# Patient Record
Sex: MALE | Race: WHITE | NOT HISPANIC OR LATINO | Employment: FULL TIME | URBAN - METROPOLITAN AREA
[De-identification: names, ages, dates, MRNs, and addresses within clinical notes are randomized per-mention and may not be internally consistent; named-entity substitution may affect disease eponyms.]

---

## 2020-01-01 ENCOUNTER — HOSPITAL ENCOUNTER (OUTPATIENT)
Dept: INFUSION CENTER | Facility: HOSPITAL | Age: 55
Discharge: HOME | End: 2020-12-22
Attending: INTERNAL MEDICINE
Payer: COMMERCIAL

## 2020-01-01 ENCOUNTER — APPOINTMENT (OUTPATIENT)
Dept: RADIATION ONCOLOGY | Facility: CLINIC | Age: 55
End: 2020-01-01
Attending: RADIOLOGY
Payer: COMMERCIAL

## 2020-01-01 ENCOUNTER — TELEPHONE (OUTPATIENT)
Dept: HEMATOLOGY ONCOLOGY | Facility: CLINIC | Age: 55
End: 2020-01-01

## 2020-01-01 ENCOUNTER — APPOINTMENT (OUTPATIENT)
Dept: PHYSICAL THERAPY | Facility: CLINIC | Age: 55
End: 2020-01-01
Payer: COMMERCIAL

## 2020-01-01 ENCOUNTER — OFFICE VISIT (OUTPATIENT)
Dept: PHYSICAL THERAPY | Facility: CLINIC | Age: 55
End: 2020-01-01
Payer: COMMERCIAL

## 2020-01-01 ENCOUNTER — HOSPITAL ENCOUNTER (OUTPATIENT)
Dept: INFUSION CENTER | Facility: HOSPITAL | Age: 55
Discharge: HOME | End: 2020-12-01
Attending: INTERNAL MEDICINE
Payer: COMMERCIAL

## 2020-01-01 ENCOUNTER — APPOINTMENT (OUTPATIENT)
Dept: SPEECH THERAPY | Facility: CLINIC | Age: 55
End: 2020-01-01
Payer: COMMERCIAL

## 2020-01-01 ENCOUNTER — TELEPHONE (OUTPATIENT)
Dept: RADIATION ONCOLOGY | Facility: CLINIC | Age: 55
End: 2020-01-01

## 2020-01-01 ENCOUNTER — APPOINTMENT (OUTPATIENT)
Dept: OCCUPATIONAL THERAPY | Facility: CLINIC | Age: 55
End: 2020-01-01
Payer: COMMERCIAL

## 2020-01-01 ENCOUNTER — HOSPITAL ENCOUNTER (OUTPATIENT)
Dept: INFUSION CENTER | Facility: HOSPITAL | Age: 55
Discharge: HOME | End: 2020-12-03
Attending: INTERNAL MEDICINE
Payer: COMMERCIAL

## 2020-01-01 ENCOUNTER — HOSPITAL ENCOUNTER (OUTPATIENT)
Dept: INFUSION CENTER | Facility: HOSPITAL | Age: 55
End: 2020-01-01
Attending: INTERNAL MEDICINE

## 2020-01-01 ENCOUNTER — HOSPITAL ENCOUNTER (OUTPATIENT)
Dept: NON INVASIVE DIAGNOSTICS | Facility: HOSPITAL | Age: 55
Discharge: HOME | End: 2020-12-23
Attending: INTERNAL MEDICINE
Payer: COMMERCIAL

## 2020-01-01 ENCOUNTER — HOSPITAL ENCOUNTER (OUTPATIENT)
Dept: INFUSION CENTER | Facility: HOSPITAL | Age: 55
Discharge: HOME | End: 2020-12-26
Payer: COMMERCIAL

## 2020-01-01 ENCOUNTER — TELEMEDICINE (OUTPATIENT)
Dept: RADIATION ONCOLOGY | Facility: CLINIC | Age: 55
End: 2020-01-01
Attending: RADIOLOGY

## 2020-01-01 ENCOUNTER — HOSPITAL ENCOUNTER (OUTPATIENT)
Dept: INFUSION CENTER | Facility: HOSPITAL | Age: 55
Discharge: HOME | End: 2020-12-23
Attending: INTERNAL MEDICINE
Payer: COMMERCIAL

## 2020-01-01 ENCOUNTER — TELEPHONE (OUTPATIENT)
Dept: PALLIATIVE MEDICINE | Facility: CLINIC | Age: 55
End: 2020-01-01

## 2020-01-01 ENCOUNTER — HOSPITAL ENCOUNTER (OUTPATIENT)
Dept: MRI IMAGING | Facility: HOSPITAL | Age: 55
Discharge: HOME | End: 2020-10-30
Payer: COMMERCIAL

## 2020-01-01 ENCOUNTER — DOCUMENTATION (OUTPATIENT)
Dept: HEMATOLOGY ONCOLOGY | Facility: CLINIC | Age: 55
End: 2020-01-01

## 2020-01-01 ENCOUNTER — HOSPITAL ENCOUNTER (OUTPATIENT)
Dept: INFUSION CENTER | Facility: HOSPITAL | Age: 55
Discharge: HOME | End: 2020-12-04
Payer: COMMERCIAL

## 2020-01-01 ENCOUNTER — OFFICE VISIT (OUTPATIENT)
Dept: SPEECH THERAPY | Facility: CLINIC | Age: 55
End: 2020-01-01
Payer: COMMERCIAL

## 2020-01-01 ENCOUNTER — TELEPHONE (OUTPATIENT)
Dept: NEUROSURGERY | Facility: CLINIC | Age: 55
End: 2020-01-01

## 2020-01-01 ENCOUNTER — HOSPITAL ENCOUNTER (OUTPATIENT)
Dept: MRI IMAGING | Facility: HOSPITAL | Age: 55
Discharge: HOME | End: 2020-12-01
Payer: COMMERCIAL

## 2020-01-01 ENCOUNTER — HOSPITAL ENCOUNTER (OUTPATIENT)
Dept: INFUSION CENTER | Facility: HOSPITAL | Age: 55
Discharge: HOME | End: 2020-11-24
Attending: INTERNAL MEDICINE

## 2020-01-01 ENCOUNTER — OFFICE VISIT (OUTPATIENT)
Dept: HEMATOLOGY ONCOLOGY | Facility: CLINIC | Age: 55
End: 2020-01-01
Payer: COMMERCIAL

## 2020-01-01 ENCOUNTER — EVALUATION (OUTPATIENT)
Dept: PHYSICAL THERAPY | Facility: CLINIC | Age: 55
End: 2020-01-01
Payer: COMMERCIAL

## 2020-01-01 ENCOUNTER — LAB (OUTPATIENT)
Dept: LAB | Facility: CLINIC | Age: 55
End: 2020-01-01
Payer: COMMERCIAL

## 2020-01-01 ENCOUNTER — HOSPITAL ENCOUNTER (OUTPATIENT)
Dept: INFUSION CENTER | Facility: HOSPITAL | Age: 55
Discharge: HOME | End: 2020-10-28
Payer: COMMERCIAL

## 2020-01-01 ENCOUNTER — OFFICE VISIT (OUTPATIENT)
Dept: OCCUPATIONAL THERAPY | Facility: CLINIC | Age: 55
End: 2020-01-01
Payer: COMMERCIAL

## 2020-01-01 ENCOUNTER — EVALUATION (OUTPATIENT)
Dept: SPEECH THERAPY | Facility: CLINIC | Age: 55
End: 2020-01-01
Payer: COMMERCIAL

## 2020-01-01 ENCOUNTER — TRANSCRIBE ORDERS (OUTPATIENT)
Dept: PHYSICAL THERAPY | Facility: CLINIC | Age: 55
End: 2020-01-01

## 2020-01-01 ENCOUNTER — HOSPITAL ENCOUNTER (OUTPATIENT)
Dept: INFUSION CENTER | Facility: HOSPITAL | Age: 55
Discharge: HOME | End: 2020-11-23
Attending: INTERNAL MEDICINE

## 2020-01-01 ENCOUNTER — HOSPITAL ENCOUNTER (OUTPATIENT)
Dept: INFUSION CENTER | Facility: HOSPITAL | Age: 55
Discharge: HOME | End: 2020-12-24
Attending: INTERNAL MEDICINE
Payer: COMMERCIAL

## 2020-01-01 ENCOUNTER — HOSPITAL ENCOUNTER (OUTPATIENT)
Dept: INFUSION CENTER | Facility: HOSPITAL | Age: 55
Discharge: HOME | End: 2020-11-06
Attending: INTERNAL MEDICINE
Payer: COMMERCIAL

## 2020-01-01 ENCOUNTER — TRANSCRIBE ORDERS (OUTPATIENT)
Dept: SPEECH THERAPY | Facility: CLINIC | Age: 55
End: 2020-01-01

## 2020-01-01 ENCOUNTER — HOSPITAL ENCOUNTER (OUTPATIENT)
Dept: INFUSION CENTER | Facility: HOSPITAL | Age: 55
Discharge: HOME | End: 2020-12-02
Attending: INTERNAL MEDICINE

## 2020-01-01 ENCOUNTER — HOSPITAL ENCOUNTER (OUTPATIENT)
Dept: INFUSION CENTER | Facility: HOSPITAL | Age: 55
Discharge: HOME | End: 2020-11-05
Attending: INTERNAL MEDICINE
Payer: COMMERCIAL

## 2020-01-01 ENCOUNTER — CONSULT (OUTPATIENT)
Dept: PALLIATIVE MEDICINE | Facility: CLINIC | Age: 55
End: 2020-01-01
Payer: COMMERCIAL

## 2020-01-01 ENCOUNTER — HOSPITAL ENCOUNTER (OUTPATIENT)
Dept: CT IMAGING | Facility: HOSPITAL | Age: 55
Discharge: HOME | End: 2020-12-31
Attending: INTERNAL MEDICINE
Payer: COMMERCIAL

## 2020-01-01 ENCOUNTER — EVALUATION (OUTPATIENT)
Dept: OCCUPATIONAL THERAPY | Facility: CLINIC | Age: 55
End: 2020-01-01
Payer: COMMERCIAL

## 2020-01-01 ENCOUNTER — HOSPITAL ENCOUNTER (OUTPATIENT)
Dept: INFUSION CENTER | Facility: HOSPITAL | Age: 55
Discharge: HOME | End: 2020-11-04
Attending: INTERNAL MEDICINE
Payer: COMMERCIAL

## 2020-01-01 ENCOUNTER — HOSPITAL ENCOUNTER (OUTPATIENT)
Dept: MRI IMAGING | Facility: HOSPITAL | Age: 55
Discharge: HOME | End: 2020-12-09
Attending: INTERNAL MEDICINE
Payer: COMMERCIAL

## 2020-01-01 ENCOUNTER — HOSPITAL ENCOUNTER (OUTPATIENT)
Dept: INFUSION CENTER | Facility: HOSPITAL | Age: 55
Discharge: HOME | End: 2020-12-02
Attending: INTERNAL MEDICINE
Payer: COMMERCIAL

## 2020-01-01 VITALS
SYSTOLIC BLOOD PRESSURE: 162 MMHG | BODY MASS INDEX: 26.19 KG/M2 | DIASTOLIC BLOOD PRESSURE: 95 MMHG | HEART RATE: 80 BPM | HEIGHT: 67 IN | WEIGHT: 166.89 LBS | TEMPERATURE: 99.1 F

## 2020-01-01 VITALS
BODY MASS INDEX: 28.11 KG/M2 | HEART RATE: 85 BPM | SYSTOLIC BLOOD PRESSURE: 120 MMHG | DIASTOLIC BLOOD PRESSURE: 80 MMHG | OXYGEN SATURATION: 99 % | WEIGHT: 181.2 LBS | TEMPERATURE: 97.9 F | RESPIRATION RATE: 14 BRPM

## 2020-01-01 VITALS
RESPIRATION RATE: 18 BRPM | DIASTOLIC BLOOD PRESSURE: 75 MMHG | HEART RATE: 86 BPM | HEIGHT: 67 IN | TEMPERATURE: 98.2 F | SYSTOLIC BLOOD PRESSURE: 126 MMHG | BODY MASS INDEX: 27.96 KG/M2 | WEIGHT: 178.13 LBS

## 2020-01-01 VITALS
WEIGHT: 167.33 LBS | HEIGHT: 67 IN | DIASTOLIC BLOOD PRESSURE: 67 MMHG | BODY MASS INDEX: 26.26 KG/M2 | HEART RATE: 80 BPM | TEMPERATURE: 97.9 F | SYSTOLIC BLOOD PRESSURE: 130 MMHG | RESPIRATION RATE: 16 BRPM

## 2020-01-01 VITALS
BODY MASS INDEX: 26.19 KG/M2 | SYSTOLIC BLOOD PRESSURE: 120 MMHG | HEART RATE: 68 BPM | HEIGHT: 67 IN | TEMPERATURE: 98.1 F | DIASTOLIC BLOOD PRESSURE: 80 MMHG | WEIGHT: 166.89 LBS | RESPIRATION RATE: 18 BRPM

## 2020-01-01 VITALS
WEIGHT: 178.13 LBS | HEIGHT: 67 IN | BODY MASS INDEX: 27.96 KG/M2 | SYSTOLIC BLOOD PRESSURE: 136 MMHG | DIASTOLIC BLOOD PRESSURE: 91 MMHG | TEMPERATURE: 97.7 F | HEART RATE: 71 BPM | RESPIRATION RATE: 16 BRPM

## 2020-01-01 VITALS
TEMPERATURE: 98.1 F | HEART RATE: 66 BPM | DIASTOLIC BLOOD PRESSURE: 76 MMHG | BODY MASS INDEX: 26.26 KG/M2 | HEIGHT: 67 IN | SYSTOLIC BLOOD PRESSURE: 109 MMHG | RESPIRATION RATE: 18 BRPM | WEIGHT: 167.33 LBS

## 2020-01-01 VITALS
HEART RATE: 56 BPM | SYSTOLIC BLOOD PRESSURE: 149 MMHG | TEMPERATURE: 98.8 F | RESPIRATION RATE: 18 BRPM | BODY MASS INDEX: 26.26 KG/M2 | DIASTOLIC BLOOD PRESSURE: 91 MMHG | HEIGHT: 67 IN | WEIGHT: 167.33 LBS

## 2020-01-01 VITALS
OXYGEN SATURATION: 94 % | HEART RATE: 60 BPM | DIASTOLIC BLOOD PRESSURE: 69 MMHG | RESPIRATION RATE: 18 BRPM | SYSTOLIC BLOOD PRESSURE: 120 MMHG | WEIGHT: 166.89 LBS | BODY MASS INDEX: 26.19 KG/M2 | HEIGHT: 67 IN

## 2020-01-01 VITALS
DIASTOLIC BLOOD PRESSURE: 72 MMHG | BODY MASS INDEX: 26.62 KG/M2 | TEMPERATURE: 97.7 F | OXYGEN SATURATION: 96 % | WEIGHT: 169.6 LBS | HEIGHT: 67 IN | HEART RATE: 86 BPM | RESPIRATION RATE: 18 BRPM | SYSTOLIC BLOOD PRESSURE: 112 MMHG

## 2020-01-01 VITALS
SYSTOLIC BLOOD PRESSURE: 118 MMHG | WEIGHT: 166.1 LBS | HEIGHT: 67 IN | DIASTOLIC BLOOD PRESSURE: 70 MMHG | TEMPERATURE: 97.7 F | OXYGEN SATURATION: 97 % | BODY MASS INDEX: 26.07 KG/M2 | HEART RATE: 64 BPM | RESPIRATION RATE: 18 BRPM

## 2020-01-01 VITALS — TEMPERATURE: 97.2 F

## 2020-01-01 VITALS
DIASTOLIC BLOOD PRESSURE: 82 MMHG | HEIGHT: 67 IN | SYSTOLIC BLOOD PRESSURE: 134 MMHG | RESPIRATION RATE: 16 BRPM | WEIGHT: 178.13 LBS | TEMPERATURE: 98.8 F | HEART RATE: 68 BPM | BODY MASS INDEX: 27.96 KG/M2

## 2020-01-01 VITALS — TEMPERATURE: 97.4 F

## 2020-01-01 VITALS — TEMPERATURE: 98.3 F

## 2020-01-01 DIAGNOSIS — C79.31 BRAIN METASTASES (HCC): Primary | ICD-10-CM

## 2020-01-01 DIAGNOSIS — C34.32 SMALL CELL LUNG CANCER, LEFT LOWER LOBE (HCC): Primary | ICD-10-CM

## 2020-01-01 DIAGNOSIS — G93.89 BRAIN MASS: Primary | ICD-10-CM

## 2020-01-01 DIAGNOSIS — T45.1X5A CHEMOTHERAPY-INDUCED NEUTROPENIA (HCC): ICD-10-CM

## 2020-01-01 DIAGNOSIS — R26.9 NEUROLOGIC GAIT DYSFUNCTION: ICD-10-CM

## 2020-01-01 DIAGNOSIS — C34.32 SMALL CELL LUNG CANCER, LEFT LOWER LOBE (HCC): ICD-10-CM

## 2020-01-01 DIAGNOSIS — C79.31 BRAIN METASTASES (HCC): ICD-10-CM

## 2020-01-01 DIAGNOSIS — R60.9 EDEMA, UNSPECIFIED TYPE: ICD-10-CM

## 2020-01-01 DIAGNOSIS — R47.01 APHASIA: Primary | ICD-10-CM

## 2020-01-01 DIAGNOSIS — D70.1 CHEMOTHERAPY-INDUCED NEUTROPENIA (HCC): ICD-10-CM

## 2020-01-01 DIAGNOSIS — R47.01 APHASIA: ICD-10-CM

## 2020-01-01 DIAGNOSIS — H54.40 BLINDNESS OF LEFT EYE, UNSPECIFIED RIGHT EYE VISUAL IMPAIRMENT CATEGORY: ICD-10-CM

## 2020-01-01 DIAGNOSIS — Z03.818 ENCOUNTER FOR OBSERVATION FOR SUSPECTED EXPOSURE TO OTHER BIOLOGICAL AGENTS RULED OUT: Primary | ICD-10-CM

## 2020-01-01 DIAGNOSIS — I82.4Z1 ACUTE DEEP VEIN THROMBOSIS (DVT) OF DISTAL VEIN OF RIGHT LOWER EXTREMITY (HCC): ICD-10-CM

## 2020-01-01 DIAGNOSIS — C34.90 MALIGNANT NEOPLASM OF LUNG, UNSPECIFIED LATERALITY, UNSPECIFIED PART OF LUNG (HCC): Primary | ICD-10-CM

## 2020-01-01 DIAGNOSIS — Z71.89 COUNSELING REGARDING ADVANCED CARE PLANNING AND GOALS OF CARE: ICD-10-CM

## 2020-01-01 DIAGNOSIS — Z45.2 ENCOUNTER FOR CENTRAL LINE CARE: ICD-10-CM

## 2020-01-01 DIAGNOSIS — Z03.818 ENCOUNTER FOR OBSERVATION FOR SUSPECTED EXPOSURE TO OTHER BIOLOGICAL AGENTS RULED OUT: ICD-10-CM

## 2020-01-01 DIAGNOSIS — R60.9 EDEMA, UNSPECIFIED TYPE: Primary | ICD-10-CM

## 2020-01-01 DIAGNOSIS — G93.89 BRAIN MASS: ICD-10-CM

## 2020-01-01 DIAGNOSIS — I82.4Z1 ACUTE DEEP VEIN THROMBOSIS (DVT) OF DISTAL VEIN OF RIGHT LOWER EXTREMITY (HCC): Primary | ICD-10-CM

## 2020-01-01 LAB
ALBUMIN SERPL BCP-MCNC: 3.3 G/DL (ref 3.5–5)
ALBUMIN SERPL BCP-MCNC: 3.4 G/DL (ref 3.5–5.7)
ALBUMIN SERPL BCP-MCNC: 3.6 G/DL (ref 3.5–5.7)
ALBUMIN SERPL BCP-MCNC: 3.8 G/DL (ref 3.5–5)
ALP SERPL-CCNC: 114 U/L (ref 46–116)
ALP SERPL-CCNC: 116 U/L (ref 46–116)
ALP SERPL-CCNC: 83 U/L (ref 40–150)
ALP SERPL-CCNC: 91 U/L (ref 40–150)
ALT SERPL W P-5'-P-CCNC: 28 U/L (ref 7–52)
ALT SERPL W P-5'-P-CCNC: 37 U/L (ref 7–52)
ALT SERPL W P-5'-P-CCNC: 38 U/L (ref 12–78)
ALT SERPL W P-5'-P-CCNC: 45 U/L (ref 12–78)
ANION GAP SERPL CALCULATED.3IONS-SCNC: 5 MMOL/L (ref 4–13)
ANION GAP SERPL CALCULATED.3IONS-SCNC: 5 MMOL/L (ref 4–13)
ANION GAP SERPL CALCULATED.3IONS-SCNC: 6 MMOL/L (ref 4–13)
ANION GAP SERPL CALCULATED.3IONS-SCNC: 7 MMOL/L (ref 4–13)
ANISOCYTOSIS BLD QL SMEAR: PRESENT
ANISOCYTOSIS BLD QL SMEAR: PRESENT
AST SERPL W P-5'-P-CCNC: 15 U/L (ref 13–39)
AST SERPL W P-5'-P-CCNC: 17 U/L (ref 13–39)
AST SERPL W P-5'-P-CCNC: 23 U/L (ref 5–45)
AST SERPL W P-5'-P-CCNC: 24 U/L (ref 5–45)
BASOPHILS # BLD AUTO: 0.01 THOUSANDS/ΜL (ref 0–0.1)
BASOPHILS # BLD MANUAL: 0 THOUSAND/UL (ref 0–0.1)
BASOPHILS NFR BLD AUTO: 0 % (ref 0–1)
BASOPHILS NFR MAR MANUAL: 0 % (ref 0–1)
BILIRUB SERPL-MCNC: 0.5 MG/DL (ref 0.2–1)
BILIRUB SERPL-MCNC: 0.5 MG/DL (ref 0.2–1)
BILIRUB SERPL-MCNC: 0.55 MG/DL (ref 0.2–1)
BILIRUB SERPL-MCNC: 0.75 MG/DL (ref 0.2–1)
BUN SERPL-MCNC: 11 MG/DL (ref 7–25)
BUN SERPL-MCNC: 6 MG/DL (ref 5–25)
BUN SERPL-MCNC: 8 MG/DL (ref 5–25)
BUN SERPL-MCNC: 8 MG/DL (ref 7–25)
CALCIUM ALBUM COR SERPL-MCNC: 10.1 MG/DL (ref 8.3–10.1)
CALCIUM ALBUM COR SERPL-MCNC: 9.1 MG/DL (ref 8.3–10.1)
CALCIUM SERPL-MCNC: 8.6 MG/DL (ref 8.6–10.5)
CALCIUM SERPL-MCNC: 8.8 MG/DL (ref 8.6–10.5)
CALCIUM SERPL-MCNC: 9.4 MG/DL (ref 8.3–10.1)
CALCIUM SERPL-MCNC: 9.5 MG/DL (ref 8.3–10.1)
CHLORIDE SERPL-SCNC: 104 MMOL/L (ref 98–107)
CHLORIDE SERPL-SCNC: 106 MMOL/L (ref 100–108)
CHLORIDE SERPL-SCNC: 106 MMOL/L (ref 98–107)
CHLORIDE SERPL-SCNC: 108 MMOL/L (ref 100–108)
CO2 SERPL-SCNC: 26 MMOL/L (ref 21–32)
CO2 SERPL-SCNC: 28 MMOL/L (ref 21–31)
CO2 SERPL-SCNC: 28 MMOL/L (ref 21–32)
CO2 SERPL-SCNC: 29 MMOL/L (ref 21–31)
CREAT SERPL-MCNC: 0.59 MG/DL (ref 0.7–1.3)
CREAT SERPL-MCNC: 0.6 MG/DL (ref 0.7–1.3)
CREAT SERPL-MCNC: 0.73 MG/DL (ref 0.6–1.3)
CREAT SERPL-MCNC: 0.81 MG/DL (ref 0.6–1.3)
CRP SERPL QL: 34.5 MG/L
EOSINOPHIL # BLD AUTO: 0 THOUSAND/ΜL (ref 0–0.61)
EOSINOPHIL # BLD MANUAL: 0 THOUSAND/UL (ref 0–0.4)
EOSINOPHIL NFR BLD AUTO: 0 % (ref 0–6)
EOSINOPHIL NFR BLD MANUAL: 0 % (ref 0–6)
ERYTHROCYTE [DISTWIDTH] IN BLOOD BY AUTOMATED COUNT: 13.9 % (ref 11.5–14.5)
ERYTHROCYTE [DISTWIDTH] IN BLOOD BY AUTOMATED COUNT: 14.4 % (ref 11.5–14.5)
ERYTHROCYTE [DISTWIDTH] IN BLOOD BY AUTOMATED COUNT: 15.1 % (ref 11.6–15.1)
ERYTHROCYTE [DISTWIDTH] IN BLOOD BY AUTOMATED COUNT: 15.5 % (ref 11.6–15.1)
ERYTHROCYTE [DISTWIDTH] IN BLOOD BY AUTOMATED COUNT: 18.8 % (ref 11.5–14.5)
ERYTHROCYTE [SEDIMENTATION RATE] IN BLOOD: 17 MM/HOUR (ref 0–19)
GFR SERPL CREATININE-BSD FRML MDRD: 101 ML/MIN/1.73SQ M
GFR SERPL CREATININE-BSD FRML MDRD: 106 ML/MIN/1.73SQ M
GFR SERPL CREATININE-BSD FRML MDRD: 115 ML/MIN/1.73SQ M
GFR SERPL CREATININE-BSD FRML MDRD: 116 ML/MIN/1.73SQ M
GIANT PLATELETS BLD QL SMEAR: PRESENT
GLUCOSE SERPL-MCNC: 109 MG/DL (ref 65–99)
GLUCOSE SERPL-MCNC: 114 MG/DL (ref 65–99)
GLUCOSE SERPL-MCNC: 92 MG/DL (ref 65–140)
GLUCOSE SERPL-MCNC: 94 MG/DL (ref 65–140)
HCT VFR BLD AUTO: 27.8 % (ref 42–47)
HCT VFR BLD AUTO: 35.3 % (ref 42–47)
HCT VFR BLD AUTO: 35.4 % (ref 42–47)
HCT VFR BLD AUTO: 37.1 % (ref 36.5–49.3)
HCT VFR BLD AUTO: 38.1 % (ref 36.5–49.3)
HGB BLD-MCNC: 12.1 G/DL (ref 12–17)
HGB BLD-MCNC: 12.4 G/DL (ref 12–17)
HGB BLD-MCNC: 12.4 G/DL (ref 14–18)
HGB BLD-MCNC: 12.6 G/DL (ref 14–18)
HGB BLD-MCNC: 9.3 G/DL (ref 14–18)
IMM GRANULOCYTES # BLD AUTO: 0.01 THOUSAND/UL (ref 0–0.2)
IMM GRANULOCYTES NFR BLD AUTO: 0 % (ref 0–2)
LDH SERPL-CCNC: 247 U/L (ref 84–246)
LYMPHOCYTES # BLD AUTO: 1.17 THOUSAND/UL (ref 0.6–4.47)
LYMPHOCYTES # BLD AUTO: 1.28 THOUSAND/UL (ref 0.6–4.47)
LYMPHOCYTES # BLD AUTO: 1.35 THOUSANDS/ΜL (ref 0.6–4.47)
LYMPHOCYTES # BLD AUTO: 1.56 THOUSAND/UL (ref 0.6–4.47)
LYMPHOCYTES # BLD AUTO: 1.99 THOUSAND/UL (ref 0.6–4.47)
LYMPHOCYTES # BLD AUTO: 15 % (ref 20–51)
LYMPHOCYTES # BLD AUTO: 19 % (ref 20–51)
LYMPHOCYTES # BLD AUTO: 20 % (ref 14–44)
LYMPHOCYTES # BLD AUTO: 40 % (ref 20–51)
LYMPHOCYTES NFR BLD AUTO: 59 % (ref 14–44)
MCH RBC QN AUTO: 30.7 PG (ref 26.8–34.3)
MCH RBC QN AUTO: 31 PG (ref 26.8–34.3)
MCH RBC QN AUTO: 31 PG (ref 26–34)
MCH RBC QN AUTO: 32 PG (ref 26–34)
MCH RBC QN AUTO: 32.2 PG (ref 26–34)
MCHC RBC AUTO-ENTMCNC: 32.5 G/DL (ref 31.4–37.4)
MCHC RBC AUTO-ENTMCNC: 32.6 G/DL (ref 31.4–37.4)
MCHC RBC AUTO-ENTMCNC: 33.5 G/DL (ref 31–37)
MCHC RBC AUTO-ENTMCNC: 35.2 G/DL (ref 31–37)
MCHC RBC AUTO-ENTMCNC: 35.7 G/DL (ref 31–37)
MCV RBC AUTO: 90 FL (ref 81–99)
MCV RBC AUTO: 91 FL (ref 81–99)
MCV RBC AUTO: 93 FL (ref 81–99)
MCV RBC AUTO: 94 FL (ref 82–98)
MCV RBC AUTO: 95 FL (ref 82–98)
METAMYELOCYTES NFR BLD MANUAL: 2 % (ref 0–1)
METAMYELOCYTES NFR BLD MANUAL: 5 % (ref 0–1)
MONOCYTES # BLD AUTO: 0.39 THOUSAND/ΜL (ref 0.17–1.22)
MONOCYTES # BLD AUTO: 0.51 THOUSAND/UL (ref 0–1.22)
MONOCYTES # BLD AUTO: 0.7 THOUSAND/UL (ref 0–1.22)
MONOCYTES # BLD AUTO: 0.74 THOUSAND/UL (ref 0–1.22)
MONOCYTES # BLD AUTO: 1.01 THOUSAND/UL (ref 0–1.22)
MONOCYTES NFR BLD AUTO: 13 % (ref 4–12)
MONOCYTES NFR BLD AUTO: 16 % (ref 4–12)
MONOCYTES NFR BLD AUTO: 17 % (ref 4–12)
MONOCYTES NFR BLD AUTO: 9 % (ref 4–12)
MONOCYTES NFR BLD: 7 % (ref 4–12)
MYELOCYTES NFR BLD MANUAL: 5 % (ref 0–1)
NEUTROPHILS # BLD AUTO: 0.54 THOUSANDS/ΜL (ref 1.85–7.62)
NEUTROPHILS # BLD MANUAL: 7.05 THOUSAND/UL (ref 1.85–7.62)
NEUTS BAND NFR BLD MANUAL: 1 % (ref 0–8)
NEUTS BAND NFR BLD MANUAL: 10 % (ref 0–8)
NEUTS SEG # BLD: 1.09 THOUSAND/UL (ref 1.81–6.82)
NEUTS SEG # BLD: 5.62 THOUSAND/UL (ref 1.81–6.82)
NEUTS SEG # BLD: 5.9 THOUSAND/UL (ref 1.81–6.82)
NEUTS SEG NFR BLD AUTO: 24 % (ref 43–75)
NEUTS SEG NFR BLD AUTO: 34 % (ref 42–75)
NEUTS SEG NFR BLD AUTO: 62 % (ref 42–75)
NEUTS SEG NFR BLD AUTO: 70 % (ref 43–75)
NEUTS SEG NFR BLD AUTO: 72 % (ref 42–75)
NRBC BLD AUTO-RTO: 0 /100 WBCS
NRBC BLD AUTO-RTO: 0 /100 WBCS
PLATELET # BLD AUTO: 164 THOUSANDS/UL (ref 149–390)
PLATELET # BLD AUTO: 237 THOUSANDS/UL (ref 149–390)
PLATELET # BLD AUTO: 246 THOUSANDS/UL (ref 149–390)
PLATELET # BLD AUTO: 253 THOUSANDS/UL (ref 149–390)
PLATELET # BLD AUTO: 319 THOUSANDS/UL (ref 149–390)
PLATELET BLD QL SMEAR: ADEQUATE
PLATELET CLUMP BLD QL SMEAR: PRESENT
PMV BLD AUTO: 10.2 FL (ref 8.9–12.7)
PMV BLD AUTO: 10.8 FL (ref 8.9–12.7)
PMV BLD AUTO: 7.3 FL (ref 8.6–11.7)
PMV BLD AUTO: 7.7 FL (ref 8.6–11.7)
PMV BLD AUTO: 7.8 FL (ref 8.6–11.7)
POLYCHROMASIA BLD QL SMEAR: PRESENT
POTASSIUM SERPL-SCNC: 3.7 MMOL/L (ref 3.5–5.5)
POTASSIUM SERPL-SCNC: 4 MMOL/L (ref 3.5–5.5)
POTASSIUM SERPL-SCNC: 4.1 MMOL/L (ref 3.5–5.3)
POTASSIUM SERPL-SCNC: 4.3 MMOL/L (ref 3.5–5.3)
PROT SERPL-MCNC: 6.3 G/DL (ref 6.4–8.9)
PROT SERPL-MCNC: 6.3 G/DL (ref 6.4–8.9)
PROT SERPL-MCNC: 7.1 G/DL (ref 6.4–8.2)
PROT SERPL-MCNC: 7.3 G/DL (ref 6.4–8.2)
RBC # BLD AUTO: 3 MILLION/UL (ref 4.3–5.9)
RBC # BLD AUTO: 3.88 MILLION/UL (ref 4.3–5.9)
RBC # BLD AUTO: 3.9 MILLION/UL (ref 3.88–5.62)
RBC # BLD AUTO: 3.92 MILLION/UL (ref 4.3–5.9)
RBC # BLD AUTO: 4.04 MILLION/UL (ref 3.88–5.62)
RBC MORPH BLD: ABNORMAL
RBC MORPH BLD: NORMAL
RBC MORPH BLD: NORMAL
SARS-COV-2 RNA SPEC QL NAA+PROBE: NOT DETECTED
SODIUM SERPL-SCNC: 139 MMOL/L (ref 134–143)
SODIUM SERPL-SCNC: 139 MMOL/L (ref 136–145)
SODIUM SERPL-SCNC: 139 MMOL/L (ref 136–145)
SODIUM SERPL-SCNC: 141 MMOL/L (ref 134–143)
TOTAL CELLS COUNTED SPEC: 100
TSH SERPL DL<=0.05 MIU/L-ACNC: 1.09 UIU/ML (ref 0.36–3.74)
WBC # BLD AUTO: 2.3 THOUSAND/UL (ref 4.31–10.16)
WBC # BLD AUTO: 3.2 THOUSAND/UL (ref 4.8–10.8)
WBC # BLD AUTO: 7.8 THOUSAND/UL (ref 4.8–10.8)
WBC # BLD AUTO: 8.2 THOUSAND/UL (ref 4.8–10.8)
WBC # BLD AUTO: 9.93 THOUSAND/UL (ref 4.31–10.16)

## 2020-01-01 PROCEDURE — 74177 CT ABD & PELVIS W/CONTRAST: CPT

## 2020-01-01 PROCEDURE — 96413 CHEMO IV INFUSION 1 HR: CPT

## 2020-01-01 PROCEDURE — 99215 OFFICE O/P EST HI 40 MIN: CPT | Performed by: NURSE PRACTITIONER

## 2020-01-01 PROCEDURE — G1004 CDSM NDSC: HCPCS

## 2020-01-01 PROCEDURE — A9577 INJ MULTIHANCE: HCPCS | Performed by: RADIOLOGY

## 2020-01-01 PROCEDURE — 80053 COMPREHEN METABOLIC PANEL: CPT

## 2020-01-01 PROCEDURE — 97112 NEUROMUSCULAR REEDUCATION: CPT

## 2020-01-01 PROCEDURE — 96367 TX/PROPH/DG ADDL SEQ IV INF: CPT

## 2020-01-01 PROCEDURE — 36415 COLL VENOUS BLD VENIPUNCTURE: CPT

## 2020-01-01 PROCEDURE — 96417 CHEMO IV INFUS EACH ADDL SEQ: CPT

## 2020-01-01 PROCEDURE — 97530 THERAPEUTIC ACTIVITIES: CPT

## 2020-01-01 PROCEDURE — 96415 CHEMO IV INFUSION ADDL HR: CPT

## 2020-01-01 PROCEDURE — 85025 COMPLETE CBC W/AUTO DIFF WBC: CPT

## 2020-01-01 PROCEDURE — 72158 MRI LUMBAR SPINE W/O & W/DYE: CPT

## 2020-01-01 PROCEDURE — 77334 RADIATION TREATMENT AID(S): CPT | Performed by: RADIOLOGY

## 2020-01-01 PROCEDURE — 97110 THERAPEUTIC EXERCISES: CPT | Performed by: PHYSICAL THERAPIST

## 2020-01-01 PROCEDURE — 77370 RADIATION PHYSICS CONSULT: CPT | Performed by: RADIOLOGY

## 2020-01-01 PROCEDURE — 92507 TX SP LANG VOICE COMM INDIV: CPT | Performed by: NURSE PRACTITIONER

## 2020-01-01 PROCEDURE — 85007 BL SMEAR W/DIFF WBC COUNT: CPT

## 2020-01-01 PROCEDURE — 77412 RADIATION TX DELIVERY LVL 3: CPT | Performed by: RADIOLOGY

## 2020-01-01 PROCEDURE — 85027 COMPLETE CBC AUTOMATED: CPT

## 2020-01-01 PROCEDURE — 96372 THER/PROPH/DIAG INJ SC/IM: CPT

## 2020-01-01 PROCEDURE — 77295 3-D RADIOTHERAPY PLAN: CPT | Performed by: RADIOLOGY

## 2020-01-01 PROCEDURE — 99214 OFFICE O/P EST MOD 30 MIN: CPT | Performed by: INTERNAL MEDICINE

## 2020-01-01 PROCEDURE — 85027 COMPLETE CBC AUTOMATED: CPT | Performed by: INTERNAL MEDICINE

## 2020-01-01 PROCEDURE — 86140 C-REACTIVE PROTEIN: CPT

## 2020-01-01 PROCEDURE — 85007 BL SMEAR W/DIFF WBC COUNT: CPT | Performed by: INTERNAL MEDICINE

## 2020-01-01 PROCEDURE — 99211 OFF/OP EST MAY X REQ PHY/QHP: CPT | Performed by: RADIOLOGY

## 2020-01-01 PROCEDURE — 96105 ASSESSMENT OF APHASIA: CPT | Performed by: NURSE PRACTITIONER

## 2020-01-01 PROCEDURE — 97112 NEUROMUSCULAR REEDUCATION: CPT | Performed by: PHYSICAL THERAPIST

## 2020-01-01 PROCEDURE — 97110 THERAPEUTIC EXERCISES: CPT

## 2020-01-01 PROCEDURE — 77331 SPECIAL RADIATION DOSIMETRY: CPT | Performed by: RADIOLOGY

## 2020-01-01 PROCEDURE — U0003 INFECTIOUS AGENT DETECTION BY NUCLEIC ACID (DNA OR RNA); SEVERE ACUTE RESPIRATORY SYNDROME CORONAVIRUS 2 (SARS-COV-2) (CORONAVIRUS DISEASE [COVID-19]), AMPLIFIED PROBE TECHNIQUE, MAKING USE OF HIGH THROUGHPUT TECHNOLOGIES AS DESCRIBED BY CMS-2020-01-R: HCPCS | Performed by: NURSE PRACTITIONER

## 2020-01-01 PROCEDURE — 85652 RBC SED RATE AUTOMATED: CPT

## 2020-01-01 PROCEDURE — 71260 CT THORAX DX C+: CPT

## 2020-01-01 PROCEDURE — 93970 EXTREMITY STUDY: CPT | Performed by: SURGERY

## 2020-01-01 PROCEDURE — A9577 INJ MULTIHANCE: HCPCS | Performed by: INTERNAL MEDICINE

## 2020-01-01 PROCEDURE — 72157 MRI CHEST SPINE W/O & W/DYE: CPT

## 2020-01-01 PROCEDURE — 97167 OT EVAL HIGH COMPLEX 60 MIN: CPT

## 2020-01-01 PROCEDURE — 97116 GAIT TRAINING THERAPY: CPT | Performed by: PHYSICAL THERAPIST

## 2020-01-01 PROCEDURE — 83615 LACTATE (LD) (LDH) ENZYME: CPT

## 2020-01-01 PROCEDURE — 84443 ASSAY THYROID STIM HORMONE: CPT

## 2020-01-01 PROCEDURE — A9577 INJ MULTIHANCE: HCPCS | Performed by: NURSE PRACTITIONER

## 2020-01-01 PROCEDURE — 77300 RADIATION THERAPY DOSE PLAN: CPT | Performed by: RADIOLOGY

## 2020-01-01 PROCEDURE — 97162 PT EVAL MOD COMPLEX 30 MIN: CPT | Performed by: PHYSICAL THERAPIST

## 2020-01-01 PROCEDURE — 70553 MRI BRAIN STEM W/O & W/DYE: CPT

## 2020-01-01 PROCEDURE — 80053 COMPREHEN METABOLIC PANEL: CPT | Performed by: INTERNAL MEDICINE

## 2020-01-01 PROCEDURE — 77290 THER RAD SIMULAJ FIELD CPLX: CPT | Performed by: RADIOLOGY

## 2020-01-01 PROCEDURE — 96411 CHEMO IV PUSH ADDL DRUG: CPT

## 2020-01-01 PROCEDURE — 72156 MRI NECK SPINE W/O & W/DYE: CPT

## 2020-01-01 PROCEDURE — 92507 TX SP LANG VOICE COMM INDIV: CPT

## 2020-01-01 PROCEDURE — 77280 THER RAD SIMULAJ FIELD SMPL: CPT | Performed by: RADIOLOGY

## 2020-01-01 PROCEDURE — 77387 GUIDANCE FOR RADJ TX DLVR: CPT | Performed by: RADIOLOGY

## 2020-01-01 PROCEDURE — 93970 EXTREMITY STUDY: CPT

## 2020-01-01 RX ORDER — SODIUM CHLORIDE 9 MG/ML
20 INJECTION, SOLUTION INTRAVENOUS ONCE
Status: CANCELLED | OUTPATIENT
Start: 2020-01-01

## 2020-01-01 RX ORDER — SODIUM CHLORIDE 9 MG/ML
20 INJECTION, SOLUTION INTRAVENOUS ONCE
Status: COMPLETED | OUTPATIENT
Start: 2020-01-01 | End: 2020-01-01

## 2020-01-01 RX ORDER — ASPIRIN 81 MG/1
162 TABLET ORAL DAILY
COMMUNITY

## 2020-01-01 RX ORDER — THIAMINE MONONITRATE (VIT B1) 100 MG
100 TABLET ORAL DAILY
COMMUNITY

## 2020-01-01 RX ORDER — DEXAMETHASONE 2 MG/1
2 TABLET ORAL 2 TIMES DAILY WITH MEALS
Qty: 28 TABLET | Refills: 0 | Status: SHIPPED | OUTPATIENT
Start: 2020-01-01

## 2020-01-01 RX ADMIN — FOSAPREPITANT 150 MG: 150 INJECTION, POWDER, LYOPHILIZED, FOR SOLUTION INTRAVENOUS at 12:42

## 2020-01-01 RX ADMIN — DEXAMETHASONE SODIUM PHOSPHATE: 10 INJECTION, SOLUTION INTRAMUSCULAR; INTRAVENOUS at 10:30

## 2020-01-01 RX ADMIN — CARBOPLATIN 750 MG: 10 INJECTION, SOLUTION INTRAVENOUS at 15:29

## 2020-01-01 RX ADMIN — ETOPOSIDE 200 MG: 20 INJECTION INTRAVENOUS at 12:41

## 2020-01-01 RX ADMIN — DEXAMETHASONE SODIUM PHOSPHATE: 10 INJECTION, SOLUTION INTRAMUSCULAR; INTRAVENOUS at 10:48

## 2020-01-01 RX ADMIN — CARBOPLATIN 736.5 MG: 10 INJECTION, SOLUTION INTRAVENOUS at 13:46

## 2020-01-01 RX ADMIN — DEXAMETHASONE SODIUM PHOSPHATE: 10 INJECTION, SOLUTION INTRAMUSCULAR; INTRAVENOUS at 12:23

## 2020-01-01 RX ADMIN — DEXAMETHASONE SODIUM PHOSPHATE: 10 INJECTION, SOLUTION INTRAMUSCULAR; INTRAVENOUS at 10:26

## 2020-01-01 RX ADMIN — DURVALUMAB 1500 MG: 500 INJECTION, SOLUTION INTRAVENOUS at 11:48

## 2020-01-01 RX ADMIN — DEXAMETHASONE SODIUM PHOSPHATE: 10 INJECTION, SOLUTION INTRAMUSCULAR; INTRAVENOUS at 11:55

## 2020-01-01 RX ADMIN — FOSAPREPITANT 150 MG: 150 INJECTION, POWDER, LYOPHILIZED, FOR SOLUTION INTRAVENOUS at 10:53

## 2020-01-01 RX ADMIN — GADOBENATE DIMEGLUMINE 8 ML: 529 INJECTION, SOLUTION INTRAVENOUS at 14:32

## 2020-01-01 RX ADMIN — GADOBENATE DIMEGLUMINE 8 ML: 529 INJECTION, SOLUTION INTRAVENOUS at 11:06

## 2020-01-01 RX ADMIN — SODIUM CHLORIDE 20 ML/HR: 0.9 INJECTION, SOLUTION INTRAVENOUS at 12:54

## 2020-01-01 RX ADMIN — DEXAMETHASONE SODIUM PHOSPHATE: 10 INJECTION, SOLUTION INTRAMUSCULAR; INTRAVENOUS at 12:57

## 2020-01-01 RX ADMIN — CARBOPLATIN 750 MG: 10 INJECTION, SOLUTION INTRAVENOUS at 13:04

## 2020-01-01 RX ADMIN — ETOPOSIDE 200 MG: 20 INJECTION INTRAVENOUS at 10:52

## 2020-01-01 RX ADMIN — SODIUM CHLORIDE 20 ML/HR: 0.9 INJECTION, SOLUTION INTRAVENOUS at 11:55

## 2020-01-01 RX ADMIN — SODIUM CHLORIDE 20 ML/HR: 0.9 INJECTION, SOLUTION INTRAVENOUS at 12:24

## 2020-01-01 RX ADMIN — ETOPOSIDE 200 MG: 20 INJECTION INTRAVENOUS at 12:23

## 2020-01-01 RX ADMIN — DEXAMETHASONE SODIUM PHOSPHATE: 10 INJECTION, SOLUTION INTRAMUSCULAR; INTRAVENOUS at 12:14

## 2020-01-01 RX ADMIN — DURVALUMAB 1500 MG: 500 INJECTION, SOLUTION INTRAVENOUS at 11:32

## 2020-01-01 RX ADMIN — ETOPOSIDE 200 MG: 20 INJECTION INTRAVENOUS at 09:09

## 2020-01-01 RX ADMIN — SODIUM CHLORIDE 20 ML/HR: 0.9 INJECTION, SOLUTION INTRAVENOUS at 12:08

## 2020-01-01 RX ADMIN — ETOPOSIDE 200 MG: 20 INJECTION INTRAVENOUS at 13:02

## 2020-01-01 RX ADMIN — IOHEXOL 100 ML: 350 INJECTION, SOLUTION INTRAVENOUS at 11:45

## 2020-01-01 RX ADMIN — ETOPOSIDE 200 MG: 20 INJECTION INTRAVENOUS at 13:35

## 2020-01-01 RX ADMIN — GADOBENATE DIMEGLUMINE 9 ML: 529 INJECTION, SOLUTION INTRAVENOUS at 14:06

## 2020-01-01 RX ADMIN — SODIUM CHLORIDE 20 ML/HR: 0.9 INJECTION, SOLUTION INTRAVENOUS at 08:28

## 2020-01-01 RX ADMIN — SODIUM CHLORIDE 20 ML/HR: 0.9 INJECTION, SOLUTION INTRAVENOUS at 10:26

## 2020-01-01 RX ADMIN — SODIUM CHLORIDE 20 ML/HR: 0.9 INJECTION, SOLUTION INTRAVENOUS at 10:21

## 2020-01-01 RX ADMIN — DURVALUMAB 1500 MG: 500 INJECTION, SOLUTION INTRAVENOUS at 13:18

## 2020-01-01 RX ADMIN — ETOPOSIDE 200 MG: 20 INJECTION INTRAVENOUS at 14:10

## 2020-01-01 RX ADMIN — DEXAMETHASONE SODIUM PHOSPHATE: 10 INJECTION, SOLUTION INTRAMUSCULAR; INTRAVENOUS at 12:08

## 2020-01-01 RX ADMIN — SODIUM CHLORIDE 20 ML/HR: 0.9 INJECTION, SOLUTION INTRAVENOUS at 10:46

## 2020-01-01 RX ADMIN — DEXAMETHASONE SODIUM PHOSPHATE: 10 INJECTION, SOLUTION INTRAMUSCULAR; INTRAVENOUS at 08:29

## 2020-01-01 RX ADMIN — FOSAPREPITANT 150 MG: 150 INJECTION, POWDER, LYOPHILIZED, FOR SOLUTION INTRAVENOUS at 11:12

## 2020-01-01 RX ADMIN — ETOPOSIDE 200 MG: 20 INJECTION INTRAVENOUS at 12:53

## 2020-01-01 RX ADMIN — PEGFILGRASTIM-CBQV 6 MG: 6 INJECTION, SOLUTION SUBCUTANEOUS at 13:39

## 2020-01-01 RX ADMIN — PEGFILGRASTIM-CBQV 6 MG: 6 INJECTION, SOLUTION SUBCUTANEOUS at 08:01

## 2020-01-01 RX ADMIN — ETOPOSIDE 200 MG: 20 INJECTION INTRAVENOUS at 14:23

## 2020-01-01 RX ADMIN — SODIUM CHLORIDE 20 ML/HR: 0.9 INJECTION, SOLUTION INTRAVENOUS at 12:13

## 2020-08-30 ENCOUNTER — APPOINTMENT (EMERGENCY)
Dept: CT IMAGING | Facility: HOSPITAL | Age: 55
End: 2020-08-30
Payer: COMMERCIAL

## 2020-08-30 ENCOUNTER — HOSPITAL ENCOUNTER (EMERGENCY)
Facility: HOSPITAL | Age: 55
End: 2020-08-30
Attending: EMERGENCY MEDICINE | Admitting: EMERGENCY MEDICINE
Payer: COMMERCIAL

## 2020-08-30 ENCOUNTER — OFFICE VISIT (OUTPATIENT)
Dept: URGENT CARE | Facility: CLINIC | Age: 55
End: 2020-08-30
Payer: COMMERCIAL

## 2020-08-30 ENCOUNTER — APPOINTMENT (INPATIENT)
Dept: RADIOLOGY | Facility: HOSPITAL | Age: 55
DRG: 021 | End: 2020-08-30
Payer: COMMERCIAL

## 2020-08-30 ENCOUNTER — HOSPITAL ENCOUNTER (INPATIENT)
Facility: HOSPITAL | Age: 55
LOS: 5 days | Discharge: HOME | DRG: 021 | End: 2020-09-04
Attending: INTERNAL MEDICINE | Admitting: INTERNAL MEDICINE
Payer: COMMERCIAL

## 2020-08-30 VITALS
HEIGHT: 70 IN | OXYGEN SATURATION: 96 % | BODY MASS INDEX: 27.2 KG/M2 | SYSTOLIC BLOOD PRESSURE: 177 MMHG | TEMPERATURE: 97.7 F | HEART RATE: 62 BPM | RESPIRATION RATE: 20 BRPM | WEIGHT: 190 LBS | DIASTOLIC BLOOD PRESSURE: 94 MMHG

## 2020-08-30 VITALS
DIASTOLIC BLOOD PRESSURE: 99 MMHG | HEIGHT: 70 IN | BODY MASS INDEX: 27.23 KG/M2 | HEART RATE: 65 BPM | WEIGHT: 190.2 LBS | TEMPERATURE: 97.8 F | OXYGEN SATURATION: 98 % | SYSTOLIC BLOOD PRESSURE: 177 MMHG | RESPIRATION RATE: 18 BRPM

## 2020-08-30 DIAGNOSIS — R91.8 LUNG MASS: ICD-10-CM

## 2020-08-30 DIAGNOSIS — F10.20 UNCOMPLICATED ALCOHOL DEPENDENCE (HCC): ICD-10-CM

## 2020-08-30 DIAGNOSIS — G93.89 BRAIN MASS: Primary | ICD-10-CM

## 2020-08-30 DIAGNOSIS — R29.810 FACIAL DROOP: Primary | ICD-10-CM

## 2020-08-30 DIAGNOSIS — E78.5 HYPERLIPIDEMIA: ICD-10-CM

## 2020-08-30 DIAGNOSIS — I10 HYPERTENSION: ICD-10-CM

## 2020-08-30 DIAGNOSIS — H54.7 DECREASED VISUAL ACUITY: ICD-10-CM

## 2020-08-30 DIAGNOSIS — H53.9 CHANGES IN VISION: ICD-10-CM

## 2020-08-30 DIAGNOSIS — R91.8 MASS OF LEFT LUNG: ICD-10-CM

## 2020-08-30 DIAGNOSIS — G93.89 BRAIN MASS: ICD-10-CM

## 2020-08-30 PROBLEM — Z72.0 TOBACCO ABUSE: Status: ACTIVE | Noted: 2020-08-30

## 2020-08-30 LAB
ALBUMIN SERPL BCP-MCNC: 4.7 G/DL (ref 3.5–5.7)
ALP SERPL-CCNC: 69 U/L (ref 40–150)
ALT SERPL W P-5'-P-CCNC: 22 U/L (ref 7–52)
ANION GAP SERPL CALCULATED.3IONS-SCNC: 7 MMOL/L (ref 4–13)
APTT PPP: 24 SECONDS (ref 23–37)
AST SERPL W P-5'-P-CCNC: 16 U/L (ref 13–39)
ATRIAL RATE: 57 BPM
BASOPHILS # BLD AUTO: 0 THOUSANDS/ΜL (ref 0–0.1)
BASOPHILS NFR BLD AUTO: 1 % (ref 0–2)
BILIRUB SERPL-MCNC: 0.5 MG/DL (ref 0.2–1)
BUN SERPL-MCNC: 14 MG/DL (ref 7–25)
CALCIUM SERPL-MCNC: 9.4 MG/DL (ref 8.6–10.5)
CHLORIDE SERPL-SCNC: 103 MMOL/L (ref 98–107)
CHOLEST SERPL-MCNC: 213 MG/DL (ref 0–200)
CO2 SERPL-SCNC: 30 MMOL/L (ref 21–31)
CREAT SERPL-MCNC: 0.8 MG/DL (ref 0.7–1.3)
EOSINOPHIL # BLD AUTO: 0.2 THOUSAND/ΜL (ref 0–0.61)
EOSINOPHIL NFR BLD AUTO: 3 % (ref 0–5)
ERYTHROCYTE [DISTWIDTH] IN BLOOD BY AUTOMATED COUNT: 13.2 % (ref 11.5–14.5)
GFR SERPL CREATININE-BSD FRML MDRD: 102 ML/MIN/1.73SQ M
GLUCOSE SERPL-MCNC: 107 MG/DL (ref 65–140)
GLUCOSE SERPL-MCNC: 99 MG/DL (ref 65–99)
HCT VFR BLD AUTO: 48.7 % (ref 42–47)
HDLC SERPL-MCNC: 45 MG/DL
HGB BLD-MCNC: 16.6 G/DL (ref 14–18)
INR PPP: 1.02 (ref 0.84–1.19)
LACTATE SERPL-SCNC: 1.2 MMOL/L (ref 0.5–2)
LDLC SERPL CALC-MCNC: 131 MG/DL (ref 0–100)
LYMPHOCYTES # BLD AUTO: 1.8 THOUSANDS/ΜL (ref 0.6–4.47)
LYMPHOCYTES NFR BLD AUTO: 24 % (ref 21–51)
MCH RBC QN AUTO: 31.2 PG (ref 26–34)
MCHC RBC AUTO-ENTMCNC: 34 G/DL (ref 31–37)
MCV RBC AUTO: 92 FL (ref 81–99)
MONOCYTES # BLD AUTO: 0.6 THOUSAND/ΜL (ref 0.17–1.22)
MONOCYTES NFR BLD AUTO: 9 % (ref 2–12)
NEUTROPHILS # BLD AUTO: 4.7 THOUSANDS/ΜL (ref 1.4–6.5)
NEUTS SEG NFR BLD AUTO: 64 % (ref 42–75)
P AXIS: 50 DEGREES
PLATELET # BLD AUTO: 191 THOUSANDS/UL (ref 149–390)
PLATELET # BLD AUTO: 193 THOUSANDS/UL (ref 149–390)
PMV BLD AUTO: 11.3 FL (ref 8.9–12.7)
PMV BLD AUTO: 9.8 FL (ref 8.6–11.7)
POTASSIUM SERPL-SCNC: 4.1 MMOL/L (ref 3.5–5.5)
PR INTERVAL: 180 MS
PROT SERPL-MCNC: 7.3 G/DL (ref 6.4–8.9)
PROTHROMBIN TIME: 13.3 SECONDS (ref 11.6–14.5)
QRS AXIS: -46 DEGREES
QRSD INTERVAL: 90 MS
QT INTERVAL: 420 MS
QTC INTERVAL: 408 MS
RBC # BLD AUTO: 5.3 MILLION/UL (ref 4.3–5.9)
SODIUM SERPL-SCNC: 140 MMOL/L (ref 134–143)
T WAVE AXIS: 66 DEGREES
TRIGL SERPL-MCNC: 183 MG/DL (ref 44–166)
TROPONIN I SERPL-MCNC: <0.03 NG/ML
VENTRICULAR RATE: 57 BPM
WBC # BLD AUTO: 7.4 THOUSAND/UL (ref 4.8–10.8)

## 2020-08-30 PROCEDURE — 96374 THER/PROPH/DIAG INJ IV PUSH: CPT

## 2020-08-30 PROCEDURE — NC001 PR NO CHARGE: Performed by: NEUROLOGICAL SURGERY

## 2020-08-30 PROCEDURE — G1004 CDSM NDSC: HCPCS

## 2020-08-30 PROCEDURE — 85025 COMPLETE CBC W/AUTO DIFF WBC: CPT | Performed by: EMERGENCY MEDICINE

## 2020-08-30 PROCEDURE — 80061 LIPID PANEL: CPT | Performed by: STUDENT IN AN ORGANIZED HEALTH CARE EDUCATION/TRAINING PROGRAM

## 2020-08-30 PROCEDURE — 93005 ELECTROCARDIOGRAM TRACING: CPT

## 2020-08-30 PROCEDURE — 85610 PROTHROMBIN TIME: CPT | Performed by: EMERGENCY MEDICINE

## 2020-08-30 PROCEDURE — 82948 REAGENT STRIP/BLOOD GLUCOSE: CPT | Performed by: NURSE PRACTITIONER

## 2020-08-30 PROCEDURE — 99285 EMERGENCY DEPT VISIT HI MDM: CPT

## 2020-08-30 PROCEDURE — 80053 COMPREHEN METABOLIC PANEL: CPT | Performed by: EMERGENCY MEDICINE

## 2020-08-30 PROCEDURE — NC001 PR NO CHARGE: Performed by: INTERNAL MEDICINE

## 2020-08-30 PROCEDURE — 93010 ELECTROCARDIOGRAM REPORT: CPT | Performed by: INTERNAL MEDICINE

## 2020-08-30 PROCEDURE — 99285 EMERGENCY DEPT VISIT HI MDM: CPT | Performed by: EMERGENCY MEDICINE

## 2020-08-30 PROCEDURE — 85049 AUTOMATED PLATELET COUNT: CPT | Performed by: STUDENT IN AN ORGANIZED HEALTH CARE EDUCATION/TRAINING PROGRAM

## 2020-08-30 PROCEDURE — G0382 LEV 3 HOSP TYPE B ED VISIT: HCPCS | Performed by: NURSE PRACTITIONER

## 2020-08-30 PROCEDURE — 85730 THROMBOPLASTIN TIME PARTIAL: CPT | Performed by: EMERGENCY MEDICINE

## 2020-08-30 PROCEDURE — 36415 COLL VENOUS BLD VENIPUNCTURE: CPT | Performed by: EMERGENCY MEDICINE

## 2020-08-30 PROCEDURE — 83605 ASSAY OF LACTIC ACID: CPT | Performed by: EMERGENCY MEDICINE

## 2020-08-30 PROCEDURE — 71270 CT THORAX DX C-/C+: CPT

## 2020-08-30 PROCEDURE — 70553 MRI BRAIN STEM W/O & W/DYE: CPT

## 2020-08-30 PROCEDURE — 84484 ASSAY OF TROPONIN QUANT: CPT | Performed by: EMERGENCY MEDICINE

## 2020-08-30 PROCEDURE — 74178 CT ABD&PLV WO CNTR FLWD CNTR: CPT

## 2020-08-30 PROCEDURE — 70450 CT HEAD/BRAIN W/O DYE: CPT

## 2020-08-30 RX ORDER — HEPARIN SODIUM 5000 [USP'U]/ML
5000 INJECTION, SOLUTION INTRAVENOUS; SUBCUTANEOUS EVERY 8 HOURS SCHEDULED
Status: DISCONTINUED | OUTPATIENT
Start: 2020-08-30 | End: 2020-08-31

## 2020-08-30 RX ORDER — NICOTINE 21 MG/24HR
21 PATCH, TRANSDERMAL 24 HOURS TRANSDERMAL ONCE
Status: DISCONTINUED | OUTPATIENT
Start: 2020-08-30 | End: 2020-08-30 | Stop reason: HOSPADM

## 2020-08-30 RX ORDER — LABETALOL 20 MG/4 ML (5 MG/ML) INTRAVENOUS SYRINGE
10 EVERY 4 HOURS PRN
Status: DISCONTINUED | OUTPATIENT
Start: 2020-08-30 | End: 2020-09-04 | Stop reason: HOSPADM

## 2020-08-30 RX ORDER — THIAMINE MONONITRATE (VIT B1) 100 MG
100 TABLET ORAL DAILY
Status: DISCONTINUED | OUTPATIENT
Start: 2020-08-31 | End: 2020-09-04 | Stop reason: HOSPADM

## 2020-08-30 RX ORDER — LISINOPRIL 10 MG/1
10 TABLET ORAL DAILY
Status: DISCONTINUED | OUTPATIENT
Start: 2020-08-31 | End: 2020-09-02

## 2020-08-30 RX ORDER — FOLIC ACID 1 MG/1
1 TABLET ORAL DAILY
Status: DISCONTINUED | OUTPATIENT
Start: 2020-08-31 | End: 2020-09-04 | Stop reason: HOSPADM

## 2020-08-30 RX ORDER — LORAZEPAM 0.5 MG/1
0.5 TABLET ORAL ONCE
Status: COMPLETED | OUTPATIENT
Start: 2020-08-30 | End: 2020-08-30

## 2020-08-30 RX ORDER — DEXAMETHASONE SODIUM PHOSPHATE 10 MG/ML
10 INJECTION, SOLUTION INTRAMUSCULAR; INTRAVENOUS ONCE
Status: COMPLETED | OUTPATIENT
Start: 2020-08-30 | End: 2020-08-30

## 2020-08-30 RX ORDER — NICOTINE 21 MG/24HR
1 PATCH, TRANSDERMAL 24 HOURS TRANSDERMAL DAILY
Status: DISCONTINUED | OUTPATIENT
Start: 2020-08-31 | End: 2020-09-04 | Stop reason: HOSPADM

## 2020-08-30 RX ADMIN — IOHEXOL 100 ML: 350 INJECTION, SOLUTION INTRAVENOUS at 23:15

## 2020-08-30 RX ADMIN — LABETALOL 20 MG/4 ML (5 MG/ML) INTRAVENOUS SYRINGE 10 MG: at 20:40

## 2020-08-30 RX ADMIN — NICOTINE 21 MG: 21 PATCH, EXTENDED RELEASE TRANSDERMAL at 16:52

## 2020-08-30 RX ADMIN — LORAZEPAM 0.5 MG: 0.5 TABLET ORAL at 21:28

## 2020-08-30 RX ADMIN — DEXAMETHASONE SODIUM PHOSPHATE 10 MG: 10 INJECTION, SOLUTION INTRAMUSCULAR; INTRAVENOUS at 16:52

## 2020-08-30 NOTE — ED PROVIDER NOTES
History  Chief Complaint   Patient presents with    Facial Droop     According to the patient, he has had a left sided facial droop starting about 1 5 weeks     Facial droop and blurred vision and facial numbness for about 10-14 days  As per family they noted him with facial droop last night at party, they also noted he was squinting oddly with the left eye  Pt states left eyey vision is decreased  Also reported periorbital numbness to the left ocular region  reported right facial droop  No LE/UE weakness is reported  No further sensation changes are reported  No trauma, recent illness,, uri or uti sx are reported  No cp, palps, sob  No leg swelling  States he does not see a doctor and thus has no pmhx  Denies trouble speaking but family states his voice sounds different  None       History reviewed  No pertinent past medical history  Past Surgical History:   Procedure Laterality Date    HAND SURGERY Left        History reviewed  No pertinent family history  I have reviewed and agree with the history as documented  E-Cigarette/Vaping    E-Cigarette Use Never User      E-Cigarette/Vaping Substances     Social History     Tobacco Use    Smoking status: Current Every Day Smoker     Packs/day: 1 50    Smokeless tobacco: Never Used   Substance Use Topics    Alcohol use: Yes     Frequency: 4 or more times a week     Binge frequency: Daily or almost daily    Drug use: Never       Review of Systems   All other systems reviewed and are negative  Physical Exam  Physical Exam  Constitutional:       General: He is not in acute distress  Appearance: He is not ill-appearing, toxic-appearing or diaphoretic  HENT:      Head: Normocephalic and atraumatic  Right Ear: External ear normal       Left Ear: External ear normal       Nose: Nose normal       Mouth/Throat:      Mouth: Mucous membranes are moist       Pharynx: No oropharyngeal exudate     Eyes:      General:         Right eye: No discharge  Left eye: No discharge  Conjunctiva/sclera: Conjunctivae normal       Pupils: Pupils are equal, round, and reactive to light  Neck:      Musculoskeletal: Normal range of motion and neck supple  No neck rigidity or muscular tenderness  Vascular: No JVD  Trachea: No tracheal deviation  Cardiovascular:      Rate and Rhythm: Normal rate and regular rhythm  Pulses: Normal pulses  Heart sounds: Normal heart sounds  No murmur  No friction rub  No gallop  Pulmonary:      Effort: No respiratory distress  Breath sounds: No stridor  No wheezing, rhonchi or rales  Chest:      Chest wall: No tenderness  Abdominal:      General: Bowel sounds are normal  There is no distension  Palpations: Abdomen is soft  There is no mass  Tenderness: There is no abdominal tenderness  There is no guarding or rebound  Hernia: No hernia is present  Musculoskeletal: Normal range of motion  General: No swelling, tenderness, deformity or signs of injury  Right lower leg: No edema  Left lower leg: No edema  Skin:     General: Skin is warm and dry  Capillary Refill: Capillary refill takes less than 2 seconds  Coloration: Skin is not pale  Findings: No rash  Neurological:      Mental Status: He is alert and oriented to person, place, and time  Sensory: No sensory deficit  Motor: No abnormal muscle tone  Deep Tendon Reflexes: Reflexes normal       Comments: There is a right sided facial droop that does not involve the forearm  The left eye is with decreased vision on my exam, when he closes the eye on the right can clearly see two fingers but when the other eye is closed he can not see two fingers and reported one blurry finger  He can lift both arms off table for 10 sec and both le off for 5 sec has equal motor strength to the LE and UE  normal H test  Eye is not injected  PERLLA   His speach appears high pitched and he does occasionally miss a word  NIHSS 3 for facial palsy and mild dysarthria            Vital Signs  ED Triage Vitals [08/30/20 1349]   Temperature Pulse Respirations Blood Pressure SpO2   97 7 °F (36 5 °C) 65 18 (!) 197/100 98 %      Temp Source Heart Rate Source Patient Position - Orthostatic VS BP Location FiO2 (%)   Temporal Monitor Lying Right arm --      Pain Score       --           Vitals:    08/30/20 1545 08/30/20 1600 08/30/20 1630 08/30/20 1731   BP: 165/89 165/85 169/94 (!) 177/94   Pulse: 65 62 74 62   Patient Position - Orthostatic VS: Lying Lying  Lying         Visual Acuity      ED Medications  Medications   dexamethasone (PF) (DECADRON) injection 10 mg (10 mg Intravenous Given 8/30/20 1652)       Diagnostic Studies  Results Reviewed     Procedure Component Value Units Date/Time    Troponin I [[id number]]  (Normal) Collected:  08/30/20 1422    Lab Status:  Final result Specimen:  Blood from Arm, Right Updated:  08/30/20 1447     Troponin I <0 03 ng/mL     Comprehensive metabolic panel [[id number]] Collected:  08/30/20 1422    Lab Status:  Final result Specimen:  Blood from Arm, Right Updated:  08/30/20 1445     Sodium 140 mmol/L      Potassium 4 1 mmol/L      Chloride 103 mmol/L      CO2 30 mmol/L      ANION GAP 7 mmol/L      BUN 14 mg/dL      Creatinine 0 80 mg/dL      Glucose 99 mg/dL      Calcium 9 4 mg/dL      AST 16 U/L      ALT 22 U/L      Alkaline Phosphatase 69 U/L      Total Protein 7 3 g/dL      Albumin 4 7 g/dL      Total Bilirubin 0 50 mg/dL      eGFR 102 ml/min/1 73sq m     Narrative:       Mindy guidelines for Chronic Kidney Disease (CKD):     Stage 1 with normal or high GFR (GFR > 90 mL/min/1 73 square meters)    Stage 2 Mild CKD (GFR = 60-89 mL/min/1 73 square meters)    Stage 3A Moderate CKD (GFR = 45-59 mL/min/1 73 square meters)    Stage 3B Moderate CKD (GFR = 30-44 mL/min/1 73 square meters)    Stage 4 Severe CKD (GFR = 15-29 mL/min/1 73 square meters)   Stage 5 End Stage CKD (GFR <15 mL/min/1 73 square meters)  Note: GFR calculation is accurate only with a steady state creatinine    Lactic acid [[id number]]  (Normal) Collected:  08/30/20 1422    Lab Status:  Final result Specimen:  Blood from Arm, Right Updated:  08/30/20 1445     LACTIC ACID 1 2 mmol/L     Narrative:       Result may be elevated if tourniquet was used during collection  Protime-INR [[id number]]  (Normal) Collected:  08/30/20 1422    Lab Status:  Final result Specimen:  Blood from Arm, Right Updated:  08/30/20 1440     Protime 13 3 seconds      INR 1 02    APTT [[id number]]  (Normal) Collected:  08/30/20 1422    Lab Status:  Final result Specimen:  Blood from Arm, Right Updated:  08/30/20 1440     PTT 24 seconds     CBC and differential [[id number]]  (Abnormal) Collected:  08/30/20 1422    Lab Status:  Final result Specimen:  Blood from Arm, Right Updated:  08/30/20 1431     WBC 7 40 Thousand/uL      RBC 5 30 Million/uL      Hemoglobin 16 6 g/dL      Hematocrit 48 7 %      MCV 92 fL      MCH 31 2 pg      MCHC 34 0 g/dL      RDW 13 2 %      MPV 9 8 fL      Platelets 740 Thousands/uL      Neutrophils Relative 64 %      Lymphocytes Relative 24 %      Monocytes Relative 9 %      Eosinophils Relative 3 %      Basophils Relative 1 %      Neutrophils Absolute 4 70 Thousands/µL      Lymphocytes Absolute 1 80 Thousands/µL      Monocytes Absolute 0 60 Thousand/µL      Eosinophils Absolute 0 20 Thousand/µL      Basophils Absolute 0 00 Thousands/µL                  CT head without contrast   Final Result by Saranya Rdz MD (08/30 1545)      Large predominantly cystic mass within the left cerebral hemisphere with relatively mild amount of surrounding edema in the brain and minimal calcification along one margin of the lesion  This may be a cystic neoplasm (primary or metastatic) or less    likely infection  Contrast-enhanced brain MRI is recommended for further workup        Mass effect on the left lateral ventricle with 4 mm rightward midline shift and some effacement of left-sided cerebral sulci  There is surgical consultation is advised  I personally discussed this study with Kenneyharrison Josette on 8/30/2020 at 3:41 PM                      Workstation performed: [id number]                    Procedures  Procedures         ED Course       US AUDIT      Most Recent Value   Initial Alcohol Screen: US AUDIT-C    1  How often do you have a drink containing alcohol? 5 Filed at: 08/30/2020 1350   2  How many drinks containing alcohol do you have on a typical day you are drinking? 2 Filed at: 08/30/2020 1350   3a  Male UNDER 65: How often do you have five or more drinks on one occasion? 0 Filed at: 08/30/2020 1350   Audit-C Score  7 Filed at: 08/30/2020 1350                  LILIANA/DAST-10      Most Recent Value   How many times in the past year have you    Used an illegal drug or used a prescription medication for non-medical reasons? Never Filed at: 08/30/2020 1350                                MDM  Number of Diagnoses or Management Options  Brain mass:   Changes in vision:   Facial droop:   Diagnosis management comments: With neuro sx and large brain mass requiring further eval  I have discussed case with neurosgy who rec [place] transfer, dr parker of neurosgy  I have discussed case with dr Basilio Guerrero who has accepted for dr Idalia Lopez           Disposition  Final diagnoses:   Facial droop   Changes in vision   Brain mass     Time reflects when diagnosis was documented in both MDM as applicable and the Disposition within this note     Time User Action Codes Description Comment    8/30/2020  5:06 PM Shanthi Pineda Add [G95 156] Facial droop     8/30/2020  5:06 PM Shanthi Pineda Add [H53 9] Changes in vision     8/30/2020  5:07 PM Shanthi Pineda Add [G93 89] Brain mass       ED Disposition     ED Disposition Condition Date/Time Comment    Transfer to Another Facility-In Network  Sun Aug 30, 2020  5:06 PM Joanna Young should be transferred out to [facility] Documentation      Most Recent Value   Accepting Physician  Dr Marisol Lara Name, Jose Lopez   Sending MD Dr Ruiz Banuelos      RN Documentation      [facility] Name, Jose Lopez   Bed Assignment  G022      Follow-up Information    None         There are no discharge medications for this patient  No discharge procedures on file      PDMP Review     None          ED Provider  Electronically Signed by           iTng Albarran MD  08/31/20 7706

## 2020-08-30 NOTE — TELEMEDICINE
Progress Note - Neurosurgery   Delbert Santiago 49 y o  male MRN: [id number]  Unit/Bed#: ED 01 Encounter: [id number]    Assessment/Plan:    · Large cystic left frontal mass with some calcifications  · Discussed with ER at [facility]  Recommend patient txr to [facility]  · Will need MRI brain with and without gado as well as CT CAP to evaluate for primary source  Objective:     Vitals: Blood pressure 165/85, pulse 62, temperature 97 7 °F (36 5 °C), temperature source Temporal, resp  rate 20, height 5' 10" (1 778 m), weight 86 2 kg (190 lb), SpO2 96 %  ,Body mass index is 27 26 kg/m²

## 2020-08-30 NOTE — EMTALA/ACUTE CARE TRANSFER
[address] [facility]  [address] [facility] [address]  [phone]  Dept: [phone]      EMTALA TRANSFER CONSENT    NAME Kendall Madsen                                         1965                              MRN [id number]    I have been informed of my rights regarding examination, treatment, and transfer   by Dr Kaia Spain MD    Benefits:      Risks:        Transfer Request   I acknowledge that my medical condition has been evaluated and explained to me by the emergency department physician or other qualified medical person and/or my attending physician who has recommended and offered to me further medical examination and treatment  I understand the Hospital's obligation with respect to the treatment and stabilization of my emergency medical condition  I nevertheless request to be transferred  I release the Hospital, the doctor, and any other persons caring for me from all responsibility or liability for any injury or ill effects that may result from my transfer and agree to accept all responsibility for the consequences of my choice to transfer, rather than receive stabilizing treatment at the Hospital  I understand that because the transfer is my request, my insurance may not provide reimbursement for the services  The Hospital will assist and direct me and my family in how to make arrangements for transfer, but the hospital is not liable for any fees charged by the transport service  In spite of this understanding, I refuse to consent to further medical examination and treatment which has been offered to me, and request transfer to  Jaycee Savage Name, [address] : [facility]  I authorize the performance of emergency medical procedures and treatments upon me in both transit and upon arrival at the receiving facility    Additionally, I authorize the release of any and all medical records to the receiving facility and request they be transported with me, if possible  I authorize the performance of emergency medical procedures and treatments upon me in both transit and upon arrival at the receiving facility  Additionally, I authorize the release of any and all medical records to the receiving facility and request they be transported with me, if possible  I understand that the safest mode of transportation during a medical emergency is an ambulance and that the Hospital advocates the use of this mode of transport  Risks of traveling to the receiving facility by car, including absence of medical control, life sustaining equipment, such as oxygen, and medical personnel has been explained to me and I fully understand them  (TK CORRECT BOX BELOW)  [  ]  I consent to the stated transfer and to be transported by ambulance/helicopter  [  ]  I consent to the stated transfer, but refuse transportation by ambulance and accept full responsibility for my transportation by car  I understand the risks of non-ambulance transfers and I exonerate the Hospital and its staff from any deterioration in my condition that results from this refusal     X___________________________________________    DATE  20  TIME________  Signature of patient or legally responsible individual signing on patient behalf           RELATIONSHIP TO PATIENT_________________________          Provider Certification    NAME Tk Walsh                                        [facility] 1965                              MRN [id number]    A medical screening exam was performed on the above named patient  Based on the examination:    Condition Necessitating Transfer The primary encounter diagnosis was Facial droop  Diagnoses of Changes in vision and Brain mass were also pertinent to this visit      Patient Condition:      Reason for Transfer:      Transfer Requirements: [address] [place] [address]   · Space available and qualified personnel available for treatment as acknowledged by    · Agreed to accept transfer and to provide appropriate medical treatment as acknowledged by       Dr Josey Mujica  · Appropriate medical records of the examination and treatment of the patient are provided at the time of transfer   [address] [facility], [address] _______  · Transfer will be performed by qualified personnel from    and appropriate transfer equipment as required, including the use of necessary and appropriate life support measures  Provider Certification: I have examined the patient and explained the following risks and benefits of being transferred/refusing transfer to the patient/family:         Based on these reasonable risks and benefits to the patient and/or the unborn child(francisco), and based upon the information available at the time of the patients examination, I certify that the medical benefits reasonably to be expected from the provision of appropriate medical treatments at another medical facility outweigh the increasing risks, if any, to the individuals medical condition, and in the case of labor to the unborn child, from effecting the transfer      X____________________________________________ DATE 08/30/20        TIME_______      ORIGINAL - SEND TO MEDICAL RECORDS   COPY - SEND WITH PATIENT DURING TRANSFER

## 2020-08-30 NOTE — PROGRESS NOTES
[facility]        NAME: Swati Bay is a 49 y o  male  : 1965    MRN: [id number]  DATE: 2020  TIME: 1:36 PM    Assessment and Plan   Facial droop [R29 810]  1  Facial droop  Transfer to other facility   2  Decreased visual acuity  Transfer to other facility     Patient refused EMS  Form signed and on chart  Patient's brother to drive patient to ER  Patient Instructions     Proceed to  ER for further evaluation  Chief Complaint     Chief Complaint   Patient presents with    Decreased Visual Acuity     pt states that he cannot see out of his left eye, it's just black  History of Present Illness       Patient is a 55-year-old male who presents with a one-week history of decreased vision in his left eye  Patient notes that he can see occasional shapes but that he  mostly sees black out of his left eye  Denies any visual changes in the right eye  Patient also complaining of numbness around his left eye  Patient denies any headache, dizziness or feeling lightheaded  Patient denies any confusion or change in mental status  Denies any head injury or trauma  Patient denies any numbness, tingling, decreased sensation, or weakness of extremities  Denies any difficulty with ambulation  States that he did go to work all week  Patient's family at bedside  States they saw patient for the 1st time in 1 week last night and noticed his smile with asymmetrical   Patient unsure when this began  Denies any past medical history  Review of Systems   Review of Systems   Constitutional: Negative for chills, diaphoresis, fatigue and fever  HENT: Negative  Eyes: Positive for visual disturbance  Negative for photophobia, pain, discharge, redness and itching  Respiratory: Negative for cough, chest tightness, shortness of breath, wheezing and stridor  Cardiovascular: Negative for chest pain and palpitations  Gastrointestinal: Negative  Musculoskeletal: Negative  Negative for gait problem  Skin: Negative  Neurological: Positive for facial asymmetry and numbness  Negative for dizziness, tremors, seizures, syncope, speech difficulty, weakness, light-headedness and headaches  Current Medications     No current outpatient medications on file  Current Allergies     Allergies as of 08/30/2020    (No Known Allergies)            The following portions of the patient's history were reviewed and updated as appropriate: allergies, current medications, past family history, past medical history, past social history, past surgical history and problem list      History reviewed  No pertinent past medical history  History reviewed  No pertinent surgical history  History reviewed  No pertinent family history  Medications have been verified  Objective   BP (!) 177/99   Pulse 65   Temp 97 8 °F (36 6 °C) (Temporal)   Resp 18   Ht 5' 10" (1 778 m)   Wt 86 3 kg (190 lb 3 2 oz)   SpO2 98%   BMI 27 29 kg/m²        Physical Exam     Physical Exam  Constitutional:       General: He is not in acute distress  Appearance: Normal appearance  He is not diaphoretic  HENT:      Head: Normocephalic and atraumatic  Eyes:      General: Lids are normal       Extraocular Movements: Extraocular movements intact  Conjunctiva/sclera: Conjunctivae normal       Pupils: Pupils are unequal (left pupil 4, right pupil 3  reactive to light bilaterally)  Cardiovascular:      Rate and Rhythm: Normal rate and regular rhythm  Pulses: Normal pulses  Heart sounds: Normal heart sounds, S1 normal and S2 normal    Pulmonary:      Effort: Pulmonary effort is normal       Breath sounds: Normal breath sounds and air entry  Skin:     General: Skin is warm and dry  Capillary Refill: Capillary refill takes less than 2 seconds  Neurological:      Mental Status: He is alert and oriented to person, place, and time  GCS: GCS eye subscore is 4   GCS verbal subscore is 5  GCS motor subscore is 6  Cranial Nerves: Facial asymmetry (smile asymmetrical, mild right sided droop) present  No cranial nerve deficit or dysarthria  Sensory: Sensory deficit (decreased sensation to left side of face) present  Motor: Motor function is intact  Coordination: Coordination is intact  Gait: Gait is intact

## 2020-08-31 ENCOUNTER — APPOINTMENT (INPATIENT)
Dept: RADIOLOGY | Facility: HOSPITAL | Age: 55
DRG: 021 | End: 2020-08-31
Payer: COMMERCIAL

## 2020-08-31 ENCOUNTER — ANESTHESIA EVENT (INPATIENT)
Dept: PERIOP | Facility: HOSPITAL | Age: 55
DRG: 021 | End: 2020-08-31
Payer: COMMERCIAL

## 2020-08-31 PROBLEM — G93.6 CEREBRAL EDEMA (HCC): Status: ACTIVE | Noted: 2020-08-31

## 2020-08-31 PROBLEM — G93.5 BRAIN COMPRESSION (HCC): Status: ACTIVE | Noted: 2020-08-31

## 2020-08-31 PROBLEM — J43.9 EMPHYSEMA LUNG (HCC): Status: ACTIVE | Noted: 2020-08-31

## 2020-08-31 LAB
ALBUMIN SERPL BCP-MCNC: 3.6 G/DL (ref 3.5–5)
ALP SERPL-CCNC: 84 U/L (ref 46–116)
ALT SERPL W P-5'-P-CCNC: 30 U/L (ref 12–78)
ANION GAP SERPL CALCULATED.3IONS-SCNC: 4 MMOL/L (ref 4–13)
AST SERPL W P-5'-P-CCNC: 16 U/L (ref 5–45)
BACTERIA UR QL AUTO: ABNORMAL /HPF
BASOPHILS # BLD AUTO: 0.02 THOUSANDS/ΜL (ref 0–0.1)
BASOPHILS NFR BLD AUTO: 0 % (ref 0–1)
BILIRUB SERPL-MCNC: 0.36 MG/DL (ref 0.2–1)
BILIRUB UR QL STRIP: NEGATIVE
BUN SERPL-MCNC: 16 MG/DL (ref 5–25)
CALCIUM SERPL-MCNC: 8.8 MG/DL (ref 8.3–10.1)
CHLORIDE SERPL-SCNC: 107 MMOL/L (ref 100–108)
CLARITY UR: CLEAR
CO2 SERPL-SCNC: 28 MMOL/L (ref 21–32)
COLOR UR: YELLOW
CREAT SERPL-MCNC: 0.84 MG/DL (ref 0.6–1.3)
EOSINOPHIL # BLD AUTO: 0.01 THOUSAND/ΜL (ref 0–0.61)
EOSINOPHIL NFR BLD AUTO: 0 % (ref 0–6)
ERYTHROCYTE [DISTWIDTH] IN BLOOD BY AUTOMATED COUNT: 12 % (ref 11.6–15.1)
GFR SERPL CREATININE-BSD FRML MDRD: 100 ML/MIN/1.73SQ M
GLUCOSE SERPL-MCNC: 119 MG/DL (ref 65–140)
GLUCOSE UR STRIP-MCNC: NEGATIVE MG/DL
HCT VFR BLD AUTO: 46.4 % (ref 36.5–49.3)
HGB BLD-MCNC: 15.9 G/DL (ref 12–17)
HGB UR QL STRIP.AUTO: NEGATIVE
HYALINE CASTS #/AREA URNS LPF: ABNORMAL /LPF
IMM GRANULOCYTES # BLD AUTO: 0.04 THOUSAND/UL (ref 0–0.2)
IMM GRANULOCYTES NFR BLD AUTO: 1 % (ref 0–2)
KETONES UR STRIP-MCNC: NEGATIVE MG/DL
LEUKOCYTE ESTERASE UR QL STRIP: NEGATIVE
LYMPHOCYTES # BLD AUTO: 0.92 THOUSANDS/ΜL (ref 0.6–4.47)
LYMPHOCYTES NFR BLD AUTO: 11 % (ref 14–44)
MCH RBC QN AUTO: 31.2 PG (ref 26.8–34.3)
MCHC RBC AUTO-ENTMCNC: 34.3 G/DL (ref 31.4–37.4)
MCV RBC AUTO: 91 FL (ref 82–98)
MONOCYTES # BLD AUTO: 0.37 THOUSAND/ΜL (ref 0.17–1.22)
MONOCYTES NFR BLD AUTO: 4 % (ref 4–12)
NEUTROPHILS # BLD AUTO: 7.23 THOUSANDS/ΜL (ref 1.85–7.62)
NEUTS SEG NFR BLD AUTO: 84 % (ref 43–75)
NITRITE UR QL STRIP: NEGATIVE
NON-SQ EPI CELLS URNS QL MICRO: ABNORMAL /HPF
NRBC BLD AUTO-RTO: 0 /100 WBCS
PH UR STRIP.AUTO: 6 [PH]
PLATELET # BLD AUTO: 206 THOUSANDS/UL (ref 149–390)
PMV BLD AUTO: 12.3 FL (ref 8.9–12.7)
POTASSIUM SERPL-SCNC: 4 MMOL/L (ref 3.5–5.3)
PROT SERPL-MCNC: 7.4 G/DL (ref 6.4–8.2)
PROT UR STRIP-MCNC: NEGATIVE MG/DL
PSA SERPL-MCNC: 0.4 NG/ML (ref 0–4)
RBC # BLD AUTO: 5.09 MILLION/UL (ref 3.88–5.62)
RBC #/AREA URNS AUTO: ABNORMAL /HPF
SODIUM SERPL-SCNC: 139 MMOL/L (ref 136–145)
SP GR UR STRIP.AUTO: >1.045 (ref 1–1.03)
UROBILINOGEN UR QL STRIP.AUTO: 1 E.U./DL
WBC # BLD AUTO: 8.59 THOUSAND/UL (ref 4.31–10.16)
WBC #/AREA URNS AUTO: ABNORMAL /HPF

## 2020-08-31 PROCEDURE — 85025 COMPLETE CBC W/AUTO DIFF WBC: CPT | Performed by: STUDENT IN AN ORGANIZED HEALTH CARE EDUCATION/TRAINING PROGRAM

## 2020-08-31 PROCEDURE — 80053 COMPREHEN METABOLIC PANEL: CPT | Performed by: STUDENT IN AN ORGANIZED HEALTH CARE EDUCATION/TRAINING PROGRAM

## 2020-08-31 PROCEDURE — NC001 PR NO CHARGE: Performed by: INTERNAL MEDICINE

## 2020-08-31 PROCEDURE — 81001 URINALYSIS AUTO W/SCOPE: CPT | Performed by: STUDENT IN AN ORGANIZED HEALTH CARE EDUCATION/TRAINING PROGRAM

## 2020-08-31 PROCEDURE — A9585 GADOBUTROL INJECTION: HCPCS | Performed by: STUDENT IN AN ORGANIZED HEALTH CARE EDUCATION/TRAINING PROGRAM

## 2020-08-31 PROCEDURE — G1004 CDSM NDSC: HCPCS

## 2020-08-31 PROCEDURE — 99223 1ST HOSP IP/OBS HIGH 75: CPT | Performed by: PHYSICIAN ASSISTANT

## 2020-08-31 PROCEDURE — 84153 ASSAY OF PSA TOTAL: CPT | Performed by: STUDENT IN AN ORGANIZED HEALTH CARE EDUCATION/TRAINING PROGRAM

## 2020-08-31 PROCEDURE — 99222 1ST HOSP IP/OBS MODERATE 55: CPT | Performed by: INTERNAL MEDICINE

## 2020-08-31 PROCEDURE — 74183 MRI ABD W/O CNTR FLWD CNTR: CPT

## 2020-08-31 RX ORDER — ALBUTEROL SULFATE 90 UG/1
2 AEROSOL, METERED RESPIRATORY (INHALATION) EVERY 6 HOURS
Status: DISCONTINUED | OUTPATIENT
Start: 2020-08-31 | End: 2020-09-02

## 2020-08-31 RX ORDER — ATORVASTATIN CALCIUM 40 MG/1
40 TABLET, FILM COATED ORAL
Status: DISCONTINUED | OUTPATIENT
Start: 2020-08-31 | End: 2020-09-04 | Stop reason: HOSPADM

## 2020-08-31 RX ORDER — HEPARIN SODIUM 5000 [USP'U]/ML
5000 INJECTION, SOLUTION INTRAVENOUS; SUBCUTANEOUS EVERY 8 HOURS SCHEDULED
Status: COMPLETED | OUTPATIENT
Start: 2020-08-31 | End: 2020-08-31

## 2020-08-31 RX ORDER — GUAIFENESIN 600 MG
600 TABLET, EXTENDED RELEASE 12 HR ORAL EVERY 12 HOURS SCHEDULED
Status: DISCONTINUED | OUTPATIENT
Start: 2020-08-31 | End: 2020-09-04 | Stop reason: HOSPADM

## 2020-08-31 RX ORDER — ATORVASTATIN CALCIUM 80 MG/1
80 TABLET, FILM COATED ORAL
Status: DISCONTINUED | OUTPATIENT
Start: 2020-08-31 | End: 2020-08-31

## 2020-08-31 RX ORDER — SODIUM CHLORIDE 9 MG/ML
75 INJECTION, SOLUTION INTRAVENOUS CONTINUOUS
Status: DISCONTINUED | OUTPATIENT
Start: 2020-08-31 | End: 2020-09-03

## 2020-08-31 RX ORDER — ALBUTEROL SULFATE 90 UG/1
2 AEROSOL, METERED RESPIRATORY (INHALATION) EVERY 6 HOURS
Status: DISCONTINUED | OUTPATIENT
Start: 2020-08-31 | End: 2020-08-31

## 2020-08-31 RX ADMIN — ALBUTEROL SULFATE 2 PUFF: 90 AEROSOL, METERED RESPIRATORY (INHALATION) at 10:47

## 2020-08-31 RX ADMIN — THIAMINE HCL TAB 100 MG 100 MG: 100 TAB at 09:16

## 2020-08-31 RX ADMIN — GUAIFENESIN 600 MG: 600 TABLET, EXTENDED RELEASE ORAL at 21:47

## 2020-08-31 RX ADMIN — Medication 1 TABLET: at 09:16

## 2020-08-31 RX ADMIN — FOLIC ACID 1 MG: 1 TABLET ORAL at 09:16

## 2020-08-31 RX ADMIN — LISINOPRIL 10 MG: 10 TABLET ORAL at 09:16

## 2020-08-31 RX ADMIN — GADOBUTROL 9 ML: 604.72 INJECTION INTRAVENOUS at 17:38

## 2020-08-31 RX ADMIN — ATORVASTATIN CALCIUM 40 MG: 40 TABLET, FILM COATED ORAL at 18:34

## 2020-08-31 RX ADMIN — GUAIFENESIN 600 MG: 600 TABLET, EXTENDED RELEASE ORAL at 10:31

## 2020-08-31 RX ADMIN — HEPARIN SODIUM 5000 UNITS: 5000 INJECTION INTRAVENOUS; SUBCUTANEOUS at 05:47

## 2020-08-31 RX ADMIN — GADOBUTROL 8 ML: 604.72 INJECTION INTRAVENOUS at 00:08

## 2020-08-31 RX ADMIN — SODIUM CHLORIDE 125 ML/HR: 0.9 INJECTION, SOLUTION INTRAVENOUS at 18:34

## 2020-08-31 RX ADMIN — HEPARIN SODIUM 5000 UNITS: 5000 INJECTION INTRAVENOUS; SUBCUTANEOUS at 13:39

## 2020-08-31 RX ADMIN — HEPARIN SODIUM 5000 UNITS: 5000 INJECTION INTRAVENOUS; SUBCUTANEOUS at 21:48

## 2020-08-31 RX ADMIN — NICOTINE 1 PATCH: 14 PATCH TRANSDERMAL at 09:14

## 2020-08-31 NOTE — CONSULTS
Consult Note - Pulmonary and Critical Care Medicine  Meri Santiago 49 y o  male MRN: [id number]  Unit/Bed#: [facility] 110-15 Encounter: [id number]    Consult requested location: Unit/Bed#: [address] [place] [address] 613-01  Physician Requesting Consult: yRan Graff MD   Reason for Consult:  Left lung mass    Assessment:  1  Left hilar mass  2  Concern for pulmonary embolism  3  Brain mass    Plan:   Await a PE scan   Depending on results of PE scan, will consider EBUS/TBNA biopsy for diagnosis of left hilar mass with brain cystic lesion   Plan discussed with primary team    HPI:    52-year-old male with past medical history of hypertension, hyperlipidemia, tobacco abuse presents for 1 week of progressive decrease in vision and a facial droop  Initial CT head shows cystic lesion in brain  CT scan of the chest revealed a left hilar mass  He has no new complaints but continues to complain of facial weakness and decreased vision  ROS:  Twelve point review of systems was negative except for what is otherwise mentioned        Past Medical Hx  Hypertension  Hyperlipidemia  Tobacco abuse        Past Surgical Hx  Hand surgery      Family Hx  Possible paternal hypertension      Occupational History:     No known previous inhalation injuries       Medications    Current Facility-Administered Medications:     albuterol (PROVENTIL HFA,VENTOLIN HFA) inhaler 2 puff, 2 puff, Inhalation, Q6H, Alejo Guzman DO, 2 puff at 08/31/20 1047    atorvastatin (LIPITOR) tablet 40 mg, 40 mg, Oral, Daily With Dinner, Valentina Willis DO    folic acid (FOLVITE) tablet 1 mg, 1 mg, Oral, Daily, Alejo Jackson DO, 1 mg at 08/31/20 0916    guaiFENesin (MUCINEX) 12 hr tablet 600 mg, 600 mg, Oral, Q12H [facility], Alejo Guzman DO, 600 mg at 08/31/20 1031    heparin (porcine) subcutaneous injection 5,000 Units, 5,000 Units, Subcutaneous, Q8H [facility], 5,000 Units at 08/31/20 1339 **AND** [COMPLETED] Platelet count, , , Once, Eldridge Bowels, DO    Labetalol HCl (NORMODYNE) injection 10 mg, 10 mg, Intravenous, Q4H PRN, Gabe Denver, DO, 10 mg at 08/30/20 2040    lisinopril (ZESTRIL) tablet 10 mg, 10 mg, Oral, Daily, [place], DO, 10 mg at 08/31/20 0916    multivitamin-minerals (CENTRUM) tablet 1 tablet, 1 tablet, Oral, Daily, Gabe Denver, DO, 1 tablet at 08/31/20 0916    nicotine (NICODERM CQ) 14 mg/24hr TD 24 hr patch 1 patch, 1 patch, Transdermal, Daily, Cuca Hampton, DO, 1 patch at 08/31/20 0914    thiamine (VITAMIN B1) tablet 100 mg, 100 mg, Oral, Daily, Gabe [place], DO, 100 mg at 08/31/20 4889       Social History:   Smoked 1 pack per day for greater than 30 years      Vitals: Blood pressure 152/91, pulse 64, temperature 97 9 °F (36 6 °C), resp  rate 19, SpO2 94 %  , There is no height or weight on file to calculate BMI  Physical Exam:  GEN  NAD  HEENT  ncat, non icteric, MM moist  NECK  supple, no JVD, no LAD  CV  +s1s2, no mrg, RRR  PULM  CTA BL, no wrr  ABD  soft, ntnd, + BS  EXT  no edema, no cyanosis, no clubbing  NEURO  Aox3, right facial droop,    Labs: I personally viewed and interpreted but not limited to the following laboratory studies:      Imaging:  I personally viewed and interpreted the following imaging studies:  CT chest 08/30/2020 shows left hilar mass      FABIOLA Calero [facility]

## 2020-08-31 NOTE — CASE MANAGEMENT
Initial interview:     Not a Bundle   Not a ReAdmit - Transfer from [address]    CM met with the patient and his girlfriend Joanne Faulkner" at bedside to review the CM role and discuss possible dc needs  Pt lives with Keyla Faulkner in a 3 story home in [facility]  No JASIEL  2nd floor bedroom and bathroom  Pt is independent, drives and works full-time  No DME  No hx of VNA or IP rehab  Pt denied drug, etoh or mental illness history  Son Gideon Rudd and, per Keyla Faulkner, Girlfriend Keyla Faulkner is POA and pt has a LW  Girlfriend Keyla Faulkner stated that she will bring in a copy of the POA document  Prescriptions are filled at [facility] in [facility]  Main contact:   Girlfrienkathe Faulkner" Lanre Ordoñez [phone]  Son Harry Fritz [phone]  Daughter Dav Daniel [phone]    WW Plan - pend medical progression  CM reviewed d/c planning process including the following: identifying help at home, patient preference for d/c planning needs, Discharge Lounge, [facility] Meds to Bed program, availability of treatment team to discuss questions or concerns patient and/or family may have regarding understanding medications and recognizing signs and symptoms once discharged  CM also encouraged patient to follow up with all recommended appointments after discharge  Patient advised of importance for patient and family to participate in managing patients medical well being

## 2020-08-31 NOTE — ASSESSMENT & PLAN NOTE
One week history of left blurry vision and right side facial droop  Physical exam initially showed right side facial droop and right sided weakness of lower and upper extremity  This morning 7/02, patient continues to have right facial drop but bilateral weakness of 5/5  Head CT 8/30 = large predominantly cystic mass within the left cerebral hemisphere with relatively mild amount of surrounding edema in the brain and minimal calcification along one margin of the lesion  Likely metastasis from lung mass, infection unlikely  Plan:  § Follow up CT head stable, DVT prophylaxis restarted  § Wound care, ALMA drain removed  § Monitor blood pressure, maintain under  (currently 138/60)  § Continue neuro checks Q2hr   § STAT head CT if change in neuro exam or drop of GCS more than 2  § Continue Keppra for seizure ppx   § Continue Dexamethasone IV started 9/1  Will transition to PO    § Pain control with tylenol, oxycodone and dilaudid    § Neurosurgery is following   § Hematology and oncology following, follow up biopsy results

## 2020-08-31 NOTE — ASSESSMENT & PLAN NOTE
30+ pack-year history of smoking  Mild emphysema on CT chest   Coarse rhonchi in all lobes bilaterally    Patient reports chronic cough, unchanged or worsening  - Continue Albuterol PRN  - Follow up as outpatient

## 2020-08-31 NOTE — H&P
INTERNAL MEDICINE RESIDENCY ADMISSION H&P     Name: Robbie Sandhu   Age & Sex: 49 y o  male   MRN: [id number]  Unit/Bed#: [facility] 613-01   Encounter: [id number]  Primary Care Provider: No primary care provider on file  Code Status: Level 1 - Full Code  Admission Status: INPATIENT   Disposition: Patient requires Med/Surg    Admit to team: SOD Team A    ASSESSMENT/PLAN     Principal Problem:    Brain mass  Active Problems:    Hypertension    Tobacco abuse      * Brain mass  Assessment & Plan  Patient with a one-week history of progressive decrease of vision in his left eye, right-sided facial droop, and decreased sensation of L face  CT of the head on 08/30 revealed a large predominantly cystic mass within the left cerebral hemisphere with relatively mild amount of surrounding edema in the brain and minimal calcification along one margin of the lesion  This may be a cystic neoplasm (primary or metastatic) or less likely infection  Mass effect on the left lateral ventricle with 4 mm rightward midline shift and some effacement of left-sided cerebral sulci  Plan:  -Neurosurgery following  -Further investigation with an MRI with and without gadolinium  -CT CAP with and without contrast to evaluate possible primary source    Hypertension  Assessment & Plan  Patient stated that he has a history of hypertension; however, he has never been treated for it  The patient states the last time he saw a primary care physician was about 5 years ago  Last documents from Dr Bina Do of [facility]  Was supposed to be taking Benicar 40-25 mg  He denies taking his blood pressure at home  Most recent /103  Possibly a component of anxiety secondary to his recent imaging findings  Plan:  -Start lisinopril 10 mg in AM  -IV labetalol 5 mg Q4 PRN for SBP >160 mmHg     Tobacco abuse  Assessment & Plan  Patient states he has about a 15 pack year history      Plan:  -Nicotine patch 21 mg  -Smoking cessation counseling upon discharge      Uncomplicated Alcohol Dependence  Patient states he drinks about 2-3 beers every day  He denies any history of tremors or withdrawal seizures  Plan:  -CIWA protocol, low suspicion that patient will withdraw heavily  -Thiamine and folic acid    Hyperlipidemia  Patient with a history based on previous documentation  Treatment only with diet  No record of lipid panel recently    Plan:  -Lipid panel ordered  -Assess need for therapy when panel results    VTE Pharmacologic Prophylaxis: Heparin  VTE Mechanical Prophylaxis: sequential compression device    CHIEF COMPLAINT   L eye blindness, R facial droop 2/2 brain mass  HISTORY OF PRESENT ILLNESS   Patient is a 26-year-old male with a past medical history of untreated hypertension, hyperlipidemia, and tobacco abuse that presented to an urgent care earlier today for 1 week of progressive decreased vision in his left eye and right-sided facial droop  Patient denied any headaches, dizziness, lightheadedness, confusion, altered mental status, trauma, and weakness  He was instructed to go to the emergency department where a CT of the head showed a large cystic mass in the left hemisphere with mild edema and calcification  At that time he was transferred to this [facility] for neurosurgery evaluation  Labwork was unremarkable  He was found to be hypertensive 171/103  Further imaging with MRI and CT CAP require further investigation and determine a primary source  Patient stated that the last time he saw a physician was about 5 years ago, and he is not current with his health maintenance (i e  colonoscopy)  Patient does have a significant family history with cancer in his mother, but the patient is unsure the type  REVIEW OF SYSTEMS     Review of Systems   Constitutional: Negative for activity change, appetite change, chills and fever     HENT: Negative for congestion, ear pain, hearing loss, postnasal drip, rhinorrhea, sneezing, sore throat and trouble swallowing  Eyes:        L eye progressive decreased vision for 1 week   Respiratory: Positive for cough  Negative for choking and chest tightness  Cardiovascular: Negative for chest pain, palpitations and leg swelling  Gastrointestinal: Negative for abdominal distention, abdominal pain, blood in stool, constipation, diarrhea, nausea and vomiting  Genitourinary: Negative for difficulty urinating and dysuria  Musculoskeletal: Negative for arthralgias and myalgias  Skin: Negative for rash  Allergic/Immunologic: Negative for environmental allergies and food allergies  Neurological: Positive for facial asymmetry and numbness  Negative for dizziness, weakness, light-headedness and headaches  Hematological: Negative for adenopathy  Psychiatric/Behavioral: Negative for agitation, behavioral problems and confusion  OBJECTIVE     Vitals:    20 1935 20 1953   BP: 147/85 (!) 171/103   BP Location: Right arm Right arm   Pulse: 68 68   Resp: 20 20   Temp: 98 8 °F (37 1 °C) 98 °F (36 7 °C)   TempSrc: Tympanic Oral   SpO2: 93%       Temperature:   Temp (24hrs), Av 1 °F (36 7 °C), Min:97 7 °F (36 5 °C), Max:98 8 °F (37 1 °C)    Temperature: 98 °F (36 7 °C)  Intake & Output:  I/O     None        Weights: There is no height or weight on file to calculate BMI  Weight (last 2 days)     None        Physical Exam  Vitals signs and nursing note reviewed  Constitutional:       General: He is not in acute distress  Appearance: Normal appearance  He is not ill-appearing  HENT:      Head: Normocephalic and atraumatic  Right Ear: External ear normal       Left Ear: External ear normal       Nose: Nose normal       Mouth/Throat:      Mouth: Mucous membranes are moist       Pharynx: Oropharynx is clear  Eyes:      General: No scleral icterus  Extraocular Movements: Extraocular movements intact        Comments: Anisocoria, L pupil is fixed with nearly complete blindness   Neck:      Musculoskeletal: Normal range of motion  Cardiovascular:      Rate and Rhythm: Normal rate and regular rhythm  Pulses: Normal pulses  Heart sounds: Normal heart sounds  No murmur  Pulmonary:      Effort: Pulmonary effort is normal       Breath sounds: Rhonchi and rales present  Abdominal:      General: Abdomen is flat  There is no distension  Palpations: Abdomen is soft  Tenderness: There is no abdominal tenderness  Musculoskeletal: Normal range of motion  General: No swelling  Skin:     General: Skin is warm and dry  Capillary Refill: Capillary refill takes less than 2 seconds  Neurological:      Mental Status: He is alert  Cranial Nerves: Cranial nerve deficit present  Sensory: Sensory deficit present  Motor: No weakness  Gait: Gait normal       Comments: 5/5 strength in all extremities  +2 reflexes in patella tendon, achilles tendon, and biceps tendon  Blindness in left eye  R facial drooping  Decreased sensation on L side, especially around L eye   Psychiatric:         Mood and Affect: Mood normal          Behavior: Behavior normal        PAST MEDICAL HISTORY   No past medical history on file  PAST SURGICAL HISTORY     Past Surgical History:   Procedure Laterality Date    HAND SURGERY Left      SOCIAL & FAMILY HISTORY     Social History     Substance and Sexual Activity   Alcohol Use Yes    Frequency: 4 or more times a week    Binge frequency: Daily or almost daily     Substance and Sexual Activity   Alcohol Use Yes    Frequency: 4 or more times a week    Binge frequency: Daily or almost daily        Substance and Sexual Activity   Drug Use Never     Social History     Tobacco Use   Smoking Status Current Every Day Smoker    Packs/day: 1 50   Smokeless Tobacco Never Used     No family history on file  LABORATORY DATA     Labs: I have personally reviewed pertinent reports      Results from last 7 days   Lab Units 08/30/20  1422   WBC Thousand/uL 7 40   HEMOGLOBIN g/dL 16 6   HEMATOCRIT % 48 7*   PLATELETS Thousands/uL 193   NEUTROS PCT % 64   MONOS PCT % 9      Results from last 7 days   Lab Units 08/30/20  1422   POTASSIUM mmol/L 4 1   CHLORIDE mmol/L 103   CO2 mmol/L 30   BUN mg/dL 14   CREATININE mg/dL 0 80   CALCIUM mg/dL 9 4   ALK PHOS U/L 69   ALT U/L 22   AST U/L 16              Results from last 7 days   Lab Units 08/30/20  1422   INR  1 02   PTT seconds 24     Results from last 7 days   Lab Units 08/30/20  1422   LACTIC ACID mmol/L 1 2     Results from last 7 days   Lab Units 08/30/20  1422   TROPONIN I ng/mL <0 03     Micro:  No results found for: Meacham Midget, WOUNDCULT, SPUTUMCULTUR  IMAGING & DIAGNOSTIC TESTS     Imaging: I have personally reviewed pertinent reports  Ct Head Without Contrast    Result Date: 8/30/2020  Impression: Large predominantly cystic mass within the left cerebral hemisphere with relatively mild amount of surrounding edema in the brain and minimal calcification along one margin of the lesion  This may be a cystic neoplasm (primary or metastatic) or less likely infection  Contrast-enhanced brain MRI is recommended for further workup  Mass effect on the left lateral ventricle with 4 mm rightward midline shift and some effacement of left-sided cerebral sulci  There is surgical consultation is advised  I personally discussed this study with Karime Stapleton on 8/30/2020 at 3:41 PM  Workstation performed: [id number]     EKG, Pathology, and Other Studies: I have personally reviewed pertinent reports       ALLERGIES   No Known Allergies  MEDICATIONS PRIOR TO ARRIVAL     Prior to Admission medications    Not on File     MEDICATIONS ADMINISTERED IN LAST 24 HOURS     Medication Administration - last 24 hours from 08/29/2020 2115 to 08/30/2020 2115       Date/Time Order Dose Route Action Action by     08/30/2020 2040 Labetalol HCl (NORMODYNE) injection 10 mg 10 mg Intravenous Given [place] Tangela Jha RN        CURRENT MEDICATIONS     Current Facility-Administered Medications   Medication Dose Route Frequency Provider Last Rate    heparin (porcine)  5,000 Units Subcutaneous [facility] Ngozi Retana DO      iohexol  50 mL Oral 90 min pre-procedure Sudhakar Soto DO      Labetalol HCl  10 mg Intravenous Q4H PRN Sudhakar Soto DO      [START ON 8/31/2020] lisinopril  10 mg Oral Daily Ngozi Retana DO      LORazepam  0 5 mg Oral Once Sudhakar Soto DO      [START ON 8/31/2020] nicotine  1 patch Transdermal Daily Ngozi Retana DO          Labetalol HCl, 10 mg, Q4H PRN        Admission Time  I spent 45 minutes admitting the patient  This involved direct patient contact where I performed a full history and physical, reviewing previous records, and reviewing laboratory and other diagnostic studies  Portions of the record may have been created with voice recognition software  Occasional wrong word or "sound a like" substitutions may have occurred due to the inherent limitations of voice recognition software    Read the chart carefully and recognize, using context, where substitutions have occurred     ==  Sudhakar Soto, [facility]  Internal Medicine Residency PGY-1

## 2020-08-31 NOTE — ASSESSMENT & PLAN NOTE
Patient states he drinks about 2-6 beers every day  He denies any history of tremors or withdrawal seizures  Plan:  - Continue [facility] protocol  -Thiamine 100 mg p o  Daily and folic acid 1 mg p o

## 2020-08-31 NOTE — UTILIZATION REVIEW
Initial Clinical Review    Admission: Date/Time/Statement:   Admission Orders (From admission, onward)     Ordered        08/30/20 1956  Inpatient Admission  Once                   Orders Placed This Encounter   Procedures    Inpatient Admission     Standing Status:   Standing     Number of Occurrences:   1     Order Specific Question:   Admitting Physician     Answer:   ISIAH MITCHELL [[id number]]     Order Specific Question:   Level of Care     Answer:   Med Surg [[id number]]     Order Specific Question:   Estimated length of stay     Answer:   More than 2 Midnights     Order Specific Question:   Certification     Answer:   I certify that inpatient services are medically necessary for this patient for a duration of greater than two midnights  See H&P and MD Progress Notes for additional information about the patient's course of treatment  Assessment/Plan:   49y Male, transfer from [facility] ED to [place] med surg unit presents with Left eye blindness, right facial droop secondary to brain mass  Initially presented to Urgent care earlier today for progressive decreased vision to his left eye and right-sided facial droop x1 week, referred to ED  PMH for untreated hypertension, hyperlipidemia and tobacco abuse  Transferred to [place] for Neurosurgery evaluation  At [address] [facility] ED CT Head showed a large cystic mass in the hemisphere with mild edema and calcification  Found to be hypertensive 171/103  PMH for Tobacco abuse, Uncomplicated Alcohol Dependence, HLD and HTN  Drinks approx  2-3 beers every day  Admit Inpatient level of care for Brain mass and Hypertension  Neurosurgery consult  MRI with and without gadolinium  Last seen by PCP about 5 yrs ago  Should be to be taking Benicar 40-25 mg  Start lisinopril daily  IV labetalol 5 mg Q4 PRN for SBP >160 mmHg   CIWA assess, low suspicious that he will withdrawn  Thiamine and folic acid  Lipid panel  On exam;  anisocoria, L pupil is fixed with nearly complete blindness   Right facial drooping  Decreased sensation on Left side, especially around L eye    8/31 Oncology-Medical cons; Lung cancer, brain mass  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  Will defer to Neurosurgery for management of brain mass inpatient  2/79 Systolics [phone] today  Continue lisinopril 10 mg in AM  8/31 Pulmonology cons; Left hilar mass, Concern for pulmonary embolism, Brain mass  Await PE scan  Depending on results of PE scan, will consider EBUS/TBNA biopsy for diagnosis of left hilar mass with brain cystic lesion    8/31 CIWA score = 0    Vitals   Temperature Pulse Respirations Blood Pressure SpO2   08/30/20 1935 08/30/20 1935 08/30/20 1935 08/30/20 1935 08/30/20 1935   98 8 °F (37 1 °C) 68 20 147/85 93 %      Temp Source Heart Rate Source Patient Position - Orthostatic VS BP Location FiO2 (%)   08/30/20 1935 08/30/20 1953 08/30/20 1935 08/30/20 1935 --   Tympanic Monitor Sitting Right arm       Pain Score       08/30/20 1951       No Pain          Wt Readings from Last 1 Encounters:   08/30/20 86 2 kg (190 lb)     Additional Vital Signs:   08/31/20 0740   97 9 °F (36 6 °C)   82   19   141/84      94 %          08/31/20 04:28:53   97 8 °F (36 6 °C)         139/86   104             08/31/20 0011   98 1 °F (36 7 °C)   77   20   142/82            Sitting    08/30/20 2119            147/87                08/30/20 19:53:31   98 °F (36 7 °C)   68   20   171/103Abnormal              Pertinent Labs/Diagnostic Test Results:   8/30 CT Head - Large predominantly cystic mass within the left cerebral hemisphere with relatively mild amount of surrounding edema in the brain and minimal calcification along one margin of the lesion  This may be a cystic neoplasm (primary or metastatic) or less likely infection  Contrast-enhanced brain MRI is recommended for further workup    Mass effect on the left lateral ventricle with 4 mm rightward midline shift and some effacement of left-sided cerebral sulci  There is surgical consultation is advised      8/30 CT Chest/Abd/Pelvis - There is an approximately 4 x 4 x 3 cm mass in the mid to lower left hilum which extends into the left lower lobe of the lung and the left lingula  Nearby satellite masses and nodules are present  The appearance is highly suggestive of malignant neoplasm  Pulmonology consultation and Oncology consultation is recommended  There appears to be some hypodensity within some of the central left pulmonary artery branches  Pulmonary embolism and/or tumor thrombus should be excluded  Dedicated CT angiogram/PE protocol CTA of the chest is recommended for further evaluation  Bilateral adrenal masses, each measuring approximately 2 cm  MRI is recommended for further characterization, if there are no contraindications  The urinary bladder wall appears thickened  This is most pronounced anteriorly  Clinical correlation, laboratory correlation and follow-up is recommended  The prostate is mildly prominent  Clinical and laboratory correlation is recommended  Mild emphysema  8/31 MRI Brain - Multiple intracranial enhancing lesions with a dominant cystic lesion noted in the deep left inferior frontal lobe  Multiplicity of lesions with a suspicious primary pulmonary neoplasm is most suggestive of metastatic disease  Other neoplastic etiologies thought to be less likely  Note is made of lesions along the ependymal surface of the lateral ventricles and septum pellucidum suspicious for the presence of leptomeningeal/CSF spread of disease  Imaging of the remainder of the neuroaxis should be considered for further evaluation  Of note lesions are seen along the course of the left trigeminal nerve tracking into Meckel's cave as well as at the level of the hypothalamus and abutting the optic tracts  Stable 5 mm rightward midline shift      8/31 MRI Abd - pending    8/31 CTA chest PE study - pending         Results from last 7 days Lab Units 08/31/20  0511 08/30/20  2119 08/30/20  1422   WBC Thousand/uL 8 59  --  7 40   HEMOGLOBIN g/dL 15 9  --  16 6   HEMATOCRIT % 46 4  --  48 7*   PLATELETS Thousands/uL 206 191 193   NEUTROS ABS Thousands/µL 7 23  --  4 70         Results from last 7 days   Lab Units 08/31/20  0510 08/30/20  1422   SODIUM mmol/L 139 140   POTASSIUM mmol/L 4 0 4 1   CHLORIDE mmol/L 107 103   CO2 mmol/L 28 30   ANION GAP mmol/L 4 7   BUN mg/dL 16 14   CREATININE mg/dL 0 84 0 80   EGFR ml/min/1 73sq m 100 102   CALCIUM mg/dL 8 8 9 4     Results from last 7 days   Lab Units 08/31/20  0510 08/30/20  1422   AST U/L 16 16   ALT U/L 30 22   ALK PHOS U/L 84 69   TOTAL PROTEIN g/dL 7 4 7 3   ALBUMIN g/dL 3 6 4 7   TOTAL BILIRUBIN mg/dL 0 36 0 50     Results from last 7 days   Lab Units 08/30/20  1305   POC GLUCOSE mg/dl 107     Results from last 7 days   Lab Units 08/31/20  0510 08/30/20  1422   GLUCOSE RANDOM mg/dL 119 99       Results from last 7 days   Lab Units 08/30/20  1422   TROPONIN I ng/mL <0 03         Results from last 7 days   Lab Units 08/30/20  1422   PROTIME seconds 13 3   INR  1 02   PTT seconds 24             Results from last 7 days   Lab Units 08/30/20  1422   LACTIC ACID mmol/L 1 2     Results from last 7 days   Lab Units 08/31/20  0622   CLARITY UA  Clear   COLOR UA  Yellow   SPEC GRAV UA  >1 045*   PH UA  6 0   GLUCOSE UA mg/dl Negative   KETONES UA mg/dl Negative   BLOOD UA  Negative   PROTEIN UA mg/dl Negative   NITRITE UA  Negative   BILIRUBIN UA  Negative   UROBILINOGEN UA E U /dl 1 0   LEUKOCYTES UA  Negative   WBC UA /hpf None Seen   RBC UA /hpf None Seen   BACTERIA UA /hpf None Seen   EPITHELIAL CELLS WET PREP /hpf None Seen       No past medical history on file    Present on Admission:   Brain mass   Hypertension   Tobacco abuse   Uncomplicated alcohol dependence ([facility] [address] )      Admitting Diagnosis: Brain mass [G93 89]  Age/Sex: 49 y o  male     Admission Orders:  Scheduled Medications:  albuterol, 2 puff, Inhalation, Q6H  atorvastatin, 40 mg, Oral, Daily With Dinner  folic acid, 1 mg, Oral, Daily  guaiFENesin, 600 mg, Oral, Q12H RAVEN  heparin (porcine), 5,000 Units, Subcutaneous, Q8H RAVEN  lisinopril, 10 mg, Oral, Daily  multivitamin-minerals, 1 tablet, Oral, Daily  nicotine, 1 patch, Transdermal, Daily  thiamine, 100 mg, Oral, Daily      Continuous IV Infusions: None     PRN Meds:  Labetalol HCl, 10 mg, Intravenous, Q4H PRN 8/30 x1      IP CONSULT TO NEUROSURGERY  IP CONSULT TO PULMONOLOGY  IP CONSULT TO ONCOLOGY    Network Utilization Review Department  [email or web address]  ATTENTION: Please call with any questions or concerns to [phone] and carefully listen to the prompts so that you are directed to the right person  All voicemails are confidential   Tejal Orozco all requests for admission clinical reviews, approved or denied determinations and any other requests to dedicated fax number below belonging to the campus where the patient is receiving treatment   List of dedicated fax numbers for the Facilities:  [address] DENIALS (Administrative/Medical Necessity) [phone]   [address] (Maternity/NICU/Pediatrics) [phone]   Conda Sas [phone]   Jamas Piggs [phone]   Dimple Dress [phone]   [address]  [phone]   [address] [phone]   [facility]  [phone]   [address] [facility] [address]  [address] [address] [address] [facility] [phone]

## 2020-08-31 NOTE — CONSULTS
Consultation - Medical Oncology   Tarsha Santiago 49 y o  male MRN: [id number]  Unit/Bed#: [facility] 700-48 Encounter: [id number]    Assessment/Plan   Lung cancer, brain mass  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  Neurosurgery following, plan pending  Would defer to neurosurgery for management of brain mass inpatient  Recommend followup with medical oncology as an outpatient for biopsy of lung mass vs adrenal glands and to discuss further options  Discharge information left for [facility]  History of Present Illness   Physician Requesting Consult: Kimberly Gleason MD  Reason for Consult / Principal Problem: brain mass and lung mass  HPI: Burke Walsh is a 49y o  year old male with PMhx HTN, HLD, tobacco abuse who presented with 1 week of decreased vision in L eye, R sided facial droop  Pt was directed to ED, where CT head showed large cystic mass within L cerebral hemisphere with surrounding edema and calcifications  Case was discussed with Neurosurgery who recommended transfer to B, recommended MRI brain with and without gadolinium contrast and CT chest abdomen and pelvis  Pt was transferred to B  MRI obtained with large predominantly cystic mass within L inferior frontal lobe, multiple intracranial enhancing lesions, suggestive of metastatic disease  CT CAP w/wo contrast showed pulmonary mass in mid to lower L hilum extending to LLL and lingula with satellite masses, nodules, possible pulmonary artery embolism in central left pulmonary artery branches, bilateral adrenal masses each 2cm, thickened urinary bladder, mildly prominent prostate  Oncology consulted for brain and lung masses  Pt was seen and evaluated at bedside  He denies any prior cancer history, reports cancer in his mother, unclear what type  He reports he is currently working  He reports current smoking up until admission  He has no acute complaints   Regarding social support, he reports that he has a girlfriend in the area  Pt expressed understanding of current situation  Inpatient consult to Oncology     Performed by  Janet Campos DO     Authorized by Francisca Solorzano DO            Review of Systems   Constitutional: Negative for chills, fatigue and fever  HENT: Negative for rhinorrhea and sore throat  Eyes: Positive for visual disturbance  Respiratory: Negative for choking, chest tightness, shortness of breath, wheezing and stridor  Cardiovascular: Negative for chest pain, palpitations and leg swelling  Gastrointestinal: Negative for abdominal pain, blood in stool, constipation, diarrhea, nausea and vomiting  Genitourinary: Negative for hematuria  Neurological: Positive for facial asymmetry  Negative for dizziness and light-headedness  Historical Information   No past medical history on file  Past Surgical History:   Procedure Laterality Date    HAND SURGERY Left      Social History   Social History     Substance and Sexual Activity   Alcohol Use Yes    Frequency: 4 or more times a week    Binge frequency: Daily or almost daily     Social History     Substance and Sexual Activity   Drug Use Never     E-Cigarette/Vaping    E-Cigarette Use Never User      E-Cigarette/Vaping Substances     Social History     Tobacco Use   Smoking Status Current Every Day Smoker    Packs/day: 1 50   Smokeless Tobacco Never Used     Family History: non-contributory    Meds/Allergies   all current active meds have been reviewed  No Known Allergies    Objective   Vitals: Blood pressure 152/91, pulse 64, temperature 97 9 °F (36 6 °C), resp  rate 19, SpO2 94 %      Intake/Output Summary (Last 24 hours) at 8/31/2020 1246  Last data filed at 8/31/2020 0800  Gross per 24 hour   Intake 0 ml   Output    Net 0 ml     Invasive Devices     Peripheral Intravenous Line            Peripheral IV 08/30/20 Right Antecubital less than 1 day                Physical Exam  Constitutional:       Appearance: He is well-developed  HENT:      Head: Normocephalic and atraumatic  Comments: R facial droop     Nose: Nose normal    Eyes:      General:         Right eye: No discharge  Left eye: No discharge  Cardiovascular:      Rate and Rhythm: Normal rate and regular rhythm  Heart sounds: Normal heart sounds  No murmur  No friction rub  No gallop  Pulmonary:      Effort: Pulmonary effort is normal       Breath sounds: Normal breath sounds  Abdominal:      General: Bowel sounds are normal  There is no distension  Palpations: Abdomen is soft  Tenderness: There is no abdominal tenderness  There is no guarding  Skin:     General: Skin is warm and dry  Neurological:      Mental Status: He is alert and oriented to person, place, and time  Psychiatric:         Speech: Speech normal        Lab Results: I have reviewed all pertinent labs  Imaging Studies: I have personally reviewed pertinent reports  EKG, Pathology, and Other Studies: I have personally reviewed pertinent reports  VTE Prophylaxis: Heparin    Code Status: Level 1 - Full Code  Advance Directive and Living Will:      Power of :    POLST:      Counseling / Coordination of Care  Total floor / unit time spent today 30 minutes  Greater than 50% of total time was spent with the patient and / or family counseling and / or coordination of care

## 2020-08-31 NOTE — PLAN OF CARE
Problem: PAIN - ADULT  Goal: Verbalizes/displays adequate comfort level or baseline comfort level  Description: Interventions:  - Encourage patient to monitor pain and request assistance  - Assess pain using appropriate pain scale  - Administer analgesics based on type and severity of pain and evaluate response  - Implement non-pharmacological measures as appropriate and evaluate response  - Consider cultural and social influences on pain and pain management  - Notify physician/advanced practitioner if interventions unsuccessful or patient reports new pain  Outcome: Progressing     Problem: INFECTION - ADULT  Goal: Absence or prevention of progression during hospitalization  Description: INTERVENTIONS:  - Assess and monitor for signs and symptoms of infection  - Monitor lab/diagnostic results  - Monitor all insertion sites, i e  indwelling lines, tubes, and drains  - Monitor endotracheal if appropriate and nasal secretions for changes in amount and color  - [place] appropriate cooling/warming therapies per order  - Administer medications as ordered  - Instruct and encourage patient and family to use good hand hygiene technique  - Identify and instruct in appropriate isolation precautions for identified infection/condition  Outcome: Progressing     Problem: SAFETY ADULT  Goal: Patient will remain free of falls  Description: INTERVENTIONS:  - Assess patient frequently for physical needs  -  Identify cognitive and physical deficits and behaviors that affect risk of falls    -  [place] fall precautions as indicated by assessment   - Educate patient/family on patient safety including physical limitations  - Instruct patient to call for assistance with activity based on assessment  - Modify environment to reduce risk of injury  - Consider OT/PT consult to assist with strengthening/mobility  Outcome: Progressing     Problem: DISCHARGE PLANNING  Goal: Discharge to home or other facility with appropriate resources  Description: INTERVENTIONS:  - Identify barriers to discharge w/patient and caregiver  - Arrange for needed discharge resources and transportation as appropriate  - Identify discharge learning needs (meds, wound care, etc )  - Arrange for interpretive services to assist at discharge as needed  - Refer to Case Management Department for coordinating discharge planning if the patient needs post-hospital services based on physician/advanced practitioner order or complex needs related to functional status, cognitive ability, or social support system  Outcome: Progressing     Problem: Knowledge Deficit  Goal: Patient/family/caregiver demonstrates understanding of disease process, treatment plan, medications, and discharge instructions  Description: Complete learning assessment and assess knowledge base    Interventions:  - Provide teaching at level of understanding  - Provide teaching via preferred learning methods  Outcome: Progressing     Problem: NEUROSENSORY - ADULT  Goal: Achieves stable or improved neurological status  Description: INTERVENTIONS  - Monitor and report changes in neurological status  - Monitor vital signs such as temperature, blood pressure, glucose, and any other labs ordered   - Initiate measures to prevent increased intracranial pressure  - Monitor for seizure activity and implement precautions if appropriate      Outcome: Progressing     Problem: SKIN/TISSUE INTEGRITY - ADULT  Goal: Skin integrity remains intact  Description: INTERVENTIONS  - Identify patients at risk for skin breakdown  - Assess and monitor skin integrity  - Assess and monitor nutrition and hydration status  - Monitor labs (i e  albumin)  - Assess for incontinence   - Turn and reposition patient  - Assist with mobility/ambulation  - Relieve pressure over bony prominences  - Avoid friction and shearing  - Provide appropriate hygiene as needed including keeping skin clean and dry  - Evaluate need for skin moisturizer/barrier cream  - Collaborate with interdisciplinary team (i e  Nutrition, Rehabilitation, etc )   - Patient/family teaching  Outcome: Progressing

## 2020-08-31 NOTE — PROGRESS NOTES
INTERNAL MEDICINE RESIDENCY PROGRESS NOTE     Name: Anabell Galan   Age & Sex: 49 y o  male   MRN: [id number]  Unit/Bed#: [facility] 613-01   Encounter: [id number]  Team: SOD Team A    PATIENT INFORMATION     Name: Anabell Galan   Age & Sex: 49 y o  male   MRN: [id number]  Hospital Stay Days: 1    ASSESSMENT/PLAN     Principal Problem:    Brain mass  Active Problems:    Hypertension    Tobacco abuse    Uncomplicated alcohol dependence ([facility] )    Hyperlipidemia    Emphysema lung (HCC)      Emphysema lung ([facility] )  Assessment & Plan  30+ pack-year history of smoking  Mild emphysema on CT chest   Coarse rhonchi in all lobes bilaterally  Patient reports chronic cough, unchanged or worsening as of late  Denies shortness of breath  Plan:  -patient asymptomatic (no shortness of breath, no increased respiratory rate or effort, stable oxygen saturation), but does complain of chronic cough  -mucolytic to help thin secretions  Start Mucinex b i d  Today  -incentive spirometry  -albuterol 2 puffs q 6 hours using spacer  -ensured adequate p o  hydration to help keep secretions thin  -CO2 28, continue to monitor next morning BMP    Hyperlipidemia  Assessment & Plan  Patient with a history based on previous documentation  Treatment only with diet  No record of lipid panel recently    Cholesterol  213High    mg/dL    Triglycerides  183High    mg/dL    HDL  45  mg/dL    LDL Calculated  131High    mg/dL          Plan:  -patient has a 14 2% risk of cardiovascular event in the next 10 years per ASCVD risk of algorithm  As such he should be placed on a moderate to high intensity statin  -start atorvastatin 40 mg q d today; plan to titrate up to 80 mg q d  Uncomplicated alcohol dependence ([facility] )  Assessment & Plan  Patient states he drinks about 2-6 beers every day  He denies any history of tremors or withdrawal seizures      Plan:  -CIWA protocol, low suspicion that patient will withdraw heavily  -patient is not exhibiting signs or symptoms of withdrawal this morning  -Thiamine 100 mg p o  Daily and folic acid 1 mg p o  Daily repletion    Tobacco abuse  Assessment & Plan  Patient states he has about a 30 pack year history  Plan:  -Nicotine patch 21 mg  -Smoking cessation counseling upon discharge    Hypertension  Assessment & Plan  Patient stated that he has a history of hypertension; however, he has never been treated for it  The patient states the last time he saw a primary care physician was about 5 years ago  Last documents from Dr Darcie Crawford of [facility]  Was supposed to be taking Benicar 40-25 mg  He denies taking his blood pressure at home  Most recent /103  Possibly a component of anxiety secondary to his recent imaging findings  Plan:  -systolics 510-012 today  -continue lisinopril 10 mg in AM  -IV labetalol 5 mg Q4 PRN for SBP >160 mmHg     * Brain mass  Assessment & Plan  Patient with a one-week history of progressive decrease of vision in his left eye, right-sided facial droop, and decreased sensation of L face  CT of the head on 08/30 revealed a large predominantly cystic mass within the left cerebral hemisphere with relatively mild amount of surrounding edema in the brain and minimal calcification along one margin of the lesion  This may be a cystic neoplasm (primary or metastatic) or less likely infection  Mass effect on the left lateral ventricle with 4 mm rightward midline shift and some effacement of left-sided cerebral sulci  MRI brain w/ and w/o contrast:  Multiple intracranial enhancing lesions with a dominant cystic lesion noted in the deep left inferior frontal lobe, as described above   Multiplicity of lesions with a suspicious primary pulmonary neoplasm is most suggestive of metastatic disease   Other  neoplastic etiologies thought to be less likely       Note is made of lesions along the ependymal surface of the lateral ventricles and septum pellucidum suspicious for the presence of leptomeningeal/CSF spread of disease   Imaging of the remainder of the neuroaxis should be considered for further   evaluation  Of note lesions are seen along the course of the left trigeminal nerve tracking into Meckel's cave as well as at the level of the hypothalamus and abutting the optic tracts  Stable 5 mm rightward midline shift  CT Chest/abdomen/pelvis: There is an approximately 4 x 4 x 3 cm mass in the mid to lower left hilum which extends into the left lower lobe of the lung and the left lingula   Nearby satellite masses and nodules are present  The appearance is highly suggestive of malignant neoplasm   Pulmonology consultation and Oncology consultation is recommended  There appears to be some hypodensity within some of the central left pulmonary artery branches   Pulmonary embolism and/or tumor thrombus should be excluded   Dedicated CT angiogram/PE protocol CTA of the chest is recommended for further evaluation  Bilateral adrenal masses, each measuring approximately 2 cm   MRI is recommended for further characterization, if there are no contraindications  The urinary bladder wall appears thickened   This is most pronounced anteriorly   Clinical correlation, laboratory correlation and follow-up is recommended  The prostate is mildly prominent   Clinical and laboratory correlation is recommended  Mild emphysema  Plan:  -per radiology report, imaging consistent with likely lung primary with atypical brain metastasis and possible bilateral adrenal gland involvement  Per radiology CT CAP report, further workup with MRI abdomen recommended  -Neurosurgery following  Updated advanced practitioner on call that MRI was back and oncology and pulmonology were also aware of patient's case  -MRI w/ and w/o contrast results listed above  -CT CAP results listed above  -pulmonology consulted  Spoke with fellow, likely will attempt EBUS of lung lesion  -oncology consulted  Spoke with medical resident on service  At this point will defer to Neurosurgery and pulmonology for further treatment plan  -continue heparin in case of likely intervention by pulmonology      Disposition: Pulmonology and oncology consulted  Neurosurgery following  MRI completed and shows multiple brain mets, probable lung primary, B/L adrenal nodules  Per Dr Tre Salguero who is the PGY2 on oncology rotation, oncology will likely defer further recommendations of treatment plan to pulmonology and neurosurgery at this point  Per pulmonology, likely will plan for EBUS to biopsy lung mass  Neurosurgery advanced practitioner contacted and updated on above  Patient stable at this point and made aware of image findings  SUBJECTIVE     Patient seen and examined  No acute events overnight  He is in good spirits considering grim findings  He is aware of multiple lesions found on imaging  Patient told me he does not drink alcohol, however per other provider notes and medical student exam following my own he admitted he drinks 2-6 beers/day  He also clarified his smoking history, and says he has smoked 1-1 5 packs of cigarettes since age 14 (thus 30+ pack year history minimum)  Denies: headache, dizziness, SOB, changed cough, CP, n/v/c/d, LE edema, weakness in extremities, changing/worsening visual deficits, unsteady gait, appetite changes  OBJECTIVE     Vitals:    20 0011 20 0428 20 0740 20 1216   BP: 142/82 139/86 141/84 152/91   BP Location: Right arm      Pulse: 77  82 64   Resp: 20  19    Temp: 98 1 °F (36 7 °C) 97 8 °F (36 6 °C) 97 9 °F (36 6 °C)    TempSrc:       SpO2:   94%       Temperature:   Temp (24hrs), Av °F (36 7 °C), Min:97 7 °F (36 5 °C), Max:98 8 °F (37 1 °C)    Temperature: 97 9 °F (36 6 °C)  Intake & Output:  I/O       701 -  -  -  07    P  O    0    Total Intake   0    Net   0           Unmeasured Urine Occurrence   1 x        Weights: There is no height or weight on file to calculate BMI  Weight (last 2 days)     None        Physical Exam  Constitutional:       General: He is not in acute distress  Appearance: Normal appearance  He is not ill-appearing, toxic-appearing or diaphoretic  HENT:      Head: Normocephalic and atraumatic  Mouth/Throat:      Mouth: Mucous membranes are moist    Eyes:      General:         Right eye: No discharge  Left eye: No discharge  Extraocular Movements: Extraocular movements intact  Comments: Anisocoria- L (4 mm) > R (2-3 mm)   Both pupils reactive to light (direct and consensual)  Left ptosis   Cardiovascular:      Rate and Rhythm: Normal rate and regular rhythm  Pulses: Normal pulses  Heart sounds: Normal heart sounds  No murmur  Pulmonary:      Effort: Pulmonary effort is normal  No respiratory distress  Breath sounds: Rhonchi (coarse rhonci B/L all lobes) present  No wheezing  Chest:      Chest wall: No tenderness  Abdominal:      General: Bowel sounds are normal  There is no distension  Palpations: Abdomen is soft  Tenderness: There is no abdominal tenderness  There is no guarding  Musculoskeletal:      Right lower leg: No edema  Left lower leg: No edema  Skin:     General: Skin is warm and dry  Capillary Refill: Capillary refill takes less than 2 seconds  Neurological:      Mental Status: He is alert and oriented to person, place, and time  Sensory: No sensory deficit  Comments: Left ptosis  Left CN VII deficit (difficulty keeping shut tightly), although equal eyebrow raising and puffed cheeks    At time of my examination, patient endorsed equal sensation in all distributions of CN V B/L       LABORATORY DATA     Labs: I have personally reviewed pertinent reports    Results from last 7 days   Lab Units 08/31/20  0511 08/30/20  2119 08/30/20  1422   WBC Thousand/uL 8 59  --  7 40 HEMOGLOBIN g/dL 15 9  --  16 6   HEMATOCRIT % 46 4  --  48 7*   PLATELETS Thousands/uL 206 191 193   NEUTROS PCT % 84*  --  64   MONOS PCT % 4  --  9      Results from last 7 days   Lab Units 08/31/20  0510 08/30/20  1422   POTASSIUM mmol/L 4 0 4 1   CHLORIDE mmol/L 107 103   CO2 mmol/L 28 30   BUN mg/dL 16 14   CREATININE mg/dL 0 84 0 80   CALCIUM mg/dL 8 8 9 4   ALK PHOS U/L 84 69   ALT U/L 30 22   AST U/L 16 16              Results from last 7 days   Lab Units 08/30/20  1422   INR  1 02   PTT seconds 24     Results from last 7 days   Lab Units 08/30/20  1422   LACTIC ACID mmol/L 1 2     Results from last 7 days   Lab Units 08/30/20  1422   TROPONIN I ng/mL <0 03       IMAGING & DIAGNOSTIC TESTING     Radiology Results: I have personally reviewed pertinent reports  Ct Chest Abdomen Pelvis W Wo Contrast    Result Date: 8/31/2020  Impression: There is an approximately 4 x 4 x 3 cm mass in the mid to lower left hilum which extends into the left lower lobe of the lung and the left lingula  Nearby satellite masses and nodules are present  The appearance is highly suggestive of malignant neoplasm  Pulmonology consultation and Oncology consultation is recommended  There appears to be some hypodensity within some of the central left pulmonary artery branches  Pulmonary embolism and/or tumor thrombus should be excluded  Dedicated CT angiogram/PE protocol CTA of the chest is recommended for further evaluation  Bilateral adrenal masses, each measuring approximately 2 cm  MRI is recommended for further characterization, if there are no contraindications  The urinary bladder wall appears thickened  This is most pronounced anteriorly  Clinical correlation, laboratory correlation and follow-up is recommended  The prostate is mildly prominent  Clinical and laboratory correlation is recommended  Mild emphysema  Other findings as described above, please see discussion    I personally discussed this study with JULIANNA RIDER AZAEL on 8/31/2020 at 5:05 AM  This examination demonstrates findings requiring imaging follow-up and was logged as such in [facility]  Workstation performed: [id number]     Ct Head Without Contrast    Result Date: 8/30/2020  Impression: Large predominantly cystic mass within the left cerebral hemisphere with relatively mild amount of surrounding edema in the brain and minimal calcification along one margin of the lesion  This may be a cystic neoplasm (primary or metastatic) or less likely infection  Contrast-enhanced brain MRI is recommended for further workup  Mass effect on the left lateral ventricle with 4 mm rightward midline shift and some effacement of left-sided cerebral sulci  There is surgical consultation is advised  I personally discussed this study with Winston Zuleima on 8/30/2020 at 3:41 PM  Workstation performed: [id number]     Mri Brain W Wo Contrast    Result Date: 8/31/2020  Impression: Multiple intracranial enhancing lesions with a dominant cystic lesion noted in the deep left inferior frontal lobe, as described above  Multiplicity of lesions with a suspicious primary pulmonary neoplasm is most suggestive of metastatic disease  Other  neoplastic etiologies thought to be less likely  Note is made of lesions along the ependymal surface of the lateral ventricles and septum pellucidum suspicious for the presence of leptomeningeal/CSF spread of disease  Imaging of the remainder of the neuroaxis should be considered for further evaluation  Of note lesions are seen along the course of the left trigeminal nerve tracking into Meckel's cave as well as at the level of the hypothalamus and abutting the optic tracts  Stable 5 mm rightward midline shift  Workstation performed: [id number]     Other Diagnostic Testing: I have personally reviewed pertinent reports      ACTIVE MEDICATIONS     Current Facility-Administered Medications   Medication Dose Route Frequency    albuterol (PROVENTIL HFA,VENTOLIN HFA) inhaler 2 puff  2 puff Inhalation Q6H    atorvastatin (LIPITOR) tablet 40 mg  40 mg Oral Daily With Dinner    folic acid (FOLVITE) tablet 1 mg  1 mg Oral Daily    guaiFENesin (MUCINEX) 12 hr tablet 600 mg  600 mg Oral Q12H RAVEN    heparin (porcine) subcutaneous injection 5,000 Units  5,000 Units Subcutaneous Q8H [address]    Labetalol HCl (NORMODYNE) injection 10 mg  10 mg Intravenous Q4H PRN    lisinopril (ZESTRIL) tablet 10 mg  10 mg Oral Daily    multivitamin-minerals (CENTRUM) tablet 1 tablet  1 tablet Oral Daily    nicotine (NICODERM CQ) 14 mg/24hr TD 24 hr patch 1 patch  1 patch Transdermal Daily    thiamine (VITAMIN B1) tablet 100 mg  100 mg Oral Daily       VTE Pharmacologic Prophylaxis: Heparin  VTE Mechanical Prophylaxis: sequential compression device    Portions of the record may have been created with voice recognition software  Occasional wrong word or "sound a like" substitutions may have occurred due to the inherent limitations of voice recognition software    Read the chart carefully and recognize, using context, where substitutions have occurred   ==  Chary Das, [address] [facility]  Internal Medicine Residency PGY-1

## 2020-08-31 NOTE — CONSULTS
Consult- Emma Combs 1965, 49 y o  male MRN: [id number]    Unit/Bed#: [facility] 558-71 Encounter: [id number]    Primary Care Provider: No primary care provider on file  Date and time admitted to hospital: 8/30/2020  7:18 PM      Inpatient consult to Neurosurgery  Consult performed by: Forrest Croft PA-C  Consult ordered by: Cody Guillen DO          * Brain mass  Assessment & Plan  Large left frontal temporal cystic mass with vasogenic edema and midline shift  Concern for leptomeningeal spread  · Presents with left eye visual deficit and left facial numbness    Imaging:  · CT head without 8/30/20:  Large predominantly cystic mass within the left cerebellar hemisphere with relatively mild amount of surrounding edema in the brain and minimal calcification along one marginal region  This may be cystic neoplasm, primary metastatic less likely infection  Mass effect on the lateral left ventricle with 4 mm of rightward shift and effacement of the left sulci  · CT chest abd pelvis with without 8/30/20:  Approximately 4 x 4 x 3 cm mass mid to lower left hilum extending left lower lobe to the lingual a  Nearby satellite masses  Highly suggestive of malignant neoplasm  Hypodensity concerning for possible PE  Bilateral adrenal masses  Bladder wall thickening  Enlarged prostate  · MRI brain with without 8/30/20:  Multiple intracranial enhancing lesions with the dominant cystic lesion noted a deep less inferior frontal lobe  The lesions along the ependymal surface of the lateral ventricles and septum blue cm suspicious for presence of leptomeningeal CSF spread  Lesion along the course of left trigeminal track into Meckel's cave as well as level of hypothalamus abutting optic tract  Stable 5 mm midline shift  Plan:  · Continue monitor neurological exam  · Imaging results reviewed with patient and his significant other    · Continue Decadron 4 mg every 6 hours for cerebral edema  · Mobilize physical therapy and occupational therapy as indicated  · DVT prophylaxis:  Bilateral SCDs  Heparin  · Ongoing medical management per primary team  · Discuss option for surgical resection of large cystic mass to allow for diagnosis and decrease pressure  The patient aware surgery will not cure symptoms but rather provide diagnosis and improve mass effect  · Patient is agreeable to surgical intervention  Will plan for craniotomy for mass resection tomorrow September 1st with Dr Juan Alejandro  · Labs reviewed  Preoperative orders placed  Brain compression [facility])  Assessment & Plan  Plan for surgical resection and continue decadron    Cerebral edema [facility])  Assessment & Plan  Decadron as above      History of Present Illness     HPI: Isabela Lee is a 49 y o  male with PMH including HTN off medication x8 years who presents with complaint of left visual loss  Patient is without significant past medical history as he does not see doctors Routinely  He states approximately 1 5 weeks ago he head decrease loss of vision from his left eye along with left forehead numbness  He denies any significant headaches of his significant other states he has been using more over-the-counter medications for headaches  He denies any significant recent weight loss  He denies any weakness or sensory deficits of his extremities  Significant other states he has complained of hand numbness dropping things in the past but patient denies at this time  Denies coughing although coughs intermittently in room  Denies chest pain or shortness of breath  Denies any speech deficits  Denies any mentation changes or seizures  Review of Systems   Constitutional: Negative for activity change, fatigue, fever and unexpected weight change  HENT: Negative for hearing loss, trouble swallowing and voice change  Eyes: Positive for visual disturbance  Negative for photophobia  Respiratory: Negative for cough and shortness of breath  Cardiovascular: Negative for chest pain and leg swelling  Gastrointestinal: Negative for abdominal pain, nausea and vomiting  Genitourinary: Negative for decreased urine volume and difficulty urinating  Musculoskeletal: Negative for back pain, gait problem and neck pain  Skin: Negative for pallor, rash and wound  Neurological: Positive for numbness  Negative for dizziness, tremors, seizures, speech difficulty, weakness, light-headedness and headaches  Psychiatric/Behavioral: Negative for agitation and confusion  Historical Information   No past medical history on file  Past Surgical History:   Procedure Laterality Date    HAND SURGERY Left      Social History     Substance and Sexual Activity   Alcohol Use Yes    Frequency: 4 or more times a week    Binge frequency: Daily or almost daily     Social History     Substance and Sexual Activity   Drug Use Never     Social History     Tobacco Use   Smoking Status Current Every Day Smoker    Packs/day: 1 50   Smokeless Tobacco Never Used     No family history on file      Meds/Allergies   all current active meds have been reviewed, current meds:   Current Facility-Administered Medications   Medication Dose Route Frequency    albuterol (PROVENTIL HFA,VENTOLIN HFA) inhaler 2 puff  2 puff Inhalation Q6H    atorvastatin (LIPITOR) tablet 40 mg  40 mg Oral Daily With Dinner    folic acid (FOLVITE) tablet 1 mg  1 mg Oral Daily    guaiFENesin (MUCINEX) 12 hr tablet 600 mg  600 mg Oral Q12H RAVEN    heparin (porcine) subcutaneous injection 5,000 Units  5,000 Units Subcutaneous Q8H [address]    Labetalol HCl (NORMODYNE) injection 10 mg  10 mg Intravenous Q4H PRN    lisinopril (ZESTRIL) tablet 10 mg  10 mg Oral Daily    multivitamin-minerals (CENTRUM) tablet 1 tablet  1 tablet Oral Daily    nicotine (NICODERM CQ) 14 mg/24hr TD 24 hr patch 1 patch  1 patch Transdermal Daily    thiamine (VITAMIN B1) tablet 100 mg  100 mg Oral Daily    and PTA meds:   None No Known Allergies    Objective   I/O       08/29 0701 - 08/30 0700 08/30 0701 - 08/31 0700 08/31 0701 - 09/01 0700    P  O    480    Total Intake   480    Net   +480           Unmeasured Urine Occurrence   1 x          Physical Exam  Vitals signs reviewed  Constitutional:       General: He is not in acute distress  Appearance: Normal appearance  He is well-developed and normal weight  He is not diaphoretic  HENT:      Head: Normocephalic and atraumatic  Right Ear: External ear normal       Left Ear: External ear normal       Nose: Nose normal       Mouth/Throat:      Mouth: Mucous membranes are moist    Eyes:      General: No scleral icterus  Extraocular Movements: Extraocular movements intact and EOM normal       Conjunctiva/sclera: Conjunctivae normal       Pupils: Pupils are equal, round, and reactive to light  Neck:      Musculoskeletal: Normal range of motion and neck supple  Cardiovascular:      Rate and Rhythm: Normal rate  Pulmonary:      Effort: Pulmonary effort is normal  No respiratory distress  Comments: Intermittent cough  Abdominal:      General: There is no distension  Palpations: Abdomen is soft  Tenderness: There is no abdominal tenderness  Musculoskeletal:         General: No tenderness  Skin:     General: Skin is warm and dry  Neurological:      Mental Status: He is alert and oriented to person, place, and time  Coordination: Finger-Nose-Finger Test normal       Deep Tendon Reflexes: Strength normal       Reflex Scores:       Bicep reflexes are 2+ on the right side and 2+ on the left side  Brachioradialis reflexes are 2+ on the right side and 2+ on the left side  Patellar reflexes are 2+ on the right side and 2+ on the left side  Psychiatric:         Mood and Affect: Affect is blunt  Speech: Speech normal          Behavior: Behavior normal          Thought Content:  Thought content normal          Judgment: Judgment normal  Neurologic Exam     Mental Status   Oriented to person, place, and time  Follows 3 step commands  Attention: normal  Concentration: normal    Speech: speech is normal   Level of consciousness: alert  Knowledge: good  Able to perform simple calculations  Able to name object  Normal comprehension  Cranial Nerves     CN II   Visual acuity: decreased (left eye able to see light and count fingers with some difficulty)    CN III, IV, VI   Pupils are equal, round, and reactive to light  Extraocular motions are normal    Right pupil: Size: 3 mm  Shape: regular  Left pupil: Size: 3 mm  Shape: regular  Nystagmus: none   Upgaze: normal  Conjugate gaze: present    CN V   Right facial sensation deficit: none  Left facial sensation deficit: forehead    CN VII   Right facial weakness: central    CN VIII   Hearing: intact    CN XI   Right trapezius strength: normal  Left trapezius strength: normal    CN XII   Tongue: not atrophic  Fasciculations: absent  Tongue deviation: left    Motor Exam   Muscle bulk: normal  Overall muscle tone: normal  Right arm pronator drift: absent  Left arm pronator drift: absent    Strength   Strength 5/5 throughout  Sensory Exam   Light touch normal      Gait, Coordination, and Reflexes     Coordination   Finger to nose coordination: normal    Tremor   Resting tremor: absent  Intention tremor: absent  Action tremor: absent    Reflexes   Right brachioradialis: 2+  Left brachioradialis: 2+  Right biceps: 2+  Left biceps: 2+  Right patellar: 2+  Left patellar: 2+  Right Salvador: absent  Left Salvador: absent  Right ankle clonus: absent  Left ankle clonus: absent        Vitals:Blood pressure 140/86, pulse 71, temperature 97 9 °F (36 6 °C), resp  rate 19, SpO2 94 %  ,There is no height or weight on file to calculate BMI       Lab Results:   Results from last 7 days   Lab Units 08/31/20  0511 08/30/20  2119 08/30/20  1422   WBC Thousand/uL 8 59  --  7 40   HEMOGLOBIN g/dL 15 9  --  16 6 HEMATOCRIT % 46 4  --  48 7*   PLATELETS Thousands/uL 206 191 193   NEUTROS PCT % 84*  --  64   MONOS PCT % 4  --  9     Results from last 7 days   Lab Units 08/31/20  0510 08/30/20  1422   POTASSIUM mmol/L 4 0 4 1   CHLORIDE mmol/L 107 103   CO2 mmol/L 28 30   BUN mg/dL 16 14   CREATININE mg/dL 0 84 0 80   CALCIUM mg/dL 8 8 9 4   ALK PHOS U/L 84 69   ALT U/L 30 22   AST U/L 16 16             Results from last 7 days   Lab Units 08/30/20  1422   INR  1 02   PTT seconds 24     No results found for: TROPONINT  ABG:No results found for: PHART, ZLG1MYP, PO2ART, SKM7NHS, B6OQCZYY, BEART, SOURCE    Imaging Studies: I have personally reviewed pertinent reports  and I have personally reviewed pertinent films in PACS    Ct Chest Abdomen Pelvis W Wo Contrast    Result Date: 8/31/2020  Impression: There is an approximately 4 x 4 x 3 cm mass in the mid to lower left hilum which extends into the left lower lobe of the lung and the left lingula  Nearby satellite masses and nodules are present  The appearance is highly suggestive of malignant neoplasm  Pulmonology consultation and Oncology consultation is recommended  There appears to be some hypodensity within some of the central left pulmonary artery branches  Pulmonary embolism and/or tumor thrombus should be excluded  Dedicated CT angiogram/PE protocol CTA of the chest is recommended for further evaluation  Bilateral adrenal masses, each measuring approximately 2 cm  MRI is recommended for further characterization, if there are no contraindications  The urinary bladder wall appears thickened  This is most pronounced anteriorly  Clinical correlation, laboratory correlation and follow-up is recommended  The prostate is mildly prominent  Clinical and laboratory correlation is recommended  Mild emphysema  Other findings as described above, please see discussion    I personally discussed this study with [facility] on 8/31/2020 at 5:05 AM  This examination demonstrates findings requiring imaging follow-up and was logged as such in EPIC  Workstation performed: [id number]     Ct Head Without Contrast    Result Date: 8/30/2020  Impression: Large predominantly cystic mass within the left cerebral hemisphere with relatively mild amount of surrounding edema in the brain and minimal calcification along one margin of the lesion  This may be a cystic neoplasm (primary or metastatic) or less likely infection  Contrast-enhanced brain MRI is recommended for further workup  Mass effect on the left lateral ventricle with 4 mm rightward midline shift and some effacement of left-sided cerebral sulci  There is surgical consultation is advised  I personally discussed this study with Tammi Iondarwin on 8/30/2020 at 3:41 PM  Workstation performed: [id number]     Mri Brain W Wo Contrast    Result Date: 8/31/2020  Impression: Multiple intracranial enhancing lesions with a dominant cystic lesion noted in the deep left inferior frontal lobe, as described above  Multiplicity of lesions with a suspicious primary pulmonary neoplasm is most suggestive of metastatic disease  Other  neoplastic etiologies thought to be less likely  Note is made of lesions along the ependymal surface of the lateral ventricles and septum pellucidum suspicious for the presence of leptomeningeal/CSF spread of disease  Imaging of the remainder of the neuroaxis should be considered for further evaluation  Of note lesions are seen along the course of the left trigeminal nerve tracking into Meckel's cave as well as at the level of the hypothalamus and abutting the optic tracts  Stable 5 mm rightward midline shift  Workstation performed: [id number]       EKG, Pathology, and Other Studies: I have personally reviewed pertinent reports     and I have personally reviewed pertinent films in PACS    VTE Prophylaxis: Sequential compression device (Venodyne)  and Heparin    Code Status: Level 1 - Full Code  Advance Directive and Living Will:      Power of :    POLST:      Counseling / Coordination of Care  I spent 45 minutes with the patient

## 2020-08-31 NOTE — ANESTHESIA PREPROCEDURE EVALUATION
Procedure:  Left frontal CRANIOTOMY IMAGE-GUIDED FOR TUMOR (Left Head)    ECG: SB with LAD  CT chest Left hilar mass suspcious for malignancy   Cystic lesion of L lower frontal lobe    Per neurosirgery team noted to have vision deficits, partial 3rd and 6th CN palsy and Left pupil enlarged by 1 mm  Denies the following: SOB/CP with exertion, asthma, JEANETTE, active GERD, stroke/TIA, seizure    Relevant Problems   CARDIO   (+) Hyperlipidemia   (+) Hypertension      PULMONARY   (+) Emphysema lung (HCC)      Other   (+) Brain mass   (+) Cerebral edema (HCC)   (+) Tobacco abuse        Physical Exam    Airway    Mallampati score: II  TM Distance: >3 FB  Neck ROM: full     Dental   upper dentures,     Cardiovascular  Rhythm: regular, Rate: normal,     Pulmonary  Comment: Productive cough, Breath sounds clear to auscultation,     Other Findings  Left eyelid droop       Anesthesia Plan  ASA Score- 3     Anesthesia Type- general with ASA Monitors  Additional Monitors: arterial line  Airway Plan: ETT  Plan Factors-Exercise tolerance (METS): >4 METS  Chart reviewed  EKG reviewed  Imaging results reviewed  Existing labs reviewed  Patient summary reviewed  Patient is a current smoker  Patient instructed to abstain from smoking on day of procedure  Patient did not smoke on day of surgery  Obstructive sleep apnea risk education given perioperatively  Induction- intravenous  Postoperative Plan- Plan for postoperative opioid use  Planned trial extubation    Informed Consent- Anesthetic plan and risks discussed with patient  I personally reviewed this patient with the CRNA  Discussed and agreed on the Anesthesia Plan with the CRNA  Umesh Flores

## 2020-08-31 NOTE — ASSESSMENT & PLAN NOTE
Patient states he has about a 30 pack year history      Plan:  -Nicotine patch 21 mg  -Smoking cessation counseling upon discharge

## 2020-08-31 NOTE — ASSESSMENT & PLAN NOTE
Patient with a history based on previous documentation  Treatment only with diet   No record of lipid panel recently    Cholesterol  213High    mg/dL    Triglycerides  183High    mg/dL    HDL  45  mg/dL    LDL Calculated  131High    mg/dL       - Continue Lipitor 40mg

## 2020-08-31 NOTE — PROGRESS NOTES
INTERNAL MEDICINE RESIDENCY SENIOR ADMISSION NOTE     Name: Ren Sandoval   Age & Sex: 49 y o  male   MRN: [id number]  Unit/Bed#: [facility] 613-01   Encounter: [id number]  Primary Care Provider: No primary care provider on file  Admit to team: SOD Team A    Patient seen and examined  Reviewed H&P per Dr Jessica Larios   Agree with the assessment and plan with any exception/addition as noted below:    Principal Problem:    Brain mass  Active Problems:    Hypertension    Tobacco abuse    Uncomplicated alcohol dependence [facility]    58-year-old male with past medical history significant for alcohol abuse, tobacco use, and hypertension who presents for evaluation decreased vision left side and right-sided facial droop  Patient presented to [facility] ED earlier today  CT head revealed large predominantly cystic mass within the left cerebral hemisphere, concern for malignancy vs less likely infection  Neurosurgery consulted by ED physician recommend patient be transferred to [facility] for further evaluation  -CT chest abdomen pelvis  -MRI with and without gado  -CIWA protocol  -folate, thiamine, multivitamin  -nicotine patch  -regular diet  -will start lisinopril 10 mg p o   Daily, labetalol p r n     -neurosurgery consulted, AP on call made aware of patient's arrival   -heparin for DVT prophylaxis      Code Status: Level 1 - Full Code  Admission Status: INPATIENT   Disposition: Patient requires Med/Surg  Expected Length of Stay:  2 nights or more

## 2020-08-31 NOTE — ASSESSMENT & PLAN NOTE
Stable  - Cardene gtt discontinued   - continue lisinopril 10 mg in AM  -IV labetalol 5 mg Q4 PRN for SBP >160 mmHg

## 2020-08-31 NOTE — ASSESSMENT & PLAN NOTE
Large left frontal temporal cystic mass with vasogenic edema and midline shift  Concern for leptomeningeal spread  · Presents with left eye visual deficit and left facial numbness    Imaging:  · CT head without 8/30/20:  Large predominantly cystic mass within the left cerebellar hemisphere with relatively mild amount of surrounding edema in the brain and minimal calcification along one marginal region  This may be cystic neoplasm, primary metastatic less likely infection  Mass effect on the lateral left ventricle with 4 mm of rightward shift and effacement of the left sulci  · CT chest abd pelvis with without 8/30/20:  Approximately 4 x 4 x 3 cm mass mid to lower left hilum extending left lower lobe to the lingual a  Nearby satellite masses  Highly suggestive of malignant neoplasm  Hypodensity concerning for possible PE  Bilateral adrenal masses  Bladder wall thickening  Enlarged prostate  · MRI brain with without 8/30/20:  Multiple intracranial enhancing lesions with the dominant cystic lesion noted a deep less inferior frontal lobe  The lesions along the ependymal surface of the lateral ventricles and septum blue cm suspicious for presence of leptomeningeal CSF spread  Lesion along the course of left trigeminal track into Meckel's cave as well as level of hypothalamus abutting optic tract  Stable 5 mm midline shift  Plan:  · Continue monitor neurological exam  · Imaging results reviewed with patient and his significant other  · Continue Decadron 4 mg every 6 hours for cerebral edema  · Mobilize physical therapy and occupational therapy as indicated  · DVT prophylaxis:  Bilateral SCDs  Heparin  · Ongoing medical management per primary team  · Discuss option for surgical resection of large cystic mass to allow for diagnosis and decrease pressure  The patient aware surgery will not cure symptoms but rather provide diagnosis and improve mass effect  · Patient is agreeable to surgical intervention  Will plan for craniotomy for mass resection tomorrow September 1st with Dr Shavonne Ma  · Labs reviewed  Preoperative orders placed

## 2020-09-01 ENCOUNTER — ANESTHESIA (INPATIENT)
Dept: PERIOP | Facility: HOSPITAL | Age: 55
DRG: 021 | End: 2020-09-01
Payer: COMMERCIAL

## 2020-09-01 LAB
ABO GROUP BLD: NORMAL
ABO GROUP BLD: NORMAL
ANION GAP SERPL CALCULATED.3IONS-SCNC: 6 MMOL/L (ref 4–13)
ANION GAP SERPL CALCULATED.3IONS-SCNC: 6 MMOL/L (ref 4–13)
ANISOCYTOSIS BLD QL SMEAR: PRESENT
APTT PPP: 25 SECONDS (ref 23–37)
BASOPHILS # BLD AUTO: 0.04 THOUSANDS/ΜL (ref 0–0.1)
BASOPHILS # BLD MANUAL: 0 THOUSAND/UL (ref 0–0.1)
BASOPHILS NFR BLD AUTO: 0 % (ref 0–1)
BASOPHILS NFR MAR MANUAL: 0 % (ref 0–1)
BLD GP AB SCN SERPL QL: NEGATIVE
BUN SERPL-MCNC: 13 MG/DL (ref 5–25)
BUN SERPL-MCNC: 16 MG/DL (ref 5–25)
CALCIUM SERPL-MCNC: 8.1 MG/DL (ref 8.3–10.1)
CALCIUM SERPL-MCNC: 8.8 MG/DL (ref 8.3–10.1)
CHLORIDE SERPL-SCNC: 110 MMOL/L (ref 100–108)
CHLORIDE SERPL-SCNC: 113 MMOL/L (ref 100–108)
CO2 SERPL-SCNC: 26 MMOL/L (ref 21–32)
CO2 SERPL-SCNC: 27 MMOL/L (ref 21–32)
CREAT SERPL-MCNC: 0.71 MG/DL (ref 0.6–1.3)
CREAT SERPL-MCNC: 0.85 MG/DL (ref 0.6–1.3)
EOSINOPHIL # BLD AUTO: 0.14 THOUSAND/ΜL (ref 0–0.61)
EOSINOPHIL # BLD MANUAL: 0 THOUSAND/UL (ref 0–0.4)
EOSINOPHIL NFR BLD AUTO: 1 % (ref 0–6)
EOSINOPHIL NFR BLD MANUAL: 0 % (ref 0–6)
ERYTHROCYTE [DISTWIDTH] IN BLOOD BY AUTOMATED COUNT: 12.3 % (ref 11.6–15.1)
ERYTHROCYTE [DISTWIDTH] IN BLOOD BY AUTOMATED COUNT: 12.4 % (ref 11.6–15.1)
GFR SERPL CREATININE-BSD FRML MDRD: 107 ML/MIN/1.73SQ M
GFR SERPL CREATININE-BSD FRML MDRD: 99 ML/MIN/1.73SQ M
GLUCOSE SERPL-MCNC: 108 MG/DL (ref 65–140)
GLUCOSE SERPL-MCNC: 124 MG/DL (ref 65–140)
HCT VFR BLD AUTO: 43.8 % (ref 36.5–49.3)
HCT VFR BLD AUTO: 46.8 % (ref 36.5–49.3)
HGB BLD-MCNC: 14.9 G/DL (ref 12–17)
HGB BLD-MCNC: 16.1 G/DL (ref 12–17)
IMM GRANULOCYTES # BLD AUTO: 0.04 THOUSAND/UL (ref 0–0.2)
IMM GRANULOCYTES NFR BLD AUTO: 0 % (ref 0–2)
INR PPP: 1.01 (ref 0.84–1.19)
LYMPHOCYTES # BLD AUTO: 0.29 THOUSAND/UL (ref 0.6–4.47)
LYMPHOCYTES # BLD AUTO: 2 % (ref 14–44)
LYMPHOCYTES # BLD AUTO: 2 THOUSANDS/ΜL (ref 0.6–4.47)
LYMPHOCYTES NFR BLD AUTO: 19 % (ref 14–44)
MAGNESIUM SERPL-MCNC: 2.1 MG/DL (ref 1.6–2.6)
MCH RBC QN AUTO: 31.6 PG (ref 26.8–34.3)
MCH RBC QN AUTO: 31.8 PG (ref 26.8–34.3)
MCHC RBC AUTO-ENTMCNC: 34 G/DL (ref 31.4–37.4)
MCHC RBC AUTO-ENTMCNC: 34.4 G/DL (ref 31.4–37.4)
MCV RBC AUTO: 92 FL (ref 82–98)
MCV RBC AUTO: 94 FL (ref 82–98)
MONOCYTES # BLD AUTO: 0.58 THOUSAND/UL (ref 0–1.22)
MONOCYTES # BLD AUTO: 0.75 THOUSAND/ΜL (ref 0.17–1.22)
MONOCYTES NFR BLD AUTO: 7 % (ref 4–12)
MONOCYTES NFR BLD: 4 % (ref 4–12)
NEUTROPHILS # BLD AUTO: 7.72 THOUSANDS/ΜL (ref 1.85–7.62)
NEUTROPHILS # BLD MANUAL: 13.12 THOUSAND/UL (ref 1.85–7.62)
NEUTS BAND NFR BLD MANUAL: 2 % (ref 0–8)
NEUTS SEG NFR BLD AUTO: 73 % (ref 43–75)
NEUTS SEG NFR BLD AUTO: 89 % (ref 43–75)
NRBC BLD AUTO-RTO: 0 /100 WBCS
NRBC BLD AUTO-RTO: 0 /100 WBCS
PHOSPHATE SERPL-MCNC: 3.8 MG/DL (ref 2.7–4.5)
PLATELET # BLD AUTO: 180 THOUSANDS/UL (ref 149–390)
PLATELET # BLD AUTO: 183 THOUSANDS/UL (ref 149–390)
PLATELET BLD QL SMEAR: ADEQUATE
PMV BLD AUTO: 11.8 FL (ref 8.9–12.7)
PMV BLD AUTO: 11.8 FL (ref 8.9–12.7)
POTASSIUM SERPL-SCNC: 3.7 MMOL/L (ref 3.5–5.3)
POTASSIUM SERPL-SCNC: 3.9 MMOL/L (ref 3.5–5.3)
PROTHROMBIN TIME: 13.3 SECONDS (ref 11.6–14.5)
RBC # BLD AUTO: 4.68 MILLION/UL (ref 3.88–5.62)
RBC # BLD AUTO: 5.1 MILLION/UL (ref 3.88–5.62)
RBC MORPH BLD: PRESENT
RH BLD: NEGATIVE
RH BLD: NEGATIVE
SODIUM SERPL-SCNC: 143 MMOL/L (ref 136–145)
SODIUM SERPL-SCNC: 145 MMOL/L (ref 136–145)
SPECIMEN EXPIRATION DATE: NORMAL
VARIANT LYMPHS # BLD AUTO: 3 %
WBC # BLD AUTO: 10.69 THOUSAND/UL (ref 4.31–10.16)
WBC # BLD AUTO: 14.42 THOUSAND/UL (ref 4.31–10.16)

## 2020-09-01 PROCEDURE — 88333 PATH CONSLTJ SURG CYTO XM 1: CPT | Performed by: PATHOLOGY

## 2020-09-01 PROCEDURE — 86901 BLOOD TYPING SEROLOGIC RH(D): CPT | Performed by: PHYSICIAN ASSISTANT

## 2020-09-01 PROCEDURE — 85007 BL SMEAR W/DIFF WBC COUNT: CPT | Performed by: REGISTERED NURSE

## 2020-09-01 PROCEDURE — 88341 IMHCHEM/IMCYTCHM EA ADD ANTB: CPT | Performed by: PATHOLOGY

## 2020-09-01 PROCEDURE — NC001 PR NO CHARGE: Performed by: INTERNAL MEDICINE

## 2020-09-01 PROCEDURE — 82948 REAGENT STRIP/BLOOD GLUCOSE: CPT

## 2020-09-01 PROCEDURE — 85730 THROMBOPLASTIN TIME PARTIAL: CPT | Performed by: STUDENT IN AN ORGANIZED HEALTH CARE EDUCATION/TRAINING PROGRAM

## 2020-09-01 PROCEDURE — 85025 COMPLETE CBC W/AUTO DIFF WBC: CPT | Performed by: STUDENT IN AN ORGANIZED HEALTH CARE EDUCATION/TRAINING PROGRAM

## 2020-09-01 PROCEDURE — 61510 CRNEC TREPH EXC BRN TUM STTL: CPT | Performed by: NEUROLOGICAL SURGERY

## 2020-09-01 PROCEDURE — 88307 TISSUE EXAM BY PATHOLOGIST: CPT | Performed by: PATHOLOGY

## 2020-09-01 PROCEDURE — 88342 IMHCHEM/IMCYTCHM 1ST ANTB: CPT | Performed by: PATHOLOGY

## 2020-09-01 PROCEDURE — 80048 BASIC METABOLIC PNL TOTAL CA: CPT | Performed by: STUDENT IN AN ORGANIZED HEALTH CARE EDUCATION/TRAINING PROGRAM

## 2020-09-01 PROCEDURE — 85610 PROTHROMBIN TIME: CPT | Performed by: STUDENT IN AN ORGANIZED HEALTH CARE EDUCATION/TRAINING PROGRAM

## 2020-09-01 PROCEDURE — 86850 RBC ANTIBODY SCREEN: CPT | Performed by: PHYSICIAN ASSISTANT

## 2020-09-01 PROCEDURE — C1713 ANCHOR/SCREW BN/BN,TIS/BN: HCPCS | Performed by: NEUROLOGICAL SURGERY

## 2020-09-01 PROCEDURE — 00B00ZZ EXCISION OF BRAIN, OPEN APPROACH: ICD-10-PCS | Performed by: INTERNAL MEDICINE

## 2020-09-01 PROCEDURE — 86920 COMPATIBILITY TEST SPIN: CPT

## 2020-09-01 PROCEDURE — 80048 BASIC METABOLIC PNL TOTAL CA: CPT | Performed by: REGISTERED NURSE

## 2020-09-01 PROCEDURE — 83735 ASSAY OF MAGNESIUM: CPT | Performed by: REGISTERED NURSE

## 2020-09-01 PROCEDURE — 99291 CRITICAL CARE FIRST HOUR: CPT | Performed by: ANESTHESIOLOGY

## 2020-09-01 PROCEDURE — 86900 BLOOD TYPING SEROLOGIC ABO: CPT | Performed by: PHYSICIAN ASSISTANT

## 2020-09-01 PROCEDURE — 61781 SCAN PROC CRANIAL INTRA: CPT | Performed by: NEUROLOGICAL SURGERY

## 2020-09-01 PROCEDURE — 85027 COMPLETE CBC AUTOMATED: CPT | Performed by: REGISTERED NURSE

## 2020-09-01 PROCEDURE — 99254 IP/OBS CNSLTJ NEW/EST MOD 60: CPT | Performed by: INTERNAL MEDICINE

## 2020-09-01 PROCEDURE — 88331 PATH CONSLTJ SURG 1 BLK 1SPC: CPT | Performed by: PATHOLOGY

## 2020-09-01 PROCEDURE — 84100 ASSAY OF PHOSPHORUS: CPT | Performed by: REGISTERED NURSE

## 2020-09-01 PROCEDURE — 88363 XM ARCHIVE TISSUE MOLEC ANAL: CPT | Performed by: PATHOLOGY

## 2020-09-01 DEVICE — IMPLANTABLE DEVICE
Type: IMPLANTABLE DEVICE | Site: CRANIAL | Status: FUNCTIONAL
Brand: THINFLAP SYSTEM

## 2020-09-01 RX ORDER — AMOXICILLIN 250 MG
2 CAPSULE ORAL DAILY
Status: DISCONTINUED | OUTPATIENT
Start: 2020-09-02 | End: 2020-09-04 | Stop reason: HOSPADM

## 2020-09-01 RX ORDER — HYDROMORPHONE HCL/PF 1 MG/ML
0.5 SYRINGE (ML) INJECTION
Status: DISCONTINUED | OUTPATIENT
Start: 2020-09-01 | End: 2020-09-04 | Stop reason: HOSPADM

## 2020-09-01 RX ORDER — LEVETIRACETAM 500 MG/1
500 TABLET ORAL EVERY 12 HOURS SCHEDULED
Status: DISCONTINUED | OUTPATIENT
Start: 2020-09-01 | End: 2020-09-01

## 2020-09-01 RX ORDER — LIDOCAINE HYDROCHLORIDE AND EPINEPHRINE 10; 10 MG/ML; UG/ML
INJECTION, SOLUTION INFILTRATION; PERINEURAL AS NEEDED
Status: DISCONTINUED | OUTPATIENT
Start: 2020-09-01 | End: 2020-09-01 | Stop reason: HOSPADM

## 2020-09-01 RX ORDER — DEXAMETHASONE 2 MG/1
2 TABLET ORAL EVERY 8 HOURS SCHEDULED
Status: DISCONTINUED | OUTPATIENT
Start: 2020-09-08 | End: 2020-09-01

## 2020-09-01 RX ORDER — EPHEDRINE SULFATE 50 MG/ML
INJECTION INTRAVENOUS AS NEEDED
Status: DISCONTINUED | OUTPATIENT
Start: 2020-09-01 | End: 2020-09-01

## 2020-09-01 RX ORDER — CEFAZOLIN SODIUM 2 G/50ML
2000 SOLUTION INTRAVENOUS ONCE
Status: COMPLETED | OUTPATIENT
Start: 2020-09-01 | End: 2020-09-01

## 2020-09-01 RX ORDER — OXYCODONE HYDROCHLORIDE 10 MG/1
10 TABLET ORAL EVERY 4 HOURS PRN
Status: DISCONTINUED | OUTPATIENT
Start: 2020-09-01 | End: 2020-09-04 | Stop reason: HOSPADM

## 2020-09-01 RX ORDER — HYDROMORPHONE HCL 110MG/55ML
PATIENT CONTROLLED ANALGESIA SYRINGE INTRAVENOUS AS NEEDED
Status: DISCONTINUED | OUTPATIENT
Start: 2020-09-01 | End: 2020-09-01

## 2020-09-01 RX ORDER — ONDANSETRON 2 MG/ML
4 INJECTION INTRAMUSCULAR; INTRAVENOUS EVERY 4 HOURS PRN
Status: DISCONTINUED | OUTPATIENT
Start: 2020-09-01 | End: 2020-09-04 | Stop reason: HOSPADM

## 2020-09-01 RX ORDER — LIDOCAINE HYDROCHLORIDE 10 MG/ML
INJECTION, SOLUTION EPIDURAL; INFILTRATION; INTRACAUDAL; PERINEURAL AS NEEDED
Status: DISCONTINUED | OUTPATIENT
Start: 2020-09-01 | End: 2020-09-01

## 2020-09-01 RX ORDER — ONDANSETRON 2 MG/ML
4 INJECTION INTRAMUSCULAR; INTRAVENOUS ONCE AS NEEDED
Status: DISCONTINUED | OUTPATIENT
Start: 2020-09-01 | End: 2020-09-01

## 2020-09-01 RX ORDER — DEXAMETHASONE SODIUM PHOSPHATE 4 MG/ML
4 INJECTION, SOLUTION INTRA-ARTICULAR; INTRALESIONAL; INTRAMUSCULAR; INTRAVENOUS; SOFT TISSUE EVERY 6 HOURS SCHEDULED
Status: COMPLETED | OUTPATIENT
Start: 2020-09-02 | End: 2020-09-03

## 2020-09-01 RX ORDER — PROPOFOL 10 MG/ML
INJECTION, EMULSION INTRAVENOUS CONTINUOUS PRN
Status: DISCONTINUED | OUTPATIENT
Start: 2020-09-01 | End: 2020-09-01

## 2020-09-01 RX ORDER — LABETALOL 20 MG/4 ML (5 MG/ML) INTRAVENOUS SYRINGE
10
Status: DISCONTINUED | OUTPATIENT
Start: 2020-09-01 | End: 2020-09-01

## 2020-09-01 RX ORDER — OXYCODONE HYDROCHLORIDE 5 MG/1
5 TABLET ORAL EVERY 4 HOURS PRN
Status: DISCONTINUED | OUTPATIENT
Start: 2020-09-01 | End: 2020-09-04 | Stop reason: HOSPADM

## 2020-09-01 RX ORDER — DEXAMETHASONE 2 MG/1
2 TABLET ORAL EVERY 12 HOURS SCHEDULED
Status: DISCONTINUED | OUTPATIENT
Start: 2020-09-09 | End: 2020-09-03

## 2020-09-01 RX ORDER — METHOCARBAMOL 500 MG/1
500 TABLET, FILM COATED ORAL EVERY 6 HOURS SCHEDULED
Status: DISCONTINUED | OUTPATIENT
Start: 2020-09-01 | End: 2020-09-04 | Stop reason: HOSPADM

## 2020-09-01 RX ORDER — DEXAMETHASONE SODIUM PHOSPHATE 10 MG/ML
INJECTION, SOLUTION INTRAMUSCULAR; INTRAVENOUS AS NEEDED
Status: DISCONTINUED | OUTPATIENT
Start: 2020-09-01 | End: 2020-09-01

## 2020-09-01 RX ORDER — DEXAMETHASONE 4 MG/1
4 TABLET ORAL EVERY 8 HOURS SCHEDULED
Status: DISCONTINUED | OUTPATIENT
Start: 2020-09-03 | End: 2020-09-01

## 2020-09-01 RX ORDER — DOCUSATE SODIUM 100 MG/1
100 CAPSULE, LIQUID FILLED ORAL 2 TIMES DAILY
Status: DISCONTINUED | OUTPATIENT
Start: 2020-09-01 | End: 2020-09-04 | Stop reason: HOSPADM

## 2020-09-01 RX ORDER — DEXAMETHASONE 2 MG/1
2 TABLET ORAL EVERY 6 HOURS SCHEDULED
Status: DISCONTINUED | OUTPATIENT
Start: 2020-09-05 | End: 2020-09-01

## 2020-09-01 RX ORDER — DEXAMETHASONE 2 MG/1
2 TABLET ORAL
Status: DISCONTINUED | OUTPATIENT
Start: 2020-09-12 | End: 2020-09-01

## 2020-09-01 RX ORDER — GINSENG 100 MG
CAPSULE ORAL AS NEEDED
Status: DISCONTINUED | OUTPATIENT
Start: 2020-09-01 | End: 2020-09-01 | Stop reason: HOSPADM

## 2020-09-01 RX ORDER — CEFAZOLIN SODIUM 1 G/3ML
INJECTION, POWDER, FOR SOLUTION INTRAMUSCULAR; INTRAVENOUS AS NEEDED
Status: DISCONTINUED | OUTPATIENT
Start: 2020-09-01 | End: 2020-09-01

## 2020-09-01 RX ORDER — CEFAZOLIN SODIUM 1 G/50ML
1000 SOLUTION INTRAVENOUS EVERY 8 HOURS
Status: COMPLETED | OUTPATIENT
Start: 2020-09-01 | End: 2020-09-02

## 2020-09-01 RX ORDER — DEXAMETHASONE 2 MG/1
2 TABLET ORAL EVERY 8 HOURS SCHEDULED
Status: DISCONTINUED | OUTPATIENT
Start: 2020-09-08 | End: 2020-09-03

## 2020-09-01 RX ORDER — DEXAMETHASONE 4 MG/1
4 TABLET ORAL EVERY 8 HOURS SCHEDULED
Status: DISCONTINUED | OUTPATIENT
Start: 2020-09-04 | End: 2020-09-03

## 2020-09-01 RX ORDER — DEXAMETHASONE 2 MG/1
2 TABLET ORAL
Status: DISCONTINUED | OUTPATIENT
Start: 2020-09-12 | End: 2020-09-03

## 2020-09-01 RX ORDER — ROCURONIUM BROMIDE 10 MG/ML
INJECTION, SOLUTION INTRAVENOUS AS NEEDED
Status: DISCONTINUED | OUTPATIENT
Start: 2020-09-01 | End: 2020-09-01

## 2020-09-01 RX ORDER — ACETAMINOPHEN 325 MG/1
975 TABLET ORAL EVERY 8 HOURS SCHEDULED
Status: DISCONTINUED | OUTPATIENT
Start: 2020-09-01 | End: 2020-09-04 | Stop reason: HOSPADM

## 2020-09-01 RX ORDER — CHLORHEXIDINE GLUCONATE 0.12 MG/ML
15 RINSE ORAL ONCE
Status: COMPLETED | OUTPATIENT
Start: 2020-09-01 | End: 2020-09-01

## 2020-09-01 RX ORDER — DEXAMETHASONE 4 MG/1
4 TABLET ORAL EVERY 6 HOURS SCHEDULED
Status: DISCONTINUED | OUTPATIENT
Start: 2020-09-01 | End: 2020-09-01

## 2020-09-01 RX ORDER — ONDANSETRON 2 MG/ML
INJECTION INTRAMUSCULAR; INTRAVENOUS AS NEEDED
Status: DISCONTINUED | OUTPATIENT
Start: 2020-09-01 | End: 2020-09-01

## 2020-09-01 RX ORDER — DEXAMETHASONE 2 MG/1
2 TABLET ORAL EVERY 12 HOURS SCHEDULED
Status: DISCONTINUED | OUTPATIENT
Start: 2020-09-09 | End: 2020-09-01

## 2020-09-01 RX ORDER — MANNITOL 250 MG/ML
INJECTION, SOLUTION INTRAVENOUS AS NEEDED
Status: DISCONTINUED | OUTPATIENT
Start: 2020-09-01 | End: 2020-09-01

## 2020-09-01 RX ORDER — ACETAMINOPHEN 160 MG
TABLET,DISINTEGRATING ORAL AS NEEDED
Status: DISCONTINUED | OUTPATIENT
Start: 2020-09-01 | End: 2020-09-01 | Stop reason: HOSPADM

## 2020-09-01 RX ORDER — DEXAMETHASONE 2 MG/1
2 TABLET ORAL EVERY 6 HOURS SCHEDULED
Status: DISCONTINUED | OUTPATIENT
Start: 2020-09-05 | End: 2020-09-03

## 2020-09-01 RX ORDER — HYDROMORPHONE HCL/PF 1 MG/ML
0.5 SYRINGE (ML) INJECTION
Status: DISCONTINUED | OUTPATIENT
Start: 2020-09-01 | End: 2020-09-01

## 2020-09-01 RX ORDER — FENTANYL CITRATE 50 UG/ML
INJECTION, SOLUTION INTRAMUSCULAR; INTRAVENOUS AS NEEDED
Status: DISCONTINUED | OUTPATIENT
Start: 2020-09-01 | End: 2020-09-01

## 2020-09-01 RX ORDER — SODIUM CHLORIDE, SODIUM LACTATE, POTASSIUM CHLORIDE, CALCIUM CHLORIDE 600; 310; 30; 20 MG/100ML; MG/100ML; MG/100ML; MG/100ML
INJECTION, SOLUTION INTRAVENOUS CONTINUOUS PRN
Status: DISCONTINUED | OUTPATIENT
Start: 2020-09-01 | End: 2020-09-01

## 2020-09-01 RX ORDER — CALCIUM CARBONATE 200(500)MG
1000 TABLET,CHEWABLE ORAL DAILY PRN
Status: DISCONTINUED | OUTPATIENT
Start: 2020-09-01 | End: 2020-09-04 | Stop reason: HOSPADM

## 2020-09-01 RX ORDER — PROPOFOL 10 MG/ML
INJECTION, EMULSION INTRAVENOUS AS NEEDED
Status: DISCONTINUED | OUTPATIENT
Start: 2020-09-01 | End: 2020-09-01

## 2020-09-01 RX ORDER — FUROSEMIDE 10 MG/ML
INJECTION INTRAMUSCULAR; INTRAVENOUS AS NEEDED
Status: DISCONTINUED | OUTPATIENT
Start: 2020-09-01 | End: 2020-09-01

## 2020-09-01 RX ADMIN — LEVETIRACETAM 500 MG: 100 INJECTION, SOLUTION INTRAVENOUS at 22:28

## 2020-09-01 RX ADMIN — SODIUM CHLORIDE 2.5 MG/HR: 0.9 INJECTION, SOLUTION INTRAVENOUS at 20:52

## 2020-09-01 RX ADMIN — LABETALOL 20 MG/4 ML (5 MG/ML) INTRAVENOUS SYRINGE 20 MG: at 15:42

## 2020-09-01 RX ADMIN — SODIUM CHLORIDE 125 ML/HR: 0.9 INJECTION, SOLUTION INTRAVENOUS at 20:52

## 2020-09-01 RX ADMIN — FENTANYL CITRATE 100 MCG: 50 INJECTION, SOLUTION INTRAMUSCULAR; INTRAVENOUS at 11:31

## 2020-09-01 RX ADMIN — SODIUM CHLORIDE 15 MG/HR: 0.9 INJECTION, SOLUTION INTRAVENOUS at 15:45

## 2020-09-01 RX ADMIN — ROCURONIUM BROMIDE 20 MG: 10 INJECTION, SOLUTION INTRAVENOUS at 13:18

## 2020-09-01 RX ADMIN — ROCURONIUM BROMIDE 50 MG: 10 INJECTION, SOLUTION INTRAVENOUS at 11:07

## 2020-09-01 RX ADMIN — PROPOFOL 20 MG: 10 INJECTION, EMULSION INTRAVENOUS at 11:28

## 2020-09-01 RX ADMIN — EPHEDRINE SULFATE 5 MG: 50 INJECTION, SOLUTION INTRAVENOUS at 11:51

## 2020-09-01 RX ADMIN — ROCURONIUM BROMIDE 30 MG: 10 INJECTION, SOLUTION INTRAVENOUS at 12:47

## 2020-09-01 RX ADMIN — LIDOCAINE HYDROCHLORIDE 50 MG: 10 INJECTION, SOLUTION EPIDURAL; INFILTRATION; INTRACAUDAL; PERINEURAL at 11:07

## 2020-09-01 RX ADMIN — DEXAMETHASONE SODIUM PHOSPHATE 10 MG: 10 INJECTION, SOLUTION INTRAMUSCULAR; INTRAVENOUS at 11:08

## 2020-09-01 RX ADMIN — ROCURONIUM BROMIDE 20 MG: 10 INJECTION, SOLUTION INTRAVENOUS at 12:16

## 2020-09-01 RX ADMIN — SODIUM CHLORIDE: 0.9 INJECTION, SOLUTION INTRAVENOUS at 11:02

## 2020-09-01 RX ADMIN — CHLORHEXIDINE GLUCONATE 0.12% ORAL RINSE 15 ML: 1.2 LIQUID ORAL at 09:38

## 2020-09-01 RX ADMIN — REMIFENTANIL HYDROCHLORIDE 0.2 MCG/KG/MIN: 1 INJECTION, POWDER, LYOPHILIZED, FOR SOLUTION INTRAVENOUS at 11:22

## 2020-09-01 RX ADMIN — ROCURONIUM BROMIDE 20 MG: 10 INJECTION, SOLUTION INTRAVENOUS at 13:50

## 2020-09-01 RX ADMIN — ONDANSETRON 4 MG: 2 INJECTION INTRAMUSCULAR; INTRAVENOUS at 14:36

## 2020-09-01 RX ADMIN — CEFAZOLIN SODIUM 2000 MG: 2 SOLUTION INTRAVENOUS at 11:07

## 2020-09-01 RX ADMIN — PHENYLEPHRINE HYDROCHLORIDE 25 MCG/MIN: 10 INJECTION INTRAVENOUS at 11:40

## 2020-09-01 RX ADMIN — FENTANYL CITRATE 100 MCG: 50 INJECTION, SOLUTION INTRAMUSCULAR; INTRAVENOUS at 11:07

## 2020-09-01 RX ADMIN — LEVETIRACETAM 1000 MG: 100 INJECTION, SOLUTION INTRAVENOUS at 11:24

## 2020-09-01 RX ADMIN — ALBUTEROL SULFATE 2 PUFF: 90 AEROSOL, METERED RESPIRATORY (INHALATION) at 22:28

## 2020-09-01 RX ADMIN — FUROSEMIDE 10 MG: 10 INJECTION, SOLUTION INTRAMUSCULAR; INTRAVENOUS at 11:30

## 2020-09-01 RX ADMIN — SODIUM CHLORIDE, SODIUM LACTATE, POTASSIUM CHLORIDE, AND CALCIUM CHLORIDE: .6; .31; .03; .02 INJECTION, SOLUTION INTRAVENOUS at 11:21

## 2020-09-01 RX ADMIN — LABETALOL 20 MG/4 ML (5 MG/ML) INTRAVENOUS SYRINGE 10 MG: at 15:25

## 2020-09-01 RX ADMIN — LABETALOL 20 MG/4 ML (5 MG/ML) INTRAVENOUS SYRINGE 20 MG: at 15:48

## 2020-09-01 RX ADMIN — HYDROMORPHONE HYDROCHLORIDE 1.5 MG: 2 INJECTION, SOLUTION INTRAMUSCULAR; INTRAVENOUS; SUBCUTANEOUS at 14:37

## 2020-09-01 RX ADMIN — LABETALOL 20 MG/4 ML (5 MG/ML) INTRAVENOUS SYRINGE 10 MG: at 15:28

## 2020-09-01 RX ADMIN — ROCURONIUM BROMIDE 30 MG: 10 INJECTION, SOLUTION INTRAVENOUS at 11:49

## 2020-09-01 RX ADMIN — SUGAMMADEX 200 MG: 100 INJECTION, SOLUTION INTRAVENOUS at 15:21

## 2020-09-01 RX ADMIN — PROPOFOL 180 MG: 10 INJECTION, EMULSION INTRAVENOUS at 11:07

## 2020-09-01 RX ADMIN — CEFAZOLIN 2000 MG: 1 INJECTION, POWDER, FOR SOLUTION INTRAVENOUS at 12:22

## 2020-09-01 RX ADMIN — CEFAZOLIN SODIUM 1000 MG: 1 SOLUTION INTRAVENOUS at 21:09

## 2020-09-01 RX ADMIN — SODIUM CHLORIDE 125 ML/HR: 0.9 INJECTION, SOLUTION INTRAVENOUS at 17:54

## 2020-09-01 RX ADMIN — SODIUM CHLORIDE 125 ML/HR: 0.9 INJECTION, SOLUTION INTRAVENOUS at 02:49

## 2020-09-01 RX ADMIN — ALBUTEROL SULFATE 2 PUFF: 90 AEROSOL, METERED RESPIRATORY (INHALATION) at 09:42

## 2020-09-01 RX ADMIN — PROPOFOL 100 MCG/KG/MIN: 10 INJECTION, EMULSION INTRAVENOUS at 11:22

## 2020-09-01 RX ADMIN — LABETALOL 20 MG/4 ML (5 MG/ML) INTRAVENOUS SYRINGE 10 MG: at 03:27

## 2020-09-01 RX ADMIN — MANNITOL 45 G: 12.5 INJECTION, SOLUTION INTRAVENOUS at 11:47

## 2020-09-01 NOTE — UTILIZATION REVIEW
Continued Stay Review    Date: 9/1                       Current Patient Class: Inpatient Current Level of Care: Med Surg    HPI:54 y o  male initially admitted on 8/30    Assessment/Plan:   8/31 Neurosurgery cons; Brain mass, Large left frontal temporal cystic mass with vasogenic edema and midline shift  Concern for leptomeningeal spread  Continue neuro exam  Discuss option for surgical resection of large cystic mass to allow for diagnosis and decrease pressure  The patient aware surgery will not cure symptoms but rather provide diagnosis and improve mass effect   Plan for craniotomy for mass resection tomorrow September 1st 9/1 OR Today - Left frontal CRANIOTOMY IMAGE-GUIDED FOR TUMOR (Left Head)     Pertinent Labs/Diagnostic Results:   8/31 MRI Abd - Bilateral adrenal nodules with imaging appearance most consistent with adenomas          Results from last 7 days   Lab Units 09/01/20  0503 08/31/20  0511 08/30/20  2119 08/30/20  1422   WBC Thousand/uL 10 69* 8 59  --  7 40   HEMOGLOBIN g/dL 16 1 15 9  --  16 6   HEMATOCRIT % 46 8 46 4  --  48 7*   PLATELETS Thousands/uL 180 206 191 193   NEUTROS ABS Thousands/µL 7 72* 7 23  --  4 70         Results from last 7 days   Lab Units 09/01/20  0503 08/31/20  0510 08/30/20  1422   SODIUM mmol/L 143 139 140   POTASSIUM mmol/L 3 7 4 0 4 1   CHLORIDE mmol/L 110* 107 103   CO2 mmol/L 27 28 30   ANION GAP mmol/L 6 4 7   BUN mg/dL 16 16 14   CREATININE mg/dL 0 71 0 84 0 80   EGFR ml/min/1 73sq m 107 100 102   CALCIUM mg/dL 8 8 8 8 9 4     Results from last 7 days   Lab Units 08/31/20  0510 08/30/20  1422   AST U/L 16 16   ALT U/L 30 22   ALK PHOS U/L 84 69   TOTAL PROTEIN g/dL 7 4 7 3   ALBUMIN g/dL 3 6 4 7   TOTAL BILIRUBIN mg/dL 0 36 0 50     Results from last 7 days   Lab Units 08/30/20  1305   POC GLUCOSE mg/dl 107     Results from last 7 days   Lab Units 09/01/20  0503 08/31/20  0510 08/30/20  1422   GLUCOSE RANDOM mg/dL 108 119 99       Results from last 7 days   Lab Units 08/30/20  1422   TROPONIN I ng/mL <0 03         Results from last 7 days   Lab Units 09/01/20  0503 08/30/20  1422   PROTIME seconds 13 3 13 3   INR  1 01 1 02   PTT seconds 25 24     Results from last 7 days   Lab Units 08/30/20  1422   LACTIC ACID mmol/L 1 2       Results from last 7 days   Lab Units 08/31/20  0622   CLARITY UA  Clear   COLOR UA  Yellow   SPEC GRAV UA  >1 045*   PH UA  6 0   GLUCOSE UA mg/dl Negative   KETONES UA mg/dl Negative   BLOOD UA  Negative   PROTEIN UA mg/dl Negative   NITRITE UA  Negative   BILIRUBIN UA  Negative   UROBILINOGEN UA E U /dl 1 0   LEUKOCYTES UA  Negative   WBC UA /hpf None Seen   RBC UA /hpf None Seen   BACTERIA UA /hpf None Seen   EPITHELIAL CELLS WET PREP /hpf None Seen       Vital Signs:   09/01/20 1010   97 2 °F (36 2 °C)Abnormal                           09/01/20 0800            158/91                09/01/20 07:48:49            158/91   113             09/01/20 06:36:37   98 4 °F (36 9 °C)      17   163/100   121             09/01/20 04:06:20      63      139/79   99            Medications:   Scheduled Medications:   albuterol, 2 puff, Inhalation, Q6H   atorvastatin, 40 mg, Oral, Daily With Dinner   folic acid, 1 mg, Oral, Daily   guaiFENesin, 600 mg, Oral, Q12H RAVEN   lisinopril, 10 mg, Oral, Daily  multivitamin-minerals, 1 tablet, Oral, Daily  nicotine, 1 patch, Transdermal, Daily   thiamine, 100 mg, Oral, Daily      Continuous IV Infusions:  sodium chloride, 125 mL/hr, Intravenous, Continuous      PRN Meds:  bacitracin topical ointment, , , PRN  hydrogen peroxide, , , PRN  HYDROmorphone, 0 5 mg, Intravenous, Q5 Min PRN  Labetalol HCl, 10 mg, Intravenous, Q4H PRN 9/1 x2  lidocaine-epinephrine, , , PRN  ondansetron, 4 mg, Intravenous, Once PRN  thrombin, , , PRN      Discharge Plan:     Network Utilization Review Department  [email or web address]  ATTENTION: Please call with any questions or concerns to [phone] and carefully listen to the prompts so that you are directed to the right person  All voicemails are confidential   Govind Petersen all requests for admission clinical reviews, approved or denied determinations and any other requests to dedicated fax number below belonging to the campus where the patient is receiving treatment   List of dedicated fax numbers for the Facilities:  [address] DENIALS (Administrative/Medical Necessity) [phone]   [address] (Maternity/NICU/Pediatrics) [phone]   [facility] [phone]   [facility] [phone]   [facility] [phone]   Apurva Jackson [phone]   [address] [facility] [address] [facility] [phone]   [facility]  [phone]   [address] [facility]  2401 [facility] 1000 [facility] [phone]

## 2020-09-01 NOTE — OP NOTE
OPERATIVE REPORT  PATIENT NAME: Sofi Mantilla    :  1965  MRN: [id number]  Pt Location: BE OR ROOM 09    SURGERY DATE: 2020    Surgeon(s) and Role:     * Raji Merchant MD - Primary     * Rina Zamudio PA-C - Assisting    Preop Diagnosis:  Brain mass [G93 89]    Post-Op Diagnosis Codes:     * Brain mass [G93 89]    Procedure(s) (LRB):  Left frontal CRANIOTOMY IMAGE-GUIDED FOR TUMOR (Left)    Specimen(s):  ID Type Source Tests Collected by Time Destination   1 : left frontal mass Tissue Brain TISSUE EXAM Raji Merchant MD 2020 1340    2 : Left frontal mass Tissue Brain TISSUE EXAM Raji Merchant MD 2020 1425        Estimated Blood Loss:   150 mL    Drains:  Closed/Suction Drain Left Head Bulb 10 Fr  (Active)   Number of days: 0       Urethral Catheter Latex 16 Fr  (Active)   Number of days: 0       Anesthesia Type:   General    Operative Indications:  Brain mass [G93 89]  This is an unfortunate 50-year-old gentleman who presented with headache, vision difficulty, as well as flat affect was found to have a large left frontal mass  Further investigation revealed a likely primary lesion in the lung as well as significant metastatic burden within the brain including ventricular spread  After discussing the risks and benefits associated with resection including bleeding, infection, stroke, paralysis, seizure, speech difficulties, and death he elected to proceed  Operative Findings:  Metastatic carcinoma on frozen section    Complications:   None    Procedure and Technique:  After obtaining written informed consent patient was brought to the operating room  General endotracheal anesthesia was induced  Arterial line Julian catheter replaced  He was then placed in Jalloh pins with his head turned to the right and a shoulder bump  Neuro navigation was then registered and confirmed to be accurate  Image guidance from the [employer] was used throughout the duration of the case   Images were loaded into the system and used for preoperative planning as well as intraoperative guidance  It was critically important through the duration of the case for navigation and avoidance of critical structures  After registering navigation his hair was clipped  Head was prepped in the usual sterile fashion  A surgical time-out was performed  10 cc of 1% lidocaine with 100,000 epinephrine was injected into the surgical site  A 10 blade was then used to open the skin taking care to protect the underlying temporalis fascia  This was then opened with monopolar cautery  The myocutaneous flap was then reflected anteriorly using silk stitches and rubber bands attached a Asad   Next an M8 drill bit was used to make a bur hole at the keyhole and posterior frontal fossa  A craniotome was used to connect these and the bone flap was elevated  This was saved on the back table in bacitracin soaked sponge  Several peripheral tack-up stitches were then placed  The dura was then opened with a 15 blade in a curvilinear fashion and reflected anteriorly  These were held in place with stay stitches  I then turned my attention to the sylvian fissure  An 11 blade was used to open the arachnoid sharply  This was then dissected from medial to lateral fashion  I immediately encountered tumor wall  Small corticectomy was made also in the frontal lobe  This allowed to dissect the cyst wall as well  A portion of cyst wall in tumor was then sent as frozen specimen  This returned consistent with metastatic carcinoma  I then began to work circumferentially around the tumor  Care was taken to protect the underlying vessels of the sylvian fissure  Along the base of the tumor there was a hypervascular area  Care was taken to protect of what appeared to be perforating arteries  I then continued my resection along the inferior pole taking care to remove some of the tumor capsule    Tumor capsule deep and medial was carefully resected to preclude damage to underlying white matter tracts  Once satisfied with the degree of resection and decompression of the cyst with resection of portions of the cyst wall, I turned my attention to hemostasis  This was done with bipolar cautery and Surgicel  The wound was then copiously irrigated antibiotic irrigation  The dura was then closed using running 4 0 Nurolon  Central tack-ups were then placed  Gelfoam was placed over the dura  Bone flap was then replaced using titanium plating system  A subgaleal drain was then placed under the temporalis muscle  The temporalis fascia was then reapproximated using 2 Vicryl  The galea was closed using 2 0 Vicryl  Skin was closed staples  Sterile dressing was then applied  He was then removed from his Jalloh pins and awoken from his general anesthetic at the end stable neurologic condition  There are 2 uninterrupted and correct surgical counts  Surgical sign-out was performed  There was no qualified resident aid in this case  As such, due to its complex nature Aline Martin was present and assisted for the duration of the case including exposure, resection and closure        I was present for the entire procedure    Patient Disposition:  PACU     SIGNATURE: Nubia Camacho MD  DATE: September 1, 2020  TIME: 3:47 PM

## 2020-09-01 NOTE — PROGRESS NOTES
HEMATOLOGY ONCOLOGY PROGRESS NOTE     Name: Swati Bay   Age & Sex: 49 y o  male   MRN: [id number]  Unit/Bed#: [address] 613-01   Encounter: [id number]      PATIENT INFORMATION     Name: Swati Bya   Age & Sex: 49 y o  male   MRN: [id number]  Hospital Stay Days: 2    ASSESSMENT/PLAN   Lung cancer, brain mass   - Imaging finding highly suspicious for primary lung malignancy with metastasis to brain   -  CT head without contrast  - large cystic mass within L cerebral hemisphere with surrounding edema and calcification   - MRI brain with and without gadolinium   - large predominantly cystic mass within L inferior frontal lobe, multiple intracranial enhancing lesions, suggestive of metastatic disease    - CT chest abdomen pelvis w/wo contrast - 4 x 4 x 3 cm pulmonary mass in mid to lower L hilum extending to LLL and lingula with satelite masses  Bilateral adrenal masses  - MRI abdomen pelvis  - Bilateral adrenal nodules with imaging appearance most consistent with adenomas  - Neurosurgery plans for craniotomy for mass resection today with Dr Sabine Hawkins, will wait for final pathology diagnosis with surgery   - Would defer to neurosurgery for management of brain mass inpatient   - Recommend outpatient followup with medical oncology for anticancer treatment       SUBJECTIVE     Patient seen and examined  No acute events overnight  Patient is scheduled for the OR with neurosurgery today  OBJECTIVE     Vitals:    20 0314 20 0406 20 0636 20 0748   BP: (!) 171/97 139/79 163/100 158/91   Pulse:  63     Resp:   17    Temp: 97 8 °F (36 6 °C)  98 4 °F (36 9 °C)    TempSrc:       SpO2:          Temperature:   Temp (24hrs), Av 1 °F (36 7 °C), Min:97 8 °F (36 6 °C), Max:98 4 °F (36 9 °C)    Temperature: 98 4 °F (36 9 °C)  Intake & Output:  I/O       701 -  07 -  07 -  07    P  O   960     I V   1027 1     Total Intake  1987     Net +1987 1            Unmeasured Urine Occurrence  3 x         Weights: There is no height or weight on file to calculate BMI  Weight (last 2 days)     None        Physical Exam  Vitals signs reviewed  HENT:      Head: Normocephalic  Comments: R facial droop     Nose: Nose normal    Eyes:      Extraocular Movements: Extraocular movements intact  Pupils: Pupils are equal, round, and reactive to light  Neck:      Musculoskeletal: Normal range of motion  Cardiovascular:      Rate and Rhythm: Normal rate and regular rhythm  Heart sounds: No murmur  No gallop  Pulmonary:      Effort: Pulmonary effort is normal  No respiratory distress  Abdominal:      General: Abdomen is flat  Palpations: Abdomen is soft  There is no mass  Tenderness: There is no abdominal tenderness  Skin:     General: Skin is warm  Neurological:      Mental Status: He is alert and oriented to person, place, and time  LABORATORY DATA     Labs: I have personally reviewed pertinent reports    Results from last 7 days   Lab Units 09/01/20  0503 08/31/20  0511 08/30/20  2119 08/30/20  1422   WBC Thousand/uL 10 69* 8 59  --  7 40   HEMOGLOBIN g/dL 16 1 15 9  --  16 6   HEMATOCRIT % 46 8 46 4  --  48 7*   PLATELETS Thousands/uL 180 206 191 193   NEUTROS PCT % 73 84*  --  64   MONOS PCT % 7 4  --  9      Results from last 7 days   Lab Units 09/01/20  0503 08/31/20  0510 08/30/20  1422   POTASSIUM mmol/L 3 7 4 0 4 1   CHLORIDE mmol/L 110* 107 103   CO2 mmol/L 27 28 30   BUN mg/dL 16 16 14   CREATININE mg/dL 0 71 0 84 0 80   CALCIUM mg/dL 8 8 8 8 9 4   ALK PHOS U/L  --  84 69   ALT U/L  --  30 22   AST U/L  --  16 16              Results from last 7 days   Lab Units 09/01/20  0503 08/30/20  1422   INR  1 01 1 02   PTT seconds 25 24     Results from last 7 days   Lab Units 08/30/20  1422   LACTIC ACID mmol/L 1 2     Results from last 7 days   Lab Units 08/30/20  1422   TROPONIN I ng/mL <0 03       IMAGING & DIAGNOSTIC TESTING     Radiology Results: I have personally reviewed pertinent reports  Ct Chest Abdomen Pelvis W Wo Contrast    Result Date: 8/31/2020  Impression: There is an approximately 4 x 4 x 3 cm mass in the mid to lower left hilum which extends into the left lower lobe of the lung and the left lingula  Nearby satellite masses and nodules are present  The appearance is highly suggestive of malignant neoplasm  Pulmonology consultation and Oncology consultation is recommended  There appears to be some hypodensity within some of the central left pulmonary artery branches  Pulmonary embolism and/or tumor thrombus should be excluded  Dedicated CT angiogram/PE protocol CTA of the chest is recommended for further evaluation  Bilateral adrenal masses, each measuring approximately 2 cm  MRI is recommended for further characterization, if there are no contraindications  The urinary bladder wall appears thickened  This is most pronounced anteriorly  Clinical correlation, laboratory correlation and follow-up is recommended  The prostate is mildly prominent  Clinical and laboratory correlation is recommended  Mild emphysema  Other findings as described above, please see discussion  I [facility] this [facility] on 8/31/2020 at 5:05 AM  This examination demonstrates findings requiring imaging follow-up and was logged as such in [facility]  Workstation performed: [id number]     Ct Head Without Contrast    Result Date: 8/30/2020  Impression: Large predominantly cystic mass within the left cerebral hemisphere with relatively mild amount of surrounding edema in the brain and minimal calcification along one margin of the lesion  This may be a cystic neoplasm (primary or metastatic) or less likely infection  Contrast-enhanced brain MRI is recommended for further workup  Mass effect on the left lateral ventricle with 4 mm rightward midline shift and some effacement of left-sided cerebral sulci  There is surgical consultation is advised  I personally discussed this study with Karime Stapleton on 8/30/2020 at 3:41 PM  Workstation performed: [id number]     Mri Brain W Wo Contrast    Result Date: 8/31/2020  Impression: Multiple intracranial enhancing lesions with a dominant cystic lesion noted in the deep left inferior frontal lobe, as described above  Multiplicity of lesions with a suspicious primary pulmonary neoplasm is most suggestive of metastatic disease  Other  neoplastic etiologies thought to be less likely  Note is made of lesions along the ependymal surface of the lateral ventricles and septum pellucidum suspicious for the presence of leptomeningeal/CSF spread of disease  Imaging of the remainder of the neuroaxis should be considered for further evaluation  Of note lesions are seen along the course of the left trigeminal nerve tracking into Meckel's cave as well as at the level of the hypothalamus and abutting the optic tracts  Stable 5 mm rightward midline shift  Workstation performed: [id number]     Mri Abdomen W Wo Contrast    Result Date: 8/31/2020  Impression: Bilateral adrenal nodules with imaging appearance most consistent with adenomas  Workstation performed: [id number]     Other Diagnostic Testing: I have personally reviewed pertinent reports      ACTIVE MEDICATIONS     Current Facility-Administered Medications   Medication Dose Route Frequency    albuterol (PROVENTIL HFA,VENTOLIN HFA) inhaler 2 puff  2 puff Inhalation Q6H    atorvastatin (LIPITOR) tablet 40 mg  40 mg Oral Daily With Dinner    bacitracin 100,000 Units in sodium chloride 0 9 % 1,000 mL irrigation bottle   Irrigation Once    ceFAZolin (ANCEF) IVPB (premix) 2,000 mg 50 mL  2,000 mg Intravenous Once    chlorhexidine (PERIDEX) 0 12 % oral rinse 15 mL  15 mL Swish & Spit Once    folic acid (FOLVITE) tablet 1 mg  1 mg Oral Daily    guaiFENesin (MUCINEX) 12 hr tablet 600 mg  600 mg Oral Q12H RAVEN    Labetalol HCl (NORMODYNE) injection 10 mg  10 mg Intravenous Q4H PRN    lisinopril (ZESTRIL) tablet 10 mg  10 mg Oral Daily    multivitamin-minerals (CENTRUM) tablet 1 tablet  1 tablet Oral Daily    nicotine (NICODERM CQ) 14 mg/24hr TD 24 hr patch 1 patch  1 patch Transdermal Daily    sodium chloride 0 9 % infusion  125 mL/hr Intravenous Continuous    thiamine (VITAMIN B1) tablet 100 mg  100 mg Oral Daily       VTE Pharmacologic Prophylaxis: Sequential compression device (Venodyne)   VTE Mechanical Prophylaxis: sequential compression device    Portions of the record may have been created with voice recognition software  Occasional wrong word or "sound a like" substitutions may have occurred due to the inherent limitations of voice recognition software    Read the chart carefully and recognize, using context, where substitutions have occurred   ==  Dena Jordan E Debby [address]7  MS4

## 2020-09-01 NOTE — ANESTHESIA PROCEDURE NOTES
Arterial Line Insertion  Performed by: Luisito Hamm MD  Authorized by: Luisito Hamm MD   Consent: Verbal consent obtained  Written consent obtained  Consent given by: patient  Patient understanding: patient states understanding of the procedure being performed  Patient consent: the patient's understanding of the procedure matches consent given  Procedure consent: procedure consent matches procedure scheduled  Relevant documents: relevant documents present and verified  Test results: test results available and properly labeled  Site marked: the operative site was marked  Required items: required blood products, implants, devices, and special equipment available  Patient identity confirmed: arm band and hospital-assigned identification number  Time out: Immediately prior to procedure a "time out" was called to verify the correct patient, procedure, equipment, support staff and site/side marked as required    Indications: hemodynamic monitoring  Orientation:  Right  Location: radial artery  Sedation:  Patient sedated: yes    Procedure Details:  Bhargav's test normal: yes  Needle gauge: 20  Seldinger technique: Seldinger technique used  Number of attempts: 2    Post-procedure:  Post-procedure: dressing applied  Waveform: good waveform  Post-procedure CNS: normal  Patient tolerance: Patient tolerated the procedure well with no immediate complications

## 2020-09-01 NOTE — PROGRESS NOTES
ICU Acceptance Note - [facility] 49 [facility]  [facility] MRN: [id number]  Unit/Bed#: RENETTA SALGUERO Encounter: [id number]      -------------------------------------------------------------------------------------------------------------  Chief Complaint: right facial droop and left eye blurry vision 2/2 brain mass     History of Present Illness   HX and PE limited by:  Surgical anesthesia     Jose Martinez is a 49 y o  male with a PMH of untreated hypertension, hyperlipidemia, tobacco use and alcohol abuse disorder who presented to an urgent care with 10 days of right facial droop and left blurry vision  Denied any headache, dizziness, emesis, SOB, or chest pain  Last time he saw a physician was 5 years ago and is not currently up to date with health maintenance screening  He was instructed to go to the ED where a CT found a large cystic mass in the left hemisphere with mild edema and calcification  He was transferred to [facility] for further neurosurgery evaluation  While here, he was hypertensive (171/103) and further imaging studies noted bilateral adrenal masses, urinary bladder thickening, prominent prostate, mild emphysema, and large left hilar mass with probable peripheral metastatic disease  Per radiology report, likely lung is primary source with atypical brain metastasis and possible bilateral adrenal gland involvement  Pulmonary and oncology was consulted and is following  Neurosurgery is following and performed a left frontal craniotomy today  Patient currently recovering in PACU and has no new complaints other than blurry vision      -------------------------------------------------------------------------------------------------------------  Assessment and Plan:    Neuro:    Diagnosis: Brain Mass   o One week history of left blurry vision and right side facial droop  Physical exam shows right side facial droop and right sided weakness of lower and upper extremity   CT of the head on 08/30 revealed a large predominantly cystic mass within the left cerebral hemisphere with relatively mild amount of surrounding edema in the brain and minimal calcification along one margin of the lesion  Likely metastasis from lung mass, infection unlikely  o Plan:   - per radiology report, imaging consistent with likely lung primary with atypical brain metastasis and possible bilateral adrenal gland involvement  Per radiology CT CAP report, further workup with MRI abdomen recommended  - Follow up with neurosurgery for final pathology from frontal craniotomy   - Monitor blood pressure, maintain under   - Continue on Nicardipine 25 mg drip   - Continue neuro checks Q1hr   - STAT head CT if change in neuro exam or drop of GCS more than 2  - Continue Keppra for seizure ppx   - Dexamethasone starting today 9/1  - Pain control with tylenol, oxycodone and dilaudid  - Neurosurgery is following   - Hematology and oncology following  - Biopsy are pending and will follow up   - Repeat CT in the morning without contrast      Uncomplicated Alcohol Dependence:  - Patient history reports daily consumption of 2-3 beers  Denies any history of withdrawal symptoms    - Maintain on CIWA protocal   - Thiamine and folic acid supplementation   - Alcohol cessation counseling upon discharge    Tobacco Abuse:  - Nicotine patch 21 mg   - Smoking cessation counseling upon discharge     CV:    Diagnosis: Hypertension  o Currently normotensive (108/63)   o Plan:   - Hold home lisinopril   - Continue Nicardipine 25 mg drip   - Keep SBP < 140       Diagnosis: Hyperlipidemia   o Cholesterol 213, Triglycerides 183,   o Plan:   - Atorvastatin 40 mg daily, consider titrating up if tolerating well     Pulm:   Diagnosis: Lung Mass   o Chest CT: mass in lower left hilum, 4x4x3 cm that extends into left lower lobe of lung   Nearby satellite masses and nodules  o Plan:   - Oncology following, will follow up results of biopsy  Diagnosis: Emphysema   o Currently asymptomatic with no SOB, desaturation or productive coughing  Physical exam findings of poor lung aeration, no wheezing or ronchi  Chest CT showed mild to moderate emphysema  o Plan:   - Incentive Spirometry   - Albuterol 2 puffs Q6H, consider switching to PRN since patient is asymptomatic    - Continue mucolytic, Mucinex BID    GI:    Diagnosis: Adrenal Mass   o MRI abdomen pelvis 8/31 - Bilateral adrenal nodules with imaging appearance most consistent with adenomas   o Oncology following, follow up with biopsy   o Monitor for bowel movements     : No findings    F/E/N: No findings  - Advanced diet   - Replete electrolytes as necessary     Heme/Onc:  - Oncology following for pulmonary mass  Follow up with biopsy     Endo: No findings     ID: No findings    MSK/Skin: No findings       Disposition: Admit to Critical Care   Code Status: Level 1 - Full Code  --------------------------------------------------------------------------------------------------------------  Review of Systems   Constitutional: Negative for chills and fever  HENT: Negative for congestion and trouble swallowing  Eyes: Positive for visual disturbance  Negative for redness  Respiratory: Negative for cough and shortness of breath  Cardiovascular: Negative for chest pain and palpitations  Gastrointestinal: Negative for abdominal pain, constipation, diarrhea and nausea  Genitourinary: Negative for dysuria and hematuria  Musculoskeletal: Negative for arthralgias, back pain and neck pain  Skin: Negative for rash  Neurological: Positive for facial asymmetry, weakness and headaches  Negative for dizziness, tremors, syncope, speech difficulty and numbness  Psychiatric/Behavioral: Positive for confusion  Negative for agitation  A 12-point, complete review of systems was reviewed and negative except as stated above     Physical Exam  Vitals signs reviewed     HENT:      Head: Normocephalic  Right facial droop  Bilateral sensation intact  Nose: Nose normal    Eyes:      Extraocular Movements: Left CN 6 Palsy (unable to look left with both eyes)     Pupils: Pupils are equal, round, and reactive to light  Neck:      Musculoskeletal: Right weakness of Upper and Lower Extremities  Sensation intact bilaterally  Cardiovascular:      Rate and Rhythm: Regular rate and rythem     Heart sounds: No murmur  No gallop  Pulmonary:      Effort: No respiratory distress  Pulmonary aeration diminished bilaterally  No wheezing and ronchi  No cyanosis  Abdominal:      General: Abdomen is flat  Palpations: Abdomen is soft and nontender  Skin:     General: Skin is warm  Neurological:      Mental Status: He is alert and oriented to person, place, and time  Muscle strength weakness on right side  Normal strength on left side  CN 2-12 abnormalities: CN 6 palsy, CN 7 palsy, rest of cranial nerve intact  Gross sensation intact and coordination intact      --------------------------------------------------------------------------------------------------------------  Vitals:   Vitals:    09/01/20 1010 09/01/20 1545 09/01/20 1600 09/01/20 1615   BP:  166/88 132/78 115/66   Pulse:  82 82 78   Resp:  16 17 14   Temp: (!) 97 2 °F (36 2 °C)      TempSrc: Tympanic Temporal     SpO2:  98% 94% 95%     Temp  Min: 97 2 °F (36 2 °C)  Max: 98 8 °F (37 1 °C)        There is no height or weight on file to calculate BMI    N/A    Laboratory and Diagnostics:  Results from last 7 days   Lab Units 09/01/20  0503 08/31/20  0511 08/30/20  2119 08/30/20  1422   WBC Thousand/uL 10 69* 8 59  --  7 40   HEMOGLOBIN g/dL 16 1 15 9  --  16 6   HEMATOCRIT % 46 8 46 4  --  48 7*   PLATELETS Thousands/uL 180 206 191 193   NEUTROS PCT % 73 84*  --  64   MONOS PCT % 7 4  --  9     Results from last 7 days   Lab Units 09/01/20  0503 08/31/20  0510 08/30/20  1422   SODIUM mmol/L 143 139 140   POTASSIUM mmol/L 3 7 4 0 4  1   CHLORIDE mmol/L 110* 107 103   CO2 mmol/L 27 28 30   ANION GAP mmol/L 6 4 7   BUN mg/dL 16 16 14   CREATININE mg/dL 0 71 0 84 0 80   CALCIUM mg/dL 8 8 8 8 9 4   GLUCOSE RANDOM mg/dL 108 119 99   ALT U/L  --  30 22   AST U/L  --  16 16   ALK PHOS U/L  --  84 69   ALBUMIN g/dL  --  3 6 4 7   TOTAL BILIRUBIN mg/dL  --  0 36 0 50          Results from last 7 days   Lab Units 09/01/20  0503 08/30/20  1422   INR  1 01 1 02   PTT seconds 25 24      Results from last 7 days   Lab Units 08/30/20  1422   TROPONIN I ng/mL <0 03     Results from last 7 days   Lab Units 08/30/20  1422   LACTIC ACID mmol/L 1 2     Micro: not applicable      Imaging: I have personally reviewed pertinent reports  Historical Information   No past medical history on file  Past Surgical History:   Procedure Laterality Date    HAND SURGERY Left      Social History   Social History     Substance and Sexual Activity   Alcohol Use Yes    Frequency: 4 or more times a week    Binge frequency: Daily or almost daily     Social History     Substance and Sexual Activity   Drug Use Never     Social History     Tobacco Use   Smoking Status Current Every Day Smoker    Packs/day: 1 50   Smokeless Tobacco Never Used       No family history on file    I have reviewed this patient's family history and commented on sigificant items within the HPI      Medications:  Current Facility-Administered Medications   Medication Dose Route Frequency    [MAR Hold] albuterol (PROVENTIL HFA,VENTOLIN HFA) inhaler 2 puff  2 puff Inhalation Q6H    [MAR Hold] atorvastatin (LIPITOR) tablet 40 mg  40 mg Oral Daily With Dinner    [MAR Hold] folic acid (FOLVITE) tablet 1 mg  1 mg Oral Daily    [MAR Hold] guaiFENesin (MUCINEX) 12 hr tablet 600 mg  600 mg Oral Q12H RAVEN    HYDROmorphone (DILAUDID) injection 0 5 mg  0 5 mg Intravenous Q5 Min PRN    [MAR Hold] Labetalol HCl (NORMODYNE) injection 10 mg  10 mg Intravenous Q4H PRN    [MAR Hold] lisinopril (ZESTRIL) tablet 10 mg  10 mg Oral Daily    [MAR Hold] multivitamin-minerals (CENTRUM) tablet 1 tablet  1 tablet Oral Daily    niCARdipine (CARDENE) 25 mg (STANDARD CONCENTRATION) in sodium chloride 0 9% 250 mL  1-15 mg/hr Intravenous Titrated    [MAR Hold] nicotine (NICODERM CQ) 14 mg/24hr TD 24 hr patch 1 patch  1 patch Transdermal Daily    ondansetron (ZOFRAN) injection 4 mg  4 mg Intravenous Once PRN    sodium chloride 0 9 % infusion  125 mL/hr Intravenous Continuous    [MAR Hold] thiamine (VITAMIN B1) tablet 100 mg  100 mg Oral Daily     Home medications:  None     Allergies:  No Known Allergies  ------------------------------------------------------------------------------------------------------------  Advance Directive and Living Will:      Power of :    POLST:    ------------------------------------------------------------------------------------------------------------  Anticipated Length of Stay is > 2 midnights    Care Time Delivered:   5:00 pm      Zaid Lopez      Portions of the record may have been created with voice recognition software  Occasional wrong word or "sound a like" substitutions may have occurred due to the inherent limitations of voice recognition software    Read the chart carefully and recognize, using context, where substitutions have occurred

## 2020-09-01 NOTE — PROGRESS NOTES
Patient examined in recovery room  He is awake and alert  He is oriented but mildly dysarthric  He has a slight right facial droop  Full strength on the left with some right upper extremity weakness however antigravity in all groups  Follows commands x4   ALMA with 95 cc output  Will continue to monitor  Systolic blood pressure goal less than 160, map greater than 65  Continue Decadron 4 q 6h   Seizure precautions  CT with and without contrast tomorrow morning

## 2020-09-01 NOTE — ANESTHESIA POSTPROCEDURE EVALUATION
Post-Op Assessment Note    CV Status:  Stable    Pain management: adequate     Mental Status:  Alert and awake   Hydration Status:  Euvolemic   PONV Controlled:  Controlled   Airway Patency:  Patent  Airway: intubated   Two or more mitigation strategies used for obstructive sleep apnea   Post Op Vitals Reviewed: Yes      Staff: Anesthesiologist         No complications documented      BP   162/74   Temp  98 1   Pulse  77   Resp   14   SpO2   98

## 2020-09-01 NOTE — PLAN OF CARE
Problem: PAIN - ADULT  Goal: Verbalizes/displays adequate comfort level or baseline comfort level  Description: Interventions:  - Encourage patient to monitor pain and request assistance  - Assess pain using appropriate pain scale  - Administer analgesics based on type and severity of pain and evaluate response  - Implement non-pharmacological measures as appropriate and evaluate response  - Consider cultural and social influences on pain and pain management  - Notify physician/advanced practitioner if interventions unsuccessful or patient reports new pain  Outcome: Progressing     Problem: INFECTION - ADULT  Goal: Absence or prevention of progression during hospitalization  Description: INTERVENTIONS:  - Assess and monitor for signs and symptoms of infection  - Monitor lab/diagnostic results  - Monitor all insertion sites, i e  indwelling lines, tubes, and drains  - Monitor endotracheal if appropriate and nasal secretions for changes in amount and color  - [place] appropriate cooling/warming therapies per order  - Administer medications as ordered  - Instruct and encourage patient and family to use good hand hygiene technique  - Identify and instruct in appropriate isolation precautions for identified infection/condition  Outcome: Progressing     Problem: SAFETY ADULT  Goal: Patient will remain free of falls  Description: INTERVENTIONS:  - Assess patient frequently for physical needs  -  Identify cognitive and physical deficits and behaviors that affect risk of falls    -  [place] fall precautions as indicated by assessment   - Educate patient/family on patient safety including physical limitations  - Instruct patient to call for assistance with activity based on assessment  - Modify environment to reduce risk of injury  - Consider OT/PT consult to assist with strengthening/mobility  Outcome: Progressing     Problem: DISCHARGE PLANNING  Goal: Discharge to home or other facility with appropriate resources  Description: INTERVENTIONS:  - Identify barriers to discharge w/patient and caregiver  - Arrange for needed discharge resources and transportation as appropriate  - Identify discharge learning needs (meds, wound care, etc )  - Arrange for interpretive services to assist at discharge as needed  - Refer to Case Management Department for coordinating discharge planning if the patient needs post-hospital services based on physician/advanced practitioner order or complex needs related to functional status, cognitive ability, or social support system  Outcome: Progressing     Problem: Knowledge Deficit  Goal: Patient/family/caregiver demonstrates understanding of disease process, treatment plan, medications, and discharge instructions  Description: Complete learning assessment and assess knowledge base    Interventions:  - Provide teaching at level of understanding  - Provide teaching via preferred learning methods  Outcome: Progressing     Problem: NEUROSENSORY - ADULT  Goal: Achieves stable or improved neurological status  Description: INTERVENTIONS  - Monitor and report changes in neurological status  - Monitor vital signs such as temperature, blood pressure, glucose, and any other labs ordered   - Initiate measures to prevent increased intracranial pressure  - Monitor for seizure activity and implement precautions if appropriate      Outcome: Progressing     Problem: SKIN/TISSUE INTEGRITY - ADULT  Goal: Skin integrity remains intact  Description: INTERVENTIONS  - Identify patients at risk for skin breakdown  - Assess and monitor skin integrity  - Assess and monitor nutrition and hydration status  - Monitor labs (i e  albumin)  - Assess for incontinence   - Turn and reposition patient  - Assist with mobility/ambulation  - Relieve pressure over bony prominences  - Avoid friction and shearing  - Provide appropriate hygiene as needed including keeping skin clean and dry  - Evaluate need for skin moisturizer/barrier cream  - Collaborate with interdisciplinary team (i e  Nutrition, Rehabilitation, etc )   - Patient/family teaching  Outcome: Progressing     Problem: Potential for Falls  Goal: Patient will remain free of falls  Description: INTERVENTIONS:  - Assess patient frequently for physical needs  -  Identify cognitive and physical deficits and behaviors that affect risk of falls    -  [place] fall precautions as indicated by assessment   - Educate patient/family on patient safety including physical limitations  - Instruct patient to call for assistance with activity based on assessment  - Modify environment to reduce risk of injury  - Consider OT/PT consult to assist with strengthening/mobility  Outcome: Progressing

## 2020-09-01 NOTE — CASE MANAGEMENT
POA & Living Will documents received:     CM received POA and Living Will documents  Pt's Son Venice Maddox is POA  Stickers applied to each page of [address]; document placed in P6 medical record bin

## 2020-09-02 ENCOUNTER — APPOINTMENT (INPATIENT)
Dept: RADIOLOGY | Facility: HOSPITAL | Age: 55
DRG: 021 | End: 2020-09-02
Payer: COMMERCIAL

## 2020-09-02 PROBLEM — R91.8 LUNG MASS: Status: ACTIVE | Noted: 2020-09-02

## 2020-09-02 LAB
ANION GAP SERPL CALCULATED.3IONS-SCNC: 6 MMOL/L (ref 4–13)
APTT PPP: 23 SECONDS (ref 23–37)
BUN SERPL-MCNC: 11 MG/DL (ref 5–25)
CALCIUM SERPL-MCNC: 8.5 MG/DL (ref 8.3–10.1)
CHLORIDE SERPL-SCNC: 108 MMOL/L (ref 100–108)
CO2 SERPL-SCNC: 27 MMOL/L (ref 21–32)
CREAT SERPL-MCNC: 0.66 MG/DL (ref 0.6–1.3)
ERYTHROCYTE [DISTWIDTH] IN BLOOD BY AUTOMATED COUNT: 12.2 % (ref 11.6–15.1)
GFR SERPL CREATININE-BSD FRML MDRD: 110 ML/MIN/1.73SQ M
GLUCOSE SERPL-MCNC: 112 MG/DL (ref 65–140)
GLUCOSE SERPL-MCNC: 121 MG/DL (ref 65–140)
GLUCOSE SERPL-MCNC: 126 MG/DL (ref 65–140)
GLUCOSE SERPL-MCNC: 136 MG/DL (ref 65–140)
GLUCOSE SERPL-MCNC: 145 MG/DL (ref 65–140)
HCT VFR BLD AUTO: 42.6 % (ref 36.5–49.3)
HGB BLD-MCNC: 14.6 G/DL (ref 12–17)
INR PPP: 1.03 (ref 0.84–1.19)
MAGNESIUM SERPL-MCNC: 2.2 MG/DL (ref 1.6–2.6)
MCH RBC QN AUTO: 31.2 PG (ref 26.8–34.3)
MCHC RBC AUTO-ENTMCNC: 34.3 G/DL (ref 31.4–37.4)
MCV RBC AUTO: 91 FL (ref 82–98)
PHOSPHATE SERPL-MCNC: 3.4 MG/DL (ref 2.7–4.5)
PLATELET # BLD AUTO: 188 THOUSANDS/UL (ref 149–390)
PMV BLD AUTO: 11.6 FL (ref 8.9–12.7)
POTASSIUM SERPL-SCNC: 3.7 MMOL/L (ref 3.5–5.3)
PROTHROMBIN TIME: 13.5 SECONDS (ref 11.6–14.5)
RBC # BLD AUTO: 4.68 MILLION/UL (ref 3.88–5.62)
SODIUM SERPL-SCNC: 141 MMOL/L (ref 136–145)
WBC # BLD AUTO: 15.18 THOUSAND/UL (ref 4.31–10.16)

## 2020-09-02 PROCEDURE — NC001 PR NO CHARGE: Performed by: INTERNAL MEDICINE

## 2020-09-02 PROCEDURE — 84100 ASSAY OF PHOSPHORUS: CPT | Performed by: STUDENT IN AN ORGANIZED HEALTH CARE EDUCATION/TRAINING PROGRAM

## 2020-09-02 PROCEDURE — 97166 OT EVAL MOD COMPLEX 45 MIN: CPT

## 2020-09-02 PROCEDURE — 92610 EVALUATE SWALLOWING FUNCTION: CPT

## 2020-09-02 PROCEDURE — 70470 CT HEAD/BRAIN W/O & W/DYE: CPT

## 2020-09-02 PROCEDURE — 83735 ASSAY OF MAGNESIUM: CPT | Performed by: STUDENT IN AN ORGANIZED HEALTH CARE EDUCATION/TRAINING PROGRAM

## 2020-09-02 PROCEDURE — 85730 THROMBOPLASTIN TIME PARTIAL: CPT | Performed by: PHYSICIAN ASSISTANT

## 2020-09-02 PROCEDURE — 85027 COMPLETE CBC AUTOMATED: CPT | Performed by: PHYSICIAN ASSISTANT

## 2020-09-02 PROCEDURE — G1004 CDSM NDSC: HCPCS

## 2020-09-02 PROCEDURE — NC001 PR NO CHARGE: Performed by: ANESTHESIOLOGY

## 2020-09-02 PROCEDURE — 82948 REAGENT STRIP/BLOOD GLUCOSE: CPT

## 2020-09-02 PROCEDURE — 99024 POSTOP FOLLOW-UP VISIT: CPT | Performed by: PHYSICIAN ASSISTANT

## 2020-09-02 PROCEDURE — 97163 PT EVAL HIGH COMPLEX 45 MIN: CPT

## 2020-09-02 PROCEDURE — 85610 PROTHROMBIN TIME: CPT | Performed by: PHYSICIAN ASSISTANT

## 2020-09-02 PROCEDURE — 99233 SBSQ HOSP IP/OBS HIGH 50: CPT | Performed by: ANESTHESIOLOGY

## 2020-09-02 PROCEDURE — 80048 BASIC METABOLIC PNL TOTAL CA: CPT | Performed by: STUDENT IN AN ORGANIZED HEALTH CARE EDUCATION/TRAINING PROGRAM

## 2020-09-02 RX ORDER — LISINOPRIL 10 MG/1
10 TABLET ORAL DAILY
Status: DISCONTINUED | OUTPATIENT
Start: 2020-09-02 | End: 2020-09-04 | Stop reason: HOSPADM

## 2020-09-02 RX ORDER — ALBUTEROL SULFATE 90 UG/1
2 AEROSOL, METERED RESPIRATORY (INHALATION) EVERY 6 HOURS PRN
Status: DISCONTINUED | OUTPATIENT
Start: 2020-09-02 | End: 2020-09-04 | Stop reason: HOSPADM

## 2020-09-02 RX ADMIN — SODIUM CHLORIDE 7.5 MG/HR: 0.9 INJECTION, SOLUTION INTRAVENOUS at 06:53

## 2020-09-02 RX ADMIN — LEVETIRACETAM 500 MG: 100 INJECTION, SOLUTION INTRAVENOUS at 22:04

## 2020-09-02 RX ADMIN — SODIUM CHLORIDE 5 MG/HR: 0.9 INJECTION, SOLUTION INTRAVENOUS at 00:58

## 2020-09-02 RX ADMIN — THIAMINE HCL TAB 100 MG 100 MG: 100 TAB at 11:10

## 2020-09-02 RX ADMIN — DEXAMETHASONE SODIUM PHOSPHATE 4 MG: 4 INJECTION, SOLUTION INTRAMUSCULAR; INTRAVENOUS at 00:01

## 2020-09-02 RX ADMIN — DEXAMETHASONE SODIUM PHOSPHATE 4 MG: 4 INJECTION, SOLUTION INTRAMUSCULAR; INTRAVENOUS at 12:08

## 2020-09-02 RX ADMIN — Medication 1 TABLET: at 11:08

## 2020-09-02 RX ADMIN — FOLIC ACID 1 MG: 1 TABLET ORAL at 11:10

## 2020-09-02 RX ADMIN — CEFAZOLIN SODIUM 1000 MG: 1 SOLUTION INTRAVENOUS at 04:56

## 2020-09-02 RX ADMIN — ACETAMINOPHEN 975 MG: 325 TABLET, FILM COATED ORAL at 22:47

## 2020-09-02 RX ADMIN — ATORVASTATIN CALCIUM 40 MG: 40 TABLET, FILM COATED ORAL at 18:32

## 2020-09-02 RX ADMIN — LISINOPRIL 10 MG: 10 TABLET ORAL at 11:10

## 2020-09-02 RX ADMIN — DOCUSATE SODIUM 100 MG: 100 CAPSULE, LIQUID FILLED ORAL at 18:32

## 2020-09-02 RX ADMIN — GUAIFENESIN 600 MG: 600 TABLET, EXTENDED RELEASE ORAL at 22:48

## 2020-09-02 RX ADMIN — LEVETIRACETAM 500 MG: 100 INJECTION, SOLUTION INTRAVENOUS at 09:11

## 2020-09-02 RX ADMIN — SODIUM CHLORIDE 75 ML/HR: 0.9 INJECTION, SOLUTION INTRAVENOUS at 22:49

## 2020-09-02 RX ADMIN — IOHEXOL 100 ML: 350 INJECTION, SOLUTION INTRAVENOUS at 04:56

## 2020-09-02 RX ADMIN — DEXAMETHASONE SODIUM PHOSPHATE 4 MG: 4 INJECTION, SOLUTION INTRAMUSCULAR; INTRAVENOUS at 18:36

## 2020-09-02 RX ADMIN — GUAIFENESIN 600 MG: 600 TABLET, EXTENDED RELEASE ORAL at 11:09

## 2020-09-02 RX ADMIN — DEXAMETHASONE SODIUM PHOSPHATE 4 MG: 4 INJECTION, SOLUTION INTRAMUSCULAR; INTRAVENOUS at 05:01

## 2020-09-02 RX ADMIN — SODIUM CHLORIDE 125 ML/HR: 0.9 INJECTION, SOLUTION INTRAVENOUS at 00:57

## 2020-09-02 RX ADMIN — ALBUTEROL SULFATE 2 PUFF: 90 AEROSOL, METERED RESPIRATORY (INHALATION) at 04:03

## 2020-09-02 RX ADMIN — METHOCARBAMOL TABLETS 500 MG: 500 TABLET, COATED ORAL at 18:33

## 2020-09-02 NOTE — ASSESSMENT & PLAN NOTE
Foun on CT chest  Likely lung primary  HemOnc following  Follow up on brain biopsy  Follow up as outpatient

## 2020-09-02 NOTE — PROGRESS NOTES
INTERNAL MEDICINE RESIDENCY TRANSFER ACCEPTANCE NOTE     Name: Oni Gonzalez   Age & Sex: 49 y o  male   MRN: [id number]  Unit/Bed#: ICU 10   Encounter: [facility] [facility] Stay Days: 3    Accepting team: SOD Team A  Code Status: Level 1 - Full Code  Disposition: Patient requires Med/Surg with Telemetry    ASSESSMENT/PLAN     Principal Problem:    Brain mass  Active Problems:    Hypertension    Tobacco abuse    Uncomplicated alcohol dependence ([address] )    Hyperlipidemia    Emphysema lung (HCC)    Cerebral edema (HCC)    Brain compression (HCC)    Lung mass    * Brain mass  Assessment & Plan  One week history of left blurry vision and right side facial droop  Physical exam initially showed right side facial droop and right sided weakness of lower and upper extremity  This morning 7/02, patient continues to have right facial drop but bilateral weakness of 5/5  Head CT 8/30 = large predominantly cystic mass within the left cerebral hemisphere with relatively mild amount of surrounding edema in the brain and minimal calcification along one margin of the lesion  Likely metastasis from lung mass, infection unlikely  Plan:  § Follow up CT head tomorrow am  § Wound care, monitor ALMA drain output  § Monitor blood pressure, maintain under  (currently 138/60)  § Continue neuro checks Q2hr   § STAT head CT if change in neuro exam or drop of GCS more than 2  § Continue Keppra for seizure ppx   § Continue Dexamethasone IV started 9/1  Will transition to PO    § Pain control with tylenol, oxycodone and dilaudid  § Neurosurgery is following   § Hematology and oncology following, follow up biopsy results    Lung mass  Assessment & Plan  Found on CT chest  Likely lung primary  HemOnc following  Follow up on brain biopsy  Follow up as outpatient  Emphy[address]ma lung ([facility] [address] )  Assessment & Plan  30+ pack-year history of smoking  Mild emphysema on CT chest   Coarse rhonchi in all lobes bilaterally    Patient reports chronic cough, unchanged or worsening  - Continue Albuterol PRN  - Follow up as outpatient    Hypertension  Assessment & Plan      Stable  - Cardene gtt discontinued   - continue lisinopril 10 mg in AM  -IV labetalol 5 mg Q4 PRN for SBP >160 mmHg     Hyperlipidemia  Assessment & Plan  Patient with a history based on previous documentation  Treatment only with diet  No record of lipid panel recently    Cholesterol  213High    mg/dL    Triglycerides  183High    mg/dL    HDL  45  mg/dL    LDL Calculated  131High    mg/dL       - Continue Lipitor 43NB     Uncomplicated alcohol dependence ([facility] [address] )  Assessment & Plan  Patient states he drinks about 2-6 beers every day  He denies any history of tremors or withdrawal seizures  Plan:  - Continue CIWA protocol  -Thiamine 100 mg p o  Daily and folic acid 1 mg p o  Tobacco abuse  Assessment & Plan  Patient states he has about a 30 pack year history  Plan:  -Nicotine patch 21 mg  -Smoking cessation counseling upon discharge      VTE Pharmacologic Prophylaxis: Reason for no pharmacologic prophylaxis Status post craniotomy  VTE Mechanical Prophylaxis: sequential compression device    HOSPITAL COURSE     As per Dr Farhan Lockett ICU transfer note:   "Malu Blount a 49 y  o  male with a PMH of untreated hypertension, hyperlipidemia, tobacco use and alcohol abuse disorder who presented to an urgent care with 10 days of right facial droop and left blurry vision  Denied any headache, dizziness, emesis, SOB, or chest pain  Last time he saw a physician was 5 years ago and is not currently up to date with health maintenance screening  He was instructed to go to the ED where a CT found a large cystic mass in the left hemisphere with mild edema and calcification  He was transferred to [facility] for further neurosurgery evaluation   While here, he was hypertensive (171/103) and further imaging studies noted bilateral adrenal masses, urinary bladder thickening, prominent prostate, mild emphysema, and large left hilar mass with probable peripheral metastatic disease  Per radiology report, likely lung is primary source with atypical brain metastasis and possible bilateral adrenal gland involvement   Pulmonary and oncology was consulted and is following  Neurosurgery is following and performed a left frontal craniotomy  "     Patient was observed in ICU post procedure  Has some expressive aphasia, improving cranial nerve 6 palsy on left and right facial droop  He was taken off cardene for BP control  Tolerating PO diet "  Upon my encouter, patient was noted to be sitting on a chair at the side of the bed  Alert oriented times 2-3 with expressive aphasia however self corrects after responses  Minimal cranial nerve 6 palsy noted  Normal muscle strength in upper and lower extremities  Intact sensation  Patient denies headaches, chest pain, shortness of breath, abdominal pain, diarrhea, constipation, lower urinary symptoms  SUBJECTIVE     Patient seen and examined today in the afternoon  Patient without complaints  OBJECTIVE     Vitals:    09/02/20 1110 09/02/20 1200 09/02/20 1300 09/02/20 1400   BP: 152/74      Pulse:  76 62 64   Resp:  14 12 15   Temp:  98 7 °F (37 1 °C)     TempSrc:  Oral     SpO2:  95% 95% 96%   Weight:       Height:         I/O last 24 hours: In: 6958 7 [P O :360; I V :6348 7; IV Piggyback:250]  Out: 1972 [Urine:6000; Drains:235; Blood:150]    Physical Exam   Physical Exam:   GENERAL: NAD, well-developed, well-nourished  Expressive aphasia  Alert oriented x2-3  HEENT:   left frontal drain with serosanguineous output, PERRL, EOMI, MMM, no scleral icterus  Minimal left cranial nerve 6 palsy noted  CARDIAC:  RRR, +S1/S2, no S3/S4 heard, no m/g/r  PULMONARY:  non-labored breathing, CTA B/L, crackles noted bilaterally  ABDOMEN:  Soft, NT/ND, +BS, no rebound/guarding/rigidity  Extremities:  2+ Pulses in DP/PT   No edema, cyanosis, or clubbing  NEUROLOGIC:  Alert/oriented x2-3  No motor or sensory deficits  SKIN:  No rashes or erythema    LABORATORY DATA     Labs: I have personally reviewed pertinent reports  Results from last 7 days   Lab Units 09/02/20 0455 09/01/20 2109 09/01/20  0503 08/31/20  0511  08/30/20  1422   WBC Thousand/uL 15 18* 14 42* 10 69* 8 59  --  7 40   HEMOGLOBIN g/dL 14 6 14 9 16 1 15 9  --  16 6   HEMATOCRIT % 42 6 43 8 46 8 46 4  --  48 7*   PLATELETS Thousands/uL 188 183 180 206   < > 193   NEUTROS PCT %  --   --  73 84*  --  64   MONOS PCT %  --   --  7 4  --  9   MONO PCT %  --  4  --   --   --   --     < > = values in this interval not displayed  Results from last 7 days   Lab Units 09/02/20 0455 09/01/20 2109 09/01/20  0503 08/31/20  0510 08/30/20  1422   POTASSIUM mmol/L 3 7 3 9 3 7 4 0 4 1   CHLORIDE mmol/L 108 113* 110* 107 103   CO2 mmol/L 27 26 27 28 30   BUN mg/dL 11 13 16 16 14   CREATININE mg/dL 0 66 0 85 0 71 0 84 0 80   CALCIUM mg/dL 8 5 8 1* 8 8 8 8 9 4   ALK PHOS U/L  --   --   --  84 69   ALT U/L  --   --   --  30 22   AST U/L  --   --   --  16 16     Results from last 7 days   Lab Units 09/02/20 0455 09/01/20  2109   MAGNESIUM mg/dL 2 2 2 1     Results from last 7 days   Lab Units 09/02/20 0455 09/01/20  2109   PHOSPHORUS mg/dL 3 4 3 8      Results from last 7 days   Lab Units 09/02/20 0455 09/01/20  0503 08/30/20  1422   INR  1 03 1 01 1 02   PTT seconds 23 25 24     Results from last 7 days   Lab Units 08/30/20  1422   LACTIC ACID mmol/L 1 2     Results from last 7 days   Lab Units 08/30/20  1422   TROPONIN I ng/mL <0 03     Micro:  No results found for: BLOODCX, Myrtice Letters, WOUNDCULT, SPUTUMCULTUR  IMAGING & DIAGNOSTIC TESTING     Imaging: I have personally reviewed pertinent reports        Ct Chest Abdomen Pelvis W Wo Contrast    Result Date: 8/31/2020  Impression: There is an approximately 4 x 4 x 3 cm mass in the mid to lower left hilum which extends into the left lower lobe of the lung and the left lingula  Nearby satellite masses and nodules are present  The appearance is highly suggestive of malignant neoplasm  Pulmonology consultation and Oncology consultation is recommended  There appears to be some hypodensity within some of the central left pulmonary artery branches  Pulmonary embolism and/or tumor thrombus should be excluded  Dedicated CT angiogram/PE protocol CTA of the chest is recommended for further evaluation  Bilateral adrenal masses, each measuring approximately 2 cm  MRI is recommended for further characterization, if there are no contraindications  The urinary bladder wall appears thickened  This is most pronounced anteriorly  Clinical correlation, laboratory correlation and follow-up is recommended  The prostate is mildly prominent  Clinical and laboratory correlation is recommended  Mild emphysema  Other findings as described above, please see discussion  I personally discussed this study with [address] on 8/31/2020 at 5:05 AM  This examination demonstrates findings requiring imaging follow-up and was logged as such in [employer]  Workstation performed: [id number]     Ct Head Without Contrast    Result Date: 8/30/2020  Impression: Large predominantly cystic mass within the left cerebral hemisphere with relatively mild amount of surrounding edema in the brain and minimal calcification along one margin of the lesion  This may be a cystic neoplasm (primary or metastatic) or less likely infection  Contrast-enhanced brain MRI is recommended for further workup  Mass effect on the left lateral ventricle with 4 mm rightward midline shift and some effacement of left-sided cerebral sulci  There is surgical consultation is advised    I personally discussed this study with Cathleen Goins on 8/30/2020 at 3:41 PM  Workstation performed: [id number]     Ct Head W Wo Contrast    Result Date: 9/2/2020  Impression: Status post left opercular frontal mass resection with hemorrhage in the postoperative bed and left frontal temporal extra-axial space  Scattered cerebral sulcal and intraventricular as well as basal subarachnoid hemorrhage  Multifocal hyperdense enhancing lesions in the periventricular regions as previously described on the MRI study of August 30, 2020  The hyperattenuation of these lesions suggest hemorrhagic metastasis  Alternatively these may represent hypercellular metastasis  Correlation with oncologic history  Interval decrease in the mass effect on the left ventricular system  I personally discussed this study with Dr Nino Cunha on 9/2/2020 at 7:51 AM  Workstation performed: [id number]     Mri Brain W Wo Contrast    Result Date: 8/31/2020  Impression: Multiple intracranial enhancing lesions with a dominant cystic lesion noted in the deep left inferior frontal lobe, as described above  Multiplicity of lesions with a suspicious primary pulmonary neoplasm is most suggestive of metastatic disease  Other  neoplastic etiologies thought to be less likely  Note is made of lesions along the ependymal surface of the lateral ventricles and septum pellucidum suspicious for the presence of leptomeningeal/CSF spread of disease  Imaging of the remainder of the neuroaxis should be considered for further evaluation  Of note lesions are seen along the course of the left trigeminal nerve tracking into Meckel's cave as well as at the level of the hypothalamus and abutting the optic tracts  Stable 5 mm rightward midline shift  Workstation performed: [id number]     Mri Abdomen W Wo Contrast    Result Date: 8/31/2020  Impression: Bilateral adrenal nodules with imaging appearance most consistent with adenomas  Workstation performed: [id number]     EKG, Pathology, and Other Studies: I have personally reviewed pertinent reports       ALLERGIES   No Known Allergies  MEDICATIONS     Current Facility-Administered Medications   Medication Dose Route Frequency Provider Last Rate    acetaminophen 975 mg Oral Q8H [address] [facility], PA-C      albuterol  2 puff Inhalation Q6H PRN Dany Turner DO      atorvastatin  40 mg Oral Daily With [employer], PA-C      calcium carbonate  1,000 mg Oral Daily PRN Cathyann [facility], PA-C      dexamethasone  4 mg Intravenous Q6H [address] Sky Neves,       [START ON 9/5/2020] dexamethasone  2 mg Oral Q6H [address] Sky Neves DO      [START ON 9/8/2020] dexamethasone  2 mg Oral Q8H [address] Swanson Edinson DO      [START ON 9/9/2020] dexamethasone  2 mg Oral Q12H [address] Sky Neves DO      [START ON 9/12/2020] dexamethasone  2 mg Oral Q24H [address] Sky Neves DO      [START ON 9/4/2020] dexamethasone  4 mg Oral Q8H [address] Sky Neves DO      docusate sodium  100 mg Oral BID [facility], PA-C      folic acid  1 mg Oral Daily Cathyann [facility], PA-C      guaiFENesin  600 mg Oral Q12H [address] [facility], PA-C      HYDROmorphone  0 5 mg Intravenous Q1H PRN Cathyann [facility], PA-C      Labetalol HCl  10 mg Intravenous Q4H PRN Cathyann [facility], PA-C      levETIRAcetam  500 mg Intravenous Q12H [address] Swanson DO Edinson Stopped (09/02/20 0930)    lisinopril  10 mg Oral Daily Argenis Chan MD      methocarbamol  500 mg Oral Q6H [address] [facility], PA-C      multivitamin-minerals  1 tablet Oral Daily [facility], PA-C      niCARdipine  1-15 mg/hr Intravenous Titrated Cathyanmarty Diaz, PA-C Stopped (09/02/20 1130)    nicotine  1 patch Transdermal Daily [facility], PA-C      ondansetron  4 mg Intravenous Q4H PRN Cathyann [facility], PA-C      oxyCODONE  10 mg Oral Q4H PRN Cathyann Hind, PA-C      oxyCODONE  5 mg Oral Q4H PRN Daniella Diaz PA-C      senna-docusate sodium  2 tablet Oral Daily Daniella Diaz PA-C      sodium chloride  75 mL/hr Intravenous Continuous Tyrese G Chirayath, DO 75 mL/hr (09/02/20 0912)    thiamine  100 mg Oral Daily Daniella Diaz PA-C       niCARdipine, 1-15 mg/hr, Last Rate: Stopped (09/02/20 1130)  sodium chloride, 75 mL/hr, Last Rate: 75 mL/hr (09/02/20 0912)      albuterol, 2 puff, Q6H PRN  calcium carbonate, 1,000 mg, Daily PRN  HYDROmorphone, 0 5 mg, Q1H PRN  Labetalol HCl, 10 mg, Q4H PRN  ondansetron, 4 mg, Q4H PRN  oxyCODONE, 10 mg, Q4H PRN  oxyCODONE, 5 mg, Q4H PRN      ==  Millicent Kwon MD  Internal Medicine Residency, PGY-2  [address] [facility]

## 2020-09-02 NOTE — PROGRESS NOTES
Progress Note - Manuel Le 1965, 49 y o  male MRN: [id number]    Unit/Bed#: ICU 10 Encounter: [id number]    Primary Care Provider: No primary care provider on file  Date and time admitted to hospital: 8/30/2020  7:18 PM        * Brain mass  Assessment & Plan  POD 1  Status post left frontal craniotomy for tumor resection  · preliminary pathology consistent with likely lung cancer  · Large left frontal temporal cystic mass with vasogenic edema and midline shift  · Concern for leptomeningeal spread  · Presents with left eye visual deficit and left facial numbness    Imaging:  ·  CT head without 9/1/20:  Status post left upper lobe frontal mass resection with hemorrhage in the postoperative bed and left frontal temporal extra-axial space  Scattered cerebral sulcal in intraventricular muscles basal subarachnoid hemorrhage  One left focal hyperdense enhancing lesions in the periventricular region as previously described on MRI concern for possible hemorrhagic metastasis versus hypercellular metastases decrease in mass effect on left lateral ventricle  · CT head without 8/30/20:  Large predominantly cystic mass within the left cerebellar hemisphere with relatively mild amount of surrounding edema in the brain and minimal calcification along one marginal region  This may be cystic neoplasm, primary metastatic less likely infection  Mass effect on the lateral left ventricle with 4 mm of rightward shift and effacement of the left sulci  · MRI brain with without 8/30/20:  Multiple intracranial enhancing lesions with the dominant cystic lesion noted a deep less inferior frontal lobe  The lesions along the ependymal surface of the lateral ventricles and septum blue cm suspicious for presence of leptomeningeal CSF spread  Lesion along the course of left trigeminal track into Meckel's cave as well as level of hypothalamus abutting optic tract  Stable 5 mm midline shift      Plan:  · Continue monitor neurological exam     · Currently with mild mixed aphasia  Able to identify objects 3/3 but difficulties with repetition at times  Able to follow simple commands but intermittent difficulties with multistep commands  · Continue Decadron 4 mg every 6 hours for cerebral edema -  Plan 72 hour wean starting tomorrow  · Mobilize physical therapy and occupational therapy   · DVT prophylaxis:  Bilateral SCDs  Only  No pharmacological DVT prophylaxis at this time  ·   Repeat CT head ordered for tomorrow for monitoring   · Ongoing medical management per primary team  ·  systolic blood pressure less than 160  Patient currently on Cardene drip  Anticipate weaning off of drip once  Cleared to take oral medications  · Continue Keppra for seizure prophylaxis x1 week   · Postoperative antibiotics  · Drain management -  Drain with 205 mL since surgery  Limited output overnight  Maintained at current  Monitor output  Discontinue later today versus tomorrow  · Will continue to follow closely  Please call if any questions concerns or exam changes    Lung mass  Assessment & Plan  · CT chest abd pelvis with without 8/30/20:  Approximately 4 x 4 x 3 cm mass mid to lower left hilum extending left lower lobe to the lingual a  Nearby satellite masses  Highly suggestive of malignant neoplasm  Hypodensity concerning for possible PE  Bilateral adrenal masses  Bladder wall thickening  Enlarged prostate  Brain compression [facility])  Assessment & Plan  continue decadron -  Plan for 72hr wean starting tomorrow    Cerebral edema [facility])  Assessment & Plan  Decadron as above      Subjective/Objective   Chief Complaint:  I am okay    Subjective:  Patient offers no specific complaints  Denies any headaches  Denies any vision or speech changes though he is noted to have some mild mixed aphasia  Denies any weakness or sensory deficits  Remains on Cardene for blood pressure control systolic less than 912      Require speech consult for swallow evaluation  Objective:   Lying bed  NAD  I/O       08/31 0701 - 09/01 0700 09/01 0701 - 09/02 0700 09/02 0701 - 09/03 0700    P  O  960 0     I V  (mL/kg) 1027 1 5363 7 (62 3) 760 (8 8)    IV Piggyback  150 100    Total Intake(mL/kg) 1987 1 5513 7 (64) 860 (10)    Urine (mL/kg/hr)  5300 (2 6) 700 (2 5)    Drains  205     Blood  150     Total Output  5655 700    Net +1987 1 -141 3 +160           Unmeasured Urine Occurrence 3 x            Invasive Devices     Peripheral Intravenous Line            Peripheral IV 08/30/20 Right Antecubital 2 days    Peripheral IV 09/01/20 Left Hand less than 1 day          Arterial Line            Arterial Line 09/01/20 Right Radial less than 1 day          Drain            Closed/Suction Drain Left Head Bulb 10 Fr  less than 1 day    Urethral Catheter Latex 16 Fr  less than 1 day                Physical Exam:  Vitals: Blood pressure 125/74, pulse 74, temperature 98 °F (36 7 °C), temperature source Oral, resp  rate (!) 11, height 5' 10" (1 778 m), weight 86 1 kg (189 lb 13 1 oz), SpO2 92 %  ,Body mass index is 27 24 kg/m²  General appearance: alert, appears stated age, cooperative and no distress  Head: Normocephalic, without obvious abnormality,   Incision clean dry intact  Drain in place    Eyes:   Partial cranial nerve 6 and 3 palsy, PERRL,  Left ptosis  Neck: supple, symmetrical, trachea midline   Lungs: non labored breathing  Heart: regular heart rate  Neurologic:   Mental status: Alert, oriented, thought content grosslyappropriate  Cranial nerves:  Mild right facial weakness, decreased left V1 sensation, partial cranial nerve 3 and 6 palsy, mild tongue deviation  Sensory: normal to LT  Motor: moving all extremities without focal weakness  Coordination: finger to nose normal bilaterally, no drift bilaterally    Lab Results:  Results from last 7 days   Lab Units 09/02/20  0455 09/01/20  2109 09/01/20  0503 08/31/20  0511  08/30/20  1422   WBC Thousand/uL 15 18* 14 42* 10 69* 8 59  --  7 40   HEMOGLOBIN g/dL 14 6 14 9 16 1 15 9  --  16 6   HEMATOCRIT % 42 6 43 8 46 8 46 4  --  48 7*   PLATELETS Thousands/uL 188 183 180 206   < > 193   NEUTROS PCT %  --   --  73 84*  --  64   MONOS PCT %  --   --  7 4  --  9   MONO PCT %  --  4  --   --   --   --     < > = values in this interval not displayed  Results from last 7 days   Lab Units 09/02/20  0455 09/01/20  2109 09/01/20  0503 08/31/20  0510 08/30/20  1422   POTASSIUM mmol/L 3 7 3 9 3 7 4 0 4 1   CHLORIDE mmol/L 108 113* 110* 107 103   CO2 mmol/L 27 26 27 28 30   BUN mg/dL 11 13 16 16 14   CREATININE mg/dL 0 66 0 85 0 71 0 84 0 80   CALCIUM mg/dL 8 5 8 1* 8 8 8 8 9 4   ALK PHOS U/L  --   --   --  84 69   ALT U/L  --   --   --  30 22   AST U/L  --   --   --  16 16     Results from last 7 days   Lab Units 09/02/20  0455 09/01/20  2109   MAGNESIUM mg/dL 2 2 2 1     Results from last 7 days   Lab Units 09/02/20  0455 09/01/20  2109   PHOSPHORUS mg/dL 3 4 3 8     Results from last 7 days   Lab Units 09/02/20  0455 09/01/20  0503 08/30/20  1422   INR  1 03 1 01 1 02   PTT seconds 23 25 24     No results found for: TROPONINT  ABG:No results found for: PHART, VEJ8MRP, PO2ART, GFT8KMS, W0SLAIJU, BEART, SOURCE    Imaging Studies: I have personally reviewed pertinent reports  and I have personally reviewed pertinent films in PACS    Ct Chest Abdomen Pelvis W Wo Contrast    Result Date: 8/31/2020  Impression: There is an approximately 4 x 4 x 3 cm mass in the mid to lower left hilum which extends into the left lower lobe of the lung and the left lingula  Nearby satellite masses and nodules are present  The appearance is highly suggestive of malignant neoplasm  Pulmonology consultation and Oncology consultation is recommended  There appears to be some hypodensity within some of the central left pulmonary artery branches  Pulmonary embolism and/or tumor thrombus should be excluded    Dedicated CT angiogram/PE protocol CTA of the chest is recommended for further evaluation  Bilateral adrenal masses, each measuring approximately 2 cm  MRI is recommended for further characterization, if there are no contraindications  The urinary bladder wall appears thickened  This is most pronounced anteriorly  Clinical correlation, laboratory correlation and follow-up is recommended  The prostate is mildly prominent  Clinical and laboratory correlation is recommended  Mild emphysema  Other findings as described above, please see discussion  I personally discussed this study with [address] on 8/31/2020 at 5:05 AM  This examination demonstrates findings requiring imaging follow-up and was logged as such in [facility]  Workstation performed: [id number]     Ct Head Without Contrast    Result Date: 8/30/2020  Impression: Large predominantly cystic mass within the left cerebral hemisphere with relatively mild amount of surrounding edema in the brain and minimal calcification along one margin of the lesion  This may be a cystic neoplasm (primary or metastatic) or less likely infection  Contrast-enhanced brain MRI is recommended for further workup  Mass effect on the left lateral ventricle with 4 mm rightward midline shift and some effacement of left-sided cerebral sulci  There is surgical consultation is advised  I personally discussed this study with Gorman Primrose on 8/30/2020 at 3:41 PM  Workstation performed: [id number]     Ct Head W Wo Contrast    Result Date: 9/2/2020  Impression: Status post left opercular frontal mass resection with hemorrhage in the postoperative bed and left frontal temporal extra-axial space  Scattered cerebral sulcal and intraventricular as well as basal subarachnoid hemorrhage  Multifocal hyperdense enhancing lesions in the periventricular regions as previously described on the MRI study of August 30, 2020  The hyperattenuation of these lesions suggest hemorrhagic metastasis    Alternatively these may represent hypercellular metastasis  Correlation with oncologic history  Interval decrease in the mass effect on the left ventricular system  I personally discussed this study with Dr Familia Childress on 9/2/2020 at 7:51 AM  Workstation performed: [id number]     Mri Brain W Wo Contrast    Result Date: 8/31/2020  Impression: Multiple intracranial enhancing lesions with a dominant cystic lesion noted in the deep left inferior frontal lobe, as described above  Multiplicity of lesions with a suspicious primary pulmonary neoplasm is most suggestive of metastatic disease  Other  neoplastic etiologies thought to be less likely  Note is made of lesions along the ependymal surface of the lateral ventricles and septum pellucidum suspicious for the presence of leptomeningeal/CSF spread of disease  Imaging of the remainder of the neuroaxis should be considered for further evaluation  Of note lesions are seen along the course of the left trigeminal nerve tracking into Meckel's cave as well as at the level of the hypothalamus and abutting the optic tracts  Stable 5 mm rightward midline shift  Workstation performed: [id number]     Mri Abdomen W Wo Contrast    Result Date: 8/31/2020  Impression: Bilateral adrenal nodules with imaging appearance most consistent with adenomas  Workstation performed: [id number]       EKG, Pathology, and Other Studies: I have personally reviewed pertinent reports        VTE Pharmacologic Prophylaxis: Reason for no pharmacologic prophylaxis cerebral hemorrhage    VTE Mechanical Prophylaxis: sequential compression device

## 2020-09-02 NOTE — PROGRESS NOTES
09/02/20 1100   Clinical Encounter Type   Visited With Patient; Health care provider   Routine Visit Introduction

## 2020-09-02 NOTE — SPEECH THERAPY NOTE
Speech-Language Pathology Bedside Swallow Evaluation      Patient Name: Kendall Madsen    GZMQA'F Date: 9/2/2020     Problem List  Principal Problem:    Brain mass  Active Problems:    Hypertension    Tobacco abuse    Uncomplicated alcohol dependence ([facility] )    Hyperlipidemia    Emphysema lung ([facility] )    Cerebral edema ([facility] )    Brain compression ([facility] )    Lung mass      Past Medical History  No past medical history on file  Past Surgical History  Past Surgical History:   Procedure Laterality Date    CRANIOTOMY Left 9/1/2020    Procedure: Left frontal CRANIOTOMY IMAGE-GUIDED FOR TUMOR;  Surgeon: Zenaida Harp MD;  [facility];  Service: Neurosurgery    HAND SURGERY Left        Summary   Pt presented with s/s suggestive of mild oral/ pharyngeal dysphagia  Symptom:  Coughing with rapid intake of thin liquid (no s/s dysphagia with food or individual sips of thin liquid)    Recommended Diet: regular diet and thin liquids , supervision to ensure slow rate of intake with thin liquid  Recommended Form of Meds: as tolerated (RN assessed pt with meds whole with thin liquid-->no difficulty noted/reported)  Aspiration precautions and swallowing strategies: slow rate of intake, roshan with liquids and mixed food/liquid items  Other Recommendations/Considerations: Speech/Language Evaluation/Tx to follow  Current Medical Status  Kendall Madsen is a L handed 49y o  year old male with PMhx HTN, HLD, alcohol and tobacco abuse who presented with 1 week of decreased vision in L eye, R sided facial droop  DX: left frontal lesion with evidence of leptomeningeal spread,  multiple lung masses and metastatic lesions  L frontal craniotomy completed 9/1  SLP Swallowing Evaluation requested at this time and completed bedside      Current Precautions:  Fall,  Seizure    Allergies:  No known food allergies    Past medical history:  Please see H&P for details    Social/Education/Vocational Hx:  Pt lives with SO in [place]    Swallow Information   Current Risks for Dysphagia & Aspiration: brain mass, is s/p craniotomy  Current Diet: NPO   Baseline Diet: regular diet and thin liquids      Baseline Assessment   Behavior/Cognition: alert  Speech/Language Status: s/s impaired comprehension and expression  Patient Positioning: upright in bed  Pain Status/Interventions/Response to Interventions: No report of or nonverbal indications of pain  Swallow Mechanism Exam  Facial: right facial droop  Lingual: mild R tongue deviation, good ROM in reflexive tasks but unable to imitate lingual movements (s/s oral apraxia)   Velum: symmetrical  Mandible: adequate ROM  Dentition: limited dentition on lower gum ridge (no upper teeth and does not have his upper dentures)  Niece (a CNA) reported that he does not consistently wear dentures for his meals  Vocal quality:clear/adequate   Volitional Cough: unable to initiate volitional cough   Respiratory Status: on RA         Consistencies Assessed and Performance   Consistencies/Materials administered included applesauce, tiny cracker/cookie, nectar thick and thin liquid    Oral Stage: mild impairment suspected  Mastication was adequate with the materials administered today  Bolus formation and transfer were functional with no significant oral residue noted  Suspect reduced oral control with successive sips of thin liquid  Pharyngeal Stage: suspect mild impairment  Swallow Mechanics:  Swallowing initiation appeared prompt  Laryngeal rise was palpated and judged to be within functional limits  No coughing, throat clearing, change in vocal quality or respiratory status noted with foods, nectar thick juice or individual sips of thin liquid    Significant (and strong) cough when pt took 4 successive sips rapidly)     Esophageal Concerns: none reported    Strategies and Efficacy: educated pt and niece (a CNA) rte; benefit of slow rate/individual sips with thin liquid at this time    Summary and Recommendations (see above)    Results Reviewed with: patient and RN & niece    Treatment Recommended: yes    Frequency of treatment: 2x weekly to ensure tolerance of diet over time and ensure strategy use as indicated  Short Term Goals:  -Pt will tolerate trays of regular textured food and thin liquid with no significant s/s oral or pharyngeal dysphagia across 1-3 diagnostic session/s    -Patient will complete daily oral motor exercise to oral motor strength, range of motion and coordination with min cues to 90% effectiveness to maximize oral management and control with no cues needed    -Patient will utilize trained compensatory strategies (eg  L food/straw placement, slow rate or individual sips of liquids ) to reduce or eliminate s/s oral/pharyngeal  dysphagia with the least restrictive consistencies

## 2020-09-02 NOTE — PROGRESS NOTES
09/02/20 1100   Spiritual Beliefs/Perceptions   Support Systems Spouse/significant other;Family members   Plan of Care   Comments Patent had surgery yesterday  sitting up in  chair very tired, Talked with nursing staff about patient

## 2020-09-02 NOTE — ASSESSMENT & PLAN NOTE
Found on CT chest  Likely lung primary  HemOnc following  Follow up on brain biopsy  Follow up as outpatient

## 2020-09-02 NOTE — PHYSICAL THERAPY NOTE
Physical Therapy Evaluation Note     Patient Name: Isabela Lee    RJZMY'V Date: 9/2/2020     Problem List  Principal Problem:    Brain mass  Active Problems:    Hypertension    Tobacco abuse    Uncomplicated alcohol dependence ([facility] )    Hyperlipidemia    Emphysema lung ([facility] [address] )    Cerebral edema ([facility] )    Brain compression ([facility] )    Lung mass       Past Medical History  No past medical history on file  Past Surgical History  Past Surgical History:   Procedure Laterality Date    CRANIOTOMY Left 9/1/2020    Procedure: Left frontal CRANIOTOMY IMAGE-GUIDED FOR TUMOR;  Surgeon: Julio Cesar Holloway MD;  [facility]: [facility];  Service: Neurosurgery    HAND SURGERY Left       09/02/20 1018   Note Type   Note type Eval only   Pain Assessment   Pain Assessment Tool Pain Assessment not indicated - pt denies pain   Pain Score No Pain   Home Living   Type of Home House   Home Layout Multi-level   Additional Comments Pt lives with his GF in a 3SH with 1 JASIEL  Pt states that he worked full time and drives  Pt's GF works and will not be home to assist upon D/C home  Pt's bed and bath are on the second floor      Prior Function   Level of [place] Independent with ADLs and functional mobility   Lives With Other (Comment)  (GF)   Receives Help From Family   ADL Assistance Independent   IADLs Independent   Falls in the last 6 months 0   Vocational Full time employment   Comments drives +   Restrictions/Precautions   Weight Bearing Precautions Per Order No   Other Precautions Fall Risk;Pain;Telemetry;Multiple lines  (ALMA drain)   General   Family/Caregiver Present No   Cognition   Overall Cognitive Status Impaired   Arousal/Participation Alert   Orientation Level Oriented to person;Oriented to situation;Oriented to time   RLE Assessment   RLE Assessment WNL   LLE Assessment   LLE Assessment WNL   Coordination   Movements are Fluid and Coordinated 1   Sensation [facility]   Light Touch RLE Light Touch Grossly intact   LLE Light Touch Grossly intact   Bed Mobility   Additional Comments unable to assess  Pt OOB at begining of the session   Transfers   Sit to Stand 4  Minimal assistance   Additional items Assist x 1   Stand to Sit 4  Minimal assistance   Additional items Assist x 1   Ambulation/Elevation   Gait pattern Shuffling;Narrow AARON; Forward Flexion  (unsteady, lateral sway, off balance)   Gait Assistance 4  Minimal assist   Additional items Assist x 1   Assistive Device Other (Comment)  (IV pole)   Distance 75ftx1   Balance   Static Sitting Fair   Dynamic Sitting Fair   Static Standing Fair -   Dynamic Standing Fair -   Ambulatory Poor   Endurance Deficit   Endurance Deficit No   Activity Tolerance   Activity Tolerance Patient limited by fatigue;Patient tolerated treatment well   Medical Staff Made Aware OT   Nurse Made Aware nurse approved therapy session   Assessment   Prognosis Good   Problem List Decreased strength;Decreased endurance; Impaired balance;Decreased mobility; Decreased cognition;Decreased safety awareness; Impaired judgement   Assessment Pt is a 50 yo male admitted to [place] [address] on 9/1/2020 s/p facial droop starting 1 5 weeks prior  Pt had surgery on L frontal craniotomy L tumor  DX: brain mass, lung mass, brain compression, cerebral edema, hyperlipemia, emphysema, adrenal mass, leukocytosis  Two patient identifiers were used to confirm  Pt lives with his GF in a 3SH with 1 JASIEL  Pt was I for ADL's and mobility prior  Pt works full time and drives  Pt's impairments include reduced mobility, limited endurance, high risk of falling, poor balance, gait abnormalities, impulsive, difficulty following commands, confusion  These impairments limit the ability of the patient to perform mobility without increased assistance, return to PLOF and participate in everyday life activities   Pt would benefit from continued skilled therapy while in the hospital to improve overall mobility and work towards a safe d/c  Recommend discharge to rehab  At the end of the session the patient was left in seated position with call bell and phone within reach  Barriers to Discharge Inaccessible home environment;Decreased caregiver support   Goals   STG Expiration Date 09/16/20   Short Term Goal #1 STG 1: Pt will perform transfers at a I level to return to baseline of function  STG 2: Pt will ambulate 300ft with SPC/least restrictive device at a MI/I level to reduce the level of assistance needed upon d/c home  STG 3: Pt will negotiate 12 steps with HR at a MI level to ensure safety with activity when able to ambulate 150ft at a S level  STG 4: Pt will perform bed mobility at a I to safety return to PLOF  PT Treatment Day 0   Plan   Treatment/Interventions Functional transfer training;LE strengthening/ROM; Therapeutic exercise; Endurance training;Gait training;Bed mobility; Equipment eval/education   PT Frequency Other (Comment)  (3-5xwk)   Recommendation   PT Discharge Recommendation Post-Acute Rehabilitation Services   PT - OK to Discharge Yes   Additional Comments if to rehab   Modified Antonio Scale   Modified Clanton Scale 4   Barthel Index   Feeding 10   Bathing 0   Grooming Score 5   Dressing Score 5   Bladder Score 0   Bowels Score 5   Toilet Use Score 5   Transfers (Bed/Chair) Score 10   Mobility (Level Surface) Score 10   Stairs Score 0   Barthel Index Score 50   Alejandra Mckay, Pt, DPT

## 2020-09-02 NOTE — PLAN OF CARE
Problem: PHYSICAL THERAPY ADULT  Goal: Performs mobility at highest level of function for planned discharge setting  See evaluation for individualized goals  Description: Treatment/Interventions: Functional transfer training, LE strengthening/ROM, Therapeutic exercise, Endurance training, Gait training, Bed mobility, Equipment eval/education          See flowsheet documentation for full assessment, interventions and recommendations  Note: Prognosis: Good  Problem List: Decreased strength, Decreased endurance, Impaired balance, Decreased mobility, Decreased cognition, Decreased safety awareness, Impaired judgement  Assessment: Pt is a 48 yo male admitted to [place] [id number] on 9/1/2020 s/p facial droop starting 1 5 weeks prior  Pt had surgery on L frontal craniotomy L tumor  DX: brain mass, lung mass, brain compression, cerebral edema, hyperlipemia, emphysema, adrenal mass, leukocytosis  Two patient identifiers were used to confirm  Pt lives with his GF in a 3SH with 1 JASIEL  Pt was I for ADL's and mobility prior  Pt works full time and drives  Pt's impairments include reduced mobility, limited endurance, high risk of falling, poor balance, gait abnormalities, impulsive, difficulty following commands, confusion  These impairments limit the ability of the patient to perform mobility without increased assistance, return to PLOF and participate in everyday life activities  Pt would benefit from continued skilled therapy while in the hospital to improve overall mobility and work towards a safe d/c  Recommend discharge to rehab  At the end of the session the patient was left in seated position with call bell and phone within reach  Barriers to Discharge: Inaccessible home environment, Decreased caregiver support     PT Discharge Recommendation: [id number]     PT - OK to Discharge: Yes    See flowsheet documentation for full assessment

## 2020-09-02 NOTE — ASSESSMENT & PLAN NOTE
Extensive smoking history, currently stable on Room Air  Continue Albuterol PRN  Follow up as outpatient

## 2020-09-02 NOTE — ASSESSMENT & PLAN NOTE
· CT chest abd pelvis with without 8/30/20:  Approximately 4 x 4 x 3 cm mass mid to lower left hilum extending left lower lobe to the lingual a  Nearby satellite masses  Highly suggestive of malignant neoplasm  Hypodensity concerning for possible PE  Bilateral adrenal masses  Bladder wall thickening  Enlarged prostate

## 2020-09-02 NOTE — PLAN OF CARE
Problem: OCCUPATIONAL THERAPY ADULT  Goal: Performs self-care activities at highest level of function for planned discharge setting  See evaluation for individualized goals  Description: Treatment Interventions: ADL retraining, Visual perceptual retraining, Functional transfer training, UE strengthening/ROM, Endurance training, Cognitive reorientation, Patient/family training, Equipment evaluation/education, Compensatory technique education, Continued evaluation, Energy conservation, Activityengagement          See flowsheet documentation for full assessment, interventions and recommendations  Note: Limitation: Decreased ADL status, Decreased Safe judgement during ADL, Decreased cognition, Decreased endurance, Decreased self-care trans, Decreased high-level ADLs  Prognosis: Fair  Assessment: Pt is a 50 y/o male seen for OT eval s/p adm to [facility] w/ L eye visual impairments and R sided facial droop  Pt is dx'd w/ a brain mass  Pt is s/p the following procedure "Left frontal CRANIOTOMY IMAGE-GUIDED FOR TUMOR (Left)" performed on 9/1  Pt  has no past medical history on file  Pt with active OT orders and up with assistance  orders  Pt lives with GF in 2 SH, 1 JASIEL vs  2 SH, 0 JASIEL; bed/bath 2nd floor  Pt was I w/  ADLS and IADLS, drove, & required no use of DME PTA  Pt is currently demonstrating the following occupational deficits: S UB ADLS, Min A LB ADLS, Min A transfers and functional mobility w/ HHA, +VC for safety 2/2 impulsivity  These deficits that are impacting pt's baseline areas of occupation are a result of the following impairments: endurance, activity tolerance, functional mobility, forward functional reach, balance, functional standing tolerance, unsupportive home environment, decreased I w/ ADLS/IADLS, cognitive impairments, decreased safety awareness, decreased insight into deficits and coordination deficits  The following Occupational Performance Areas to address include: grooming, bathing/shower, toilet hygiene, dressing, medication management, socialization, health maintenance, functional mobility, community mobility, clothing management, household maintenance and job performance/volunteering  Pt scored overall 45/100 on the Barthel Index  Based on the aforementioned OT evaluation, functional performance deficits, and assessments, pt has been identified as a moderate complexity evaluation  Recommend STR upon D/C, when medically stable   Pt to continue to benefit from acute immediate OT services to address the following goals 3-5x/week to  w/in 10-14 days:   Recommendation: [address]  OT Discharge Recommendation: Post-Acute Rehabilitation Services  OT - OK to Discharge: Yes(when medically stable)     Remy Laboy MS, OTR/L

## 2020-09-02 NOTE — PLAN OF CARE
Summary   Pt presented with s/s suggestive of mild oral/ pharyngeal dysphagia  Symptom:  Coughing with rapid intake of thin liquid (no s/s dysphagia with food or individual sips of thin liquid)    Recommended Diet: regular diet and thin liquids , supervision to ensure slow rate of intake with thin liquid  Recommended Form of Meds: as tolerated (RN assessed pt with meds whole with thin liquid-->no difficulty noted/reported)  Aspiration precautions and swallowing strategies: slow rate of intake, roshan with liquids and mixed food/liquid items  Other Recommendations/Considerations: Speech/Language Evaluation/Tx to follow

## 2020-09-02 NOTE — OCCUPATIONAL THERAPY NOTE
Occupational Therapy Evaluation     Patient Name: Gurinder Akbar  Today's Date: 9/2/2020  Problem List  Principal Problem:    Brain mass  Active Problems:    Hypertension    Tobacco abuse    Uncomplicated alcohol dependence ([address] )    Hyperlipidemia    Emphysema lung ([facility] [address] )    Cerebral edema ([facility] [address] )    Brain compression ([facility] [address] )    Lung mass    Past Medical History  No past medical history on file  Past Surgical History  Past Surgical History:   Procedure Laterality Date    CRANIOTOMY Left 9/1/2020    Procedure: Left frontal CRANIOTOMY IMAGE-GUIDED FOR TUMOR;  Surgeon: Fely Negrete MD;  Location: [facility];  Service: Neurosurgery    HAND SURGERY Left              09/02/20 1020   Note Type   Note type Eval/Treat   Restrictions/Precautions   Weight Bearing Precautions Per Order No   Other Precautions Cognitive; Impulsive; Chair Alarm; Bed Alarm;Multiple lines;Telemetry; Fall Risk;Pain  (melissa drain)   Pain Assessment   Pain Assessment Tool Pain Assessment not indicated - pt denies pain   Pain Score No Pain   Home Living   Type of Home House   Home Layout Two level; Other (Comment); Bed/bath upstairs  (1 JASIEL)   Bathroom Shower/Tub Walk-in shower   Bathroom Toilet Raised   Bathroom Equipment Other (Comment)  (denies any)   Home Equipment Other (Comment)  (denies any)   Additional Comments Pt is a questionable historian; reports living in 2 SH, 1 JASIEL (chart says 3 SH, 0 JASIEL); bed/bath 2nd floor  Unclear bathroom setup as pt was contradictory in his answers   Prior Function   Level of [place] Independent with ADLs and functional mobility   Lives With Other (Comment); Significant other  (GF)   Receives Help From Family   ADL Assistance Independent   IADLs Independent   Falls in the last 6 months 0   Vocational Full time employment   Comments Pt reports being I w/ ADLS/IADLS, transfers and functional mobility PTA   Lifestyle   Autonomy I ADLS/IADLS, transfers and functional mobility PTA   Reciprocal Relationships Pt lives w/ GF; GF works    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   Intrinsic Gratification pt unable to report when asked; will continue to assess   Psychosocial   Psychosocial (WDL) X   Patient Behaviors/Mood Angry;Irritable;Flat affect   ADL   Eating Assistance 5  Supervision/Setup   Grooming Assistance 5  Supervision/Setup   UB Bathing Assistance 5  Supervision/Setup    [address]; Impulsive;Verbal cueing;Supervision/safety; Increased time to complete   Bed Mobility   Supine to Sit Unable to assess   Sit to Supine Unable to assess   Additional Comments Pt seated up OOB In chair prior to and end of session   Transfers   Sit to Stand 4  Minimal assistance   Additional items Assist x 1; Impulsive; Increased time required;Verbal cues   Stand to Sit 4  Minimal assistance   Additional items Assist x 1; Increased time required; Impulsive;Verbal cues   Additional Comments Pt performed STS transfer from chair w/ Min A x1  HHA used  +VC for safety 2/2 impulsivity   Functional Mobility   Functional Mobility 4  Minimal assistance   Additional Comments Pt ambulated short household distance w/ Min A x1  HHA used   +VC for safety/pacing 2/2 impulsivitiy   Additional items Hand hold assistance   Balance   Static Sitting Fair +   Dynamic Sitting Fair -   Static Standing Fair -   Dynamic Standing Poor +   Ambulatory Poor +   Activity Tolerance   Activity Tolerance Patient tolerated treatment well   Medical Staff Made Aware PT   Nurse Made Aware yes   RUE Assessment   RUE Assessment WFL   LUE Assessment   LUE Assessment WFL   Hand Function   Gross Motor Coordination Impaired  (mild dysmetria R>L)   Fine Motor Coordination Functional   Sensation   Light Touch No apparent deficits   Proprioception Proprioception No apparent deficits   Vision-Basic Assessment   Current Vision No visual deficits   Vision - Complex Assessment   Ocular Range of Motion WFL   Cognition   Overall Cognitive Status Impaired   Arousal/Participation Arousable; Cooperative;Responsive   Attention Difficulty attending to directions   Orientation Level Oriented to person;Oriented to time;Oriented to situation;Disoriented to place   Memory Decreased recall of precautions;Decreased short term memory   Following Commands Follows one step commands with increased time or repetition   Comments Pt is cooperative; overall appears confused  Is slow to respond; flat affect  Needs questions to be repeated several times; is slow to process  Has decreased understanding of deficits and safety awareness  Recommend continued formal cognitive evaluation to assist w/ safe D/C planning   Assessment   Limitation Decreased ADL status; Decreased Safe judgement during ADL;Decreased cognition;Decreased endurance;Decreased self-care trans;Decreased high-level ADLs   Prognosis Fair   Assessment Pt is a 50 y/o male seen for OT eval s/p adm to [facility] w/ L eye visual impairments and R sided facial droop  Pt is dx'd w/ a brain mass  Pt is s/p the following procedure "Left frontal CRANIOTOMY IMAGE-GUIDED FOR TUMOR (Left)" performed on 9/1  Pt  has no past medical history on file  Pt with active OT orders and up with assistance  orders  Pt lives with GF in 2 SH, 1 JASIEL vs  2 SH, 0 JASIEL; bed/bath 2nd floor  Pt was I w/  ADLS and IADLS, drove, & required no use of DME PTA  Pt is currently demonstrating the following occupational deficits: S UB ADLS, Min A LB ADLS, Min A transfers and functional mobility w/ HHA, +VC for safety 2/2 impulsivity   These deficits that are impacting pt's baseline areas of occupation are a result of the following impairments: endurance, activity tolerance, functional mobility, forward functional reach, balance, functional standing tolerance, unsupportive home environment, decreased I w/ ADLS/IADLS, cognitive impairments, decreased safety awareness, decreased insight into deficits and coordination deficits  The following Occupational Performance Areas to address include: grooming, bathing/shower, toilet hygiene, dressing, medication management, socialization, health maintenance, functional mobility, community mobility, clothing management, household maintenance and job performance/volunteering  Pt scored overall 45/100 on the Barthel Index  Based on the aforementioned OT evaluation, functional performance deficits, and assessments, pt has been identified as a moderate complexity evaluation  Recommend STR upon D/C, when medically stable  Pt to continue to benefit from acute immediate OT services to address the following goals 3-5x/week to  w/in 10-14 days:    Goals   Patient Goals none reported 2/2 confusion @ this time   LTG Time Frame 10-   Long Term Goal #1 see below listed goals   Plan   Treatment Interventions ADL retraining;Visual perceptual retraining;Functional transfer training;UE strengthening/ROM; Endurance training;Cognitive reorientation;Patient/family training;Equipment evaluation/education; Compensatory technique education;Continued evaluation; Energy conservation; Activityengagement   Goal Expiration Date 20   OT Frequency 3-5x/wk   Recommendation   Recommendation Physiatry Consult   OT Discharge Recommendation Post-Acute Rehabilitation Services   OT - OK to Discharge Yes  (when medically stable)   Barthel Index   Feeding 10   Bathing 0   Grooming Score 5   Dressing Score 5   Bladder Score 0   Bowels Score 10   Toilet Use Score 5   Transfers (Bed/Chair) Score 10   Mobility (Level Surface) Score 0   Stairs Score 0   Barthel Index Score 45     GOALS    1) Pt will improve activity tolerance to G for min 30 min txment sessions for increase engagement in functional tasks  2) Pt will complete UB/LB dressing/self care w/ mod I using adaptive device and DME as needed  3) Pt will complete bathing w/ Mod I w/ use of AE and DME as needed  4) Pt will complete toileting w/ mod I w/ G hygiene/thoroughness using DME as needed  5) Pt will improve functional transfers to Mod I on/off all surfaces using DME as needed w/ G balance/safety   6) Pt will improve functional mobility during ADL/IADL/leisure tasks to Mod I using DME as needed w/ G balance/safety   7) Pt will participate in simulated IADL management task to increase independence to Mod I w/ G safety and endurance  8) Pt will be attentive 100% of the time during ongoing cognitive assessment w/ G participation to assist w/ safe d/c planning/recommendations  9) Pt will demonstrate G carryover of pt/caregiver education and training as appropriate w/o cues w/ good tolerance to increase safety during functional tasks  10) Pt will demonstrate 100% carryover of energy conservation techniques t/o functional I/ADL/leisure tasks w/o cues s/p skilled education to increase endurance during functional tasks  11) Pt will improve functional dynamic/static balance to Good for 20 mins w/ Mod I to increase engagement in standing ADL/IADL tasks       Lashon Mcnair MS, OTR/L

## 2020-09-02 NOTE — PROGRESS NOTES
INTERNAL MEDICINE ICU TRANSFER NOTE     Name: Chet Coon   Age & Sex: 49 y o  male   MRN: [id number]  Unit/Bed#: ICU 10   Encounter: [id number]  Hospital Stay Days: 3    Accepting team: SOD Team A  Code Status: Level 1 - Full Code  Disposition: Patient requires Level 2 Step Down     ASSESSMENT/PLAN     Principal Problem:    Brain mass  Active Problems:    Hypertension    Tobacco abuse    Uncomplicated alcohol dependence ([facility] [address] )    Hyperlipidemia    Emphysema lung (HCC)    Cerebral edema (HCC)    Brain compression (HCC)    Lung mass      Lung mass  Assessment & Plan  Foun on CT chest  Likely lung primary  HemOnc following  Follow up on brain biopsy  Follow up as outpatient  Emphysema lung ([facility] [address] )  Assessment & Plan  Extensive smoking history, currently stable on Room Air  Continue Albuterol PRN  Follow up as outpatient    Hyperlipidemia  Assessment & Plan  Stable   Continue lipitor    Uncomplicated alcohol dependence (HCC)  Assessment & Plan  Drink 2-3 beers per day  Continue Ciwa, thiamine folate      Tobacco abuse  Assessment & Plan  Stable  Continue nicotine patch      Hypertension  Assessment & Plan  Stable  Cardene gtt discontinued   Continue lisinopril 10mg, consider adding second agent  Labetalol and hydralazine prn      * Brain mass  Assessment & Plan  One week history of left blurry vision and right side facial droop  Physical exam initially showed right side facial droop and right sided weakness of lower and upper extremity  This morning 7/02, patient continues to have right facial drop but bilateral weakness of 5/5  Head CT 8/30 = large predominantly cystic mass within the left cerebral hemisphere with relatively mild amount of surrounding edema in the brain and minimal calcification along one margin of the lesion  Likely metastasis from lung mass, infection unlikely  Plan:  § Follow up CT head tomorrow am  § Wound care, monitor ALMA drain output    § Monitor blood pressure, maintain under  (currently 138/60)  § Continue neuro checks Q2hr   § STAT head CT if change in neuro exam or drop of GCS more than 2  § Continue Keppra for seizure ppx   § Continue Dexamethasone IV started 9/1  Will transition to PO    § Pain control with tylenol, oxycodone and dilaudid  § Neurosurgery is following   § Hematology and oncology following, follow up biopsy results        VTE Pharmacologic Prophylaxis: Reason for no pharmacologic prophylaxis recent surgery  VTE Mechanical Prophylaxis: sequential compression device    HOSPITAL COURSE     As per Traci Cuadra' H/P:    "Cricket Shepherd is a 49 y o  male with a PMH of untreated hypertension, hyperlipidemia, tobacco use and alcohol abuse disorder who presented to an urgent care with 10 days of right facial droop and left blurry vision  Denied any headache, dizziness, emesis, SOB, or chest pain  Last time he saw a physician was 5 years ago and is not currently up to date with health maintenance screening  He was instructed to go to the ED where a CT found a large cystic mass in the left hemisphere with mild edema and calcification  He was transferred to [facility] for further neurosurgery evaluation  While here, he was hypertensive (171/103) and further imaging studies noted bilateral adrenal masses, urinary bladder thickening, prominent prostate, mild emphysema, and large left hilar mass with probable peripheral metastatic disease  Per radiology report, likely lung is primary source with atypical brain metastasis and possible bilateral adrenal gland involvement  Pulmonary and oncology was consulted and is following  Neurosurgery is following and performed a left frontal craniotomy "    Patient was observed in ICU post procedure  Has some expressive aphasia, improving cranial nerve 6 palsy on left and right facial droop  He was taken off cardene for BP control  Tolerating PO diet  SUBJECTIVE     Seen and examined sitting in chair in no acute distress  Tolerating PO, denies any new focal deficits, chest pain, SOB  Rest of ROS negative  OBJECTIVE     Vitals:    09/02/20 1000 09/02/20 1100 09/02/20 1110 09/02/20 1200   BP:   152/74    Pulse: 74 82  76   Resp: (!) 11 17  14   Temp:    98 7 °F (37 1 °C)   TempSrc:    Oral   SpO2: 92% 92%  95%   Weight:       Height:         I/O last 24 hours: In: 6598 7 [I V :6348 7; IV Piggyback:250]  Out: 4492 [Urine:6000; Drains:205; Blood:150]    Physical Exam  Constitutional:       General: He is not in acute distress  Appearance: Normal appearance  He is not ill-appearing  HENT:      Head: Normocephalic  Comments: ALMA drain in place draining sanginous fluid     Mouth/Throat:      Mouth: Mucous membranes are moist       Comments: Poor dentition  Eyes:      Extraocular Movements: Extraocular movements intact  Conjunctiva/sclera: Conjunctivae normal       Pupils: Pupils are equal, round, and reactive to light  Cardiovascular:      Rate and Rhythm: Normal rate and regular rhythm  Pulses: Normal pulses  Heart sounds: Normal heart sounds  No murmur  No friction rub  No gallop  Pulmonary:      Effort: Pulmonary effort is normal  No respiratory distress  Breath sounds: Normal breath sounds  No wheezing or rales  Abdominal:      General: Abdomen is flat  Bowel sounds are normal  There is no distension  Palpations: Abdomen is soft  Tenderness: There is no abdominal tenderness  There is no guarding  Musculoskeletal:      Right lower leg: No edema  Left lower leg: No edema  Skin:     General: Skin is warm and dry  Comments: ALMA drain site clean intact   Neurological:      Mental Status: He is alert  Comments: AAOx2, CN 6 palsy on left  Right facial droop  Mild expressive aphasia  Strength 5/5 in all extremities  Gross sensation intact   Coordination intact   Psychiatric:         Mood and Affect: Mood normal          Behavior: Behavior normal        LABORATORY DATA Labs: I have personally reviewed pertinent reports  Results from last 7 days   Lab Units 09/02/20 0455 09/01/20 2109 09/01/20  0503 08/31/20  0511  08/30/20  1422   WBC Thousand/uL 15 18* 14 42* 10 69* 8 59  --  7 40   HEMOGLOBIN g/dL 14 6 14 9 16 1 15 9  --  16 6   HEMATOCRIT % 42 6 43 8 46 8 46 4  --  48 7*   PLATELETS Thousands/uL 188 183 180 206   < > 193   NEUTROS PCT %  --   --  73 84*  --  64   MONOS PCT %  --   --  7 4  --  9   MONO PCT %  --  4  --   --   --   --     < > = values in this interval not displayed  Results from last 7 days   Lab Units 09/02/20 0455 09/01/20 2109 09/01/20  0503 08/31/20  0510 08/30/20  1422   POTASSIUM mmol/L 3 7 3 9 3 7 4 0 4 1   CHLORIDE mmol/L 108 113* 110* 107 103   CO2 mmol/L 27 26 27 28 30   BUN mg/dL 11 13 16 16 14   CREATININE mg/dL 0 66 0 85 0 71 0 84 0 80   CALCIUM mg/dL 8 5 8 1* 8 8 8 8 9 4   ALK PHOS U/L  --   --   --  84 69   ALT U/L  --   --   --  30 22   AST U/L  --   --   --  16 16     Results from last 7 days   Lab Units 09/02/20 0455 09/01/20  2109   MAGNESIUM mg/dL 2 2 2 1     Results from last 7 days   Lab Units 09/02/20 0455 09/01/20  2109   PHOSPHORUS mg/dL 3 4 3 8      Results from last 7 days   Lab Units 09/02/20 0455 09/01/20  0503 08/30/20  1422   INR  1 03 1 01 1 02   PTT seconds 23 25 24     Results from last 7 days   Lab Units 08/30/20  1422   LACTIC ACID mmol/L 1 2     Results from last 7 days   Lab Units 08/30/20  1422   TROPONIN I ng/mL <0 03     Micro:  No results found for: BLOODCX, Natasha Relic, WOUNDCULT, SPUTUMCULTUR  IMAGING & DIAGNOSTIC TESTING     Imaging: I have personally reviewed pertinent reports  Ct Chest Abdomen Pelvis W Wo Contrast    Result Date: 8/31/2020  Impression: There is an approximately 4 x 4 x 3 cm mass in the mid to lower left hilum which extends into the left lower lobe of the lung and the left lingula  Nearby satellite masses and nodules are present    The appearance is highly suggestive of malignant neoplasm  Pulmonology consultation and Oncology consultation is recommended  There appears to be some hypodensity within some of the central left pulmonary artery branches  Pulmonary embolism and/or tumor thrombus should be excluded  Dedicated CT angiogram/PE protocol CTA of the chest is recommended for further evaluation  Bilateral adrenal masses, each measuring approximately 2 cm  MRI is recommended for further characterization, if there are no contraindications  The urinary bladder wall appears thickened  This is most pronounced anteriorly  Clinical correlation, laboratory correlation and follow-up is recommended  The prostate is mildly prominent  Clinical and laboratory correlation is recommended  Mild emphysema  Other findings as described above, please see discussion  I personally discussed this study with [address] on 8/31/2020 at 5:05 AM  This examination demonstrates findings requiring imaging follow-up and was logged as such in [facility]  Workstation performed: [id number]     Ct Head Without Contrast    Result Date: 8/30/2020  Impression: Large predominantly cystic mass within the left cerebral hemisphere with relatively mild amount of surrounding edema in the brain and minimal calcification along one margin of the lesion  This may be a cystic neoplasm (primary or metastatic) or less likely infection  Contrast-enhanced brain MRI is recommended for further workup  Mass effect on the left lateral ventricle with 4 mm rightward midline shift and some effacement of left-sided cerebral sulci  There is surgical consultation is advised  I personally discussed this study with Xi Magallon on 8/30/2020 at 3:41 PM  Workstation performed: [id number]     Ct Head W Wo Contrast    Result Date: 9/2/2020  Impression: Status post left opercular frontal mass resection with hemorrhage in the postoperative bed and left frontal temporal extra-axial space    Scattered cerebral sulcal and intraventricular as well as basal subarachnoid hemorrhage  Multifocal hyperdense enhancing lesions in the periventricular regions as previously described on the MRI study of August 30, 2020  The hyperattenuation of these lesions suggest hemorrhagic metastasis  Alternatively these may represent hypercellular metastasis  Correlation with oncologic history  Interval decrease in the mass effect on the left ventricular system  I personally discussed this study with Dr Margareth Roca on 9/2/2020 at 7:51 AM  Workstation performed: [id number]     Mri Brain W Wo Contrast    Result Date: 8/31/2020  Impression: Multiple intracranial enhancing lesions with a dominant cystic lesion noted in the deep left inferior frontal lobe, as described above  Multiplicity of lesions with a suspicious primary pulmonary neoplasm is most suggestive of metastatic disease  Other  neoplastic etiologies thought to be less likely  Note is made of lesions along the ependymal surface of the lateral ventricles and septum pellucidum suspicious for the presence of leptomeningeal/CSF spread of disease  Imaging of the remainder of the neuroaxis should be considered for further evaluation  Of note lesions are seen along the course of the left trigeminal nerve tracking into Meckel's cave as well as at the level of the hypothalamus and abutting the optic tracts  Stable 5 mm rightward midline shift  Workstation performed: [id number]     Mri Abdomen W Wo Contrast    Result Date: 8/31/2020  Impression: Bilateral adrenal nodules with imaging appearance most consistent with adenomas  Workstation performed: [id number]     EKG, Pathology, and Other Studies: I have personally reviewed pertinent reports       ALLERGIES   No Known Allergies  MEDICATIONS     Current Facility-Administered Medications   Medication Dose Route Frequency Provider Last Rate    acetaminophen  975 mg Oral [address] Fred Mondragon PA-C      albuterol  2 puff Inhalation Q6H PRN Rosa Linares DO      atorvastatin 40 mg Oral Daily With [employer], PA-C      calcium carbonate  1,000 mg Oral Daily PRN Cathyann [facility], PA-C      dexamethasone  4 mg Intravenous Q6H [address] Swanson Edinson, DO      [START ON 9/5/2020] dexamethasone  2 mg Oral Q6H [address] Swansonens SeamanEdinson, DO      [START ON 9/8/2020] dexamethasone  2 mg Oral Q8H [address] Swanson Edinson, DO      [START ON 9/9/2020] dexamethasone  2 mg Oral Q12H [address] Swanson Edinson, DO      [START ON 9/12/2020] dexamethasone  2 mg Oral Q24H [address] Swanson Edinson, DO      [START ON 9/4/2020] dexamethasone  4 mg Oral Q8H [address] Swanson Edinson, DO      docusate sodium  100 mg Oral BID Cathyann [facility], PA-C      folic acid  1 mg Oral Daily Cathyann Hind, PA-C      guaiFENesin  600 mg Oral Q12H [address] [facility]      HYDROmorphone  0 5 mg Intravenous Q1H PRN Cathyann [facility], PA-C      Labetalol HCl  10 mg Intravenous Q4H PRN Cathyann [facility], PA-C      levETIRAcetam  500 mg Intravenous Q12H [address] Swanson Edinson, DO Stopped (09/02/20 0930)    lisinopril  10 mg Oral Daily Argenis Chan MD      methocarbamol  500 mg Oral Q6H [address] [facility], PA-C      multivitamin-minerals  1 tablet Oral Daily Cathyann [facility], PA-C      niCARdipine  1-15 mg/hr Intravenous Titrated Cathyann Emily, PA-C Stopped (09/02/20 1130)    nicotine  1 patch Transdermal Daily Cathyann [facility], PA-C      ondansetron  4 mg Intravenous Q4H PRN Cathyann Hind, PA-C      oxyCODONE  10 mg Oral Q4H PRN Cathyann Hind, PA-C      oxyCODONE  5 mg Oral Q4H PRN Cathyann Hind, PA-C      senna-docusate sodium  2 tablet Oral Daily Cathyann Hind, PA-C      sodium chloride  75 mL/hr Intravenous Continuous Tyrese G Chirayath, DO 75 mL/hr (09/02/20 0912)    thiamine  100 mg Oral Daily Daniella Diaz PA-C       niCARdipine, 1-15 mg/hr, Last Rate: Stopped (09/02/20 1130)  sodium chloride, 75 mL/hr, Last Rate: 75 mL/hr (09/02/20 0912)      albuterol, 2 puff, Q6H PRN  calcium carbonate, 1,000 mg, Daily PRN  HYDROmorphone, 0 5 mg, Q1H PRN  Labetalol HCl, 10 mg, Q4H PRN  ondansetron, 4 mg, Q4H PRN  oxyCODONE, 10 mg, Q4H PRN  oxyCODONE, 5 mg, Q4H PRN        Portions of the record may have been created with voice recognition software  Occasional wrong word or "sound a like" substitutions may have occurred due to the inherent limitations of voice recognition software    Read the chart carefully and recognize, using context, where substitutions have occurred     ==  Compa Gregory, [address] [facility]  Internal Medicine Residency PGY-2

## 2020-09-02 NOTE — ASSESSMENT & PLAN NOTE
POD 1  Status post left frontal craniotomy for tumor resection  · preliminary pathology consistent with likely lung cancer  · Large left frontal temporal cystic mass with vasogenic edema and midline shift  · Concern for leptomeningeal spread  · Presents with left eye visual deficit and left facial numbness    Imaging:  ·  CT head without 9/1/20:  Status post left upper lobe frontal mass resection with hemorrhage in the postoperative bed and left frontal temporal extra-axial space  Scattered cerebral sulcal in intraventricular muscles basal subarachnoid hemorrhage  One left focal hyperdense enhancing lesions in the periventricular region as previously described on MRI concern for possible hemorrhagic metastasis versus hypercellular metastases decrease in mass effect on left lateral ventricle  · CT head without 8/30/20:  Large predominantly cystic mass within the left cerebellar hemisphere with relatively mild amount of surrounding edema in the brain and minimal calcification along one marginal region  This may be cystic neoplasm, primary metastatic less likely infection  Mass effect on the lateral left ventricle with 4 mm of rightward shift and effacement of the left sulci  · MRI brain with without 8/30/20:  Multiple intracranial enhancing lesions with the dominant cystic lesion noted a deep less inferior frontal lobe  The lesions along the ependymal surface of the lateral ventricles and septum blue cm suspicious for presence of leptomeningeal CSF spread  Lesion along the course of left trigeminal track into Meckel's cave as well as level of hypothalamus abutting optic tract  Stable 5 mm midline shift  Plan:  · Continue monitor neurological exam     · Currently with mild mixed aphasia  Able to identify objects 3/3 but difficulties with repetition at times    Able to follow simple commands but intermittent difficulties with multistep commands  · Continue Decadron 4 mg every 6 hours for cerebral edema -  Plan 72 hour wean starting tomorrow  · Mobilize physical therapy and occupational therapy   · DVT prophylaxis:  Bilateral SCDs  Only  No pharmacological DVT prophylaxis at this time  ·   Repeat CT head ordered for tomorrow for monitoring   · Ongoing medical management per primary team  ·  systolic blood pressure less than 160  Patient currently on Cardene drip  Anticipate weaning off of drip once  Cleared to take oral medications  · Continue Keppra for seizure prophylaxis x1 week   · Postoperative antibiotics  · Drain management -  Drain with 205 mL since surgery  Limited output overnight  Maintained at current  Monitor output  Discontinue later today versus tomorrow  · Will continue to follow closely    Please call if any questions concerns or exam changes

## 2020-09-02 NOTE — ASSESSMENT & PLAN NOTE
One week history of left blurry vision and right side facial droop  Physical exam initially showed right side facial droop and right sided weakness of lower and upper extremity  This morning 7/02, patient continues to have right facial drop but bilateral weakness of 5/5  Head CT 8/30 = large predominantly cystic mass within the left cerebral hemisphere with relatively mild amount of surrounding edema in the brain and minimal calcification along one margin of the lesion  Likely metastasis from lung mass, infection unlikely  Plan:  § Follow up CT head tomorrow am  § Wound care  § Monitor blood pressure, maintain under  (currently 138/60)  § Continue neuro checks Q2hr   § STAT head CT if change in neuro exam or drop of GCS more than 2  § Continue Keppra for seizure ppx   § Continue Dexamethasone IV started 9/1  Consider switching to PO if patient passes swallow evaluation today     § Pain control with tylenol, oxycodone and dilaudid  § Neurosurgery is following   § Hematology and oncology following, follow up biopsy results

## 2020-09-02 NOTE — PROGRESS NOTES
Daily Progress Note - Critical Care   Lesa Santiago 49 y o  male MRN: [id number]  Unit/Bed#: ICU 10 Encounter: [id number]        ----------------------------------------------------------------------------------------  HPI/24hr events: Post-op day 1 of left frontal craniotomy  Failed swallowing evaluation     ---------------------------------------------------------------------------------------  SUBJECTIVE  Mr Joce Marie is a 50 yo patient with a PMH of untreated hypertension, hyperlipidemia, tobacco use and alcohol abuse disorder who is post-op day 1  No acute events overnight  This morning patient was resting comfortably  Denies any headache, dizziness, blurry vision or SOB  Review of Systems   Constitutional: Negative for chills, fatigue and fever  HENT: Negative for sore throat and trouble swallowing  Eyes: Negative for visual disturbance  Respiratory: Negative for cough, shortness of breath and wheezing  Cardiovascular: Negative for chest pain, palpitations and leg swelling  Gastrointestinal: Negative for constipation, diarrhea, nausea and vomiting  Musculoskeletal: Negative for arthralgias and neck stiffness  Skin: Negative for rash  Neurological: Positive for facial asymmetry and speech difficulty  Negative for tremors, weakness and headaches  Psychiatric/Behavioral: Negative for agitation and confusion  Review of systems was reviewed and negative unless stated above in HPI/24-hour events   ---------------------------------------------------------------------------------------  Assessment and Plan:  Neuro:   · Diagnosis: Brain Mass   ? One week history of left blurry vision and right side facial droop  Physical exam initially showed right side facial droop and right sided weakness of lower and upper extremity  This morning 7/02, patient continues to have right facial drop but bilateral weakness of 5/5    ?  Head CT 8/30 = large predominantly cystic mass within the left cerebral hemisphere with relatively mild amount of surrounding edema in the brain and minimal calcification along one margin of the lesion  Likely metastasis from lung mass, infection unlikely  ? Plan:  § Follow up with neurosurgery for final pathology from frontal craniotomy   § Follow up with final read of today's Head CT w/o contrast (7/2)  § Monitor blood pressure, maintain under  (currently 138/60)  § Continue on Nicardipine 25 mg drip   § Continue neuro checks Q1hr   § STAT head CT if change in neuro exam or drop of GCS more than 2  § Continue Keppra for seizure ppx   § Continue Dexamethasone IV started 9/1  Consider switching to PO if patient passes swallow evaluation today  § Pain control with tylenol, oxycodone and dilaudid  § Neurosurgery is following   § Hematology and oncology following     Uncomplicated Alcohol Dependence:     - Patient history reports daily consumption of 2-3 beers  Denies any history of withdrawal symptoms       - Maintain on CIWA protocal      - Thiamine and folic acid supplementation      - Alcohol cessation counseling upon discharge     Tobacco Abuse:  - Nicotine patch 21 mg   - Smoking cessation counseling upon discharge      CV:   · Diagnosis: Hypertension  ? Maintain pressure under   ? Plan:   § Hold home lisinopril   § Continue Nicardipine 25 mg drip      · Diagnosis: Hyperlipidemia   ? Cholesterol 213, Triglycerides 183,   ? Plan:   § Atorvastatin 40 mg daily     Pulm:  · Diagnosis: Lung Mass   ? Chest CT: mass in lower left hilum, 4x4x3 cm that extends into left lower lobe of lung  Nearby satellite masses and nodules  ? Plan:   § Oncology following, will follow up results of biopsy      · Diagnosis: Emphysema   ? Currently asymptomatic with no SOB, desaturation or productive coughing  Physical exam findings of poor lung aeration and mild ronchi  Chest CT showed mild to moderate emphysema    ? Plan:   § Incentive Spirometry   § Albuterol 2 puffs Q6H, consider switching to PRN since patient is asymptomatic  § Continue mucolytic, Mucinex BID     GI:   · Diagnosis: Adrenal Mass   ? MRI abdomen pelvis 8/31 - Bilateral adrenal nodules with imaging appearance most consistent with adenomas  ? Oncology following, follow up with biopsy   ? Continue to monitor for bowel movements      : No acute issues    - Monitor urine output     F/E/N: No acute issues    - Advanced diet     - Replete electrolytes as necessary      Heme/Onc: Leukocytosis   - Oncology following for pulmonary mass  Follow up with biopsy   - Elevated WBC likely 2/2 dexamethasone and post-operative leukocytosis  Continue monitoring     Endo: No acute issues      ID: No acute issues      MSK/Skin: No acute issues    - PTOT   - SCD    - DVT prophylaxis determined by neurosurgery      Disposition: Admit to Critical Care   Code Status: Level 1 - Full Code  ---------------------------------------------------------------------------------------  ICU CORE MEASURES    Prophylaxis   VTE Pharmacologic Prophylaxis: none  VTE Mechanical Prophylaxis: sequential compression device  Stress Ulcer Prophylaxis: not indicated    ABCDE Protocol (if indicated)  Plan to perform spontaneous awakening trial today? Not applicable  Plan to perform spontaneous breathing trial today? Not applicable  Obvious barriers to extubation? Not applicable  CAM-ICU: Negative    Invasive Devices Review  Invasive Devices     Peripheral Intravenous Line            Peripheral IV 08/30/20 Right Antecubital 2 days    Peripheral IV 09/01/20 Left Hand less than 1 day          Arterial Line            Arterial Line 09/01/20 Right Radial less than 1 day          Drain            Closed/Suction Drain Left Head Bulb 10 Fr  less than 1 day    Urethral Catheter Latex 16 Fr  less than 1 day                Can any invasive devices be discontinued today?  Yes  ---------------------------------------------------------------------------------------  OBJECTIVE    Vitals Vitals:    20 0200 20 0300 20 0400 20 0500   BP:       Pulse: 70 70 74 78   Resp: 15 18 18 16   Temp:   99 °F (37 2 °C)    TempSrc:   Oral    SpO2: 91% 91% 93% 92%   Weight:       Height:         Temp (24hrs), Av 6 °F (37 °C), Min:97 2 °F (36 2 °C), Max:99 2 °F (37 3 °C)  Current: Temperature: 99 °F (37 2 °C)  HR: 70-78 (70)  BP: 138/60 - 152/56 (138/60)  RR: 13-18 (15)  SpO2: 91-96% (92%)     Invasive/non-invasive ventilation settings   Respiratory    Lab Data (Last 4 hours)    None         O2/Vent Data (Last 4 hours)    None                Physical Exam  Constitutional:       General: He is not in acute distress  Appearance: Normal appearance  He is not ill-appearing  HENT:      Head: Normocephalic  Nose: Nose normal    Eyes:      General: No scleral icterus  Conjunctiva/sclera: Conjunctivae normal       Pupils: Pupils are equal, round, and reactive to light  Neck:      Musculoskeletal: Normal range of motion  No neck rigidity  Cardiovascular:      Rate and Rhythm: Normal rate and regular rhythm  Pulses: Normal pulses  Heart sounds: No murmur  Pulmonary:      Effort: Pulmonary effort is normal  No respiratory distress  Breath sounds: Rhonchi present  Abdominal:      General: Abdomen is flat  Bowel sounds are normal       Palpations: Abdomen is soft  Tenderness: There is no abdominal tenderness  There is no guarding  Musculoskeletal: Normal range of motion  Neurological:      Mental Status: He is alert  Sensory: No sensory deficit  Motor: No weakness  Gait: Gait normal       Comments: Appeared oriented 3x, however trouble speaking 2/2 expressive aphasia  Right facial drop, CN 2-12  nerve exam intact bilaterally      Psychiatric:         Mood and Affect: Mood normal        Laboratory and Diagnostics:  Results from last 7 days   Lab Units 20  0455 20  0503 20  1827 20  2119 20  1422 WBC Thousand/uL 15 18* 14 42* 10 69* 8 59  --  7 40   HEMOGLOBIN g/dL 14 6 14 9 16 1 15 9  --  16 6   HEMATOCRIT % 42 6 43 8 46 8 46 4  --  48 7*   PLATELETS Thousands/uL 188 183 180 206 191 193   NEUTROS PCT %  --   --  73 84*  --  64   BANDS PCT %  --  2  --   --   --   --    MONOS PCT %  --   --  7 4  --  9   MONO PCT %  --  4  --   --   --   --      Results from last 7 days   Lab Units 09/02/20  0455 09/01/20  2109 09/01/20  0503 08/31/20  0510 08/30/20  1422   SODIUM mmol/L 141 145 143 139 140   POTASSIUM mmol/L 3 7 3 9 3 7 4 0 4 1   CHLORIDE mmol/L 108 113* 110* 107 103   CO2 mmol/L 27 26 27 28 30   ANION GAP mmol/L 6 6 6 4 7   BUN mg/dL 11 13 16 16 14   CREATININE mg/dL 0 66 0 85 0 71 0 84 0 80   CALCIUM mg/dL 8 5 8 1* 8 8 8 8 9 4   GLUCOSE RANDOM mg/dL 136 124 108 119 99   ALT U/L  --   --   --  30 22   AST U/L  --   --   --  16 16   ALK PHOS U/L  --   --   --  84 69   ALBUMIN g/dL  --   --   --  3 6 4 7   TOTAL BILIRUBIN mg/dL  --   --   --  0 36 0 50     Results from last 7 days   Lab Units 09/02/20 0455 09/01/20  2109   MAGNESIUM mg/dL 2 2 2 1   PHOSPHORUS mg/dL 3 4 3 8      Results from last 7 days   Lab Units 09/02/20  0455 09/01/20  0503 08/30/20  1422   INR  1 03 1 01 1 02   PTT seconds 23 25 24      Results from last 7 days   Lab Units 08/30/20  1422   TROPONIN I ng/mL <0 03     Results from last 7 days   Lab Units 08/30/20  1422   LACTIC ACID mmol/L 1 2     Micro: not applicable      EKG: normal sinus rhythm   Imaging:  I have personally reviewed pertinent reports  Intake and Output  I/O       08/31 0701 - 09/01 0700 09/01 0701 - 09/02 0700    P  O  960 0    I V  (mL/kg) 1027 1 4990 4 (58)    IV Piggyback  150    Total Intake(mL/kg) 1987 1 5140 4 (59 7)    Urine (mL/kg/hr)  5000 (2 4)    Drains  205    Blood  150    Total Output  5355    Net +1987 1 -214 6          Unmeasured Urine Occurrence 3 x           Height and Weights   Height: 5' 10" (177 8 cm)  IBW: 73 kg  Body mass index is 27 24 kg/m²  Weight (last 2 days)     Date/Time   Weight    09/01/20 2035   86 1 (496 82)            Nutrition       Diet Orders   (From admission, onward)             Start     Ordered    09/01/20 1752  Diet Regular; Regular House  Diet effective now     Question Answer Comment   Diet Type Regular    Regular Regular House    RD to adjust diet per protocol?  No        09/01/20 1751                Active Medications  Scheduled Meds:  Current Facility-Administered Medications   Medication Dose Route Frequency Provider Last Rate    acetaminophen  975 mg Oral Q8H [address] Kristyn Green PA-C      albuterol  2 puff Inhalation Q6H Kristyn Green PA-C      atorvastatin  40 mg Oral Daily With InfluitiveCHERISE      calcium carbonate  1,000 mg Oral Daily PRN Kristyn Green PA-C      dexamethasone  4 mg Intravenous Q6H [address] García Dk, DO      [START ON 9/5/2020] dexamethasone  2 mg Oral Q6H [address] [facility] [place], DO      [START ON 9/8/2020] dexamethasone  2 mg Oral Q8H [address] [facility] [place], DO      [START ON 9/9/2020] dexamethasone  2 mg Oral Q12H [address] [facility] [place], DO      [START ON 9/12/2020] dexamethasone  2 mg Oral Q24H [address] [facility] [place], DO      [START ON 9/4/2020] dexamethasone  4 mg Oral Q8H [address] [facility] [place], DO      docusate sodium  100 mg Oral BID Kristyn Green PA-C      folic acid  1 mg Oral Daily Kristyn Green PA-C      guaiFENesin  600 mg Oral Q12H [address] Kristyn Green PA-C      HYDROmorphone  0 5 mg Intravenous Q1H PRN Kristyn Green PA-C      Labetalol HCl  10 mg Intravenous Q4H PRN Kristyn Green PA-C      levETIRAcetam  500 mg Intravenous Q12H [address] García Dk, DO Stopped (09/02/20 0000)    lisinopril  10 mg Oral Daily Kristyn Green PA-C      methocarbamol  500 mg Oral Q6H [address] Kristyn Green, PA-C      multivitamin-minerals  1 tablet Oral Daily Kristyn Green PA-C      niCARdipine  1-15 mg/hr Intravenous Titrated Kristyn Green CHERISE 7 5 mg/hr (09/02/20 0457)    nicotine  1 patch Transdermal Daily Gómez Dorado PA-C      ondansetron  4 mg Intravenous Q4H PRN Gómez Dorado PA-C      oxyCODONE  10 mg Oral Q4H PRN Gómez Dorado, CHERISE      oxyCODONE  5 mg Oral Q4H PRN Gómez Dorado, CHERISE      senna-docusate sodium  2 tablet Oral Daily Gómez Dorado PA-C      sodium chloride  125 mL/hr Intravenous Continuous Tyrese G Chirayath,  mL/hr (09/02/20 0057)    thiamine  100 mg Oral Daily Gómez Dorado PA-C       Continuous Infusions:  niCARdipine, 1-15 mg/hr, Last Rate: 7 5 mg/hr (09/02/20 0457)  sodium chloride, 125 mL/hr, Last Rate: 125 mL/hr (09/02/20 0057)      PRN Meds:   calcium carbonate, 1,000 mg, Daily PRN  HYDROmorphone, 0 5 mg, Q1H PRN  Labetalol HCl, 10 mg, Q4H PRN  ondansetron, 4 mg, Q4H PRN  oxyCODONE, 10 mg, Q4H PRN  oxyCODONE, 5 mg, Q4H PRN      Allergies   No Known Allergies  ---------------------------------------------------------------------------------------  Advance Directive and Living Will:      Power of :    POLST:    ---------------------------------------------------------------------------------------  Care Time Delivered:   30 minutes    Atul Zepeda      Portions of the record may have been created with voice recognition software  Occasional wrong word or "sound a like" substitutions may have occurred due to the inherent limitations of voice recognition software    Read the chart carefully and recognize, using context, where substitutions have occurred Attending Attestation (For Attendings USE Only)...

## 2020-09-02 NOTE — ASSESSMENT & PLAN NOTE
Stable  Cardene gtt discontinued   Continue lisinopril 10mg, consider adding second agent  Labetalol and hydralazine prn

## 2020-09-03 ENCOUNTER — APPOINTMENT (INPATIENT)
Dept: RADIOLOGY | Facility: HOSPITAL | Age: 55
DRG: 021 | End: 2020-09-03
Payer: COMMERCIAL

## 2020-09-03 LAB
ABO GROUP BLD BPU: NORMAL
ABO GROUP BLD BPU: NORMAL
ANION GAP SERPL CALCULATED.3IONS-SCNC: 5 MMOL/L (ref 4–13)
BPU ID: NORMAL
BPU ID: NORMAL
BUN SERPL-MCNC: 16 MG/DL (ref 5–25)
CALCIUM SERPL-MCNC: 8.5 MG/DL (ref 8.3–10.1)
CHLORIDE SERPL-SCNC: 112 MMOL/L (ref 100–108)
CO2 SERPL-SCNC: 27 MMOL/L (ref 21–32)
CREAT SERPL-MCNC: 0.6 MG/DL (ref 0.6–1.3)
CROSSMATCH: NORMAL
CROSSMATCH: NORMAL
ERYTHROCYTE [DISTWIDTH] IN BLOOD BY AUTOMATED COUNT: 12.2 % (ref 11.6–15.1)
GFR SERPL CREATININE-BSD FRML MDRD: 115 ML/MIN/1.73SQ M
GLUCOSE SERPL-MCNC: 139 MG/DL (ref 65–140)
HCT VFR BLD AUTO: 40.4 % (ref 36.5–49.3)
HGB BLD-MCNC: 14 G/DL (ref 12–17)
MAGNESIUM SERPL-MCNC: 2.5 MG/DL (ref 1.6–2.6)
MCH RBC QN AUTO: 31.7 PG (ref 26.8–34.3)
MCHC RBC AUTO-ENTMCNC: 34.7 G/DL (ref 31.4–37.4)
MCV RBC AUTO: 91 FL (ref 82–98)
PHOSPHATE SERPL-MCNC: 3.1 MG/DL (ref 2.7–4.5)
PLATELET # BLD AUTO: 166 THOUSANDS/UL (ref 149–390)
PMV BLD AUTO: 11.9 FL (ref 8.9–12.7)
POTASSIUM SERPL-SCNC: 3.9 MMOL/L (ref 3.5–5.3)
RBC # BLD AUTO: 4.42 MILLION/UL (ref 3.88–5.62)
SODIUM SERPL-SCNC: 144 MMOL/L (ref 136–145)
UNIT DISPENSE STATUS: NORMAL
UNIT DISPENSE STATUS: NORMAL
UNIT PRODUCT CODE: NORMAL
UNIT PRODUCT CODE: NORMAL
UNIT RH: NORMAL
UNIT RH: NORMAL
WBC # BLD AUTO: 14.92 THOUSAND/UL (ref 4.31–10.16)

## 2020-09-03 PROCEDURE — 83735 ASSAY OF MAGNESIUM: CPT | Performed by: STUDENT IN AN ORGANIZED HEALTH CARE EDUCATION/TRAINING PROGRAM

## 2020-09-03 PROCEDURE — G1004 CDSM NDSC: HCPCS

## 2020-09-03 PROCEDURE — 99232 SBSQ HOSP IP/OBS MODERATE 35: CPT | Performed by: INTERNAL MEDICINE

## 2020-09-03 PROCEDURE — 70450 CT HEAD/BRAIN W/O DYE: CPT

## 2020-09-03 PROCEDURE — 84100 ASSAY OF PHOSPHORUS: CPT | Performed by: STUDENT IN AN ORGANIZED HEALTH CARE EDUCATION/TRAINING PROGRAM

## 2020-09-03 PROCEDURE — 80048 BASIC METABOLIC PNL TOTAL CA: CPT | Performed by: STUDENT IN AN ORGANIZED HEALTH CARE EDUCATION/TRAINING PROGRAM

## 2020-09-03 PROCEDURE — 85027 COMPLETE CBC AUTOMATED: CPT | Performed by: STUDENT IN AN ORGANIZED HEALTH CARE EDUCATION/TRAINING PROGRAM

## 2020-09-03 PROCEDURE — 92523 SPEECH SOUND LANG COMPREHEN: CPT

## 2020-09-03 PROCEDURE — 99024 POSTOP FOLLOW-UP VISIT: CPT | Performed by: PHYSICIAN ASSISTANT

## 2020-09-03 RX ORDER — DEXAMETHASONE 2 MG/1
2 TABLET ORAL EVERY 12 HOURS SCHEDULED
Status: DISCONTINUED | OUTPATIENT
Start: 2020-09-12 | End: 2020-09-04

## 2020-09-03 RX ORDER — DEXAMETHASONE 2 MG/1
2 TABLET ORAL
Status: DISCONTINUED | OUTPATIENT
Start: 2020-09-16 | End: 2020-09-03

## 2020-09-03 RX ORDER — DEXAMETHASONE 2 MG/1
2 TABLET ORAL EVERY 12 HOURS SCHEDULED
Status: DISCONTINUED | OUTPATIENT
Start: 2020-09-12 | End: 2020-09-03

## 2020-09-03 RX ORDER — DEXAMETHASONE 2 MG/1
2 TABLET ORAL EVERY 12 HOURS SCHEDULED
Status: DISCONTINUED | OUTPATIENT
Start: 2020-09-09 | End: 2020-09-03

## 2020-09-03 RX ORDER — HEPARIN SODIUM 5000 [USP'U]/ML
5000 INJECTION, SOLUTION INTRAVENOUS; SUBCUTANEOUS EVERY 8 HOURS SCHEDULED
Status: DISCONTINUED | OUTPATIENT
Start: 2020-09-03 | End: 2020-09-04 | Stop reason: HOSPADM

## 2020-09-03 RX ORDER — DEXAMETHASONE 4 MG/1
4 TABLET ORAL EVERY 8 HOURS SCHEDULED
Status: DISCONTINUED | OUTPATIENT
Start: 2020-09-04 | End: 2020-09-04

## 2020-09-03 RX ORDER — DEXAMETHASONE 2 MG/1
2 TABLET ORAL EVERY 8 HOURS SCHEDULED
Status: DISCONTINUED | OUTPATIENT
Start: 2020-09-09 | End: 2020-09-04

## 2020-09-03 RX ORDER — DEXAMETHASONE 2 MG/1
2 TABLET ORAL
Status: DISCONTINUED | OUTPATIENT
Start: 2020-09-13 | End: 2020-09-03

## 2020-09-03 RX ORDER — DEXAMETHASONE 2 MG/1
2 TABLET ORAL EVERY 6 HOURS SCHEDULED
Status: DISCONTINUED | OUTPATIENT
Start: 2020-09-06 | End: 2020-09-04

## 2020-09-03 RX ADMIN — METHOCARBAMOL TABLETS 500 MG: 500 TABLET, COATED ORAL at 05:26

## 2020-09-03 RX ADMIN — LISINOPRIL 10 MG: 10 TABLET ORAL at 08:43

## 2020-09-03 RX ADMIN — GUAIFENESIN 600 MG: 600 TABLET, EXTENDED RELEASE ORAL at 08:43

## 2020-09-03 RX ADMIN — THIAMINE HCL TAB 100 MG 100 MG: 100 TAB at 08:44

## 2020-09-03 RX ADMIN — DEXAMETHASONE SODIUM PHOSPHATE 4 MG: 4 INJECTION, SOLUTION INTRAMUSCULAR; INTRAVENOUS at 00:41

## 2020-09-03 RX ADMIN — DEXAMETHASONE SODIUM PHOSPHATE 4 MG: 4 INJECTION, SOLUTION INTRAMUSCULAR; INTRAVENOUS at 05:26

## 2020-09-03 RX ADMIN — LEVETIRACETAM 500 MG: 100 INJECTION, SOLUTION INTRAVENOUS at 21:14

## 2020-09-03 RX ADMIN — GUAIFENESIN 600 MG: 600 TABLET, EXTENDED RELEASE ORAL at 21:14

## 2020-09-03 RX ADMIN — Medication 1 TABLET: at 08:43

## 2020-09-03 RX ADMIN — HYDROMORPHONE HYDROCHLORIDE 0.5 MG: 1 INJECTION, SOLUTION INTRAMUSCULAR; INTRAVENOUS; SUBCUTANEOUS at 03:31

## 2020-09-03 RX ADMIN — ATORVASTATIN CALCIUM 40 MG: 40 TABLET, FILM COATED ORAL at 17:24

## 2020-09-03 RX ADMIN — DOCUSATE SODIUM AND SENNOSIDES 2 TABLET: 8.6; 5 TABLET ORAL at 08:43

## 2020-09-03 RX ADMIN — HEPARIN SODIUM 5000 UNITS: 5000 INJECTION INTRAVENOUS; SUBCUTANEOUS at 14:25

## 2020-09-03 RX ADMIN — ACETAMINOPHEN 975 MG: 325 TABLET, FILM COATED ORAL at 05:26

## 2020-09-03 RX ADMIN — ACETAMINOPHEN 975 MG: 325 TABLET, FILM COATED ORAL at 21:13

## 2020-09-03 RX ADMIN — DEXAMETHASONE SODIUM PHOSPHATE 4 MG: 4 INJECTION, SOLUTION INTRAMUSCULAR; INTRAVENOUS at 17:24

## 2020-09-03 RX ADMIN — DEXAMETHASONE SODIUM PHOSPHATE 4 MG: 4 INJECTION, SOLUTION INTRAMUSCULAR; INTRAVENOUS at 11:24

## 2020-09-03 RX ADMIN — DOCUSATE SODIUM 100 MG: 100 CAPSULE, LIQUID FILLED ORAL at 17:24

## 2020-09-03 RX ADMIN — LEVETIRACETAM 500 MG: 100 INJECTION, SOLUTION INTRAVENOUS at 08:46

## 2020-09-03 RX ADMIN — SODIUM CHLORIDE 75 ML/HR: 0.9 INJECTION, SOLUTION INTRAVENOUS at 12:26

## 2020-09-03 RX ADMIN — HEPARIN SODIUM 5000 UNITS: 5000 INJECTION INTRAVENOUS; SUBCUTANEOUS at 21:14

## 2020-09-03 RX ADMIN — NICOTINE 1 PATCH: 14 PATCH TRANSDERMAL at 08:44

## 2020-09-03 RX ADMIN — DOCUSATE SODIUM 100 MG: 100 CAPSULE, LIQUID FILLED ORAL at 08:43

## 2020-09-03 RX ADMIN — FOLIC ACID 1 MG: 1 TABLET ORAL at 08:43

## 2020-09-03 RX ADMIN — METHOCARBAMOL TABLETS 500 MG: 500 TABLET, COATED ORAL at 00:39

## 2020-09-03 RX ADMIN — ACETAMINOPHEN 975 MG: 325 TABLET, FILM COATED ORAL at 14:24

## 2020-09-03 RX ADMIN — METHOCARBAMOL TABLETS 500 MG: 500 TABLET, COATED ORAL at 17:24

## 2020-09-03 RX ADMIN — METHOCARBAMOL TABLETS 500 MG: 500 TABLET, COATED ORAL at 11:23

## 2020-09-03 NOTE — CASE MANAGEMENT
CM spoke with pt's son Sendy Wylie who is now pt's medical POA  [place] Dejan reports he has not yet spoken with pt's g'friend to notify her of change in [place]  CM notified Sendy Dejan of need for him to notify pt's g'friend of change  Sendy Jonesxon will attempt to call her  CM rec'd call from Sendy Wylie stating he attempted to call pt's girlfriend x2 with no answer  Sendy Wylie reports he is giving permission for medical information to be shared with pt's g'friend although any decision making regarding treatment and discharge planning would be through Sendy Wylie  Upon reviewing rehab choices, CM provided further clarification regarding acute rehab vs skilled rehab  CM also offered [facility] rehab at [facility]  Sendy Wylie in agreement with rehab choices as follows:  1  [facility] [place]  2  [facility]  3  [facility]    CM updated RN KLAUDIA Garnica

## 2020-09-03 NOTE — PLAN OF CARE
Problem: SLP ADULT - COMMUNICATION, IMPAIRED  Goal: Initial communication eval performed  Outcome: Completed  Summary/Impression:  Pt presented with s/s mild/moderate deficits in auditory comprehension and at least mild deficits in expressive language  Pt also reporting "I don't know why I had surgery" (discussion initiated re: evaluation for vision changes revealed a mass and surgery already completed-->at that point pt rubbed head and asked if he had staples)  Note: pt also seen for f/u to swallowing evaluation  He took food (apple slices) and coffee (one sip at a time) with no s/s aspiration  No additional swallow f/u appears indicated

## 2020-09-03 NOTE — ASSESSMENT & PLAN NOTE
POD2 Status post left frontal craniotomy for tumor resection  · preliminary pathology consistent with likely lung cancer  · Large left frontal temporal cystic mass with vasogenic edema and midline shift  · Concern for leptomeningeal spread  · Presents with left eye visual deficit and left facial numbness    Imaging:  · CT head wo contrast 9/3/2020:  Stable intraparenchymal hemorrhage noted within the left frontal postoperative bed extending to the extra-axial space deep to the frontal temporal craniotomy  Stable vasogenic edema and localized mass effect  Hemorrhage adjacent to the frontal horn of the lateral ventricle on the right is unchanged  Stable intraventricular hemorrhage within the frontal horn of the lateral ventricle  Slight improvement in intraventricular hemorrhage layering within the occipital horn of the lateral ventricle  Stable hyperdensity adjacent to the frontal horn of the left lateral ventricle as well  · MRI brain with without 8/30/20:  Multiple intracranial enhancing lesions with the dominant cystic lesion noted a deep less inferior frontal lobe  The lesions along the ependymal surface of the lateral ventricles and septum blue cm suspicious for presence of leptomeningeal CSF spread  Lesion along the course of left trigeminal track into Meckel's cave as well as level of hypothalamus abutting optic tract  Stable 5 mm midline shift  Plan:  · Continue monitor neurological exam     · Aphasia not as noticeable today  Able to identify objects 3/3 but difficulties with repetition at times  Able to follow simple commands but intermittent difficulties with multistep commands    Able to do simple calculations  · Okay to go to q4hour neuro checks  · Continue Decadron wean q72 hours, currently on [id number]  · Mobilize physical therapy and occupational therapy   · DVT prophylaxis:  Bilateral SCDs, okay for [facility] today  · Ongoing medical management per primary team  · Systolic blood pressure less than 160  · Keppra for seizure prophylaxis x1 week   · Postoperative antibiotics completed  · ALMA drain removed 9/2    Will continue to follow closely  Please call if any questions concerns or exam changes

## 2020-09-03 NOTE — SPEECH THERAPY NOTE
Speech/Language Assessment    Patient Name: Oni Gonzalez 49 y o  Today's Date: 9/3/2020      Problem List  Principal Problem:    Brain mass  Active Problems:    Hypertension    Tobacco abuse    Uncomplicated alcohol dependence ([address] )    Hyperlipidemia    Emphysema lung (HCC)    Brain compression (HCC)    Lung mass    Past Medical History  No past medical history on file  Past Surgical History  Past Surgical History:   Procedure Laterality Date    CRANIOTOMY Left 2020    Procedure: Left frontal CRANIOTOMY IMAGE-GUIDED FOR TUMOR;  Surgeon: Nubia Camacho MD;  Location: [facility];  Service: Neurosurgery    HAND SURGERY Left        CURRENT MEDICAL:  See EMR including Bedside Swallow Evaluation report  CURRENT COGNITIVE:  Pt fully alert  He was oriented to self, others (RN), place, mo/date/year  CURRENT PAIN & RESPONSE TO INTERVENTIONS:  Denied pain    AUDITORY COMPREHENSION:  Body part ID:   Object ID:   Personal yes/no ? 's: 8/10 (confused family/relational terms)  Simple yes/no ? 's: 9/10  Complex y/n ?'s: 3/6 but  after review  One step commands:   Two step commands:  2/4 (reqiored repetition and frustrated quickly with task)    READING COMPREHENSION:  Single word comprehension/Match pic w/ a choice of 2 words: 4/  Simple direction following:  3/3  Sentence comprehension:  3 of 4  Functional comprehension: read contents of white board aloud quickly with some dysfluency and word substitution but able to comprehend  Paragraph: again reading quickly and dismissive when asked ?'s to determine comprehension      VERBAL EXPRESSION/EXPR LANGUAGE:  Confrontation Naming: 10/10  Responsive Namin% (given object description, pt unable to name object but difficulty appears related to deficit in comprehension)  Picture Description: occasions of anomia and word substitution noted  Conversation: variably fluent; anomia and word substitutions noted (when attemptingiso tell me about his job, but unable to recall company name and described, "I plow and pool and stuff "  Generally able to make needs known     WRITTEN EXPRESSION:  Name: +  Address: +  Phone #: +  Word level: accurate for SO, kids names, towns where they reside  Sentence to dictation:  DNT  Sentence formulation:  DNT    ORAL MOTOR/SPEECH MECHANISM EXAM:    MOTOR SPEECH:  Dysarthria:  No s/s  Apraxia:   Oral: imitated 4 of 5 oral motor movements  Needs further motor speech evaluation:    Cognitive -linguistic skills  Oriented to: person, place, time but not recalling specifics re: reason for hospitalization ("I don't know")  Long term memory:  8/8 biographical facts  Short term memory:  +am events but stating he does not know why he is here or why he had surgery  Problem solving: aware of need to use call bell (I encouraged follow through with same)  Organizational tasks: able to use phone (limited usage this am)  Sequencing:  Corrected error when dialing    Further evaluation suggested    Symptoms noted:  Distractable  Semantic paraphasias  Not fully aware of deficits:    Summary/Impression:  Pt presented with s/s mild/moderate deficits in auditory comprehension and at least mild deficits in expressive language  Pt also reporting "I don't know why I had surgery" (discussion initiated re: evaluation for vision changes revealed a mass and surgery already completed-->at that point pt rubbed head and asked if he had staples)  Note: pt also seen for f/u to swallowing evaluation  He took food (apple slices) and coffee (one sip at a time) with no s/s aspiration  No additional swallow f/u appears indicated  Treatment Recommended: Yes    Patient Stated Goal: "Go home, how long do I have to stay here?"    Treatment Goals:     The patient will follow 1 and 2-step commands related to functional living environment with % accuracy given no cues (pt will be trained to repeat or request repetition and clarification strategies when he does not comprehend)    The patient will demonstrate comprehension of simple and mod complex yes/no questions with 95% accuracy and min cues  The patient will identify the appropriate object/picture following a verbal description of 2-3 clues with 95% accuracy  After listening to a 2-5 sentence paragraph, the patient will answer simple yes/no and whquestions with 95% accuracy      The patient will use trained strategies to increase comprehension with min cues needed (eg subvocalize, repeat, request repetition/clarification/rephrasal etc)    Patient will produce content words/phrases in response to egocentric, situational and background information questions (immediately after review/5 minutes after review) with 95% accuracy and min cues     Pt will recall and be able to report reason for surgery and salient information re: plan (eg XRT, home with outpt vs inpt therapy)

## 2020-09-03 NOTE — PROGRESS NOTES
Progress Note - Bj Gonzalez 1965, 49 y o  male MRN: [id number]    Unit/Bed#: [facility] 628-01 Encounter: [id number]    Primary Care Provider: No primary care provider on file  Date and time admitted to hospital: 8/30/2020  7:18 PM        * Brain mass  Assessment & Plan  POD2 Status post left frontal craniotomy for tumor resection  · preliminary pathology consistent with likely lung cancer  · Large left frontal temporal cystic mass with vasogenic edema and midline shift  · Concern for leptomeningeal spread  · Presents with left eye visual deficit and left facial numbness    Imaging:  · CT head wo contrast 9/3/2020:  Stable intraparenchymal hemorrhage noted within the left frontal postoperative bed extending to the extra-axial space deep to the frontal temporal craniotomy  Stable vasogenic edema and localized mass effect  Hemorrhage adjacent to the frontal horn of the lateral ventricle on the right is unchanged  Stable intraventricular hemorrhage within the frontal horn of the lateral ventricle  Slight improvement in intraventricular hemorrhage layering within the occipital horn of the lateral ventricle  Stable hyperdensity adjacent to the frontal horn of the left lateral ventricle as well  · MRI brain with without 8/30/20:  Multiple intracranial enhancing lesions with the dominant cystic lesion noted a deep less inferior frontal lobe  The lesions along the ependymal surface of the lateral ventricles and septum blue cm suspicious for presence of leptomeningeal CSF spread  Lesion along the course of left trigeminal track into Meckel's cave as well as level of hypothalamus abutting optic tract  Stable 5 mm midline shift  Plan:  · Continue monitor neurological exam     · Aphasia not as noticeable today  Able to identify objects 3/3 but difficulties with repetition at times  Able to follow simple commands but intermittent difficulties with multistep commands    Able to do simple calculations  · Okay to go to q4hour neuro checks  · Continue Decadron wean q72 hours, currently on [id number]  · Mobilize physical therapy and occupational therapy   · DVT prophylaxis:  Bilateral SCDs, okay for [facility] today  · Ongoing medical management per primary team  · Systolic blood pressure less than 160  · Keppra for seizure prophylaxis x1 week   · Postoperative antibiotics completed  · ALMA drain removed 9/2    Will continue to follow closely  Please call if any questions concerns or exam changes  Lung mass  Assessment & Plan  · CT chest abd pelvis with without 8/30/20:  Approximately 4 x 4 x 3 cm mass mid to lower left hilum extending left lower lobe to the lingual a  Nearby satellite masses  Highly suggestive of malignant neoplasm  Hypodensity concerning for possible PE  Bilateral adrenal masses  Bladder wall thickening  Enlarged prostate  Brain compression [facility])  Assessment & Plan  continue decadron wean q72 hours    Cerebral edema (HCC)  Assessment & Plan  Decadron as above    Subjective/Objective   Chief Complaint: "I am good"    Subjective:  Patient states he is doing well  Denies headaches, dizziness, CP, SOB, abdominal pain, N/V, numbness / tingling / weakness  Urinating well  No BM but passing gas  Feels speech is okay today  Objective:  Sitting in bed, NAD, watching TV    I/O       09/01 0701 - 09/02 0700 09/02 0701 - 09/03 0700 09/03 0701 - 09/04 0700    P  O  0 360 360    I V  (mL/kg) 5363 7 (62 3) 985 (11 4)     IV Piggyback 150 100     Total Intake(mL/kg) 5513 7 (64) 1445 (16 8) 360 (4 1)    Urine (mL/kg/hr) 5300 (2 6) 700 (0 3)     Drains 205 30     Blood 150      Total Output 5655 730     Net -141 3 +715 +360           Unmeasured Urine Occurrence   1 x          Invasive Devices     Peripheral Intravenous Line            Peripheral IV 09/01/20 Left Hand 1 day                Physical Exam:  Vitals: Blood pressure 157/88, pulse 65, temperature 97 9 °F (36 6 °C), resp   rate 20, height 5' 10" (1 778 m), weight 88 3 kg (194 lb 10 7 oz), SpO2 94 %  ,Body mass index is 27 93 kg/m²  General appearance: alert, appears stated age, cooperative and no distress  Head: Normocephalic, without obvious abnormality, incision intact, dressing removed  Eyes: left eye ptosis, partial cranial nerve 3 and 6 palsy  Neck: supple, symmetrical, trachea midline  Lungs: non labored breathing  Heart: regular heart rate  Neurologic:   Mental status: Alert, oriented x 4, aphasia seems mildly improved, able to ID 3/3 objects, trouble with repeating sentence, able to do calculations, thought content appropriate  Cranial nerves: grossly intact (Cranial nerves II-XII)  Sensory: normal to LT, JPS 3/3 bilateral index finger, DST intact  Motor: moving all extremities without focal weakness, strength 5/5 throughout  Reflexes: no mcnally or clonus noted  Coordination: finger to nose normal bilaterally, mild right downward drift      Lab Results:  Results from last 7 days   Lab Units 09/03/20  0546 09/02/20  0455 09/01/20 2109 09/01/20  0503 08/31/20  0511  08/30/20  1422   WBC Thousand/uL 14 92* 15 18* 14 42* 10 69* 8 59  --  7 40   HEMOGLOBIN g/dL 14 0 14 6 14 9 16 1 15 9  --  16 6   HEMATOCRIT % 40 4 42 6 43 8 46 8 46 4  --  48 7*   PLATELETS Thousands/uL 166 188 183 180 206   < > 193   NEUTROS PCT %  --   --   --  73 84*  --  64   MONOS PCT %  --   --   --  7 4  --  9   MONO PCT %  --   --  4  --   --   --   --     < > = values in this interval not displayed       Results from last 7 days   Lab Units 09/03/20  0546 09/02/20  0455 09/01/20  2109 08/31/20  0510 08/30/20  1422   POTASSIUM mmol/L 3 9 3 7 3 9   < > 4 0 4 1   CHLORIDE mmol/L 112* 108 113*   < > 107 103   CO2 mmol/L 27 27 26   < > 28 30   BUN mg/dL 16 11 13   < > 16 14   CREATININE mg/dL 0 60 0 66 0 85   < > 0 84 0 80   CALCIUM mg/dL 8 5 8 5 8 1*   < > 8 8 9 4   ALK PHOS U/L  --   --   --   --  84 69   ALT U/L  --   --   --   --  30 22   AST U/L  --   --   --   --  16 16    < > = values in this interval not displayed  Results from last 7 days   Lab Units 09/03/20  0546 09/02/20  0455 09/01/20  2109   MAGNESIUM mg/dL 2 5 2 2 2 1     Results from last 7 days   Lab Units 09/03/20  0546 09/02/20  0455 09/01/20  2109   PHOSPHORUS mg/dL 3 1 3 4 3 8     Results from last 7 days   Lab Units 09/02/20  0455 09/01/20  0503 08/30/20  1422   INR  1 03 1 01 1 02   PTT seconds 23 25 24       Imaging Studies: I have personally reviewed pertinent reports  and I have personally reviewed pertinent films in PACS    Ct Chest Abdomen Pelvis W Wo Contrast    Result Date: 8/31/2020  Impression: There is an approximately 4 x 4 x 3 cm mass in the mid to lower left hilum which extends into the left lower lobe of the lung and the left lingula  Nearby satellite masses and nodules are present  The appearance is highly suggestive of malignant neoplasm  Pulmonology consultation and Oncology consultation is recommended  There appears to be some hypodensity within some of the central left pulmonary artery branches  Pulmonary embolism and/or tumor thrombus should be excluded  Dedicated CT angiogram/PE protocol CTA of the chest is recommended for further evaluation  Bilateral adrenal masses, each measuring approximately 2 cm  MRI is recommended for further characterization, if there are no contraindications  The urinary bladder wall appears thickened  This is most pronounced anteriorly  Clinical correlation, laboratory correlation and follow-up is recommended  The prostate is mildly prominent  Clinical and laboratory correlation is recommended  Mild emphysema  Other findings as described above, please see discussion  I personally discussed this study with [facility] on 8/31/2020 at 5:05 AM  This examination demonstrates findings requiring imaging follow-up and was logged as such in [facility]   Workstation performed: [id number]     Ct Head Wo Contrast    Result Date: 9/3/2020  Impression: Stable intraparenchymal hemorrhage noted within the left frontal postoperative bed extending to the extra-axial space deep to the frontal temporal craniotomy  Stable vasogenic edema and localized mass effect  Hemorrhage adjacent to the frontal horn of the lateral ventricle on the right is unchanged  Stable intraventricular hemorrhage within the frontal horn of the lateral ventricle  Slight improvement in intraventricular hemorrhage layering within the occipital horn of the lateral ventricle  Stable hyperdensity adjacent to the frontal horn of the left lateral ventricle as well  Workstation performed: [id number]     Ct Head Without Contrast    Result Date: 8/30/2020  Impression: Large predominantly cystic mass within the left cerebral hemisphere with relatively mild amount of surrounding edema in the brain and minimal calcification along one margin of the lesion  This may be a cystic neoplasm (primary or metastatic) or less likely infection  Contrast-enhanced brain MRI is recommended for further workup  Mass effect on the left lateral ventricle with 4 mm rightward midline shift and some effacement of left-sided cerebral sulci  There is surgical consultation is advised  I personally discussed this study with José Manuel Griffiths on 8/30/2020 at 3:41 PM  Workstation performed: [id number]     Ct Head W Wo Contrast    Result Date: 9/2/2020  Impression: Status post left opercular frontal mass resection with hemorrhage in the postoperative bed and left frontal temporal extra-axial space  Scattered cerebral sulcal and intraventricular as well as basal subarachnoid hemorrhage  Multifocal hyperdense enhancing lesions in the periventricular regions as previously described on the MRI study of August 30, 2020  The hyperattenuation of these lesions suggest hemorrhagic metastasis  Alternatively these may represent hypercellular metastasis  Correlation with oncologic history  Interval decrease in the mass effect on the left ventricular system    I personally discussed this study with Dr Cholo Casanova on 9/2/2020 at 7:51 AM  Workstation performed: [id number]     Mri Brain W Wo Contrast    Result Date: 8/31/2020  Impression: Multiple intracranial enhancing lesions with a dominant cystic lesion noted in the deep left inferior frontal lobe, as described above  Multiplicity of lesions with a suspicious primary pulmonary neoplasm is most suggestive of metastatic disease  Other  neoplastic etiologies thought to be less likely  Note is made of lesions along the ependymal surface of the lateral ventricles and septum pellucidum suspicious for the presence of leptomeningeal/CSF spread of disease  Imaging of the remainder of the neuroaxis should be considered for further evaluation  Of note lesions are seen along the course of the left trigeminal nerve tracking into Meckel's cave as well as at the level of the hypothalamus and abutting the optic tracts  Stable 5 mm rightward midline shift  Workstation performed: [id number]     Mri Abdomen W Wo Contrast    Result Date: 8/31/2020  Impression: Bilateral adrenal nodules with imaging appearance most consistent with adenomas  Workstation performed: [id number]       EKG, Pathology, and Other Studies: I have personally reviewed pertinent reports        VTE Pharmacologic Prophylaxis: okay for pharm DVT ppx    VTE Mechanical Prophylaxis: sequential compression device

## 2020-09-03 NOTE — CASE MANAGEMENT
DC Planning:     A post acute care recommendation was made by your care team for STR  Discussed Freedom of Choice with both patient and POA  List of facilities given to both patient and POA via in person  both patient and POA aware the list is custom filtered for them by preference  and that [facility] post acute providers are designated  Pt and Son Santana Coon agreeable to IP rehab; requested referrals to [address] [place] [address] rehab and [place] [address] rehab; [facility] sent

## 2020-09-03 NOTE — PLAN OF CARE
Problem: PAIN - ADULT  Goal: Verbalizes/displays adequate comfort level or baseline comfort level  Description: Interventions:  - Encourage patient to monitor pain and request assistance  - Assess pain using appropriate pain scale  - Administer analgesics based on type and severity of pain and evaluate response  - Implement non-pharmacological measures as appropriate and evaluate response  - Consider cultural and social influences on pain and pain management  - Notify physician/advanced practitioner if interventions unsuccessful or patient reports new pain  Outcome: Progressing     Problem: INFECTION - ADULT  Goal: Absence or prevention of progression during hospitalization  Description: INTERVENTIONS:  - Assess and monitor for signs and symptoms of infection  - Monitor lab/diagnostic results  - Monitor all insertion sites, i e  indwelling lines, tubes, and drains  - Monitor endotracheal if appropriate and nasal secretions for changes in amount and color  - [place] appropriate cooling/warming therapies per order  - Administer medications as ordered  - Instruct and encourage patient and family to use good hand hygiene technique  - Identify and instruct in appropriate isolation precautions for identified infection/condition  Outcome: Progressing     Problem: SAFETY ADULT  Goal: Patient will remain free of falls  Description: INTERVENTIONS:  - Assess patient frequently for physical needs  -  Identify cognitive and physical deficits and behaviors that affect risk of falls    -  [place] fall precautions as indicated by assessment   - Educate patient/family on patient safety including physical limitations  - Instruct patient to call for assistance with activity based on assessment  - Modify environment to reduce risk of injury  - Consider OT/PT consult to assist with strengthening/mobility  Outcome: Progressing     Problem: DISCHARGE PLANNING  Goal: Discharge to home or other facility with appropriate resources  Description: INTERVENTIONS:  - Identify barriers to discharge w/patient and caregiver  - Arrange for needed discharge resources and transportation as appropriate  - Identify discharge learning needs (meds, wound care, etc )  - Arrange for interpretive services to assist at discharge as needed  - Refer to Case Management Department for coordinating discharge planning if the patient needs post-hospital services based on physician/advanced practitioner order or complex needs related to functional status, cognitive ability, or social support system  Outcome: Progressing     Problem: Knowledge Deficit  Goal: Patient/family/caregiver demonstrates understanding of disease process, treatment plan, medications, and discharge instructions  Description: Complete learning assessment and assess knowledge base    Interventions:  - Provide teaching at level of understanding  - Provide teaching via preferred learning methods  Outcome: Progressing     Problem: NEUROSENSORY - ADULT  Goal: Achieves stable or improved neurological status  Description: INTERVENTIONS  - Monitor and report changes in neurological status  - Monitor vital signs such as temperature, blood pressure, glucose, and any other labs ordered   - Initiate measures to prevent increased intracranial pressure  - Monitor for seizure activity and implement precautions if appropriate      Outcome: Progressing     Problem: SKIN/TISSUE INTEGRITY - ADULT  Goal: Skin integrity remains intact  Description: INTERVENTIONS  - Identify patients at risk for skin breakdown  - Assess and monitor skin integrity  - Assess and monitor nutrition and hydration status  - Monitor labs (i e  albumin)  - Assess for incontinence   - Turn and reposition patient  - Assist with mobility/ambulation  - Relieve pressure over bony prominences  - Avoid friction and shearing  - Provide appropriate hygiene as needed including keeping skin clean and dry  - Evaluate need for skin moisturizer/barrier cream  - Collaborate with interdisciplinary team (i e  Nutrition, Rehabilitation, etc )   - Patient/family teaching  Outcome: Progressing     Problem: Potential for Falls  Goal: Patient will remain free of falls  Description: INTERVENTIONS:  - Assess patient frequently for physical needs  -  Identify cognitive and physical deficits and behaviors that affect risk of falls  -  [place] fall precautions as indicated by assessment   - Educate patient/family on patient safety including physical limitations  - Instruct patient to call for assistance with activity based on assessment  - Modify environment to reduce risk of injury  - Consider OT/PT consult to assist with strengthening/mobility  Outcome: Progressing     Problem: Prexisting or High Potential for Compromised Skin Integrity  Goal: Skin integrity is maintained or improved  Description: INTERVENTIONS:  - Identify patients at risk for skin breakdown  - Assess and monitor skin integrity  - Assess and monitor nutrition and hydration status  - Monitor labs   - Assess for incontinence   - Turn and reposition patient  - Assist with mobility/ambulation  - Relieve pressure over bony prominences  - Avoid friction and shearing  - Provide appropriate hygiene as needed including keeping skin clean and dry  - Evaluate need for skin moisturizer/barrier cream  - Collaborate with interdisciplinary team   - Patient/family teaching  - Consider wound care consult   Outcome: Progressing     Problem: Nutrition/Hydration-ADULT  Goal: Nutrient/Hydration intake appropriate for improving, restoring or maintaining nutritional needs  Description: Monitor and assess patient's nutrition/hydration status for malnutrition  Collaborate with interdisciplinary team and initiate plan and interventions as ordered  Monitor patient's weight and dietary intake as ordered or per policy  Utilize nutrition screening tool and intervene as necessary   Determine patient's food preferences and provide high-protein, high-caloric foods as appropriate       INTERVENTIONS:  - Monitor oral intake, urinary output, labs, and treatment plans  - Assess nutrition and hydration status and recommend course of action  - Evaluate amount of meals eaten  - Assist patient with eating if necessary   - Allow adequate time for meals  - Recommend/ encourage appropriate diets, oral nutritional supplements, and vitamin/mineral supplements  - Order, calculate, and assess calorie counts as needed  - Recommend, monitor, and adjust tube feedings and TPN/PPN based on assessed needs  - Assess need for intravenous fluids  - Provide specific nutrition/hydration education as appropriate  - Include patient/family/caregiver in decisions related to nutrition  Outcome: Progressing

## 2020-09-03 NOTE — CASE MANAGEMENT
CM Manager met with pt and pt's g'friend Kiera Bynum and patient; reviewed change in POA to pt's son Sendy Wylie  CM also informed both of Sendy Wylie agreeing for Kiera Bynum to be given medical updates and discharge planning informaiton although final decision making with with Sendy Bynum became upset and reports she will be patient's caregiver when pt is discharge  CM reviewed pt's discharge plan, recommendation for rehab, and Mehran's rehab choices  Kiera Bynum in agreement with choices although reports she has been in process of making plans to have 24 hr care for pt at home and applied for FMLA to care for patient  CM reviewed difference between acute rehab, skilled rehab, home therapy, and outpatient therapy  Cm informed Kiera Bynum that she will remain updated on pt's d/c and d/c plan  CM also informed RN KLAUDIA Garnica and pt's bedside RN of ability to provide medical updates to Kiera Bynum

## 2020-09-03 NOTE — PROGRESS NOTES
INTERNAL MEDICINE RESIDENCY PROGRESS NOTE     Name: Josh Kuo   Age & Sex: 49 y o  male   MRN: [id number]  Unit/Bed#: [facility] 628-01   Encounter: [id number]  Team: SOD Team A    PATIENT INFORMATION     Name: Josh Kuo   Age & Sex: 49 y o  male   MRN: [id number]  Hospital Stay Days: 4    ASSESSMENT/PLAN     Principal Problem:    Brain mass  Active Problems:    Hypertension    Tobacco abuse    Uncomplicated alcohol dependence ([facility] 75 )    Hyperlipidemia    Emphysema lung ([facility] 75 )    Brain compression ([facility] 75 )    Lung mass      Lung mass  Assessment & Plan  Found on CT chest  Likely lung primary  HemOnc following  Follow up on brain biopsy  Follow up as outpatient  Emphysema lung ([place] [address] )  Assessment & Plan  30+ pack-year history of smoking  Mild emphysema on CT chest   Coarse rhonchi in all lobes bilaterally  Patient reports chronic cough, unchanged or worsening  - Continue Albuterol PRN  - Follow up as outpatient    Hyperlipidemia  Assessment & Plan  Patient with a history based on previous documentation  Treatment only with diet  No record of lipid panel recently    Cholesterol  213High    mg/dL    Triglycerides  183High    mg/dL    HDL  45  mg/dL    LDL Calculated  131High    mg/dL       - Continue Lipitor 39JT     Uncomplicated alcohol dependence ([facility] 75 )  Assessment & Plan  Patient states he drinks about 2-6 beers every day  He denies any history of tremors or withdrawal seizures  Plan:  - Continue CIWA protocol  -Thiamine 100 mg p o  Daily and folic acid 1 mg p o  Tobacco abuse  Assessment & Plan  Patient states he has about a 30 pack year history  Plan:  -Nicotine patch 21 mg  -Smoking cessation counseling upon discharge    Hypertension  Assessment & Plan      Stable  - Cardene gtt discontinued   - continue lisinopril 10 mg in AM  -IV labetalol 5 mg Q4 PRN for SBP >160 mmHg     * Brain mass  Assessment & Plan  One week history of left blurry vision and right side facial droop   Physical exam initially showed right side facial droop and right sided weakness of lower and upper extremity  This morning , patient continues to have right facial drop but bilateral weakness of 5/5  Head CT  = large predominantly cystic mass within the left cerebral hemisphere with relatively mild amount of surrounding edema in the brain and minimal calcification along one margin of the lesion  Likely metastasis from lung mass, infection unlikely  Plan:  § Follow up CT head stable, DVT prophylaxis restarted  § Wound care, ALMA drain removed  § Monitor blood pressure, maintain under  (currently 138/60)  § Continue neuro checks Q2hr   § STAT head CT if change in neuro exam or drop of GCS more than 2  § Continue Keppra for seizure ppx   § Continue Dexamethasone IV started   Will transition to PO    § Pain control with tylenol, oxycodone and dilaudid  § Neurosurgery is following   § Hematology and oncology following, follow up biopsy results        Disposition: per neurosurgery, begin discharge planning for post acute rehab  Placement per CM  Brain biopsy results pending     SUBJECTIVE     Patient seen and examined  No acute events overnight  Patient says he feels "good" this morning  Observed drinking coffee without difficulty  Says his vision is "better" in his left eye but is unable to further elaborate on what is better  Denies headache, dizziness, lightheadedness, fevers, chills, nausea, vomiting, chest pain, SOB       OBJECTIVE     Vitals:    20 2238 20 0232 20 0732 20 1122   BP: 158/83 158/85 157/88 152/87   Pulse: 68 56 65 55   Resp: 16 18 20 21   Temp: 98 1 °F (36 7 °C) 97 9 °F (36 6 °C) 97 9 °F (36 6 °C) 97 9 °F (36 6 °C)   TempSrc:    Oral   SpO2: 95% 91% 94% 92%   Weight:   88 3 kg (194 lb 10 7 oz)    Height:          Temperature:   Temp (24hrs), Av °F (36 7 °C), Min:97 9 °F (36 6 °C), Max:98 1 °F (36 7 °C)    Temperature: 97 9 °F (36 6 °C)  Intake & Output:  I/O 09/01 0701 - 09/02 0700 09/02 0701 - 09/03 0700 09/03 0701 - 09/04 0700    P  O  0 360 360    I V  (mL/kg) 5363 7 (62 3) 985 (11 4) 375 (4 2)    IV Piggyback 150 100 100    Total Intake(mL/kg) 5513 7 (64) 1445 (16 8) 835 (9 5)    Urine (mL/kg/hr) 5300 (2 6) 700 (0 3)     Drains 205 30     Blood 150      Total Output 5655 730     Net -141 3 +715 +835           Unmeasured Urine Occurrence   1 x        Weights:   IBW: 73 kg    Body mass index is 27 93 kg/m²  Weight (last 2 days)     Date/Time   Weight    09/03/20 07:32:21   88 3 (194 67)    09/02/20 1734   86 1 (189 82)    09/01/20 2035   86 1 (189 82)            Physical Exam  Constitutional:       General: He is not in acute distress  Appearance: Normal appearance  HENT:      Head: Normocephalic and atraumatic  Right Ear: External ear normal       Left Ear: External ear normal       Nose: Nose normal       Mouth/Throat:      Mouth: Mucous membranes are moist       Pharynx: Oropharynx is clear  Eyes:      Extraocular Movements: Extraocular movements intact  Conjunctiva/sclera: Conjunctivae normal       Comments: Decreased pupillary light reflex in left eye  Neck:      Musculoskeletal: Normal range of motion and neck supple  Cardiovascular:      Rate and Rhythm: Normal rate and regular rhythm  Pulses: Normal pulses  Heart sounds: Normal heart sounds  Pulmonary:      Effort: Pulmonary effort is normal       Breath sounds: Rhonchi present  Comments: Diffuse coarse breath sounds  Abdominal:      General: Abdomen is flat  Palpations: Abdomen is soft  Tenderness: There is no abdominal tenderness  Musculoskeletal:      Right lower leg: No edema  Left lower leg: No edema  Skin:     General: Skin is warm and dry  Neurological:      Mental Status: He is alert and oriented to person, place, and time  Cranial Nerves: No cranial nerve deficit  Sensory: No sensory deficit  Motor: No weakness        Comments: Dysarthria, facial droop appear improved  Strength and sensation intact bilaterally upper and lower extremities  Visual deficits in the left lower field  Psychiatric:         Mood and Affect: Mood normal          Behavior: Behavior normal        LABORATORY DATA     Labs: I have personally reviewed pertinent reports  Results from last 7 days   Lab Units 09/03/20 0546 09/02/20 0455 09/01/20 2109 09/01/20  0503 08/31/20  0511  08/30/20  1422   WBC Thousand/uL 14 92* 15 18* 14 42* 10 69* 8 59  --  7 40   HEMOGLOBIN g/dL 14 0 14 6 14 9 16 1 15 9  --  16 6   HEMATOCRIT % 40 4 42 6 43 8 46 8 46 4  --  48 7*   PLATELETS Thousands/uL 166 188 183 180 206   < > 193   NEUTROS PCT %  --   --   --  73 84*  --  64   MONOS PCT %  --   --   --  7 4  --  9   MONO PCT %  --   --  4  --   --   --   --     < > = values in this interval not displayed  Results from last 7 days   Lab Units 09/03/20 0546 09/02/20 0455 09/01/20 2109 08/31/20  0510 08/30/20  1422   POTASSIUM mmol/L 3 9 3 7 3 9   < > 4 0 4 1   CHLORIDE mmol/L 112* 108 113*   < > 107 103   CO2 mmol/L 27 27 26   < > 28 30   BUN mg/dL 16 11 13   < > 16 14   CREATININE mg/dL 0 60 0 66 0 85   < > 0 84 0 80   CALCIUM mg/dL 8 5 8 5 8 1*   < > 8 8 9 4   ALK PHOS U/L  --   --   --   --  84 69   ALT U/L  --   --   --   --  30 22   AST U/L  --   --   --   --  16 16    < > = values in this interval not displayed       Results from last 7 days   Lab Units 09/03/20 0546 09/02/20 0455 09/01/20  2109   MAGNESIUM mg/dL 2 5 2 2 2 1     Results from last 7 days   Lab Units 09/03/20 0546 09/02/20 0455 09/01/20  2109   PHOSPHORUS mg/dL 3 1 3 4 3 8      Results from last 7 days   Lab Units 09/02/20 0455 09/01/20  0503 08/30/20  1422   INR  1 03 1 01 1 02   PTT seconds 23 25 24     Results from last 7 days   Lab Units 08/30/20  1422   LACTIC ACID mmol/L 1 2     Results from last 7 days   Lab Units 08/30/20  1422   TROPONIN I ng/mL <0 03       IMAGING & DIAGNOSTIC TESTING Radiology Results: I have personally reviewed pertinent reports  Ct Chest Abdomen Pelvis W Wo Contrast    Result Date: 8/31/2020  Impression: There is an approximately 4 x 4 x 3 cm mass in the mid to lower left hilum which extends into the left lower lobe of the lung and the left lingula  Nearby satellite masses and nodules are present  The appearance is highly suggestive of malignant neoplasm  Pulmonology consultation and Oncology consultation is recommended  There appears to be some hypodensity within some of the central left pulmonary artery branches  Pulmonary embolism and/or tumor thrombus should be excluded  Dedicated CT angiogram/PE protocol CTA of the chest is recommended for further evaluation  Bilateral adrenal masses, each measuring approximately 2 cm  MRI is recommended for further characterization, if there are no contraindications  The urinary bladder wall appears thickened  This is most pronounced anteriorly  Clinical correlation, laboratory correlation and follow-up is recommended  The prostate is mildly prominent  Clinical and laboratory correlation is recommended  Mild emphysema  Other findings as described above, please see discussion  I personally discussed this study with [address] on 8/31/2020 at 5:05 AM  This examination demonstrates findings requiring imaging follow-up and was logged as such in [facility]  Workstation performed: [id number]     Ct Head Wo Contrast    Result Date: 9/3/2020  Impression: Stable intraparenchymal hemorrhage noted within the left frontal postoperative bed extending to the extra-axial space deep to the frontal temporal craniotomy  Stable vasogenic edema and localized mass effect  Hemorrhage adjacent to the frontal horn of the lateral ventricle on the right is unchanged  Stable intraventricular hemorrhage within the frontal horn of the lateral ventricle    Slight improvement in intraventricular hemorrhage layering within the occipital horn of the lateral ventricle  Stable hyperdensity adjacent to the frontal horn of the left lateral ventricle as well  Workstation performed: [id number]     Ct Head Without Contrast    Result Date: 8/30/2020  Impression: Large predominantly cystic mass within the left cerebral hemisphere with relatively mild amount of surrounding edema in the brain and minimal calcification along one margin of the lesion  This may be a cystic neoplasm (primary or metastatic) or less likely infection  Contrast-enhanced brain MRI is recommended for further workup  Mass effect on the left lateral ventricle with 4 mm rightward midline shift and some effacement of left-sided cerebral sulci  There is surgical consultation is advised  I personally discussed this study with Jeannette Doty on 8/30/2020 at 3:41 PM  Workstation performed: [id number]     Ct Head W Wo Contrast    Result Date: 9/2/2020  Impression: Status post left opercular frontal mass resection with hemorrhage in the postoperative bed and left frontal temporal extra-axial space  Scattered cerebral sulcal and intraventricular as well as basal subarachnoid hemorrhage  Multifocal hyperdense enhancing lesions in the periventricular regions as previously described on the MRI study of August 30, 2020  The hyperattenuation of these lesions suggest hemorrhagic metastasis  Alternatively these may represent hypercellular metastasis  Correlation with oncologic history  Interval decrease in the mass effect on the left ventricular system  I personally discussed this study with Dr Marlen Kelley on 9/2/2020 at 7:51 AM  Workstation performed: [id number]     Mri Brain W Wo Contrast    Result Date: 8/31/2020  Impression: Multiple intracranial enhancing lesions with a dominant cystic lesion noted in the deep left inferior frontal lobe, as described above  Multiplicity of lesions with a suspicious primary pulmonary neoplasm is most suggestive of metastatic disease    Other  neoplastic etiologies thought to be less likely  Note is made of lesions along the ependymal surface of the lateral ventricles and septum pellucidum suspicious for the presence of leptomeningeal/CSF spread of disease  Imaging of the remainder of the neuroaxis should be considered for further evaluation  Of note lesions are seen along the course of the left trigeminal nerve tracking into Meckel's cave as well as at the level of the hypothalamus and abutting the optic tracts  Stable 5 mm rightward midline shift  Workstation performed: [id number]     Mri Abdomen W Wo Contrast    Result Date: 8/31/2020  Impression: Bilateral adrenal nodules with imaging appearance most consistent with adenomas  Workstation performed: [id number]     Other Diagnostic Testing: I have personally reviewed pertinent reports      ACTIVE MEDICATIONS     Current Facility-Administered Medications   Medication Dose Route Frequency    acetaminophen (TYLENOL) tablet 975 mg  975 mg Oral Q8H [address]    albuterol (PROVENTIL HFA,VENTOLIN HFA) inhaler 2 puff  2 puff Inhalation Q6H PRN    atorvastatin (LIPITOR) tablet 40 mg  40 mg Oral Daily With Dinner    calcium carbonate (TUMS) chewable tablet 1,000 mg  1,000 mg Oral Daily PRN    dexamethasone (DECADRON) injection 4 mg  4 mg Intravenous Q6H [address]    [START ON 9/9/2020] dexamethasone (DECADRON) tablet 2 mg  2 mg Oral Q8H [address]    [START ON 9/6/2020] dexamethasone (DECADRON) tablet 2 mg  2 mg Oral Q6H [address]    [START ON 9/12/2020] dexamethasone (DECADRON) tablet 2 mg  2 mg Oral Q12H [address]    [START ON 9/16/2020] dexamethasone (DECADRON) tablet 2 mg  2 mg Oral Q24H [address]    [START ON 9/4/2020] dexamethasone (DECADRON) tablet 4 mg  4 mg Oral Q8H [address]    docusate sodium (COLACE) capsule 100 mg  100 mg Oral BID    folic acid (FOLVITE) tablet 1 mg  1 mg Oral Daily    guaiFENesin (MUCINEX) 12 hr tablet 600 mg  600 mg Oral Q12H [address]    heparin (porcine) subcutaneous injection 5,000 Units  5,000 Units Subcutaneous Q8H [address]    HYDROmorphone (DILAUDID) injection 0 5 mg  0 5 mg Intravenous Q1H PRN    Labetalol HCl (NORMODYNE) injection 10 mg  10 mg Intravenous Q4H PRN    levETIRAcetam (KEPPRA) 500 mg in sodium chloride 0 9 % 100 mL IVPB  500 mg Intravenous Q12H [address]    lisinopril (ZESTRIL) tablet 10 mg  10 mg Oral Daily    methocarbamol (ROBAXIN) tablet 500 mg  500 mg Oral Q6H [address]    multivitamin-minerals (CENTRUM) tablet 1 tablet  1 tablet Oral Daily    niCARdipine (CARDENE) 25 mg (STANDARD CONCENTRATION) in sodium chloride 0 9% 250 mL  1-15 mg/hr Intravenous Titrated    nicotine (NICODERM CQ) 14 mg/24hr TD 24 hr patch 1 patch  1 patch Transdermal Daily    ondansetron (ZOFRAN) injection 4 mg  4 mg Intravenous Q4H PRN    oxyCODONE (ROXICODONE) IR tablet 10 mg  10 mg Oral Q4H PRN    oxyCODONE (ROXICODONE) IR tablet 5 mg  5 mg Oral Q4H PRN    senna-docusate sodium (SENOKOT S) 8 6-50 mg per tablet 2 tablet  2 tablet Oral Daily    sodium chloride 0 9 % infusion  75 mL/hr Intravenous Continuous    thiamine (VITAMIN B1) tablet 100 mg  100 mg Oral Daily       VTE Pharmacologic Prophylaxis: Heparin  VTE Mechanical Prophylaxis: sequential compression device    Portions of the record may have been created with voice recognition software  Occasional wrong word or "sound a like" substitutions may have occurred due to the inherent limitations of voice recognition software    Read the chart carefully and recognize, using context, where substitutions have occurred   ==  Maribell Arredondo MD  [address] [place] [address]  Internal Medicine Residency PGY-1

## 2020-09-04 VITALS
DIASTOLIC BLOOD PRESSURE: 87 MMHG | SYSTOLIC BLOOD PRESSURE: 149 MMHG | TEMPERATURE: 98.7 F | BODY MASS INDEX: 26.34 KG/M2 | OXYGEN SATURATION: 95 % | HEART RATE: 74 BPM | HEIGHT: 70 IN | RESPIRATION RATE: 18 BRPM | WEIGHT: 184 LBS

## 2020-09-04 PROBLEM — G93.5 BRAIN COMPRESSION (HCC): Status: RESOLVED | Noted: 2020-08-31 | Resolved: 2020-09-04

## 2020-09-04 LAB
BASOPHILS # BLD AUTO: 0.02 THOUSANDS/ΜL (ref 0–0.1)
BASOPHILS NFR BLD AUTO: 0 % (ref 0–1)
EOSINOPHIL # BLD AUTO: 0 THOUSAND/ΜL (ref 0–0.61)
EOSINOPHIL NFR BLD AUTO: 0 % (ref 0–6)
ERYTHROCYTE [DISTWIDTH] IN BLOOD BY AUTOMATED COUNT: 12.1 % (ref 11.6–15.1)
HCT VFR BLD AUTO: 42 % (ref 36.5–49.3)
HGB BLD-MCNC: 14.2 G/DL (ref 12–17)
IMM GRANULOCYTES # BLD AUTO: 0.1 THOUSAND/UL (ref 0–0.2)
IMM GRANULOCYTES NFR BLD AUTO: 1 % (ref 0–2)
LYMPHOCYTES # BLD AUTO: 1.12 THOUSANDS/ΜL (ref 0.6–4.47)
LYMPHOCYTES NFR BLD AUTO: 8 % (ref 14–44)
MCH RBC QN AUTO: 31.4 PG (ref 26.8–34.3)
MCHC RBC AUTO-ENTMCNC: 33.8 G/DL (ref 31.4–37.4)
MCV RBC AUTO: 93 FL (ref 82–98)
MONOCYTES # BLD AUTO: 0.88 THOUSAND/ΜL (ref 0.17–1.22)
MONOCYTES NFR BLD AUTO: 6 % (ref 4–12)
NEUTROPHILS # BLD AUTO: 11.95 THOUSANDS/ΜL (ref 1.85–7.62)
NEUTS SEG NFR BLD AUTO: 85 % (ref 43–75)
NRBC BLD AUTO-RTO: 0 /100 WBCS
PLATELET # BLD AUTO: 174 THOUSANDS/UL (ref 149–390)
PMV BLD AUTO: 12.3 FL (ref 8.9–12.7)
RBC # BLD AUTO: 4.52 MILLION/UL (ref 3.88–5.62)
WBC # BLD AUTO: 14.07 THOUSAND/UL (ref 4.31–10.16)

## 2020-09-04 PROCEDURE — 97116 GAIT TRAINING THERAPY: CPT

## 2020-09-04 PROCEDURE — 99024 POSTOP FOLLOW-UP VISIT: CPT | Performed by: PHYSICIAN ASSISTANT

## 2020-09-04 PROCEDURE — 85025 COMPLETE CBC W/AUTO DIFF WBC: CPT | Performed by: STUDENT IN AN ORGANIZED HEALTH CARE EDUCATION/TRAINING PROGRAM

## 2020-09-04 PROCEDURE — 99239 HOSP IP/OBS DSCHRG MGMT >30: CPT | Performed by: INTERNAL MEDICINE

## 2020-09-04 PROCEDURE — 97535 SELF CARE MNGMENT TRAINING: CPT

## 2020-09-04 RX ORDER — DEXAMETHASONE 4 MG/1
4 TABLET ORAL EVERY 8 HOURS SCHEDULED
Status: DISCONTINUED | OUTPATIENT
Start: 2020-09-04 | End: 2020-09-04 | Stop reason: HOSPADM

## 2020-09-04 RX ORDER — FOLIC ACID 1 MG/1
1 TABLET ORAL DAILY
Qty: 29 TABLET | Refills: 0 | Status: SHIPPED | OUTPATIENT
Start: 2020-09-05 | End: 2021-01-01

## 2020-09-04 RX ORDER — ATORVASTATIN CALCIUM 40 MG/1
40 TABLET, FILM COATED ORAL
Qty: 30 TABLET | Refills: 0 | Status: SHIPPED | OUTPATIENT
Start: 2020-09-04 | End: 2021-01-01

## 2020-09-04 RX ORDER — DEXAMETHASONE 2 MG/1
2 TABLET ORAL EVERY 8 HOURS SCHEDULED
Qty: 9 TABLET | Refills: 0 | Status: SHIPPED | OUTPATIENT
Start: 2020-09-10 | End: 2020-09-13

## 2020-09-04 RX ORDER — DEXAMETHASONE 2 MG/1
2 TABLET ORAL EVERY 12 HOURS SCHEDULED
Qty: 6 TABLET | Refills: 0 | Status: SHIPPED | OUTPATIENT
Start: 2020-09-13 | End: 2020-09-16 | Stop reason: SDUPTHER

## 2020-09-04 RX ORDER — DEXAMETHASONE 2 MG/1
2 TABLET ORAL EVERY 6 HOURS SCHEDULED
Qty: 12 TABLET | Refills: 0 | Status: SHIPPED | OUTPATIENT
Start: 2020-09-07 | End: 2020-09-10

## 2020-09-04 RX ORDER — DEXAMETHASONE 2 MG/1
2 TABLET ORAL EVERY 8 HOURS SCHEDULED
Status: DISCONTINUED | OUTPATIENT
Start: 2020-09-10 | End: 2020-09-04 | Stop reason: HOSPADM

## 2020-09-04 RX ORDER — LEVETIRACETAM 500 MG/1
500 TABLET ORAL EVERY 12 HOURS SCHEDULED
Status: DISCONTINUED | OUTPATIENT
Start: 2020-09-04 | End: 2020-09-04 | Stop reason: HOSPADM

## 2020-09-04 RX ORDER — LANOLIN ALCOHOL/MO/W.PET/CERES
100 CREAM (GRAM) TOPICAL DAILY
Qty: 30 TABLET | Refills: 0 | Status: SHIPPED | OUTPATIENT
Start: 2020-09-05 | End: 2021-01-01

## 2020-09-04 RX ORDER — LEVETIRACETAM 500 MG/1
500 TABLET ORAL EVERY 12 HOURS SCHEDULED
Qty: 14 TABLET | Refills: 0 | Status: SHIPPED | OUTPATIENT
Start: 2020-09-04 | End: 2020-09-11

## 2020-09-04 RX ORDER — METHOCARBAMOL 500 MG/1
500 TABLET, FILM COATED ORAL EVERY 6 HOURS SCHEDULED
Qty: 56 TABLET | Refills: 0 | Status: SHIPPED | OUTPATIENT
Start: 2020-09-04 | End: 2020-09-18

## 2020-09-04 RX ORDER — DEXAMETHASONE 2 MG/1
2 TABLET ORAL EVERY 6 HOURS SCHEDULED
Status: DISCONTINUED | OUTPATIENT
Start: 2020-09-07 | End: 2020-09-04 | Stop reason: HOSPADM

## 2020-09-04 RX ORDER — DEXAMETHASONE 2 MG/1
2 TABLET ORAL EVERY 12 HOURS SCHEDULED
Status: DISCONTINUED | OUTPATIENT
Start: 2020-09-13 | End: 2020-09-04 | Stop reason: HOSPADM

## 2020-09-04 RX ORDER — LISINOPRIL 10 MG/1
10 TABLET ORAL DAILY
Qty: 30 TABLET | Refills: 0 | Status: SHIPPED | OUTPATIENT
Start: 2020-09-05 | End: 2021-01-01

## 2020-09-04 RX ORDER — DEXAMETHASONE 4 MG/1
4 TABLET ORAL EVERY 8 HOURS SCHEDULED
Qty: 9 TABLET | Refills: 0 | Status: SHIPPED | OUTPATIENT
Start: 2020-09-04 | End: 2020-09-07

## 2020-09-04 RX ADMIN — GUAIFENESIN 600 MG: 600 TABLET, EXTENDED RELEASE ORAL at 08:02

## 2020-09-04 RX ADMIN — DEXAMETHASONE 4 MG: 4 TABLET ORAL at 05:31

## 2020-09-04 RX ADMIN — METHOCARBAMOL TABLETS 500 MG: 500 TABLET, COATED ORAL at 00:18

## 2020-09-04 RX ADMIN — DEXAMETHASONE 4 MG: 4 TABLET ORAL at 00:18

## 2020-09-04 RX ADMIN — HEPARIN SODIUM 5000 UNITS: 5000 INJECTION INTRAVENOUS; SUBCUTANEOUS at 05:31

## 2020-09-04 RX ADMIN — DOCUSATE SODIUM 100 MG: 100 CAPSULE, LIQUID FILLED ORAL at 08:02

## 2020-09-04 RX ADMIN — METHOCARBAMOL TABLETS 500 MG: 500 TABLET, COATED ORAL at 05:31

## 2020-09-04 RX ADMIN — Medication 1 TABLET: at 08:02

## 2020-09-04 RX ADMIN — LISINOPRIL 10 MG: 10 TABLET ORAL at 08:02

## 2020-09-04 RX ADMIN — LEVETIRACETAM 500 MG: 100 INJECTION, SOLUTION INTRAVENOUS at 08:06

## 2020-09-04 RX ADMIN — THIAMINE HCL TAB 100 MG 100 MG: 100 TAB at 08:02

## 2020-09-04 RX ADMIN — DOCUSATE SODIUM AND SENNOSIDES 2 TABLET: 8.6; 5 TABLET ORAL at 08:02

## 2020-09-04 RX ADMIN — NICOTINE 1 PATCH: 14 PATCH TRANSDERMAL at 08:03

## 2020-09-04 RX ADMIN — FOLIC ACID 1 MG: 1 TABLET ORAL at 08:02

## 2020-09-04 RX ADMIN — ACETAMINOPHEN 975 MG: 325 TABLET, FILM COATED ORAL at 05:31

## 2020-09-04 NOTE — PROGRESS NOTES
Progress Note - Lalit Larger 1965, 49 y o  male MRN: [id number]    [facility] En[facility]: [id number]    Primary Care Provider: No primary care provider on file  Date and time admitted to hospital: 8/30/2020  7:18 PM    * Brain mass  Assessment & Plan  POD 3 Status post left frontal craniotomy for tumor resection  · preliminary pathology consistent with likely lung cancer (adenocarcinoma)  · Large left frontal temporal cystic mass with vasogenic edema and midline shift  · Concern for leptomeningeal spread  · Presents with left eye visual deficit and left facial numbness    Imaging:  · CT head wo contrast 9/3/2020:  Stable intraparenchymal hemorrhage noted within the left frontal postoperative bed extending to the extra-axial space deep to the frontal temporal craniotomy  Stable vasogenic edema and localized mass effect  Hemorrhage adjacent to the frontal horn of the lateral ventricle on the right is unchanged  Stable intraventricular hemorrhage within the frontal horn of the lateral ventricle  Slight improvement in intraventricular hemorrhage layering within the occipital horn of the lateral ventricle  Stable hyperdensity adjacent to the frontal horn of the left lateral ventricle as well  · MRI brain with without 8/30/20:  Multiple intracranial enhancing lesions with the dominant cystic lesion noted a deep less inferior frontal lobe  The lesions along the ependymal surface of the lateral ventricles and septum blue cm suspicious for presence of leptomeningeal CSF spread  Lesion along the course of left trigeminal track into Meckel's cave as well as level of hypothalamus abutting optic tract  Stable 5 mm midline shift  Plan:  · Continue monitor neurological exam     · Aphasia not as noticeable today  Able to identify objects 4/4  Able to follow simple commands and 2 step commands but intermittent difficulties with multistep commands    Able to do simple calculations  · Okay to go to q4hour neuro checks  · Continue Decadron wean q72 hours, currently on [id number]  · Mobilize physical therapy and occupational therapy   · DVT prophylaxis:  Bilateral SCDs, SQH today  · Ongoing medical management per primary team  · Systolic blood pressure less than 160  · Keppra for seizure prophylaxis x1 week   · Postoperative antibiotics completed  · ALMA drain removed 9/2    Patient at this time is medically cleared for discharge from a neurosurgical standpoint  Will schedule patient a 2 week pathology appointment with Dr Laurent Cisneros in the outpatient setting  Please call if any questions concerns or exam changes  Lung mass  Assessment & Plan  · CT chest abd pelvis with without 8/30/20:  Approximately 4 x 4 x 3 cm mass mid to lower left hilum extending left lower lobe to the lingual a  Nearby satellite masses  Highly suggestive of malignant neoplasm  Hypodensity concerning for possible PE  Bilateral adrenal masses  Bladder wall thickening  Enlarged prostate  Brain compression [facility])  Assessment & Plan  · continue decadron wean q72 hours    Cerebral edema (HCC)  Assessment & Plan  · Decadron as above    Subjective/Objective   Chief Complaint: None     Subjective: Patient denies any issues or concerns  Patient says he feels pretty good today  He still has a flat affect and some mild trouble expressing words, but overall speech is clear and fluent in short sentences  He admits to some mild headaches, but denies any visual changes, changes in speech, facial weakness, arm or leg weakness, tingling/numbness changes in hearing, CP or SOB  No documented BM yet at this time  Would be aggressive with bowel regimen  Objective: sitting in chair in NAD  I/O       09/02 0701 - 09/03 0700 09/03 0701 - 09/04 0700 09/04 0701 - 09/05 0700    P  O  360 1655 360    I V  (mL/kg) 985 (11 4) 516 3 (6 2)     IV Piggyback 100 200 100    Total Intake(mL/kg) 1445 (16 8) 2371 3 (28 4) 460 (5 5)    Urine (mL/kg/hr) 700 (0 3)  0 (0)    Drains 30      Stool   0    Blood       Total Output 730  0    Net +715 +2371 3 +460           Unmeasured Urine Occurrence  3 x 2 x    Unmeasured Stool Occurrence   0 x          Invasive Devices     Peripheral Intravenous Line            Peripheral IV 09/01/20 Left Hand 2 days                Physical Exam:  Vitals: Blood pressure 154/87, pulse 63, temperature 98 2 °F (36 8 °C), resp  rate 20, height 5' 10" (1 778 m), weight 83 5 kg (184 lb), SpO2 95 %  ,Body mass index is 26 4 kg/m²  General appearance: alert, appears stated age, cooperative and no distress  Head: Normocephalic, left sided frontotemporal cranial incision appears to be healing well  Mild scabbing overlying, but skin edges well approximated and no active bleeding noted  Eyes: EOMI, PERRL  Lungs: non labored breathing  Heart: regular heart rate  Neurologic:   Mental status: Alert, oriented x3, thought content appropriate  Cranial nerves: grossly intact (Cranial nerves II-XII) with exception of very mild right facial weakness that appears to be improving     Sensory: normal to light touch   Motor: moving all extremities without focal weakness 5/5 strength   Coordination: finger to nose normal bilaterally, no drift bilaterally    Lab Results:  Results from last 7 days   Lab Units 09/04/20  0514 09/03/20  0546 09/02/20  0455 09/01/20 2109 09/01/20  0503 08/31/20  0511   WBC Thousand/uL 14 07* 14 92* 15 18* 14 42* 10 69* 8 59   HEMOGLOBIN g/dL 14 2 14 0 14 6 14 9 16 1 15 9   HEMATOCRIT % 42 0 40 4 42 6 43 8 46 8 46 4   PLATELETS Thousands/uL 174 166 188 183 180 206   NEUTROS PCT % 85*  --   --   --  73 84*   MONOS PCT % 6  --   --   --  7 4   MONO PCT %  --   --   --  4  --   --      Results from last 7 days   Lab Units 09/03/20  0546 09/02/20  0455 09/01/20 2109 08/31/20  0510 08/30/20  1422   POTASSIUM mmol/L 3 9 3 7 3 9   < > 4 0 4 1   CHLORIDE mmol/L 112* 108 113*   < > 107 103   CO2 mmol/L 27 27 26   < > 28 30   BUN mg/dL 16 11 13   < > 16 14   CREATININE mg/dL 0 60 0 66 0 85   < > 0 84 0 80   CALCIUM mg/dL 8 5 8 5 8 1*   < > 8 8 9 4   ALK PHOS U/L  --   --   --   --  84 69   ALT U/L  --   --   --   --  30 22   AST U/L  --   --   --   --  16 16    < > = values in this interval not displayed  Results from last 7 days   Lab Units 09/03/20  0546 09/02/20  0455 09/01/20  2109   MAGNESIUM mg/dL 2 5 2 2 2 1     Results from last 7 days   Lab Units 09/03/20  0546 09/02/20  0455 09/01/20  2109   PHOSPHORUS mg/dL 3 1 3 4 3 8     Results from last 7 days   Lab Units 09/02/20  0455 09/01/20  0503 08/30/20  1422   INR  1 03 1 01 1 02   PTT seconds 23 25 24     No results found for: TROPONINT  ABG:No results found for: PHART, MVR1QHE, PO2ART, IUM0TJK, S0LUSZDU, BEART, SOURCE    Imaging Studies: I have personally reviewed pertinent reports  and I have personally reviewed pertinent films in PACS  Ct Chest Abdomen Pelvis W Wo Contrast    Result Date: 8/31/2020  Impression: There is an approximately 4 x 4 x 3 cm mass in the mid to lower left hilum which extends into the left lower lobe of the lung and the left lingula  Nearby satellite masses and nodules are present  The appearance is highly suggestive of malignant neoplasm  Pulmonology consultation and Oncology consultation is recommended  There appears to be some hypodensity within some of the central left pulmonary artery branches  Pulmonary embolism and/or tumor thrombus should be excluded  Dedicated CT angiogram/PE protocol CTA of the chest is recommended for further evaluation  Bilateral adrenal masses, each measuring approximately 2 cm  MRI is recommended for further characterization, if there are no contraindications  The urinary bladder wall appears thickened  This is most pronounced anteriorly  Clinical correlation, laboratory correlation and follow-up is recommended  The prostate is mildly prominent  Clinical and laboratory correlation is recommended  Mild emphysema   Other findings as described above, please see discussion  I personally discussed this study with [address] on 8/31/2020 at 5:05 AM  This examination demonstrates findings requiring imaging follow-up and was logged as such in [facility]  Workstation performed: [id number]     Ct Head Wo Contrast    Result Date: 9/3/2020  Impression: Stable intraparenchymal hemorrhage noted within the left frontal postoperative bed extending to the extra-axial space deep to the frontal temporal craniotomy  Stable vasogenic edema and localized mass effect  Hemorrhage adjacent to the frontal horn of the lateral ventricle on the right is unchanged  Stable intraventricular hemorrhage within the frontal horn of the lateral ventricle  Slight improvement in intraventricular hemorrhage layering within the occipital horn of the lateral ventricle  Stable hyperdensity adjacent to the frontal horn of the left lateral ventricle as well  Workstation performed: [id number]     Ct Head Without Contrast    Result Date: 8/30/2020  Impression: Large predominantly cystic mass within the left cerebral hemisphere with relatively mild amount of surrounding edema in the brain and minimal calcification along one margin of the lesion  This may be a cystic neoplasm (primary or metastatic) or less likely infection  Contrast-enhanced brain MRI is recommended for further workup  Mass effect on the left lateral ventricle with 4 mm rightward midline shift and some effacement of left-sided cerebral sulci  There is surgical consultation is advised  I personally discussed this study with Bianca Ariza on 8/30/2020 at 3:41 PM  Workstation performed: [id number]     Ct Head W Wo Contrast    Result Date: 9/2/2020  Impression: Status post left opercular frontal mass resection with hemorrhage in the postoperative bed and left frontal temporal extra-axial space  Scattered cerebral sulcal and intraventricular as well as basal subarachnoid hemorrhage   Multifocal hyperdense enhancing lesions in the periventricular regions as previously described on the MRI study of August 30, 2020  The hyperattenuation of these lesions suggest hemorrhagic metastasis  Alternatively these may represent hypercellular metastasis  Correlation with oncologic history  Interval decrease in the mass effect on the left ventricular system  I personally discussed this study with Dr Stefanie Dawkins on 9/2/2020 at 7:51 AM  Workstation performed: [id number]     Mri Brain W Wo Contrast    Result Date: 8/31/2020  Impression: Multiple intracranial enhancing lesions with a dominant cystic lesion noted in the deep left inferior frontal lobe, as described above  Multiplicity of lesions with a suspicious primary pulmonary neoplasm is most suggestive of metastatic disease  Other  neoplastic etiologies thought to be less likely  Note is made of lesions along the ependymal surface of the lateral ventricles and septum pellucidum suspicious for the presence of leptomeningeal/CSF spread of disease  Imaging of the remainder of the neuroaxis should be considered for further evaluation  Of note lesions are seen along the course of the left trigeminal nerve tracking into Meckel's cave as well as at the level of the hypothalamus and abutting the optic tracts  Stable 5 mm rightward midline shift  Workstation performed: [id number]     Mri Abdomen W Wo Contrast    Result Date: 8/31/2020  Impression: Bilateral adrenal nodules with imaging appearance most consistent with adenomas  Workstation performed: [id number]       EKG, Pathology, and Other Studies: I have personally reviewed pertinent reports        VTE Pharmacologic Prophylaxis: Heparin    VTE Mechanical Prophylaxis: sequential compression device

## 2020-09-04 NOTE — PLAN OF CARE
Problem: PAIN - ADULT  Goal: Verbalizes/displays adequate comfort level or baseline comfort level  Description: Interventions:  - Encourage patient to monitor pain and request assistance  - Assess pain using appropriate pain scale  - Administer analgesics based on type and severity of pain and evaluate response  - Implement non-pharmacological measures as appropriate and evaluate response  - Consider cultural and social influences on pain and pain management  - Notify physician/advanced practitioner if interventions unsuccessful or patient reports new pain  Outcome: Progressing     Problem: INFECTION - ADULT  Goal: Absence or prevention of progression during hospitalization  Description: INTERVENTIONS:  - Assess and monitor for signs and symptoms of infection  - Monitor lab/diagnostic results  - Monitor all insertion sites, i e  indwelling lines, tubes, and drains  - Monitor endotracheal if appropriate and nasal secretions for changes in amount and color  - [place] appropriate cooling/warming therapies per order  - Administer medications as ordered  - Instruct and encourage patient and family to use good hand hygiene technique  - Identify and instruct in appropriate isolation precautions for identified infection/condition  Outcome: Progressing     Problem: SAFETY ADULT  Goal: Patient will remain free of falls  Description: INTERVENTIONS:  - Assess patient frequently for physical needs  -  Identify cognitive and physical deficits and behaviors that affect risk of falls    -  [place] fall precautions as indicated by assessment   - Educate patient/family on patient safety including physical limitations  - Instruct patient to call for assistance with activity based on assessment  - Modify environment to reduce risk of injury  - Consider OT/PT consult to assist with strengthening/mobility  Outcome: Progressing     Problem: DISCHARGE PLANNING  Goal: Discharge to home or other facility with appropriate resources  Description: INTERVENTIONS:  - Identify barriers to discharge w/patient and caregiver  - Arrange for needed discharge resources and transportation as appropriate  - Identify discharge learning needs (meds, wound care, etc )  - Arrange for interpretive services to assist at discharge as needed  - Refer to Case Management Department for coordinating discharge planning if the patient needs post-hospital services based on physician/advanced practitioner order or complex needs related to functional status, cognitive ability, or social support system  Outcome: Progressing     Problem: Knowledge Deficit  Goal: Patient/family/caregiver demonstrates understanding of disease process, treatment plan, medications, and discharge instructions  Description: Complete learning assessment and assess knowledge base    Interventions:  - Provide teaching at level of understanding  - Provide teaching via preferred learning methods  Outcome: Progressing     Problem: NEUROSENSORY - ADULT  Goal: Achieves stable or improved neurological status  Description: INTERVENTIONS  - Monitor and report changes in neurological status  - Monitor vital signs such as temperature, blood pressure, glucose, and any other labs ordered   - Initiate measures to prevent increased intracranial pressure  - Monitor for seizure activity and implement precautions if appropriate      Outcome: Progressing     Problem: SKIN/TISSUE INTEGRITY - ADULT  Goal: Skin integrity remains intact  Description: INTERVENTIONS  - Identify patients at risk for skin breakdown  - Assess and monitor skin integrity  - Assess and monitor nutrition and hydration status  - Monitor labs (i e  albumin)  - Assess for incontinence   - Turn and reposition patient  - Assist with mobility/ambulation  - Relieve pressure over bony prominences  - Avoid friction and shearing  - Provide appropriate hygiene as needed including keeping skin clean and dry  - Evaluate need for skin moisturizer/barrier cream  - Collaborate with interdisciplinary team (i e  Nutrition, Rehabilitation, etc )   - Patient/family teaching  Outcome: Progressing     Problem: Potential for Falls  Goal: Patient will remain free of falls  Description: INTERVENTIONS:  - Assess patient frequently for physical needs  -  Identify cognitive and physical deficits and behaviors that affect risk of falls  -  [place] fall precautions as indicated by assessment   - Educate patient/family on patient safety including physical limitations  - Instruct patient to call for assistance with activity based on assessment  - Modify environment to reduce risk of injury  - Consider OT/PT consult to assist with strengthening/mobility  Outcome: Progressing     Problem: Prexisting or High Potential for Compromised Skin Integrity  Goal: Skin integrity is maintained or improved  Description: INTERVENTIONS:  - Identify patients at risk for skin breakdown  - Assess and monitor skin integrity  - Assess and monitor nutrition and hydration status  - Monitor labs   - Assess for incontinence   - Turn and reposition patient  - Assist with mobility/ambulation  - Relieve pressure over bony prominences  - Avoid friction and shearing  - Provide appropriate hygiene as needed including keeping skin clean and dry  - Evaluate need for skin moisturizer/barrier cream  - Collaborate with interdisciplinary team   - Patient/family teaching  - Consider wound care consult   Outcome: Progressing     Problem: Nutrition/Hydration-ADULT  Goal: Nutrient/Hydration intake appropriate for improving, restoring or maintaining nutritional needs  Description: Monitor and assess patient's nutrition/hydration status for malnutrition  Collaborate with interdisciplinary team and initiate plan and interventions as ordered  Monitor patient's weight and dietary intake as ordered or per policy  Utilize nutrition screening tool and intervene as necessary   Determine patient's food preferences and provide high-protein, high-caloric foods as appropriate       INTERVENTIONS:  - Monitor oral intake, urinary output, labs, and treatment plans  - Assess nutrition and hydration status and recommend course of action  - Evaluate amount of meals eaten  - Assist patient with eating if necessary   - Allow adequate time for meals  - Recommend/ encourage appropriate diets, oral nutritional supplements, and vitamin/mineral supplements  - Order, calculate, and assess calorie counts as needed  - Recommend, monitor, and adjust tube feedings and TPN/PPN based on assessed needs  - Assess need for intravenous fluids  - Provide specific nutrition/hydration education as appropriate  - Include patient/family/caregiver in decisions related to nutrition  Outcome: Progressing

## 2020-09-04 NOTE — PLAN OF CARE
Problem: PHYSICAL THERAPY ADULT  Goal: Performs mobility at highest level of function for planned discharge setting  See evaluation for individualized goals  Description: Treatment/Interventions: Functional transfer training, LE strengthening/ROM, Therapeutic exercise, Endurance training, Gait training, Bed mobility, Equipment eval/education          See flowsheet documentation for full assessment, interventions and recommendations  Outcome: Progressing  Note: Prognosis: Good  Problem List: Decreased strength, Decreased endurance, Impaired balance, Decreased mobility, Decreased cognition, Decreased safety awareness, Impaired judgement  Assessment: Pt demosntrated improved functional mobility this session, performing all tasks without need for physical assist & demonstrating no LOB with use of SPC for community distances & alternating SPC/no AD in room  Pt able to navigate confined spaces & perform dynamic turns in hallway without LOB, and was able to perform dynamic head movements to scan environment & look out windows while ambulating without significant gait deviations  Pt is impulsive with behaviors & required occasional repetition of instructions during session, and will likely need standby assist/supervision upon return home to ensure safety  Recommendations at this time are for discharge home with increased social support  If support is unavailable, will require rehab to progress   Defer to OT for requisite level of supervision  Barriers to Discharge: Decreased caregiver support(girlfriend works during the day)     PT Discharge Recommendation: [id number]     PT - OK to Discharge: Yes    See flowsheet documentation for full assessment

## 2020-09-04 NOTE — PHYSICAL THERAPY NOTE
Physical Therapy Progress Note     09/04/20 0910   Pain Assessment   Pain Assessment Tool Pain Assessment not indicated - pt denies pain   Restrictions/Precautions   Other Precautions Cognitive;Pain; Fall Risk   Subjective   Subjective Pt encountered supine in bed, reports no new complaints  Still reports visual deficits in L eye, but was not specific when prompted  Believes L side is stronger today  RN reports pt has been turning bed & chair alarms off & moving in room without assist or SPC  Bed Mobility   Supine to Sit 5  Supervision   Additional items Assist x 1   Transfers   Sit to Stand 5  Supervision   Additional items Assist x 1; Increased time required   Stand to Sit 5  Supervision   Additional items Increased time required   Ambulation/Elevation   Gait pattern Excessively slow; Short stride;Decreased foot clearance; Improper Weight shift   Gait Assistance 5  Supervision   Additional items Assist x 1   Assistive Device SPC   Distance 200', 70'   Stair Management Assistance 5  Supervision   Additional items Assist x 1   Stair Management Technique Two rails; Foreward;Reciprocal   Number of Stairs 7   Balance   Static Sitting Fair +   Static Standing Fair   Ambulatory Fair -   Endurance Deficit   Endurance Deficit No   Activity Tolerance   Activity Tolerance Patient tolerated treatment well   Nurse Made Aware [id number],    Assessment   Prognosis Good   Problem List Decreased strength;Decreased endurance; Impaired balance;Decreased mobility; Decreased cognition;Decreased safety awareness; Impaired judgement   Assessment Pt demosntrated improved functional mobility this session, performing all tasks without need for physical assist & demonstrating no LOB with use of SPC for community distances & alternating SPC/no AD in room    Pt able to navigate confined spaces & perform dynamic turns in hallway without LOB, and was able to perform dynamic head movements to scan environment & look out windows while ambulating without significant gait deviations  Pt is impulsive with behaviors & required occasional repetition of instructions during session, and will likely need standby assist/supervision upon return home to ensure safety  Recommendations at this time are for discharge home with increased social support  If support is unavailable, will require rehab to progress   Defer to OT for requisite level of supervision  Barriers to Discharge Decreased caregiver support  (girlfriend works during the day)   Goals   Patient Goals to get out of the hospital   STG Expiration Date 09/16/20   PT Treatment Day 1   Plan   Treatment/Interventions Functional transfer training;Elevations;LE strengthening/ROM; Therapeutic exercise; Endurance training;Patient/family training;Equipment eval/education; Bed mobility;Gait training   Progress Progressing toward goals   PT Frequency   (3-5x/week)   Recommendation   PT Discharge Recommendation Post-Acute Rehabilitation Services   Equipment Recommended [employer]  (continue to trial [facility] vs no AD)   PT - OK to Discharge Yes     Sara Ibarra, PTA

## 2020-09-04 NOTE — DISCHARGE INSTRUCTIONS
Discharge Instructions  Craniotomy for tumor resection     Activity:  1  Do not lift, push or pull more than 10 pounds for 2 weeks  2  Avoid bending, lifting and twisting for 2 weeks  No running  No athletic activities until cleared  3  No driving for at least 2 weeks or until cleared by Neurosurgery  4  When able to shower, continue to use clean towel and washcloth for 2 weeks post-op  5  Do not use a hair dryer, and avoid hair products such as mousse, oils, and gels  Do not brush your hair away from the incision since this will put strain on the suture line  6  Do not dye or perm hair for 6 weeks or until cleared by physician  7  Continue to change bed linens and pajamas more frequently  Wear clean clothes daily  8  May walk as tolerated  Recommend 4 short walks daily  Surgical incision care:  1  Keep dressings in place for 3 days  After 3 days, incisions may be left open to air, but should remain clean  2  Keep incisions dry for 3 days  3  May shower after 3 days using a baby shampoo including head incision  Rinse off shampoo and pat dry  4  Avoid rubbing the incision but gently massage hair  5  Do not immerse the incisions in water for 6 weeks  6  Staples/suture will be removed at your 2 week postoperative visit  7  Do not apply any creams or ointments to the incision, unless otherwise instructed by [facility]  8  Contact office if increasing redness, drainage, pain or swelling occurs around the incisions or if you develop a fever greater than 101F  9  Do not dye/perm hair or use any hair products until cleared by Neurosurgery  Postoperative medication:  1  [facility] will provide pain medication in the postoperative period  All prescriptions must come from a single practice  a  Take medications as prescribed  Call office with any questions/concerns  b  May use over the counter Tylenol  No NSAIDs (ie   Ibuprofen, Aleve, Advil, Naproxen)  2  Please contact office for questions regarding dosage and modifications  3  No antiplatelet or anticoagulation medication (ie  Coumadin, Aspirin, Plavix) until cleared by [facility], unless otherwise instructed  Please contact [facility] if you have any questions about the effects of any of your medications on blood clotting  4  Do not operate heavy machinery or vehicles while taking sedating medications  5  Use a bowel regimen while on opioids as they induce constipation  Ie  Senokot-S, Miralax, Colace, etc  Increase fiber and water intake  Follow-up as scheduled for a 2 week post-operative visit for an incision check and final pathology  ** Please notify the office if incision becomes red, swollen, tender, or has increased drainage, and temp>101  Return to the ER if you experience increased headache, drowsiness, weakness, nausea/vomiting, or seizures  **

## 2020-09-04 NOTE — OCCUPATIONAL THERAPY NOTE
Occupational Therapy Treatment Note:       09/04/20 1126   Restrictions/Precautions   Weight Bearing Precautions Per Order No   Other Precautions Impulsive;Cognitive; Fall Risk;Pain   Pain Assessment   Pain Assessment Tool Pain Assessment not indicated - pt denies pain   Pain Score No Pain   ADL   LB Dressing Assistance 5  Supervision/Setup   LB Dressing Deficit Impulsive; Increased time to complete   Functional Standing Tolerance   Time Fair + balance noted  Comments Slight unsteadiness during functional mobility  Transfers   Sit to Stand 5  Supervision   Additional items Impulsive  [facility])   Stand to Sit 5  Supervision   Additional items Impulsive  [facility])   Functional Mobility   Functional Mobility 5  Supervision   Additional Comments Pt ambulated household distance to [facility]  Pt noted to be unsteady and very impulsive when ambulating demonstrating poor insight to safety awareness  Additional items SPC   Cognition   Overall Cognitive Status Impaired   Arousal/Participation Alert; Responsive; Cooperative   Attention Difficulty attending to directions   Orientation Level Oriented X4   Memory Decreased short term memory;Decreased recall of precautions   Following Commands Follows multistep commands with increased time or repetition   Comments Pt is cooperative this session  Able to follow multistep directions with repitition  Cognition Assessment Tools ACLS   Score 4 6   Activity Tolerance   Activity Tolerance Patient tolerated treatment well   Assessment   Assessment Pt participated in am therapy session focusing on functional mobility, sequencing and cognitive retraining  Upon arrival, pt was seated in chair  Pt was agreeable to treatment session  Pt able to don slip-on shoes c S for safety  Pt ambulated to [facility] c SPC c S for safety awareness  Pt noted to be unsteady and impulsive when ambulating, demonstrating poor insight to safety precautions and carryover   Pt also noted to not always use SPC when ambulating around room  Pt able to tell time from clock and read newspaper heading  Pt noted to have tearing of the L eye and closed it multiple times during the session  Pt reported decreased vision in L eye during tasks stating "I need my glasses, they're readers"  Pt completed ACLS during session  Pt scored a 4 6, showing decreased insight to safety recall and precautions  Recommend daily assistance for IADL tasks  Pt is limited by factors stated above, Pt will continue to benefit from skilled OT services to continue addressing ADL training, cognitive retraining and functional mobility safety  Discussed pt status c OTR and recommend outpatient OT services if daily assistance is available  Plan   Treatment Interventions ADL retraining;Visual perceptual retraining;Functional transfer training;UE strengthening/ROM; Endurance training;Cognitive reorientation;Patient/family training;Equipment evaluation/education; Compensatory technique education;Continued evaluation; Activityengagement; Energy conservation   Goal Expiration Date 09/16/20   OT Treatment Day 1   OT Frequency 3-5x/wk   Recommendation   OT Discharge Recommendation Home with skilled therapy  (recommend outpatient OT)   OT - OK to Discharge Yes   Carli Hamilton

## 2020-09-04 NOTE — ASSESSMENT & PLAN NOTE
POD 3 Status post left frontal craniotomy for tumor resection  · preliminary pathology consistent with likely lung cancer (adenocarcinoma)  · Large left frontal temporal cystic mass with vasogenic edema and midline shift  · Concern for leptomeningeal spread  · Presents with left eye visual deficit and left facial numbness    Imaging:  · CT head wo contrast 9/3/2020:  Stable intraparenchymal hemorrhage noted within the left frontal postoperative bed extending to the extra-axial space deep to the frontal temporal craniotomy  Stable vasogenic edema and localized mass effect  Hemorrhage adjacent to the frontal horn of the lateral ventricle on the right is unchanged  Stable intraventricular hemorrhage within the frontal horn of the lateral ventricle  Slight improvement in intraventricular hemorrhage layering within the occipital horn of the lateral ventricle  Stable hyperdensity adjacent to the frontal horn of the left lateral ventricle as well  · MRI brain with without 8/30/20:  Multiple intracranial enhancing lesions with the dominant cystic lesion noted a deep less inferior frontal lobe  The lesions along the ependymal surface of the lateral ventricles and septum blue cm suspicious for presence of leptomeningeal CSF spread  Lesion along the course of left trigeminal track into Meckel's cave as well as level of hypothalamus abutting optic tract  Stable 5 mm midline shift  Plan:  · Continue monitor neurological exam     · Aphasia not as noticeable today  Able to identify objects 4/4  Able to follow simple commands and 2 step commands but intermittent difficulties with multistep commands    Able to do simple calculations  · Okay to go to q4hour neuro checks  · Continue Decadron wean q72 hours, currently on [id number]  · Mobilize physical therapy and occupational therapy   · DVT prophylaxis:  Bilateral SCDs, SQH today  · Ongoing medical management per primary team  · Systolic blood pressure less than 160  · Keppra for seizure prophylaxis x1 week   · Postoperative antibiotics completed  · ALMA drain removed 9/2    Patient at this time is medically cleared for discharge from a neurosurgical standpoint  Will schedule patient a 2 week pathology appointment with Dr Mattie Dumont in the outpatient setting  Please call if any questions concerns or exam changes

## 2020-09-04 NOTE — DISCHARGE SUMMARY
INTERNAL MEDICINE RESIDENCY DISCHARGE SUMMARY     Tra Santiago   49 y o  male  MRN: [id number]  Room/Bed: [place] [address]/[facility] 628-01     [address] [place] [address]   Encounter: [id number]    Principal Problem:    Brain mass  Active Problems:    Hypertension    Tobacco abuse    Uncomplicated alcohol dependence ([facility] )    Hyperlipidemia    Emphysema lung (HCC)    Lung mass      Lung mass  Assessment & Plan  Found on CT chest  Likely lung primary  HemOnc following  Follow up on brain biopsy  Follow up as outpatient  Emphysema lung ([place] [address] )  Assessment & Plan  30+ pack-year history of smoking  Mild emphysema on CT chest   Coarse rhonchi in all lobes bilaterally  Patient reports chronic cough, unchanged or worsening  - Continue Albuterol PRN  - Follow up as outpatient    Hyperlipidemia  Assessment & Plan  Patient with a history based on previous documentation  Treatment only with diet  No record of lipid panel recently    Cholesterol  213High    mg/dL    Triglycerides  183High    mg/dL    HDL  45  mg/dL    LDL Calculated  131High    mg/dL       - Continue Lipitor 15MI     Uncomplicated alcohol dependence ([place] [address] )  Assessment & Plan  Patient states he drinks about 2-6 beers every day  He denies any history of tremors or withdrawal seizures  Plan:  - Continue CIWA protocol  -Thiamine 100 mg p o  Daily and folic acid 1 mg p o  Tobacco abuse  Assessment & Plan  Patient states he has about a 30 pack year history  Plan:  -Nicotine patch 21 mg  -Smoking cessation counseling upon discharge    Hypertension  Assessment & Plan      Stable  - Cardene gtt discontinued   - continue lisinopril 10 mg in AM  -IV labetalol 5 mg Q4 PRN for SBP >160 mmHg     * Brain mass  Assessment & Plan  One week history of left blurry vision and right side facial droop   Physical exam initially showed right side facial droop and right sided weakness of lower and upper extremity  This morning 7/02, patient continues to have right facial drop but bilateral weakness of 5/5  Head CT 8/30 = large predominantly cystic mass within the left cerebral hemisphere with relatively mild amount of surrounding edema in the brain and minimal calcification along one margin of the lesion  Likely metastasis from lung mass, infection unlikely  Plan:  § Follow up CT head stable, DVT prophylaxis restarted  § Wound care, ALMA drain removed  § Monitor blood pressure, maintain under  (currently 138/60)  § Continue neuro checks Q2hr   § STAT head CT if change in neuro exam or drop of GCS more than 2  § Continue Keppra for seizure ppx   § Continue Dexamethasone IV started 9/1  Will transition to PO    § Pain control with tylenol, oxycodone and dilaudid  § Neurosurgery is following   § Hematology and oncology following, follow up biopsy results        [address]     52-year-old male with past medical history of untreated hypertension, hyperlipidemia, and tobacco abuse who presented to an urgent care for 1 week of progressive decreased vision in his left eye and right-sided facial droop, was found to have large cystic mass in the left hemisphere with mild edema calcification and 5 mm rightward midline shift on head CT  He was transferred to [facility] for neurosurgery evaluation  Labwork was unremarkable at that time  In the ED he was found to be hypertensive to 171/103  Patient had not seen a physician for 5 years previously and was not current with health maintenance (i e  Colonoscopy)  CT chest abdomen pelvis revealed 4 x 4 x 3 cm mass in the mid to lower left hilum which extends into the left lower lobe of the lung and the left lingula  Nearby satellite masses and nodules are present, highly suggestive of malignant neoplasm  Bilateral adrenal masses measuring 2 cm were also found    MRI brain showed multiple intracranial enhancing lesions with a dominant cystic lesion noted in the deep left inferior frontal lobe, with stable 5 mm midline shift  MRI abdomen confirmed 1 3 x 1 2 cm right and 1 5 x 1 8 cm left adrenal nodules distant with adenomas  Neurosurgery elected to proceed with left frontal craniotomy and resection of mass with placement of ALMA drain, performed 9/1  Following procedure, patient was awake and oriented but mildly dysarthric with some slight right facial droop  Patient was initiated on Decadron and Keppra for seizure precautions  Follow-up head CTs showed stable intraparenchymal hemorrhage and stable vasogenic edema and localized mass effect  The drain was removed, and DVT prophylaxis was restarted with subcu heparin  Patient was evaluated by PT/OT and found to be appropriate for discharge home with home skilled therapy  Oncology was consulted inpatient elected to follow patient as outpatient discharge anticancer treatment  At the time of discharge, patient is afebrile, hemodynamically stable, improving on exam with improved strength, facial symmetry, and dysarthria  He will continue Decadron taper per neurosurgery, as well as Keppra for 1 week for seizure prophylaxis  He will also continue to take atorvastatin 40 mg for hyperlipidemia and lisinopril 10 mg for hypertension  He will follow-up with Neurosurgery, Oncology, and the [facility] for further outpatient management  Physical Exam  Constitutional:       General: He is not in acute distress  Appearance: Normal appearance  HENT:      Head: Normocephalic and atraumatic  Right Ear: External ear normal       Left Ear: External ear normal       Nose: Nose normal       Mouth/Throat:      Mouth: Mucous membranes are moist       Pharynx: Oropharynx is clear  Eyes:      General: Visual field deficit (left lower) present  Extraocular Movements: Extraocular movements intact        Conjunctiva/sclera: Conjunctivae normal       Comments: Diminished left pupillary reflex   Neck: Musculoskeletal: Normal range of motion and neck supple  Cardiovascular:      Rate and Rhythm: Normal rate and regular rhythm  Pulses: Normal pulses  Heart sounds: Normal heart sounds  Pulmonary:      Effort: Pulmonary effort is normal       Breath sounds: Wheezing and rhonchi present  Abdominal:      General: Abdomen is flat  Palpations: Abdomen is soft  Tenderness: There is no abdominal tenderness  Musculoskeletal:      Right lower leg: No edema  Left lower leg: No edema  Skin:     General: Skin is warm and dry  Neurological:      Mental Status: He is alert and oriented to person, place, and time  Cranial Nerves: No dysarthria or facial asymmetry  Sensory: Sensation is intact  Motor: Motor function is intact  Psychiatric:         Mood and Affect: Affect is blunt and flat  Behavior: Behavior is cooperative  DISCHARGE INFORMATION         Admitting Provider: Saurav Roman MD  Admission Date: 8/30/2020    Discharge Provider: Saurav Roman MD  Discharge Date: 9/4/2020    Discharge Disposition: Home with home PT/OT    Discharge Diagnoses:  Principal Problem:    Brain mass  Active Problems:    Hypertension    Tobacco abuse    Uncomplicated alcohol dependence ([facility] )    Hyperlipidemia    Emphysema lung ([facility] )    Lung mass  Resolved Problems:    Brain compression [facility])      Consulting Providers:      Diagnostic & Therapeutic Procedures Performed:  Ct Chest Abdomen Pelvis W Wo Contrast    Result Date: 8/31/2020  Impression: There is an approximately 4 x 4 x 3 cm mass in the mid to lower left hilum which extends into the left lower lobe of the lung and the left lingula  Nearby satellite masses and nodules are present  The appearance is highly suggestive of malignant neoplasm  Pulmonology consultation and Oncology consultation is recommended  There appears to be some hypodensity within some of the central left pulmonary artery branches    Pulmonary embolism and/or tumor thrombus should be excluded  Dedicated CT angiogram/PE protocol CTA of the chest is recommended for further evaluation  Bilateral adrenal masses, each measuring approximately 2 cm  MRI is recommended for further characterization, if there are no contraindications  The urinary bladder wall appears thickened  This is most pronounced anteriorly  Clinical correlation, laboratory correlation and follow-up is recommended  The prostate is mildly prominent  Clinical and laboratory correlation is recommended  Mild emphysema  Other findings as described above, please see discussion  I personally discussed this study with [address] on 8/31/2020 at 5:05 AM  This examination demonstrates findings requiring imaging follow-up and was logged as such in [facility]  Workstation performed: [id number]     Ct Head Wo Contrast    Result Date: 9/3/2020  Impression: Stable intraparenchymal hemorrhage noted within the left frontal postoperative bed extending to the extra-axial space deep to the frontal temporal craniotomy  Stable vasogenic edema and localized mass effect  Hemorrhage adjacent to the frontal horn of the lateral ventricle on the right is unchanged  Stable intraventricular hemorrhage within the frontal horn of the lateral ventricle  Slight improvement in intraventricular hemorrhage layering within the occipital horn of the lateral ventricle  Stable hyperdensity adjacent to the frontal horn of the left lateral ventricle as well  Workstation performed: [id number]     Ct Head Without Contrast    Result Date: 8/30/2020  Impression: Large predominantly cystic mass within the left cerebral hemisphere with relatively mild amount of surrounding edema in the brain and minimal calcification along one margin of the lesion  This may be a cystic neoplasm (primary or metastatic) or less likely infection  Contrast-enhanced brain MRI is recommended for further workup   Mass effect on the left lateral ventricle with 4 mm rightward midline shift and some effacement of left-sided cerebral sulci  There is surgical consultation is advised  I personally discussed this study with Leena Madsen on 8/30/2020 at 3:41 PM  Workstation performed: [id number]     Ct Head W Wo Contrast    Result Date: 9/2/2020  Impression: Status post left opercular frontal mass resection with hemorrhage in the postoperative bed and left frontal temporal extra-axial space  Scattered cerebral sulcal and intraventricular as well as basal subarachnoid hemorrhage  Multifocal hyperdense enhancing lesions in the periventricular regions as previously described on the MRI study of August 30, 2020  The hyperattenuation of these lesions suggest hemorrhagic metastasis  Alternatively these may represent hypercellular metastasis  Correlation with oncologic history  Interval decrease in the mass effect on the left ventricular system  I personally discussed this study with Dr Felicitas Yeung on 9/2/2020 at 7:51 AM  Workstation performed: [id number]     Mri Brain W Wo Contrast    Result Date: 8/31/2020  Impression: Multiple intracranial enhancing lesions with a dominant cystic lesion noted in the deep left inferior frontal lobe, as described above  Multiplicity of lesions with a suspicious primary pulmonary neoplasm is most suggestive of metastatic disease  Other  neoplastic etiologies thought to be less likely  Note is made of lesions along the ependymal surface of the lateral ventricles and septum pellucidum suspicious for the presence of leptomeningeal/CSF spread of disease  Imaging of the remainder of the neuroaxis should be considered for further evaluation  Of note lesions are seen along the course of the left trigeminal nerve tracking into Meckel's cave as well as at the level of the hypothalamus and abutting the optic tracts  Stable 5 mm rightward midline shift   Workstation performed: [id number]     Mri Abdomen W Wo Contrast    Result Date: 8/31/2020  Impression: Bilateral adrenal nodules with imaging appearance most consistent with adenomas   Workstation performed: [id number]       Code Status: Level 1 - Full Code  Advance Directive & Living Will: <no information>  Power of : Yes  POLST:      Medications:  Current Discharge Medication List        Current Discharge Medication List      START taking these medications    Details   atorvastatin (LIPITOR) 40 mg tablet Take 1 tablet (40 mg total) by mouth daily with dinner  Qty: 30 tablet, Refills: 0    Associated Diagnoses: Hyperlipidemia      !! dexamethasone (DECADRON) 2 mg tablet Take 1 tablet (2 mg total) by mouth every 6 (six) hours for 12 doses  Qty: 12 tablet, Refills: 0    Associated Diagnoses: Brain mass      !! dexamethasone (DECADRON) 2 mg tablet Take 1 tablet (2 mg total) by mouth every 8 (eight) hours for 9 doses  Qty: 9 tablet, Refills: 0    Associated Diagnoses: Brain mass      !! dexamethasone (DECADRON) 2 mg tablet Take 1 tablet (2 mg total) by mouth every 12 (twelve) hours for 6 doses  Qty: 6 tablet, Refills: 0    Associated Diagnoses: Brain mass      !! dexamethasone (DECADRON) 4 mg tablet Take 1 tablet (4 mg total) by mouth every 8 (eight) hours for 9 doses  Qty: 9 tablet, Refills: 0    Associated Diagnoses: Brain mass      folic acid (FOLVITE) 1 mg tablet Take 1 tablet (1 mg total) by mouth daily for 29 days  Qty: 29 tablet, Refills: 0    Associated Diagnoses: Uncomplicated alcohol dependence (HCC)      levETIRAcetam (KEPPRA) 500 mg tablet Take 1 tablet (500 mg total) by mouth every 12 (twelve) hours for 14 doses  Qty: 14 tablet, Refills: 0    Associated Diagnoses: Brain mass      lisinopril (ZESTRIL) 10 mg tablet Take 1 tablet (10 mg total) by mouth daily  Qty: 30 tablet, Refills: 0    Associated Diagnoses: Hypertension      methocarbamol (ROBAXIN) 500 mg tablet Take 1 tablet (500 mg total) by mouth every 6 (six) hours for 14 days  Qty: 56 tablet, Refills: 0    Associated Diagnoses: Brain mass thiamine 100 MG tablet Take 1 tablet (100 mg total) by mouth daily  Qty: 30 tablet, Refills: 0    Associated Diagnoses: Uncomplicated alcohol dependence ([address] )       ! ! - Potential duplicate medications found  Please discuss with provider  Current Discharge Medication List          Allergies:  No Known Allergies    FOLLOW-UP         Discharge Statement:   I spent 45 minutes minutes discharging the patient  This time was spent on the day of discharge  I had direct contact with the patient on the day of discharge  Additional documentation is required if more than 30 minutes were spent on discharge  Portions of the record may have been created with voice recognition software  Occasional wrong word or "sound a like" substitutions may have occurred due to the inherent limitations of voice recognition software    Read the chart carefully and recognize, using context, where substitutions have occurred     ==  Abril Myrick MD  [address]  Internal Medicine Resident PGY-1

## 2020-09-08 ENCOUNTER — TELEPHONE (OUTPATIENT)
Dept: NEUROSURGERY | Facility: CLINIC | Age: 55
End: 2020-09-08

## 2020-09-08 ENCOUNTER — TELEPHONE (OUTPATIENT)
Dept: SURGICAL ONCOLOGY | Facility: CLINIC | Age: 55
End: 2020-09-08

## 2020-09-08 NOTE — TELEPHONE ENCOUNTER
New Patient Encounter    New Patient Intake Form   Patient Details:  Lorry Aschoff  1965  [id number]    Background Information:  [address] starts by opening a telephone encounter and gathering the following information   Who is calling to schedule? If not self, relationship to patient? Fletcher Munoz- significant other  [phone]   Referring Provider Dr Claudio Beltre   What is the diagnosis? 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 Yes   When was the diagnosis? Na    Is there a confirmed diagnosis from a biopsy/tissue reviewed by pathology? Yes 9/1   Were outside slides requested? No   Is patient aware of diagnosis? Yes Results of biopsy will be reviewed with Dr Claudio Beltre on 9/16   Is there a personal history and what kind? na   Is there a family history and what kind? na   Reason for visit? New Diagnosis   Have you had any imaging or labs done? If so: when, where? yes  [facility]   Are records in EPIC? yes   If patient has a prior history of breast cancer were old records obtained? NA   Was the patient told to bring a disk? no   Does the patient smoke or Vape? no   If yes, how many packs or cartridges per day? na   Scheduling Information:   Preferred [place]:  [address] [place] [address]     Are there any dates/time the patient cannot be seen? na   Miscellaneous: Pt does not have insurance currently  Pt did apply for medical assistance  Appointment was requested  After completing the above information, please route to Financial Counselor and the appropriate Nurse Navigator for review

## 2020-09-08 NOTE — TELEPHONE ENCOUNTER
1st attempt - Called patient on primary contact number after surgery & recent discharge from the hospital to check in on recovery and provide post surgical instructions  Got voicemail, left message to callback  Will make another attempt if no callback is received

## 2020-09-09 NOTE — TELEPHONE ENCOUNTER
Received a call back from significant other  She states that he is doing well overall and denies any incisional issues or fevers  She does report that he is still having difficulty seeing and has numbness on the side of his face  Denies any dizziness, headaches or changes to his mental status  She would like to know if Dr Norma Aparicio thinks the numbness and vision will improve and if so, how long he thinks it may take  Advised her that I would send a message to the surgeon and call her back  Verified date time and location of his upcoming POV

## 2020-09-09 NOTE — TELEPHONE ENCOUNTER
Per the notes on the acct the pt applied for ma thru [employer]   called the pt to find out when they gave all the info  Spoke to Coy Miller his care giver  She sd that [facility] needs the last years taxes but she just took then today to be filed so she's not sure how long it will take to get the back    she doesn't know if they can submit the application w/out that info    she sd that as soon as she get then back & gives then to [facility] she will let me know   she isn't sure when they can set up the Dr appt  but when she has that info she will call me back

## 2020-09-11 ENCOUNTER — TELEPHONE (OUTPATIENT)
Dept: NEUROSURGERY | Facility: CLINIC | Age: 55
End: 2020-09-11

## 2020-09-11 ENCOUNTER — EVALUATION (OUTPATIENT)
Dept: PHYSICAL THERAPY | Facility: CLINIC | Age: 55
End: 2020-09-11
Payer: COMMERCIAL

## 2020-09-11 DIAGNOSIS — G93.89 BRAIN MASS: ICD-10-CM

## 2020-09-11 PROCEDURE — 97162 PT EVAL MOD COMPLEX 30 MIN: CPT | Performed by: PHYSICAL THERAPIST

## 2020-09-11 NOTE — TELEPHONE ENCOUNTER
Received a call from Antonia Calvin the girlfriend of Edilia Diana with questions about his symptoms  She said he was still having trouble with his vision, and having eye and facial pain  Review of the chart shows that she spoke with RN LN following his surgery who communicated with Dr Jordan Nunez about similar complaints  Advised to continue following his direction for these symptoms  She also wanted to know about Keppra which he only had a short course of  Denied seizure history  Advised this is prophylactic and usually only needed for 7-10 days following cranial surgery  Wanted to know what he could take for pain, advised to hold NSAIDs for at least 2 weeks post op, however can safely take tylenol  She stated an understanding and was appreciative  Confirmed OV next week with Dr Jordan Nunez

## 2020-09-11 NOTE — PROGRESS NOTES
PT Evaluation     Today's date: 2020  Patient name: Nj Garcia  : 1965  MRN: [id number]  Referring provider: Taryn Ortiz MD  Dx:   Encounter Diagnosis     ICD-10-CM    1  Brain mass  G93 89 Ambulatory referral to Physical Therapy                  Assessment  Assessment details: Patient reports to physical therapy s/p removal of brain mass  At this time patient has normal MMT of lower extremities, balance within normal limits and above fall risk category  However patient has poor understanding of exercise program to return to work however he is currently unable to complete exertional activity  He may also have changes physically pending plan for chemo/radiation  Plan to place on hold and reassess in 3-4 weeks for return to work readiness and to look at effects of chemo/radiation  Goals  N/a at this time    Plan  Patient would benefit from: skilled physical therapy and skilled speech therapy  Planned therapy interventions: neuromuscular re-education, patient education, balance, home exercise program and therapeutic exercise  Treatment plan discussed with: family and patient        Subjective Evaluation    History of Present Illness  Mechanism of injury: About one month ago he started having difficulty with left eye and facial numbness  One week ago he had excision of mass  He came home four days later  He has been doing OK at home since then but has been sleeping a lot  He is independent with bathing, dressing etc  Denies difficulty with balance and walking at this time  He hasnt' been outdoors walking yet but has been doing stairs and home ambulation well  Denies loss of sensation in EL or UE, denies dizziness  He was instructed to avoid heavy lifting     Pain  Location: low back     Social Support  Stairs in house: yes   Lives in: multiple-level home  Lives with: spouse    Employment status: working (install above ground pools, self employed)        Objective   Neuro Exam:     Oculomotor exam Head thrust: left normal    Sensation   Light touch LE: left WNL and right WNL  Proprioception LE: left WNL and right WNL    Coordination   Heel to shin: left WNL and right WNL  Finger to nose: left WNL and right WNL  Rapid alternating movements: UE WNL and LE impaired     Functional outcomes   6 minute walk test: 1350 ft  Functional outcome comment: FGA - 28/30      Balance assessments   MCTSIB   Eyes open level surface: 30  Eyes open foam surface: 30  Eyes closed level surface: 30  Eyes closed foam surface: 30      Flowsheet Rows      Most Recent Value   PT/OT G-Codes   Current Score  85   Projected Score  0             Precautions: no lifting      Manuals                                                                 Neuro Re-Ed                                                                                                        Ther Ex                                                                                                                     Ther Activity                                       Gait Training                                       Modalities

## 2020-09-15 NOTE — PROGRESS NOTES
Speech-Language Pathology Initial Evaluation    Today's date: 2020   Patients name: Manuel Le  : 1965  MRN: [id number]  Safety measures: Limited Verbal, h/o Brain Mass  Referring provider: Franklin Lopez  Primary diagnosis/billing code: R 47 01  Secondary diagnosis/billing code: G 93 89    Visit tracking:  -Referring provider: Cecilio Vega  -Billing guidelines: AMA  -Visit #1 (Self Pay)  -Insurance: Self Pay  -RE due 10/14/2020    Subjective comments: "Hello "    How did the patient hear about us? Physician and Other (PT referred during initial evaluation)    Patient's goal(s): Patient could not respond to question  Reason for referral: Change in cognitive status, Decreased language skills and Decreased speech intelligibility  Prior functional status: Communication effective and appropriate in all situations  Clinically complex situations: Discharge from SNF or Hospital in the last 30 days    History: Patient is a 49 y o  male who was referred to outpatient skilled Speech Therapy services for a aphasia evaluation  Patient has been referred to outpatient skilled Speech Therapy for suspected Aphasia secondary to a brain mass removal   Patient was admitted to ER on 2020 after demonstrating facial droop and blurred vision  At time of admit, Patient reported that facial droop began 10-14 days prior  Upon further examination and CT scan, a cystic mass was discovered in the L cerebral hemisphere with mild amount of edema  Surgery for removal was recommended at this time and the procedure took place on 2020  Patient was seen for skilled inpatient Speech Therapy focusing on Dysphagia management, expressive language, and receptive language skills  Patient was dismissed from inpatient skilled Speech Therapy on a regular diet with thin liquids  Patient's wife, Antonia Mccabe, reports that patient is continuing on this diet at home and has not had any s/sx of Dysphagia        Hearing: [facility]  Vision: Decreased with glasses    Home environment/lifestyle: At home with wife   Highest level of education: High School  Vocational status: Building above ground pools    Mental status: Alert  Behavior status: Cooperative  Communication modalities: Verbal (limited verbal)  Rehabilitation prognosis: Good rehab potential to reach the established goals    Assessments    The [place] Diagnostic Aphasia Examination-Third Edition (BDAE-3) is a comprehensive standardized assessment designed to evaluate a broad range of language impairments that often arise as a consequence of organic brain dysfunction  The BDAE-3 is divided into five subtests, including conversational & expository speech, auditory comprehension, oral expression, reading, and writing  The results of the BDAE-3 are used to classify a patients language profiles into one of the localization-based classifications of aphasia: Brocas, Wernickes, anomic, conduction, transcortical, transcortical motor, transcortical sensory, and global aphasia syndromes, although the test does not always provide a diagnosis or a therapeutic approach   The following results were obtained during the administration of the short form assessment:       Score: Percentile:   SEVERITY RATIN/5 40%ile        FLUENCY:     -Phrase length (rating scale) 3/7 15%ile   -Melodic line (rating scale)  30%ile   -Grammatical form (rating scale)  10%ile        CONVERSATION/EXPOSITORY SPEECH:    -Simple social responses  20%ile        AUDITORY COMPREHENSION:     -Basic word discrimination  20%ile   -Commands 1/10 10%ile   -Complex ideational material  10%ile        ARTICULATION:     -Articulatory agility (rating scale)  40%ile        RECITATION:     -Automatized sequences  20%ile        REPETITION:     -Words 0/5 0%ile    -Sentences 0/2 30%ile        NAMING:     -Responsive naming 010 10%ile   -[place] Naming Test - short 5/15 40%ile   -Special categories  10%ile        READING: -Matching cases & scripts 4/4 100%ile   -Number matching 3/4 10%ile   -Picture-word matching 1/4 10%ile   -Oral word reading 0/15 10%ile   -Oral sentence reading 0/5 30%ile   -Oral sentence comprehension 0/3 10%ile   -Sentence/paragraph comprehension 0/4 0%ile        WRITING:     -Form 10/14 20%ile   -Letter choice 14/21 10%ile   -Motor facility 8/14 25%ile   -Primer words 1/4 10%ile   -Regular phonics NT N/A   -Common irregular words NT N/A   -Written picture naming  NT N/A   -Narrative writing NT N/A     Overall, patient presents with a moderate-severe expressive/receptive aphasia with a severity rating of 1 (out of a possible 5 being fluent speech)  1 - All communication is through fragmentary expression; great need for inference, questioning, and guessing by the listener  The range of information that can be exchanged is limited, and the listener carries the burden of communication  Goals    Short-term goals:  1  Patient will follow one-step verbal directions (e g , close your eyes) with 80% accuracy to facilitate increased auditory comprehension skills, to be achieved in 4-6 weeks  2  Patient will provide responses for automated sequences (e g  Days of the week, demographic information) with 80% accuracy to facilitate increased expressive language skills, to be achieved in 4-6 weeks  3  Patient will imitate words/phrases using NABILA/tapping/pacing with 80% accuracy to facilitate increased verbal expression skills, to be achieved in 4-6 weeks  4  Patient will provide an appropriate word for sentence completion task (e g , open the ___) with 80% accuracy to facilitate increased expressive language skills, to be achieved in 4-6 weeks  5  Patient will provide an adequate response to confrontation naming task (i e , what is) with 80% accuracy to facilitate increased expressive language skills, to be achieved in 4-6 weeks      6  Patient will facilitate functional reading skills by matching word to picture with 80% accuracy over 5 sessions to facilitate growth of vocabulary concept comprehension, to be achieved in 4-6 weeks  7  Patient will complete simple writing tasks at the word level with >90% acc to increase writing skills within FLE, to be achieved in 4-6 weeks  8  Clinician will complete motor speech evaluation to further diagnose patient and create POC to best fit patient's needs, to be achieved during first treatment session  Long-term goals:  1  Patient will demonstrate improved expressive and receptive language skills during structured and unstructured tasks by discharge        2  Patient will improve ability to facilitate communication to meet needs including use of compensatory strategies to promote meaningful interactions for improved quality of life and maximize level of independence by discharge  3  Patient will improve ability to facilitate functional reading and writing to meet needs including use of compensatory strategies to promote meaningful interactions for improved quality of life and maximize level of independence by discharge  Impressions/Recommendations    Impressions:   -Patient presents with moderate-severe expressive and receptive aphasia secondary to cystic brain mass  Expressive language deficits demonstrated can be characterized by decreased responsive naming, decreased confrontational naming, and impaired repetition of single words and sentences  It should be noted that patient's expressive language is fluent, but very inconsistent  Patient required prompts (I e  guiding questions) during free conversation (e g  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?)  Patient's responses typically consist of 2-4 word utterances  Patient demonstrated phonemic paraphasias and jargon during free conversation and confrontational naming and repetition tasks    During responsive naming tasks, patient did not demonstrate verbalizations, but did demonstrate the ability to gesture in order to respond (e g  gestured shaving when asked what you do with a razor, demonstrated writing when asked what you do with a pencil)  Patient needed additional cues/models when completing automated sequencing tasks (I e  Clinician modeled counting "1, 2 " until patient could complete independently)  Intelligible utterances include, but are not limited to: "Purvi Santiago," "10 years," "My dad," "I'm okay," "I can't do it "      Receptive language deficits demonstrated can be characterized by decreased ability to follow 1-2 step directions, answering factual yes/no questions, and simple reading tasks (I e  word level)  It should be noted that Patient demonstrated ability to match single letters across fonts and scripts and to match numbers across fingers and dot diagrams  When clinician asked Patient if he understood directions during various tasks on the test, Patient either verbalized, "yes" or nodded his head  Patient began to cough during examination and was asked if he needed water, Patient verbalized "no," in response to the question  When completing writing tasks where Patient was expected to copy written words, Patient did not demonstrate ability to do so  Instead, Patient wrote random words and nonsense words (I e Car, plick, sick, sock, part, pard)  Patient demonstrated ability to write letters and numbers to dictation  Patient did not demonstrate ability to write primer words (I e  Go, Dog, Cat)  Clinician, Patient, and Patient's wife discussed therapy options including expressive and receptive language treatment, as well as possible AAC options to supplement traditional therapy  At this time, it is recommended that Patient received skilled outpatient Speech Therapy services to increase his expressive and receptive language skills, functional reading skills, and functional writing skills    It is also recommended that a motor speech evaluation be completed during the first treatment session to make any additional adjustments to POC  AAC options should be explored to act as a supplement to vocalizations  **Addendum: Patinet has not returned since IE , DC at this time  Will need a new script to return  **    Recommendations:  -Patient would benefit from outpatient skilled Speech Therapy services: Speech/ language therapy    -Frequency: 2x weekly  -Duration: 4-6 weeks    -Intervention certification from: 1/66/7432  -Intervention certification to: 32/07/1102

## 2020-09-16 ENCOUNTER — OFFICE VISIT (OUTPATIENT)
Dept: NEUROSURGERY | Facility: CLINIC | Age: 55
End: 2020-09-16

## 2020-09-16 ENCOUNTER — DOCUMENTATION (OUTPATIENT)
Dept: NEUROSURGERY | Facility: CLINIC | Age: 55
End: 2020-09-16

## 2020-09-16 VITALS
TEMPERATURE: 97 F | WEIGHT: 181 LBS | BODY MASS INDEX: 25.91 KG/M2 | SYSTOLIC BLOOD PRESSURE: 132 MMHG | DIASTOLIC BLOOD PRESSURE: 90 MMHG | HEART RATE: 70 BPM | HEIGHT: 70 IN | RESPIRATION RATE: 16 BRPM

## 2020-09-16 DIAGNOSIS — G93.89 BRAIN MASS: Primary | ICD-10-CM

## 2020-09-16 PROCEDURE — 99024 POSTOP FOLLOW-UP VISIT: CPT | Performed by: NEUROLOGICAL SURGERY

## 2020-09-16 RX ORDER — DEXAMETHASONE 2 MG/1
2 TABLET ORAL EVERY 12 HOURS SCHEDULED
Qty: 30 TABLET | Refills: 0 | Status: SHIPPED | OUTPATIENT
Start: 2020-09-16 | End: 2020-09-19

## 2020-09-16 NOTE — PROGRESS NOTES
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 given his lung mass  We discussed the natural history and diagnosis of this disease  We discussed the fact that he is treatment naive and requires radiation therapy and possible chemotherapy for this  He has Radiation Oncology as well medical oncology appointments scheduled in the near future  Given his continued speech difficulties and possible worsening per the shannon we will continue his Decadron at 2 Q 12  I would like to obtain CT with and without contrast to ensure no active acute intracranial pathology as opposed to tumor progression  I will see him back after he completes his post radiation MRI  Chief Complaint: Post-op        HPI:   This is a pleasant 64-year-old left-handed gentleman who presented with several weeks of visual complaints and speech deficits  CT chest abdomen pelvis was revealing for multiple lung masses as well as a dominant left frontal mass with leptomeningeal spread  On his hospitalization his speech was slow but he is awake alert oriented  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  He denies any diplopia  He complained of decreased vision in his left eye  He presents for a 2 week postsurgical follow-up  According to his wife/beaue he has had decreased speech  He has also had some mild weakness of his right hand and worsening vision of his left eye  He continues on Decadron 2 mg  Review of systems obtained by the MA reviewed and updated below  Review of Systems   Eyes: Positive for photophobia, pain (left eye) and visual disturbance (blurry in left eye)  Respiratory: Negative  Cardiovascular: Negative  Gastrointestinal: Negative  Endocrine: Negative  Genitourinary: Negative  Musculoskeletal: Negative  Skin: Negative  Allergic/Immunologic: Negative  Neurological: Positive for speech difficulty (starting speech therapy) and weakness (the other night having issue with the right hand)  Negative for dizziness, tremors, seizures, light-headedness, numbness and headaches  Hematological: Negative  Psychiatric/Behavioral: Negative  Physical Exam  Vitals:    09/16/20 0926   BP: 132/90   Pulse: 70   Resp: 16   Temp: (!) 97 °F (36 1 °C)    He is awake and alert  His speech is slow  It is difficult to get him to express himself  However he does know his name  He answers questions with yes or no  His pupils are equal round reactive  He has ptosis on the left  His eyes are intermittently dysconjugate  He does have range of motion and likely incomplete 3rd and 6th nerve palsies  He has a right facial droop  He has mild weakness of his left upper extremity but good strength in his right upper extremity and bilateral lower extremities  His incision is clean dry and intact  It is well healed      The following portions of the patient's history were reviewed and updated as appropriate: allergies, current medications, past family history, past medical history, past social history, past surgical history and problem list     Active Ambulatory Problems     Diagnosis Date Noted    Brain mass 08/30/2020    Hypertension 08/30/2020    Tobacco abuse 47/12/7671    Uncomplicated alcohol dependence ([facility] [address] ) 08/30/2020    Hyperlipidemia 08/30/2020    Emphysema lung ([facility] [address] ) 08/31/2020    Cerebral edema ([facility] [address] ) 08/31/2020    Lung mass 09/02/2020     Resolved Ambulatory Problems     Diagnosis Date Noted    Brain compression ([facility] [address] ) 08/31/2020     No Additional Past Medical History       Past Surgical History:   Procedure Laterality Date    CRANIOTOMY Left 9/1/2020    Procedure: Left frontal CRANIOTOMY IMAGE-GUIDED FOR TUMOR;  Surgeon: Alondra Chen MD;  Location: [facility];  Service: Neurosurgery    HAND SURGERY Left          Current Outpatient Medications:     atorvastatin (LIPITOR) 40 mg tablet, Take 1 tablet (40 mg total) by mouth daily with dinner, Disp: 30 tablet, Rfl: 0    dexamethasone (DECADRON) 2 mg tablet, Take 1 tablet (2 mg total) by mouth every 12 (twelve) hours for 6 doses, Disp: 30 tablet, Rfl: 0    folic acid (FOLVITE) 1 mg tablet, Take 1 tablet (1 mg total) by mouth daily for 29 days, Disp: 29 tablet, Rfl: 0    lisinopril (ZESTRIL) 10 mg tablet, Take 1 tablet (10 mg total) by mouth daily, Disp: 30 tablet, Rfl: 0    methocarbamol (ROBAXIN) 500 mg tablet, Take 1 tablet (500 mg total) by mouth every 6 (six) hours for 14 days, Disp: 56 tablet, Rfl: 0    thiamine 100 MG tablet, Take 1 tablet (100 mg total) by mouth daily, Disp: 30 tablet, Rfl: 0    levETIRAcetam (KEPPRA) 500 mg tablet, Take 1 tablet (500 mg total) by mouth every 12 (twelve) hours for 14 doses, Disp: 14 tablet, Rfl: 0    Results/Data: We reviewed the results of his MRI and CT scan detail  We also reviewed the report

## 2020-09-16 NOTE — PROGRESS NOTES
Patient was discussed at the Neuro Oncology Case Review this morning  Dr Jose Gonsales recommended for WBRT  Dr Jose Gonsales said patient does not need a new MRI brain to plan for RT  Patient will be seen by Dr Devika Guerrero today for a 2 week POV with path review  Scheduled patient to be seen by Dr Jose Gonsales on 9/18/20 at 10:30 am at [facility] (closest location to patient's home) to discuss RT  Notified Dr Manuel Snyder will notify the patient at the appointment today

## 2020-09-17 ENCOUNTER — TRANSCRIBE ORDERS (OUTPATIENT)
Dept: SPEECH THERAPY | Facility: CLINIC | Age: 55
End: 2020-09-17

## 2020-09-17 ENCOUNTER — DOCUMENTATION (OUTPATIENT)
Dept: NEUROSURGERY | Facility: CLINIC | Age: 55
End: 2020-09-17

## 2020-09-17 ENCOUNTER — EVALUATION (OUTPATIENT)
Dept: SPEECH THERAPY | Facility: CLINIC | Age: 55
End: 2020-09-17
Payer: COMMERCIAL

## 2020-09-17 DIAGNOSIS — G93.89 BRAIN MASS: ICD-10-CM

## 2020-09-17 DIAGNOSIS — R47.01 COMBINED RECEPTIVE AND EXPRESSIVE APHASIA: Primary | ICD-10-CM

## 2020-09-17 PROCEDURE — 96105 ASSESSMENT OF APHASIA: CPT

## 2020-09-17 NOTE — PROGRESS NOTES
Dr Lenell Kayser wants to see pt back after he completes his post radiation MRI  - which we dont know when they will be yet has rad on apt 09/18/2020 - wife aware

## 2020-09-17 NOTE — LETTER
2020    Aaron Kim MD  [address] [employer] [address] [place] [address]    Patient: Jose Martinez   YOB: 1965   Date of Visit: 2020     Encounter Diagnosis     ICD-10-CM    1  Combined receptive and expressive aphasia  R47 01    2  Brain mass  G93 89        Dear Dr Jeannie Robb Recipients: Thank you for your recent referral of Jose Martinez  Please review the attached evaluation summary from Servando's recent visit  Please verify that you agree with the plan of care by signing the attached order  If you have any questions or concerns, please do not hesitate to call  I sincerely appreciate the opportunity to share in the care of one of your patients and hope to have another opportunity to work with you in the near future  Sincerely,    Kanika Spence, SLP      Referring Provider:     Based upon review of the patient's progress and continued therapy plan, it is my medical opinion that [facility] [place] should continue speech therapy treatment at the Physical Therapy at [address] [place] [address]:                    Aaron Kim MD  [address] [place] [address]: [phone]        Speech-Language Pathology Initial Evaluation    Today's date: 2020   Patients name: Jose Martinez  : 1965  MRN: [id number]  Safety measures: Limited Verbal, h/o Brain Mass  Referring provider: Yash Herrera  Primary diagnosis/billing code: R 47 01  Secondary diagnosis/billing code: G 93 89    Visit tracking:  -Referring provider: Michael Henson  -Billing guidelines: AMA  -Visit #1 (Self Pay)  -Insurance: Self Pay  -RE due 10/14/2020    Subjective comments: "Hello "    How did the patient hear about us? Physician and Other (PT referred during initial evaluation)    Patient's goal(s): Patient could not respond to question       Reason for referral: Change in cognitive status, Decreased language skills and Decreased speech intelligibility  Prior functional status: Communication effective and appropriate in all situations  Clinically complex situations: Discharge from SNF or Hospital in the last 30 days    History: Patient is a 49 y o  male who was referred to outpatient skilled Speech Therapy services for a aphasia evaluation  Patient has been referred to outpatient skilled Speech Therapy for suspected Aphasia secondary to a brain mass removal   Patient was admitted to ER on 08/30/2020 after demonstrating facial droop and blurred vision  At time of admit, Patient reported that facial droop began 10-14 days prior  Upon further examination and CT scan, a cystic mass was discovered in the L cerebral hemisphere with mild amount of edema  Surgery for removal was recommended at this time and the procedure took place on 09/01/2020  Patient was seen for skilled inpatient Speech Therapy focusing on Dysphagia management, expressive language, and receptive language skills  Patient was dismissed from inpatient skilled Speech Therapy on a regular diet with thin liquids  Patient's wife, Reena Thayer, reports that patient is continuing on this diet at home and has not had any s/sx of Dysphagia  Hearing: WFL  Vision: Decreased with glasses    Home environment/lifestyle: At home with wife   Highest level of education: High School  Vocational status: Building above ground pools    Mental status: Alert  Behavior status: Cooperative  Communication modalities: Verbal (limited verbal)  Rehabilitation prognosis: Good rehab potential to reach the established goals    Assessments    The [place] Diagnostic Aphasia Examination-Third Edition (BDAE-3) is a comprehensive standardized assessment designed to evaluate a broad range of language impairments that often arise as a consequence of organic brain dysfunction  The BDAE-3 is divided into five subtests, including conversational & expository speech, auditory comprehension, oral expression, reading, and writing   The results of the BDAE-3 are used to classify a patients language profiles into one of the localization-based classifications of aphasia: Brocas, Wernickes, anomic, conduction, transcortical, transcortical motor, transcortical sensory, and global aphasia syndromes, although the test does not always provide a diagnosis or a therapeutic approach  The following results were obtained during the administration of the short form assessment:       Score: Percentile:   SEVERITY RATIN/5 40%ile        FLUENCY:     -Phrase length (rating scale) 3/7 15%ile   -Melodic line (rating scale)  30%ile   -Grammatical form (rating scale)  10%ile        CONVERSATION/EXPOSITORY SPEECH:    -Simple social responses  20%ile        AUDITORY COMPREHENSION:     -Basic word discrimination  20%ile   -Commands 1/10 10%ile   -Complex ideational material  10%ile        ARTICULATION:     -Articulatory agility (rating scale)  40%ile        RECITATION:     -Automatized sequences  20%ile        REPETITION:     -Words 0 0%ile    -Sentences 0 30%ile        NAMING:     -Responsive naming 0/10 10%ile   -[place] Naming Test  short 5/15 40%ile   -Special categories  10%ile        READING:     -Matching cases & scripts  100%ile   -Number matching 3/4 10%ile   -Picture-word matching  10%ile   -Oral word reading 0/15 10%ile   -Oral sentence reading  30%ile   -Oral sentence comprehension 0/3 10%ile   -Sentence/paragraph comprehension 0 0%ile        WRITING:     -Form 10/14 20%ile   -Letter choice  10%ile   -Motor facility  25%ile   -Primer words  10%ile   -Regular phonics NT N/A   -Common irregular words NT N/A   -Written picture naming  NT N/A   -Narrative writing NT N/A     Overall, patient presents with a moderate-severe expressive/receptive aphasia with a severity rating of 1 (out of a possible 5 being fluent speech)  1 - All communication is through fragmentary expression; great need for inference, questioning, and guessing by the listener   The range of information that can be exchanged is limited, and the listener carries the burden of communication  Goals    Short-term goals:  1  Patient will follow one-step verbal directions (e g , close your eyes) with 80% accuracy to facilitate increased auditory comprehension skills, to be achieved in 4-6 weeks  2  Patient will provide responses for automated sequences (e g  Days of the week, demographic information) with 80% accuracy to facilitate increased expressive language skills, to be achieved in 4-6 weeks  3  Patient will imitate words/phrases using NABILA/tapping/pacing with 80% accuracy to facilitate increased verbal expression skills, to be achieved in 4-6 weeks  4  Patient will provide an appropriate word for sentence completion task (e g , open the ___) with 80% accuracy to facilitate increased expressive language skills, to be achieved in 4-6 weeks  5  Patient will provide an adequate response to confrontation naming task (i e , what is) with 80% accuracy to facilitate increased expressive language skills, to be achieved in 4-6 weeks  6  Patient will facilitate functional reading skills by matching word to picture with 80% accuracy over 5 sessions to facilitate growth of vocabulary concept comprehension, to be achieved in 4-6 weeks  7  Patient will complete simple writing tasks at the word level with >90% acc to increase writing skills within FLE, to be achieved in 4-6 weeks  8  Clinician will complete motor speech evaluation to further diagnose patient and create POC to best fit patient's needs, to be achieved during first treatment session  Long-term goals:  1   Patient will demonstrate improved expressive and receptive language skills during structured and unstructured tasks by discharge        2  Patient will improve ability to facilitate communication to meet needs including use of compensatory strategies to promote meaningful interactions for improved quality of life and maximize level of independence by discharge  3  Patient will improve ability to facilitate functional reading and writing to meet needs including use of compensatory strategies to promote meaningful interactions for improved quality of life and maximize level of independence by discharge  Impressions/Recommendations    Impressions:   -Patient presents with moderate-severe expressive and receptive aphasia secondary to cystic brain mass  Expressive language deficits demonstrated can be characterized by decreased responsive naming, decreased confrontational naming, and impaired repetition of single words and sentences  It should be noted that patient's expressive language is fluent, but very inconsistent  Patient required prompts (I e  guiding questions) during free conversation (e g  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?)  Patient's responses typically consist of 2-4 word utterances  Patient demonstrated phonemic paraphasias and jargon during free conversation and confrontational naming and repetition tasks  During responsive naming tasks, patient did not demonstrate verbalizations, but did demonstrate the ability to gesture in order to respond (e g  gestured shaving when asked what you do with a razor, demonstrated writing when asked what you do with a pencil)  Patient needed additional cues/models when completing automated sequencing tasks (I e  Clinician modeled counting "1, 2 " until patient could complete independently)  Intelligible utterances include, but are not limited to: "Janee Santiago," "10 years," "My dad," "I'm okay," "I can't do it "      Receptive language deficits demonstrated can be characterized by decreased ability to follow 1-2 step directions, answering factual yes/no questions, and simple reading tasks (I e  word level)    It should be noted that Patient demonstrated ability to match single letters across fonts and scripts and to match numbers across fingers and dot diagrams  When clinician asked Patient if he understood directions during various tasks on the test, Patient either verbalized, "yes" or nodded his head  Patient began to cough during examination and was asked if he needed water, Patient verbalized "no," in response to the question  When completing writing tasks where Patient was expected to copy written words, Patient did not demonstrate ability to do so  Instead, Patient wrote random words and nonsense words (I e Car, plick, sick, sock, part, pard)  Patient demonstrated ability to write letters and numbers to dictation  Patient did not demonstrate ability to write primer words (I e  Go, Dog, Cat)  Clinician, Patient, and Patient's wife discussed therapy options including expressive and receptive language treatment, as well as possible AAC options to supplement traditional therapy  At this time, it is recommended that Patient received skilled outpatient Speech Therapy services to increase his expressive and receptive language skills, functional reading skills, and functional writing skills  It is also recommended that a motor speech evaluation be completed during the first treatment session to make any additional adjustments to POC  AAC options should be explored to act as a supplement to vocalizations        Recommendations:  -Patient would benefit from outpatient skilled Speech Therapy services: Speech/ language therapy    -Frequency: 2x weekly  -Duration: 4-6 weeks    -Intervention certification from: 7/64/5643  -Intervention certification to: 18/05/9956

## 2020-09-18 ENCOUNTER — RADIATION ONCOLOGY CONSULT (OUTPATIENT)
Dept: RADIATION ONCOLOGY | Facility: CLINIC | Age: 55
End: 2020-09-18
Attending: RADIOLOGY
Payer: COMMERCIAL

## 2020-09-18 ENCOUNTER — HOSPITAL ENCOUNTER (OUTPATIENT)
Dept: CT IMAGING | Facility: HOSPITAL | Age: 55
Discharge: HOME | End: 2020-09-18
Payer: COMMERCIAL

## 2020-09-18 VITALS
WEIGHT: 180.12 LBS | OXYGEN SATURATION: 95 % | HEIGHT: 70 IN | DIASTOLIC BLOOD PRESSURE: 85 MMHG | SYSTOLIC BLOOD PRESSURE: 122 MMHG | HEART RATE: 73 BPM | RESPIRATION RATE: 22 BRPM | BODY MASS INDEX: 25.79 KG/M2 | TEMPERATURE: 97.7 F

## 2020-09-18 DIAGNOSIS — G93.89 BRAIN MASS: ICD-10-CM

## 2020-09-18 DIAGNOSIS — C34.02 MALIGNANT NEOPLASM OF HILUS OF LEFT LUNG (HCC): Primary | ICD-10-CM

## 2020-09-18 DIAGNOSIS — C79.31 BRAIN METASTASES (HCC): Primary | ICD-10-CM

## 2020-09-18 PROCEDURE — 99211 OFF/OP EST MAY X REQ PHY/QHP: CPT | Performed by: RADIOLOGY

## 2020-09-18 PROCEDURE — 70470 CT HEAD/BRAIN W/O & W/DYE: CPT

## 2020-09-18 PROCEDURE — G1004 CDSM NDSC: HCPCS

## 2020-09-18 RX ORDER — ALBUTEROL SULFATE 90 UG/1
2 AEROSOL, METERED RESPIRATORY (INHALATION) EVERY 6 HOURS PRN
COMMUNITY
End: 2021-01-01 | Stop reason: SDUPTHER

## 2020-09-18 RX ADMIN — IOHEXOL 100 ML: 350 INJECTION, SOLUTION INTRAVENOUS at 15:14

## 2020-09-18 NOTE — PROGRESS NOTES
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  He is referred by Dr Sabine Hawkins  Significant other Althea with patient today and talks on behalf of patient  States patient had not seen a doctor in 9 years  56-year-old male with past medical history of untreated hypertension, hyperlipidemia, and tobacco abuse who presented to urgent care on 8/30/20 for 1-2 week history of progressive decrease in vision in his left eye, left forehead numbness and right-sided facial droop  He was sent to the ED for further evaulation  Workup revealed multiple intracranial enhancing lesions with a large 5 5 cm dominant cystic lesion noted in the deep left inferior frontal lobe and 5 mm rightward midline shift on brain MRI  Also noted was the suspicion for the presence of leptomeningeal/CSF spread of disease  He was seen by neurosurgery for evaluation and underwent image-guided left frontal craniotomy on 9/1/20 by Dr Sabine Hawkins  Pathology revealed 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, 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  8/30/20 CT Head (without contrast): IMPRESSION:   Large predominantly cystic mass within the left cerebral hemisphere with relatively mild amount of surrounding edema in the brain and minimal calcification along one margin of the lesion  This may be a cystic neoplasm (primary or metastatic) or less likely infection  Contrast-enhanced brain MRI is recommended for further workup  Mass effect on the left lateral ventricle with 4 mm rightward midline shift and some effacement of left-sided cerebral sulci        8/30/20 CT C/A/P: IMPRESSION:   There is an approximately 4 x 4 x 3 cm mass in the mid to lower left hilum which extends into the left lower lobe of the lung and the left lingula  Nearby satellite masses and nodules are present  The appearance is highly suggestive of malignant neoplasm  Pulmonology consultation and Oncology consultation is recommended  There appears to be some hypodensity within some of the central left pulmonary artery branches  Pulmonary embolism and/or tumor thrombus should be excluded  Dedicated CT angiogram/PE protocol CTA of the chest is recommended for further evaluation  Bilateral adrenal masses, each measuring approximately 2 cm  MRI is recommended for further characterization, if there are no contraindications  The urinary bladder wall appears thickened  This is most pronounced anteriorly  Clinical correlation, laboratory correlation and follow-up is recommended  The prostate is mildly prominent  Clinical and laboratory correlation is recommended  Mild emphysema  8/31/20 MRI Brain: IMPRESSION:   Multiple intracranial enhancing lesions with a dominant cystic lesion noted in the deep left inferior frontal lobe, as described above  Multiplicity of lesions with a suspicious primary pulmonary neoplasm is most suggestive of metastatic disease  Other neoplastic etiologies thought to be less likely  Note is made of lesions along the ependymal surface of the lateral ventricles and septum pellucidum suspicious for the presence of leptomeningeal/CSF spread of disease  Imaging of the remainder of the neuroaxis should be considered for further evaluation  Of note lesions are seen along the course of the left trigeminal nerve tracking into Meckel's cave as well as at the level of the hypothalamus and abutting the optic tracts  Stable 5 mm rightward midline shift  8/31/20 MRI Abdomen: IMPRESSION:   Bilateral adrenal nodules with imaging appearance most consistent with adenomas        9/1/2020-L frontal craniotomy image guided    Final Diagnosis    A & B Brain, left frontal mass:  -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 , 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     Note:  Tumor cells are  positive for CKC, CAM5 2 , EMA, TTF-1, synaptophysin, CD56, KI-67 (>80%) and negative for GFAP, chromogranin,  CK7, CD45,   Controls reacted appropriately   This staining pattern supports the above diagnosis  9/2/20 CT Head (s/p craniotomy for resection): IMPRESSION:   Status post left opercular frontal mass resection with hemorrhage in the postoperative bed and left frontal temporal extra-axial space  Scattered cerebral sulcal and intraventricular as well as basal subarachnoid hemorrhage  Multifocal hyperdense enhancing lesions in the periventricular regions as previously described on the MRI study of August 30, 2020  The hyperattenuation of these lesions suggest hemorrhagic metastasis  Alternatively these may represent hypercellular metastasis  Correlation with oncologic history  Interval decrease in the mass effect on the left ventricular system  9/3/20 CT Head (hemorrhage): IMPRESSION:   Stable intraparenchymal hemorrhage noted within the left frontal postoperative bed extending to the extra-axial space deep to the frontal temporal craniotomy  Stable vasogenic edema and localized mass effect  Hemorrhage adjacent to the frontal horn of the lateral ventricle on the right is unchanged  Stable intraventricular hemorrhage within the frontal horn of the lateral ventricle  Slight improvement in intraventricular hemorrhage layering within the occipital horn of the lateral ventricle  Stable hyperdensity adjacent to the frontal horn of the left lateral ventricle as well  9/4/20 Discharged to home with home PT/OT  He was instructed to continue Decadron taper per neurosurgery, as well as Keppra for 1 week for seizure prophylaxis         9/16/20 Dr Fernando Rock post op FU - "Given his continued speech difficulties and possible worsening per the shannon we will continue his Decadron at 2 Q 12   I would like to obtain CT with and without contrast to ensure no active acute intracranial pathology as opposed to tumor progression "      9/16/20 Case reviewed at Neuro Oncology Work Group: whole brain radiation recommended        9/18/20 CT Head  @ 2:30 pm  9/24/20 Dr Gini Jackson consult        Oncology History   Brain metastases ([facility] [address] )   9/1/2020 Initial Diagnosis    Brain metastases ([facility] [address] )     9/1/2020 Surgery    Left frontal CRANIOTOMY IMAGE-GUIDED FOR TUMOR (Left)  Surgeon: Dr Elizabet Barlow, left frontal mass:  -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 , 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             Clinical Trial: no      Health Maintenance   Topic Date Due    PT PLAN OF CARE  1965    SLP PLAN OF CARE  1965    Pneumococcal Vaccine: Pediatrics (0 to 5 Years) and At-Risk Patients (6 to 59 Years) (1 of 3 - PCV13) 12/04/1972    Depression Screening PHQ  12/04/1978    HIV Screening  12/04/1981    BMI: Followup Plan  12/04/1984    Annual Physical  12/04/1984    DTaP,Tdap,and Td Vaccines (1 - Tdap) 12/04/1987    Colorectal Cancer Screening  12/04/2016    Influenza Vaccine  07/01/2020    BMI: Adult  09/18/2021    HIB Vaccine  Aged Out    Hepatitis B Vaccine  Aged Out    IPV Vaccine  Aged Out    Hepatitis A Vaccine  Aged Out    Meningococcal ACWY Vaccine  Aged Out    HPV Vaccine  Aged Out       Past Medical History:   Diagnosis Date    Brain cancer ([facility] [address] )     COPD (chronic obstructive pulmonary disease) ([facility] [address] )     Hypertension     Lung cancer ([facility] [address] )        Past Surgical History:   Procedure Laterality Date    BILATERAL KNEE ARTHROSCOPY Bilateral     CRANIOTOMY Left 9/1/2020    Procedure: Left frontal CRANIOTOMY IMAGE-GUIDED FOR TUMOR;  Surgeon: Navdeep Valladares MD;  [facility]: [facility];  Service: Neurosurgery    HAND SURGERY Left        Family History   Problem Relation Age of Onset    Lymphoma Mother        Social History     Tobacco Use    Smoking status: Current Every Day Smoker     Packs/day: 1 50    Smokeless tobacco: Never Used    Tobacco comment: was smoking 2-3 packs/day   Substance Use Topics    Alcohol use: Not Currently     Frequency: 4 or more times a week     Binge frequency: Daily or almost daily     Comment: Not at present    Drug use: Never          Current Outpatient Medications:     albuterol (PROVENTIL HFA,VENTOLIN HFA) 90 mcg/act inhaler, Inhale 2 puffs every 6 (six) hours as needed for wheezing, Disp: , Rfl:     atorvastatin (LIPITOR) 40 mg tablet, Take 1 tablet (40 mg total) by mouth daily with dinner, Disp: 30 tablet, Rfl: 0    dexamethasone (DECADRON) 2 mg tablet, Take 1 tablet (2 mg total) by mouth every 12 (twelve) hours for 6 doses, Disp: 30 tablet, Rfl: 0    folic acid (FOLVITE) 1 mg tablet, Take 1 tablet (1 mg total) by mouth daily for 29 days, Disp: 29 tablet, Rfl: 0    lisinopril (ZESTRIL) 10 mg tablet, Take 1 tablet (10 mg total) by mouth daily, Disp: 30 tablet, Rfl: 0    thiamine 100 MG tablet, Take 1 tablet (100 mg total) by mouth daily, Disp: 30 tablet, Rfl: 0    levETIRAcetam (KEPPRA) 500 mg tablet, Take 1 tablet (500 mg total) by mouth every 12 (twelve) hours for 14 doses, Disp: 14 tablet, Rfl: 0    methocarbamol (ROBAXIN) 500 mg tablet, Take 1 tablet (500 mg total) by mouth every 6 (six) hours for 14 days (Patient not taking: Reported on 9/18/2020), Disp: 56 tablet, Rfl: 0    No Known Allergies     Review of Systems:  Review of Systems   Constitutional: Positive for activity change, fatigue and unexpected weight change  Negative for appetite change, chills and fever  HENT: Positive for postnasal drip  Pt nonverbal since brain surgery   Eyes: Positive for visual disturbance (cannot see out of left eye)  L eye ptosis   Respiratory: Positive for cough, shortness of breath and wheezing           Current heavy smoker   Cardiovascular: Negative  Negative for chest pain and leg swelling  Gastrointestinal: Negative for abdominal pain, blood in stool, constipation, diarrhea, nausea and vomiting  Genitourinary: Negative  Musculoskeletal: Negative  Skin:        L crani incision healing  Staples removed 9/16/2020   Allergic/Immunologic: Negative  Neurological: Negative for dizziness, seizures, weakness, light-headedness and headaches  Numbness to left side of face   Hematological: Negative  Psychiatric/Behavioral: Negative  Vitals:    09/18/20 1038   BP: 122/85   BP Location: Left arm   Pulse: 73   Resp: 22   Temp: 97 7 °F (36 5 °C)   TempSrc: Temporal   SpO2: 95%   Weight: 81 7 kg (180 lb 1 9 oz)   Height: 5' 10" (1 778 m)            Pain assessment: 0    Imaging:No images are attached to the encounter  Teaching [facility] teaching and booklet offered    MST Declined at this time  May be interested once treatment begins  Pt lost 10 lbs in the last three weeks since surgery

## 2020-09-18 NOTE — PROGRESS NOTES
Consultation - Radiation Oncology      EUY:[id number] : 1965  Encounter: [id number]  Patient Information: Sanjay Sanchez 28  Chief Complaint   Patient presents with   [facility] [place] Consult     radiation oncology     Cancer Staging  No matching staging information was found for the patient  Metastatic small cell carcinoma left lung       History of Present Illness   Gurinder Akbar is a 49y o  year old male who presents with decrease to loss of vision left eye as well as numbness left face  CT of the head without contrast taken in the ER showed a large cystic mass in the left cerebral hemisphere  CT of the chest, abdomen pelvis demonstrated a 4 cm mass in the mid to lower left hilum extends to the left lower lobe of the lung and left lingula  There were bilateral adrenal masses 2 cm  Subsequent MRI of the brain showed multiple intracranial lesions with the dominant cystic lesion in the left frontal lobe as well as evidence suspicious for leptomeningeal or CSF spread of disease  There were lesions along the left trigeminal nerve, Meckel's cave, hypothalamus and optic tracts  There were approximately 9 lesions identified with sizes ranging from 5-1/2 cm to 0 4 cm  An MRI of the abdomen confirmed the adrenal masses were consistent with adenomas   patient underwent left frontal craniotomy image guided resection of left frontal tumor  Final pathology report was metastatic small cell carcinoma from lung primary  Postop patient undergoing speech therapy for losing his ability to talk                  Historical Information   Oncology History   Brain metastases ([facility] [address] )   2020 Initial Diagnosis    Brain metastases ([facility] [address] )     2020 Surgery    Left frontal CRANIOTOMY IMAGE-GUIDED FOR TUMOR (Left)  Surgeon: Dr Lenin Sims, left frontal mass:  -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 , 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               Past Medical History:   Diagnosis Date    [facility] [address] [facility]     [facility]     Hypertension     [facility]      Past Surgical History:   Procedure Laterality Date    BILATERAL KNEE ARTHROSCOPY Bilateral     CRANIOTOMY Left 9/1/2020    Procedure: Left frontal CRANIOTOMY IMAGE-GUIDED FOR TUMOR;  Surgeon: Wolf Knapp MD;  Location: [facility];  Service: Neurosurgery    HAND SURGERY Left        Family History   Problem Relation Age of Onset    Lymphoma Mother        Social History   Social History     Substance and Sexual Activity   Alcohol Use Not Currently    Frequency: 4 or more times a week    Binge frequency: Daily or almost daily    Comment: Not at present     Social History     Substance and Sexual Activity   Drug Use Never     Social History     Tobacco Use   Smoking Status Current Every Day Smoker    Packs/day: 1 50   Smokeless Tobacco Never Used   Tobacco Comment    was smoking 2-3 packs/day         Meds/Allergies     Current Outpatient Medications:     albuterol (PROVENTIL HFA,VENTOLIN HFA) 90 mcg/act inhaler, Inhale 2 puffs every 6 (six) hours as needed for wheezing, Disp: , Rfl:     atorvastatin (LIPITOR) 40 mg tablet, Take 1 tablet (40 mg total) by mouth daily with dinner, Disp: 30 tablet, Rfl: 0    dexamethasone (DECADRON) 2 mg tablet, Take 1 tablet (2 mg total) by mouth every 12 (twelve) hours for 6 doses, Disp: 30 tablet, Rfl: 0    folic acid (FOLVITE) 1 mg tablet, Take 1 tablet (1 mg total) by mouth daily for 29 days, Disp: 29 tablet, Rfl: 0    lisinopril (ZESTRIL) 10 mg tablet, Take 1 tablet (10 mg total) by mouth daily, Disp: 30 tablet, Rfl: 0    thiamine 100 MG tablet, Take 1 tablet (100 mg total) by mouth daily, Disp: 30 tablet, Rfl: 0    levETIRAcetam (KEPPRA) 500 mg tablet, Take 1 tablet (500 mg total) by mouth every 12 (twelve) hours for 14 doses, Disp: 14 tablet, Rfl: 0    methocarbamol (ROBAXIN) 500 mg tablet, Take 1 tablet (500 mg total) by mouth every 6 (six) hours for 14 days (Patient not taking: Reported on 9/18/2020), Disp: 56 tablet, Rfl: 0  No Known Allergies      Review of Systems   Constitutional: Negative for appetite change, fatigue, fever and unexpected weight change  HENT: Positive for voice change  Negative for congestion, ear pain, nosebleeds, sneezing, sore throat and trouble swallowing  Eyes: Positive for visual disturbance  Respiratory: Negative for cough, shortness of breath and wheezing  Cardiovascular: Negative for chest pain, palpitations and leg swelling  Gastrointestinal: Negative for abdominal distention, blood in stool, nausea and vomiting  Endocrine: Negative  Genitourinary: Negative for decreased urine volume, frequency, hematuria and urgency  Musculoskeletal: Negative for arthralgias, back pain, gait problem, myalgias and neck pain  Skin: Negative  Allergic/Immunologic: Negative  Neurological: Positive for facial asymmetry, speech difficulty, weakness, numbness and headaches  Psychiatric/Behavioral: Negative  OBJECTIVE:   /85 (BP Location: Left arm)   Pulse 73   Temp 97 7 °F (36 5 °C) (Temporal)   Resp 22   Ht 5' 10" (1 778 m)   Wt 81 7 kg (180 lb 1 9 oz)   SpO2 95%   BMI 25 84 kg/m²   Pain Assessment:  0  Performance Status: Karnofsky: 80 - Normal activity with effort; some signs or symptoms of disease    Physical Exam  Constitutional:       General: He is not in acute distress  Appearance: Normal appearance  He is normal weight  HENT:      Head: Normocephalic and atraumatic  Mouth/Throat:      Pharynx: Oropharynx is clear  No oropharyngeal exudate or posterior oropharyngeal erythema  Eyes:      Conjunctiva/sclera: Conjunctivae normal       Pupils: Pupils are equal, round, and reactive to light  Neck:      Musculoskeletal: Normal range of motion and neck supple  No muscular tenderness     Cardiovascular:      Rate and Rhythm: Normal rate and regular rhythm  Heart sounds: Normal heart sounds  Pulmonary:      Effort: Pulmonary effort is normal       Breath sounds: Normal breath sounds  Abdominal:      General: Abdomen is flat  Palpations: Abdomen is soft  Tenderness: There is no abdominal tenderness  Musculoskeletal: Normal range of motion  General: No swelling or tenderness  Skin:     General: Skin is warm  Findings: No erythema, lesion or rash  Neurological:      Mental Status: He is alert and oriented to person, place, and time  Sensory: No sensory deficit  Motor: No weakness  Comments: Complete loss of speech   Psychiatric:         Mood and Affect: Mood normal          Behavior: Behavior normal          Thought Content: Thought content normal             RESULTS  Lab Results    Chemistry        Component Value Date/Time    K 3 9 09/03/2020 0546     (H) 09/03/2020 0546    CO2 27 09/03/2020 0546    BUN 16 09/03/2020 0546    CREATININE 0 60 09/03/2020 0546        Component Value Date/Time    CALCIUM 8 5 09/03/2020 0546    ALKPHOS 84 08/31/2020 0510    AST 16 08/31/2020 0510    ALT 30 08/31/2020 0510            Lab Results   Component Value Date    WBC 14 07 (H) 09/04/2020    HGB 14 2 09/04/2020    HCT 42 0 09/04/2020    MCV 93 09/04/2020     09/04/2020         Imaging Studies  Ct Chest Abdomen Pelvis W Wo Contrast    Result Date: 8/31/2020  Narrative: CT CHEST, ABDOMEN AND PELVIS WITH AND WITHOUT IV CONTRAST INDICATION:   Metastases suspected, unknown primary  Brain mass  History of hypertension, tobacco use  COMPARISON:  None available  TECHNIQUE: Initial CT of the chest, abdomen and pelvis was performed without intravenous contrast   Subsequent dynamic CT evaluation of the chest, abdomen and pelvis was performed after the administration of intravenous contrast was performed    Axial, sagittal, and coronal 2D reformatted images were created from the source data and submitted for interpretation  Radiation dose length product (DLP) for this visit:  1859 61 mGy-cm   This examination, like all CT scans performed in the [facility], was performed utilizing techniques to minimize radiation dose exposure, including the use of iterative reconstruction and automated exposure control  IV Contrast:  100 mL of iohexol (OMNIPAQUE) Enteric Contrast:  Enteric contrast was administered  FINDINGS: LUNGS, MEDIASTINUM AND ANGELY:  There is mild to moderate emphysema  There is a mass in the mid to lower left hilum which measures approximately 4 x 4 x 3 cm  This extends into the left lower lobe of the lung and left lingula  Nearby satellite masses and nodules are present in the superior segment of the left lower lobe of the lung  Some of these demonstrate peripheral spiculation and adjacent stranding into the surrounding parenchyma, and there is some extension to the posterolateral pleural surface  The appearance is highly suggestive of malignant neoplasm  Pulmonology consultation and Oncology consultation is recommended  Additionally, there appears to be some hypodensity within some of the central left pulmonary artery branches  Pulmonary embolism and/or tumor thrombus should be excluded  Dedicated CT angiogram/PE protocol CTA of the chest is recommended for further evaluation  PLEURA:  Unremarkable  HEART/GREAT VESSELS:  Coronary artery calcifications are present  There is mild atherosclerosis of the thoracic aorta  Please see above pulmonary artery findings  CHEST WALL AND LOWER NECK:   Unremarkable  ABDOMEN LIVER/BILIARY TREE:  There is mild fatty infiltration of the liver  GALLBLADDER:  No calcified gallstones  No pericholecystic inflammatory change  SPLEEN:  Unremarkable  PANCREAS:  Unremarkable  ADRENAL GLANDS:  Bilateral adrenal masses, each measuring approximately 2 cm  MRI is recommended for further evaluation, if there are no contraindications   KIDNEYS: Small bilateral renal cysts  No hydronephrosis  STOMACH AND BOWEL:  There is no evidence of bowel obstruction  APPENDIX: No findings to suggest acute appendicitis  ABDOMINOPELVIC CAVITY:  No ascites  No free intraperitoneal air  No lymphadenopathy  VESSELS:  There is atherosclerosis  There is no abdominal aortic aneurysm  PELVIS REPRODUCTIVE ORGANS:  The prostate is mildly prominent and contains some central calcifications  Clinical and laboratory correlation is recommended  URINARY BLADDER: The urinary bladder wall appears thickened; this is most pronounced anteriorly  Clinical correlation, laboratory correlation and follow-up is recommended  ABDOMINAL WALL/INGUINAL REGIONS:  Small bilateral fat-containing inguinal hernias, right larger than left  OSSEOUS STRUCTURES:  No acute fracture or destructive osseous lesion  There is multilevel degenerative change of the spine  There is 7 to 8 mm of anterolisthesis of L4 upon L5 related to bilateral chronic appearing L4 pars defects  Impression: There is an approximately 4 x 4 x 3 cm mass in the mid to lower left hilum which extends into the left lower lobe of the lung and the left lingula  Nearby satellite masses and nodules are present  The appearance is highly suggestive of malignant neoplasm  Pulmonology consultation and Oncology consultation is recommended  There appears to be some hypodensity within some of the central left pulmonary artery branches  Pulmonary embolism and/or tumor thrombus should be excluded  Dedicated CT angiogram/PE protocol CTA of the chest is recommended for further evaluation  Bilateral adrenal masses, each measuring approximately 2 cm  MRI is recommended for further characterization, if there are no contraindications  The urinary bladder wall appears thickened  This is most pronounced anteriorly  Clinical correlation, laboratory correlation and follow-up is recommended  The prostate is mildly prominent    Clinical and laboratory correlation is recommended  Mild emphysema  Other findings as described above, please see discussion  I personally discussed this study with [address] on 8/31/2020 at 5:05 AM  This examination demonstrates findings requiring imaging follow-up and was logged as such in [facility]  Workstation performed: [id number]     Ct Head Wo Contrast    Result Date: 9/3/2020  Narrative: CT BRAIN - WITHOUT CONTRAST INDICATION:   hemorrhage  COMPARISON:  9/2/2020, 8/30/2020 CT examinations  MRI dated 8/30/2020  TECHNIQUE:  CT examination of the brain was performed  In addition to axial images, sagittal and coronal 2D reformatted images were created and submitted for interpretation  Radiation dose length product (DLP) for this visit:  851 56 mGy-cm   This examination, like all CT scans performed in the [facility], was performed utilizing techniques to minimize radiation dose exposure, including the use of iterative  reconstruction and automated exposure control  IMAGE QUALITY:  Diagnostic  FINDINGS: PARENCHYMA:  Patient is status post left frontotemporal craniotomy for tumor resection  The previously identified large complex primarily cystic mass within the left frontal lobe has been resected  There is a significant amount of blood products present within the resection cavity extending peripherally, to the extra-axial space just deep to the craniotomy site  There is a moderate amount of air present throughout the resection cavity as well  The appearance is unchanged compared to the prior examination performed approximately 24 hours earlier  Mild localized mass effect and mild vasogenic edema appears stable  There is mild extra-axial hemorrhage seen underlying the craniotomy site with a small amount of air present in this extra-axial space as well  There is focal hemorrhage identified adjacent to the frontal horn of the right lateral ventricle which is also unchanged    A small amount of intraventricular hemorrhage is seen within the frontal horn and layering within the left occipital horn  The intraventricular hemorrhage within the left occipital horn appears slightly improved  Stable hyperdensity adjacent to the frontal horn of the left lateral ventricle as well  These areas correspond to sites of tumor enhancement on prior MRI  Stable basilar cisterns, patent  Stable brainstem and cerebellum  VENTRICLES:  No obstructive hydrocephalus  As described above there is slight improvement in the intraventricular hemorrhage layering in the occipital horn on the left  VISUALIZED ORBITS AND PARANASAL SINUSES:  Stable  CALVARIUM AND EXTRACRANIAL SOFT TISSUES:  Stable left frontotemporal craniotomy and overlying extracranial soft tissues with expected postoperative change  Impression: Stable intraparenchymal hemorrhage noted within the left frontal postoperative bed extending to the extra-axial space deep to the frontal temporal craniotomy  Stable vasogenic edema and localized mass effect  Hemorrhage adjacent to the frontal horn of the lateral ventricle on the right is unchanged  Stable intraventricular hemorrhage within the frontal horn of the lateral ventricle  Slight improvement in intraventricular hemorrhage layering within the occipital horn of the lateral ventricle  Stable hyperdensity adjacent to the frontal horn of the left lateral ventricle as well  Workstation performed: [id number]     Ct Head Without Contrast    Result Date: 8/30/2020  Narrative: CT BRAIN - WITHOUT CONTRAST INDICATION:   cva concern  facial droop /facial numbness  COMPARISON:  None  TECHNIQUE:  CT examination of the brain was performed  In addition to axial images, sagittal and coronal 2D reformatted images were created and submitted for interpretation  Radiation dose length product (DLP) for this visit:  734 8 mGy-cm     This examination, like all CT scans performed in the [facility], was performed utilizing techniques to minimize radiation dose exposure, including the use of iterative reconstruction and automated exposure control  IMAGE QUALITY:  Diagnostic  FINDINGS: PARENCHYMA:  In the left cerebral hemisphere centered in the inferior frontal temporal region is a large predominantly cystic 5 6 cm diameter mass with some minimal associated calcification along the inferior lateral margin  This has significant mass effect on the left lateral ventricle which is severely compressed  There seems to be a small amount of edema within the brain mostly along the posterior edge of the lesion and inferiorly in the temporal lobe  This seems to be a solitary brain lesion without additional findings elsewhere in either hemisphere or in the posterior fossa  There is no evidence of acute hemorrhage  There is minimal rightward midline shift of about 4 mm  There is no evidence of acute large vessel territorial infarction  VENTRICLES AND EXTRA-AXIAL SPACES:  As above, there is severe mass effect on the left lateral ventricle  There is no evidence of extra-axial hemorrhage  There is some effacement of the left-sided cerebral sulci due to mass effect  VISUALIZED ORBITS AND PARANASAL SINUSES:  There is an old left-sided lamina papyracea fracture deformity  There is no acute orbital or sinus pathology seen  CALVARIUM AND EXTRACRANIAL SOFT TISSUES:  Normal      Impression: Large predominantly cystic mass within the left cerebral hemisphere with relatively mild amount of surrounding edema in the brain and minimal calcification along one margin of the lesion  This may be a cystic neoplasm (primary or metastatic) or less likely infection  Contrast-enhanced brain MRI is recommended for further workup  Mass effect on the left lateral ventricle with 4 mm rightward midline shift and some effacement of left-sided cerebral sulci  There is surgical consultation is advised    I personally discussed this study with Cathleen Goins on 8/30/2020 at 3:41 PM  Workstation performed: [id number]     Ct Head W Wo Contrast    Result Date: 9/2/2020  Narrative: CT BRAIN - WITH AND WITHOUT CONTRAST INDICATION:   Brain mass or lesion, follow-up s/p craniotomy for resection  COMPARISON:  Head CT from August 30, 2020  TECHNIQUE:  CT examination of the brain was performed both prior to and after the administration of intravenous contrast   In addition to axial images, sagittal and coronal 2D reformatted images were created and submitted for interpretation  Radiation dose length product (DLP) for this visit:  1721  95 mGy-cm   This examination, like all CT scans performed in the [facility], was performed utilizing techniques to minimize radiation dose exposure, including the use of iterative reconstruction and automated exposure control  IV Contrast:  100 mL of iohexol (OMNIPAQUE)  IMAGE QUALITY:  Diagnostic  FINDINGS: PARENCHYMA:  Interval resection of the solid and cystic mass lesion in the left opercular frontal region with surgical packing material, gas and hemorrhage in the surgical cavity and along the incisional tract extending to the left lateral frontal extra-axial space  There is mild stable edema about the surgical cavity  In the setting of multicompartment intracranial hemorrhage, it is difficult to assess for parenchymal / lesion enhancement  There are redemonstrated hyperdense enhancing lesions in the bifrontal periventricular regions  A small hyperdense lesion is also noted to enhance within the floor of the 3rd ventricle/hypothalamic region  There is a questionable focus of nodular hemorrhage versus enhancement in the left temporal lobe adjacent to the surgical cavity on image 17 of series 5, measuring 6 mm  VENTRICLES AND EXTRA-AXIAL SPACES: Interval decrease in the degree of mass effect on the left lateral ventricle status post mass resection    The ventricular system remains small and the lateral ventricles are medialized with minimal 2 mm of residual rightward deviation of the septum pellucidum  Small amount of layering hemorrhage within the occipital horns  Left lateral frontal extra-axial hemorrhage subjacent to the craniotomy flap  Scattered subarachnoid hemorrhage in the left cerebral sulci  Small amount of right anterior frontal sulcal subarachnoid hemorrhage  Minimal hemorrhage in the interpeduncular cistern  Small amount of postoperative gas and hemorrhage as well as fluid in the frontal temporal extra-axial space subjacent to the craniotomy  VISUALIZED ORBITS AND PARANASAL SINUSES:  Unremarkable  CALVARIUM:  Postsurgical changes of a left frontal temporal craniotomy with an overlying surgical drain in the lateral frontal and temporal scalp with scattered postoperative scalp emphysema and mild swelling with postsurgical skin staples in place  Impression: Status post left opercular frontal mass resection with hemorrhage in the postoperative bed and left frontal temporal extra-axial space  Scattered cerebral sulcal and intraventricular as well as basal subarachnoid hemorrhage  Multifocal hyperdense enhancing lesions in the periventricular regions as previously described on the MRI study of August 30, 2020  The hyperattenuation of these lesions suggest hemorrhagic metastasis  Alternatively these may represent hypercellular metastasis  Correlation with oncologic history  Interval decrease in the mass effect on the left ventricular system  I personally discussed this study with Dr Concepcion Pierce on 9/2/2020 at 7:51 AM  Workstation performed: [id number]     Mri Brain W Wo Contrast    Result Date: 8/31/2020  Narrative: MRI BRAIN WITH AND WITHOUT CONTRAST INDICATION: Brain mass found on CT imaging       Better visualization of mass  Recent CT of the chest demonstrating findings highly suspicious for pulmonary neoplasm  COMPARISON:  None   TECHNIQUE: Sagittal T1, axial T2, axial FLAIR, axial T1, axial Tommy axial diffusion  Sagittal, axial T1 postcontrast   Axial bravo postcontrast with coronal reconstructions  IV Contrast:  8 mL of gadobutrol injection (MULTI-DOSE)  IMAGE QUALITY:   Diagnostic  FINDINGS: BRAIN PARENCHYMA:There are multifocal enhancing intracranial lesions, many of which demonstrate associated restricted diffusion  Findings are compatible with tumor cellularity and can be seen in the setting of metastatic small cell lung cancer given findings on recent chest CT  High-grade glioma with CSF spread of disease thought to be less likely  These lesions are described below and measured on series 11: -Dominant large cystic lesion centered within the inferior left frontal lobe inseparable from the basal ganglia involving the insular ribbon measuring 4 3 x 5 5 x 5 5 cm in the transverse by anteroposterior by craniocaudad dimensions on image 93  This demonstrates areas of thick peripheral nodular enhancement  There are corresponding blood products as well as calcification as correlated with recent CT  There is mild surrounding edema  -2 5 x 0 7 cm lesion more medially along the ependymal margin of the left lateral ventricle on image 103  -Lesion along the left frontal horn measuring up to 0 6 x 0 7 cm on image 88  -Lesion along the right frontal horn measuring 1 0 x 0 8 cm on image 87  -Lesion attached of the septum pellucidum measuring 0 6 x 0 7 cm on image 87  -Lesion at the level of the hypothalamus measuring 0 7 x 1 2 cm on image 73  This is inseparable from the basilar artery and is abutting the optic tracts  -Lesion tracking along the left trigeminal nerve into Meckel's cave measuring 2 0 x 0 9 cm on image 57  There is also possible enhancement within the left internal auditory canal  -Lesion within the right gyrus rectus measuring 0 7 x 0 4 cm on image 81  -Probable lesion adjacent to the left choroid plexus within the atrium of the left lateral ventricle measuring 0 6 x 0 8 cm on image 76   There is effacement of the left lateral ventricle secondary to the dominant lesion with 5 mm rightward midline shift, unchanged  There is mild medial deviation of the uncus on the left with otherwise patent basilar cisterns  Small focus of chronic hemosiderin deposition within the right centrum semiovale  No evidence for large acute vascular distribution infarct  VENTRICLES:  Effacement of the left lateral ventricle  No evidence for hydrocephalus  SELLA AND PITUITARY GLAND:  Normal  ORBITS:  Deformity of the left lamina papyracea  PARANASAL SINUSES:  Retention cyst versus polyp left maxillary sinus  Minimal ethmoid air cell mucosal thickening  VASCULATURE:  Evaluation of the major intracranial vasculature demonstrates appropriate flow voids  CALVARIUM AND SKULL BASE:  Normal  EXTRACRANIAL SOFT TISSUES:  Normal      Impression: Multiple intracranial enhancing lesions with a dominant cystic lesion noted in the deep left inferior frontal lobe, as described above  Multiplicity of lesions with a suspicious primary pulmonary neoplasm is most suggestive of metastatic disease  Other  neoplastic etiologies thought to be less likely  Note is made of lesions along the ependymal surface of the lateral ventricles and septum pellucidum suspicious for the presence of leptomeningeal/CSF spread of disease  Imaging of the remainder of the neuroaxis should be considered for further evaluation  Of note lesions are seen along the course of the left trigeminal nerve tracking into Meckel's cave as well as at the level of the hypothalamus and abutting the optic tracts  Stable 5 mm rightward midline shift  Workstation performed: [id number]     Mri Abdomen W Wo Contrast    Result Date: 8/31/2020  Narrative: MRI - ABDOMEN - WITH AND WITHOUT CONTRAST INDICATION:  B/L adrenal masses found on CT CAP   Better visualization to investigate metastasis   COMPARISON: CT 8/30/2020 TECHNIQUE:  The following pulse sequences were obtained prior to and following the administration of intravenous contrast:     Coronal and axial T2 with TE of 90 and 180 respectively, axial T2 with fat saturation, axial FIESTA fat-sat, axial T1-weighted in-and-out-of phase, axial DWI/ADC, precontrast axial T1 with fat saturation, post-contrast dynamic axial T1 with fat saturation at 20, 70, and 180 seconds, coronal T1  with fat saturation and 7 minute delayed axial T1 with fat saturation  IV Contrast:  9 mL of gadobutrol injection (MULTI-DOSE) FINDINGS: LOWER CHEST:   Unremarkable  LIVER: Severe hepatic steatosis  No suspicious mass  The hepatic veins and portal veins are patent  BILE DUCTS: No intrahepatic or extrahepatic bile duct dilation  GALLBLADDER:  Normal  PANCREAS: Normal  No main pancreatic ductal dilation  ADRENAL GLANDS:  1 3 x 1 2 cm right and 1 5 x 1 8 cm left adrenal nodules are present and demonstrate loss of signal on opposed phase sequences, consistent with adenomas  These nodules also measured less than 10 Hounsfield units on the nonenhanced portion of the prior CT, also consistent with adenomas  SPLEEN:  Normal  KIDNEYS/PROXIMAL URETERS: No hydroureteronephrosis  No suspicious renal mass  Simple cysts are present  BOWEL:   No dilated loops of bowel  PERITONEUM/RETROPERITONEUM: No ascites  LYMPH NODES: No abdominal lymphadenopathy  VASCULAR STRUCTURES:  No aneurysm  ABDOMINAL WALL:  Unremarkable  OSSEOUS STRUCTURES:  No suspicious osseous lesion  Impression: Bilateral adrenal nodules with imaging appearance most consistent with adenomas  Workstation performed: [id number]         Pathology:  Small cell carcinoma        ASSESSMENT  No diagnosis found  Cancer Staging  No matching staging information was found for the patient  PLAN/DISCUSSION  No orders of the defined types were placed in this encounter  Jayashree Barney is a 49y o  year old male with 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    The large left frontal cystic lesion underwent craniotomy and decompression and resection  Still has no vision in left eye and lost his speech after surgery  Patient currently on Decadron 2 mg twice a day and will continue to take the medication throughout the course of treatment  Discussed with patient and his significant other treatment will be whole-brain total dose 30 Gy in 10 treatments  Side effects is hair loss towards the end of treatment  He has an appointment to see Dr Wilber Mcgowan next week to discuss when to start chemotherapy  Jevon Grewal MD  9/18/2020,1:02 PM      Portions of the record may have been created with voice recognition software  Occasional wrong word or "sound a like" substitutions may have occurred due to the inherent limitations of voice recognition software  Read the chart carefully and recognize, using context, where substitutions have occurred

## 2020-09-21 ENCOUNTER — APPOINTMENT (OUTPATIENT)
Dept: RADIATION ONCOLOGY | Facility: CLINIC | Age: 55
End: 2020-09-21
Attending: RADIOLOGY
Payer: COMMERCIAL

## 2020-09-21 PROCEDURE — 77334 RADIATION TREATMENT AID(S): CPT | Performed by: RADIOLOGY

## 2020-09-21 PROCEDURE — 77290 THER RAD SIMULAJ FIELD CPLX: CPT | Performed by: RADIOLOGY

## 2020-09-22 ENCOUNTER — EVALUATION (OUTPATIENT)
Dept: OCCUPATIONAL THERAPY | Facility: CLINIC | Age: 55
End: 2020-09-22
Payer: COMMERCIAL

## 2020-09-22 ENCOUNTER — TELEPHONE (OUTPATIENT)
Dept: HEMATOLOGY ONCOLOGY | Facility: CLINIC | Age: 55
End: 2020-09-22

## 2020-09-22 DIAGNOSIS — G93.89 BRAIN MASS: Primary | ICD-10-CM

## 2020-09-22 PROCEDURE — 77295 3-D RADIOTHERAPY PLAN: CPT | Performed by: RADIOLOGY

## 2020-09-22 PROCEDURE — 97166 OT EVAL MOD COMPLEX 45 MIN: CPT

## 2020-09-22 PROCEDURE — 77300 RADIATION THERAPY DOSE PLAN: CPT | Performed by: RADIOLOGY

## 2020-09-22 PROCEDURE — 77334 RADIATION TREATMENT AID(S): CPT | Performed by: RADIOLOGY

## 2020-09-22 NOTE — PROGRESS NOTES
Occupational Therapy Neuro Evaluation:    Today's Date: 2020  Patient Name: David Ziegler  : 1965  MRN: [id number]  Referring Provider: David Osborne MD  Dx: Brain mass [G93 89]    Active Problem List:   Patient Active Problem List   Diagnosis    Brain mass    Hypertension    Tobacco abuse    [facility]com[facility] dependence [facility]    Hyperlipidemia    [facility]    Cerebral edema (HCC)    Lung mass    Brain metastases (HCC)     Past Medical Hx:   Past Medical History:   Diagnosis Date    [facility] cancer [facility]     [facility]chronic obstructive [facility] disease[facility]     Hypertension     Lung cancer ([facility] )      Past Surgical Hx:   Past Surgical History:   Procedure Laterality Date    BILATERAL KNEE ARTHROSCOPY Bilateral     CRANIOTOMY Left 2020    Procedure: Left frontal CRANIOTOMY IMAGE-GUIDED FOR TUMOR;  Surgeon: Sunny Mojica MD;  Location: [facility];  Service: Neurosurgery    HAND SURGERY Left       Pain Levels:   Restin    With Activity:  0    Subjective/Patient Goal: "I need help"    History of Present Illness:  Pt is a pleasant, active, aphasic 49 y o  male seen for OT eval s/p referred to [address] [place] [address] w/ past medical history of untreated hypertension, hyperlipidemia, and tobacco abuse who presented to [facility]  Robyn Loya "[facility]" [facility] ED 2* 1 week of progressive decreased vision in his left eye and right-sided facial droop, was found to have large cystic mass in the left hemisphere with mild edema calcification and 5 mm rightward midline shift on head CT  He was transferred to [facility] for neurosurgery evaluation  Labwork was unremarkable at that time  In the ED he was found to be hypertensive to 171/103  Patient had not seen a physician for 5 years previously and was not current with health maintenance (i e  Colonoscopy)   Neurosurgery elected to proceed with left frontal craniotomy and resection of mass with placement of ALMA drain, performed       CT chest abdomen pelvis: multiple met masses, MRI brain showed multiple intracranial enhancing lesions with a dominant cystic lesion noted in the deep left inferior frontal lobe, with stable 5 mm midline shift  MRI abdomen confirmed 1 3 x 1 2 cm right and 1 5 x 1 8 cm left adrenal nodules distant with adenomas  Currently dx'd w/ brain mass, HTN, ETOH dependence, lung mass, HLD, s/p L frontal craniotomy w/ resection of mass 9/1, comorbidities as listed above  Lifestyle Performance Model:  Autonomy: Pt was I w/ I/ADLS, drove, & required no use of DME PTA  Reciprocal Relationships: Supportive s/o Bryce Carbajal pt lives w/ providing care since sx  Service to Others: Pt is employed full time for [employer], 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     Intrinsic Gratification: Enjoys being outside, working  Home Setup: Pt lives in [place] w/ s/o Bryce Carbajal in a home  Objective  Impairments Section:  Cognition:  1  Cognitive Checklist:  *Patient indicated that he is experiencing difficulties in the following areas:    · Memory: Remembering what people have told you and Keeping track of your appointments    · Attention: Keeping your attention/concentrating on a task, Dividing your attention (i e , multi-tasking) and Losing your train of thought    · Processing: Processing new information , Following directions and Responding to questions in a timely manner     · Executive Functions: Planning daily tasks and Initiating/starting tasks    · Communication: Word finding in conversation, Expressing thoughts and ideas fluently and Expressing thoughts and ideas into writing    · Visual: Losing spot on the page when reading, Change in handwriting (i e , sloppier) and Eye strain/fatigue when reading    · Emotional: Increased anxiety, Increased depression and Easily agitated or irritable    · Increased Sensitivities to: Crowds or busy environments    2   Nacho Cognitive Assessment (MoCA)    SCORE DEFICIT RANGE Comments COGNITIVE FXN                    MOCA (8 1)         Education Level > 12 years     Visuospatial/executive functioning 4/5     Naming 1/3     Memory: 1st trial: 0/5     Memory: 2nd trial: 0/5     Attention/concentration 1/2     List of letters:  0/1     Serial Seven Subtraction: 2/3     Language/sentence repetition: 0/2     Language Fluency:  0/1     Abstract/Correlational Thinkin/2     Delayed Recall: 0/5     Orientation: 6/6     Memory Index Score 0/15                                        Total Score 14/30 Severe neurocognitive impairments             Vision:  3  Vision Screen:    R EYE     L EYE Comments                                        VISION SCREEN          CONTACTS  Not present for vision screen        Visual Acuity (near) 20/70 L eye blind    Binocularity (near) orthophoria orthophoria    Binocularity (far) orthophoria orthophoria    [employer] Unable to ID low L exophoria Unable to ID low L exophoria    Convergence  Unable to ID low L exophoria Unable to ID low L exophoria    Romaine String              Alignment  misalignment   misalignment Decreased B/L teaming w/ low L exophoria           Accommodation jerky  jerky     Pursuits jerky in all planes jerky in all planes    Saccades jerky in all planes jerky in all planes    Range of Motion WFL reduced L eye unable to track/scan >75% midline to far L VF   Visual perceptual midline              Midline Shifted to the R of  midline Shifted to the R of  midline            Horizon  @ horizon @ horizon      Psychosocial:  4  Anxiety/Depression:  Pt engaged in the Neuro QOL Anxiety Short Form and Neuro QOL Depression Short Form in order to screen for anxiety and depressive s/s  Pt reported the following:  Anxiety- Short Form:   --used to measure anxious s/s  For scoring purposes, scores of 25+ indicate the threshold for treatment per neurology/SLIM  Score:18/40   Pt most often chose the response rarely when asked about feeling anxious s/s  Depression- Short Form:   --used to measure depressive s/s  For scoring purposes, scores of 25+ indicate the threshold for treatment per neurology/SLIM  Score:21/40  Pt most often chose the response rarely when asked about feeling depressive s/s  Assessment/Plan  Occupational Therapy Skilled Analysis Assessment and Plan of Care:  Pt requires overall mod I for ADLs/self care and mod I for fx'l mobility w/o DME  Pt is currently demonstrating the following occupational deficits: limited 2* expressive/receptive aphasia, word finding/language fluency difficulties, low frustration tolerance, decreased STM/mimmediate delayed recall, direction following, reading comprehension, attention/concentration, working memory, delayed processing, max cues w/ max downgrading for activity completion/engagement, L eye blind, decreased B/L teaming w/ misalignment for convergence w/ decreased B/L teaming and low L exophoria, jerky pursuits, inaccurate saccades w/ dysmetria, shifted to the L of  midline, L eye unable to track/scan from midline to far L VF  Based on the aforementioned OT evaluation, functional performance deficits, and assessments, pt has been identified as a moderate complexity evaluation  Pt to continue to benefit from outpatient skilled OT services to address the following goals 2x/wk Short Term Goals for 4 weeks (10/22/2020), Long Term Goals for 8 weeks (2020), and POC to  w/in 90 days (2020) with special focus on self-care management, pt education,  and VM training, UE strength/coordination as well as motor training to improve above defiicits and enhance overall QOL/function  Of note, pt is starting 2 weeks of radiation tomorrow followed by chemo, also currently private pay pending Medicaid   Pt and s/o report PT to place on hold pending completion of treatments, pt and s/o interested in pursuing OT/SLP depending on how pt responds to radiation, agreeable for 2x/wk for 4 weeks private pay pending Medicaid  Pt's s/o to call tomorrow after radiation to schedule  Pt also blind in L eye for now pending swelling to diminish per s/o per neurosx, "they said after a month we'll know if it comes back or not" requesting to work on vision and cog in OT  This OTD requested s/o update OT after f/u w/ neurosx to determine vision recovery potential for the OT team to know to pursue rehab vs compensatory strategies      Goals:  Short Term Goals: (4 weeks)  Cognition:  - Pt will increase auditory processing to take notes while listening in multi-modal environment symptom free at baseline performance for improved role performance, once returned as applicable  - Pt will increase attention to 2+ tasks for improved role performance (once returned) and engagement in salient tasks as applicable  - Pt will increase temporal awareness for keeping to schedule within 5 min increments, recall appointments, functional addition of time with 80% accuracy  - Pt will increase verbal and written direction following with processing time of <2 min and 80% accuracy for improved role performance, once returned as applicable  - Pt will demo good carryover of internal and external memory aides for improved recall of daily events, improved executive functioning with 80% accuracy  - Pt will increase insight into deficits for improved carryover of recommendations/ accommodations  Vision:  - Pt will increase oculomotor control for improved saccades, con/divergent tasks for improved reading, board to table tasks with minimal increase in symptoms 4 weeks  - Pt will tolerate multi-modal envt x 15 min with 80% accuracy of cog load 4 weeks  Long Term Goals: (8 weeks)  Cognition:  - Pt will increase attention to 3+ tasks for improved divided attention with occupational roles and pre driving roles as applicable  - Pt will increase verbal and written direction following with processing time of <1 min and 100% accuracy  - Pt will demo with decreased anxiety and frustration for improved insight into process and rate of recovery  Vision:  - Pt will increase oculomotor control for improved dynamic activities with head turns, board/screen to table tasks symptom free, improved VMI and return to baseline handwriting  - Pt will increase oculomotor control for WNL saccades, con/divergent tasks symptom free    INTERVENTION COMMENTS:  Diagnosis:  L frontal craniotomy w/ resection of mass 9/1  Precautions: seizure precautions post op  FOTO: 79 with 21% limitation memory  Insurance: None Private Pay  1 of _ visits, PN due 10/22/2020    Thank you for the consult!   Please call if you have any questions: [phone]  DARIAN Osborne, MALIKA, OTR/L, C-GCM, CSRS, CBIS  Director of Outpatient Neuro Occupational Therapy

## 2020-09-23 PROCEDURE — 77331 SPECIAL RADIATION DOSIMETRY: CPT | Performed by: RADIOLOGY

## 2020-09-23 PROCEDURE — 77280 THER RAD SIMULAJ FIELD SMPL: CPT | Performed by: RADIOLOGY

## 2020-09-23 PROCEDURE — 77412 RADIATION TX DELIVERY LVL 3: CPT | Performed by: RADIOLOGY

## 2020-09-24 ENCOUNTER — CONSULT (OUTPATIENT)
Dept: HEMATOLOGY ONCOLOGY | Facility: CLINIC | Age: 55
End: 2020-09-24
Payer: COMMERCIAL

## 2020-09-24 VITALS
SYSTOLIC BLOOD PRESSURE: 130 MMHG | TEMPERATURE: 98.7 F | HEART RATE: 73 BPM | WEIGHT: 176.6 LBS | OXYGEN SATURATION: 97 % | RESPIRATION RATE: 16 BRPM | BODY MASS INDEX: 25.28 KG/M2 | HEIGHT: 70 IN | DIASTOLIC BLOOD PRESSURE: 82 MMHG

## 2020-09-24 DIAGNOSIS — C79.31 BRAIN METASTASES (HCC): ICD-10-CM

## 2020-09-24 DIAGNOSIS — C34.32 SMALL CELL LUNG CANCER, LEFT LOWER LOBE (HCC): Primary | ICD-10-CM

## 2020-09-24 PROCEDURE — 99245 OFF/OP CONSLTJ NEW/EST HI 55: CPT | Performed by: INTERNAL MEDICINE

## 2020-09-24 PROCEDURE — 77412 RADIATION TX DELIVERY LVL 3: CPT | Performed by: RADIOLOGY

## 2020-09-24 RX ORDER — DEXAMETHASONE 2 MG/1
2 TABLET ORAL 2 TIMES DAILY WITH MEALS
COMMUNITY
End: 2020-09-29 | Stop reason: SDUPTHER

## 2020-09-24 NOTE — PROGRESS NOTES
Hematology/Oncology Outpatient Follow-up  Ren Sandoval 49 y o  male 1965 [id number]    Date:  9/24/2020        Assessment and Plan:  1  Small cell lung cancer, left lower lobe (HCC)  The patient and his girlfriend and cousin were all educated about the extensive stage small cell lung cancer with significant metastatic spread to the brain status post resection of the largest mass of the left frontal lobe  The patient was told that his disease in a unfortunately most likely  noncurable but treatable  The patient is currently getting radiation treatment to the brain which will be completed most likely on the 7th of October  Subsequently, he will be started on combination of chemotherapy and immunotherapy with carboplatin/etoposide and durvalumab on every 3 week basis  Prior to initiating the treatment he will get a PET-CT scan as a baseline imaging study and a Port-A-Cath  We will also send the patient to the palliative care team for symptom management since he seems to be very depressed and have some pain in his leg  We will monitor the patient closely on every 3 week basis  - Comprehensive metabolic panel; Future  - CBC and differential; Future  - Magnesium  - LD,Blood; Future  - NM PET CT skull base to mid thigh; Future  - Ambulatory referral to Palliative Care; Future  - Ambulatory referral to Interventional Radiology; Future    2  Brain metastases [facility])  He is currently getting radiation treatment to the brain on a daily basis for a total of 10 sessions  The immuno therapy and chemotherapy combination will be started around the 12 of October with cycle 1  HPI:  This is a 81-year-old male with history of COPD, hypertension, tobacco abuse, etc   The patient presented initially to the emergency room with loss of vision in the left eye with numbness on the left face    He was then evaluated with a CT scan of the head on 08/30/2020 which showed large cystic mass in the left cerebral hemisphere with surrounding edema  CT scan of the chest abdomen pelvis with contrast was then done on 08/30/2020 which showed:  IMPRESSION:     There is an approximately 4 x 4 x 3 cm mass in the mid to lower left hilum which extends into the left lower lobe of the lung and the left lingula  Nearby satellite masses and nodules are present  The appearance is highly suggestive of malignant   neoplasm  Pulmonology consultation and Oncology consultation is recommended      There appears to be some hypodensity within some of the central left pulmonary artery branches  Pulmonary embolism and/or tumor thrombus should be excluded  Dedicated CT angiogram/PE protocol CTA of the chest is recommended for further evaluation      Bilateral adrenal masses, each measuring approximately 2 cm  MRI is recommended for further characterization, if there are no contraindications      The urinary bladder wall appears thickened  This is most pronounced anteriorly  Clinical correlation, laboratory correlation and follow-up is recommended    The prostate is mildly prominent  Clinical and laboratory correlation is recommended  Mild emphysema  Other findings as described above, please see discussion  MRI of the brain was done on 08/31/2020 which showed multiple intracranial enhancing lesions without dominant cystic lesion in the deep left inferior frontal lobe with the multiple lesions with suspicious primary pulmonary neoplasm  He also had an MRI of the abdomen for further evaluation of the adrenal glands this showed bilateral adenomas  The patient then had craniotomy and resection of the left frontal mass on 09/01/2020 which came back compatible with high-grade neuroendocrine tumor compatible with metastatic small cell lung cancer primary  The patient is currently on 2 mg of Decadron twice a day and started radiation treatment to the brain on the 23rd of September  He currently has very difficult time expressing himself    He only can answer questions with yes and no  The patient came in today with his girlfriend and cousin to discuss treatment options for his extensive stage small cell lung cancer  He seems to be depressed  Oncology History   Brain metastases ([facility] )   9/1/2020 Initial Diagnosis    Brain metastases ([facility] )     9/1/2020 Surgery    Left frontal CRANIOTOMY IMAGE-GUIDED FOR TUMOR (Left)  Surgeon: Dr Tammi Hurd, left frontal mass:  -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 , 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             Interval history:    ROS: Review of Systems   Constitutional: Positive for fatigue  Negative for appetite change, diaphoresis and fever  HENT: Positive for mouth sores and trouble swallowing  Negative for congestion, dental problem, facial swelling, hearing loss and tinnitus  Eyes: Negative for visual disturbance  Respiratory: Positive for cough and shortness of breath  Negative for chest tightness and wheezing  Cardiovascular: Negative for chest pain and leg swelling  Gastrointestinal: Positive for constipation  Negative for abdominal distention, abdominal pain, blood in stool, diarrhea, nausea and vomiting  Genitourinary: Negative for dysuria, hematuria and urgency  Musculoskeletal: Negative for arthralgias, myalgias and neck pain  Skin: Negative  Negative for color change, pallor, rash and wound  Neurological: Positive for headaches  Negative for dizziness, weakness and numbness  Hematological: Negative for adenopathy  Psychiatric/Behavioral: Negative for agitation, behavioral problems, confusion, hallucinations, self-injury and sleep disturbance  The patient is not nervous/anxious and is not hyperactive          Past Medical History:   Diagnosis Date    Brain cancer ([facility] )     COPD (chronic obstructive pulmonary disease) ([place] [address] )     Hypertension     Lung cancer [facility])        Past Surgical History:   Procedure Laterality Date    BILATERAL KNEE ARTHROSCOPY Bilateral     CRANIOTOMY Left 9/1/2020    Procedure: Left frontal CRANIOTOMY IMAGE-GUIDED FOR TUMOR;  Surgeon: Dain Umana MD;  [facility]: [facility];  Service: Neurosurgery    HAND SURGERY Left        Social History     Socioeconomic History    Marital status: Single     Spouse name: None    Number of children: None    Years of education: None    Highest education level: None   Occupational History    None   Social Needs    Financial resource strain: None    Food insecurity     Worry: None     Inability: None    Transportation needs     Medical: None     Non-medical: None   Tobacco Use    Smoking status: Current Every Day Smoker     Packs/day: 1 50    Smokeless tobacco: Never Used    Tobacco comment: was smoking 2-3 packs/day   Substance and Sexual Activity    Alcohol use: Not Currently     Frequency: 4 or more times a week     Binge frequency: Daily or almost daily     Comment: Not at present    Drug use: Never    Sexual activity: None   Lifestyle    Physical activity     Days per week: None     Minutes per session: None    Stress: None   Relationships    Social connections     Talks on phone: None     Gets together: None     Attends Tenri[employer] service: None     Active member of club or organization: None     Attends meetings of clubs or organizations: None     Relationship status: None    Intimate partner violence     Fear of current or ex partner: None     Emotionally abused: None     Physically abused: None     Forced sexual activity: None   Other Topics Concern    None   Social History Narrative    None       Family History   Problem Relation Age of Onset    Lymphoma Mother        No Known Allergies      Current Outpatient Medications:     albuterol (PROVENTIL HFA,VENTOLIN HFA) 90 mcg/act inhaler, Inhale 2 puffs every 6 (six) hours as needed for wheezing, Disp: , Rfl:     atorvastatin (LIPITOR) 40 mg tablet, Take 1 tablet (40 mg total) by mouth daily with dinner, Disp: 30 tablet, Rfl: 0    dexamethasone (DECADRON) 2 mg tablet, Take 2 mg by mouth, Disp: , Rfl:     folic acid (FOLVITE) 1 mg tablet, Take 1 tablet (1 mg total) by mouth daily for 29 days, Disp: 29 tablet, Rfl: 0    lisinopril (ZESTRIL) 10 mg tablet, Take 1 tablet (10 mg total) by mouth daily, Disp: 30 tablet, Rfl: 0    thiamine 100 MG tablet, Take 1 tablet (100 mg total) by mouth daily, Disp: 30 tablet, Rfl: 0    levETIRAcetam (KEPPRA) 500 mg tablet, Take 1 tablet (500 mg total) by mouth every 12 (twelve) hours for 14 doses, Disp: 14 tablet, Rfl: 0    methocarbamol (ROBAXIN) 500 mg tablet, Take 1 tablet (500 mg total) by mouth every 6 (six) hours for 14 days (Patient not taking: Reported on 9/18/2020), Disp: 56 tablet, Rfl: 0      Physical Exam:  /82 (BP Location: Left arm, Patient Position: Sitting, Cuff Size: Adult)   Pulse 73   Temp 98 7 °F (37 1 °C) (Tympanic)   Resp 16   Ht 5' 10" (1 778 m)   Wt 80 1 kg (176 lb 9 6 oz)   SpO2 97%   BMI 25 34 kg/m²     Physical Exam  Constitutional:       Appearance: He is well-developed  HENT:      Head: Normocephalic  Comments: Status post craniotomy in the left frontal lobe, the wound is well-healed  Eyes:      General: No scleral icterus  Right eye: No discharge  Left eye: No discharge  Conjunctiva/sclera: Conjunctivae normal       Comments: 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  Neck:      Musculoskeletal: Normal range of motion and neck supple  Thyroid: No thyromegaly  Trachea: No tracheal deviation  Cardiovascular:      Rate and Rhythm: Normal rate and regular rhythm  Heart sounds: Normal heart sounds  No murmur  No friction rub  Pulmonary:      Effort: Pulmonary effort is normal  No respiratory distress  Breath sounds: Wheezing (Bilaterally) and rhonchi present  No rales  Chest:      Chest wall: No tenderness     Abdominal:      General: Bowel sounds are normal  There is no distension  Palpations: Abdomen is soft  There is no hepatomegaly, splenomegaly or mass  Tenderness: There is no abdominal tenderness  There is no guarding or rebound  Musculoskeletal: Normal range of motion  General: No tenderness or deformity  Lymphadenopathy:      Cervical: No cervical adenopathy  Skin:     General: Skin is warm and dry  Coloration: Skin is not pale  Findings: No erythema or rash  Neurological:      Mental Status: He is alert and oriented to person, place, and time  Cranial Nerves: No cranial nerve deficit  Coordination: Coordination normal       Deep Tendon Reflexes: Reflexes are normal and symmetric  Reflexes normal    Psychiatric:         Behavior: Behavior normal          Thought Content: Thought content normal          Judgment: Judgment normal            Labs:  Lab Results   Component Value Date    WBC 14 07 (H) 09/04/2020    HGB 14 2 09/04/2020    HCT 42 0 09/04/2020    MCV 93 09/04/2020     09/04/2020     Lab Results   Component Value Date    K 3 9 09/03/2020     (H) 09/03/2020    CO2 27 09/03/2020    BUN 16 09/03/2020    CREATININE 0 60 09/03/2020    CALCIUM 8 5 09/03/2020    AST 16 08/31/2020    ALT 30 08/31/2020    ALKPHOS 84 08/31/2020    EGFR 115 09/03/2020     No results found for: TSH    Patient voiced understanding and agreement in the above discussion  Aware to contact our office with questions/symptoms in the interim

## 2020-09-25 ENCOUNTER — DOCUMENTATION (OUTPATIENT)
Dept: INFUSION CENTER | Facility: CLINIC | Age: 55
End: 2020-09-25

## 2020-09-25 ENCOUNTER — APPOINTMENT (OUTPATIENT)
Dept: LAB | Facility: MEDICAL CENTER | Age: 55
End: 2020-09-25
Payer: COMMERCIAL

## 2020-09-25 ENCOUNTER — TELEPHONE (OUTPATIENT)
Dept: RADIATION ONCOLOGY | Facility: CLINIC | Age: 55
End: 2020-09-25

## 2020-09-25 ENCOUNTER — PREP FOR PROCEDURE (OUTPATIENT)
Dept: INTERVENTIONAL RADIOLOGY/VASCULAR | Facility: CLINIC | Age: 55
End: 2020-09-25

## 2020-09-25 DIAGNOSIS — C79.31 BRAIN METASTASES (HCC): Primary | ICD-10-CM

## 2020-09-25 DIAGNOSIS — C34.92 MALIGNANT NEOPLASM OF LEFT LUNG, UNSPECIFIED PART OF LUNG (HCC): Primary | ICD-10-CM

## 2020-09-25 DIAGNOSIS — C34.32 SMALL CELL LUNG CANCER, LEFT LOWER LOBE (HCC): ICD-10-CM

## 2020-09-25 DIAGNOSIS — C79.31 BRAIN METASTASES (HCC): ICD-10-CM

## 2020-09-25 LAB
ALBUMIN SERPL BCP-MCNC: 3.6 G/DL (ref 3.5–5)
ALP SERPL-CCNC: 101 U/L (ref 46–116)
ALT SERPL W P-5'-P-CCNC: 38 U/L (ref 12–78)
ANION GAP SERPL CALCULATED.3IONS-SCNC: 7 MMOL/L (ref 4–13)
AST SERPL W P-5'-P-CCNC: 12 U/L (ref 5–45)
BASOPHILS # BLD AUTO: 0.03 THOUSANDS/ΜL (ref 0–0.1)
BASOPHILS NFR BLD AUTO: 0 % (ref 0–1)
BILIRUB SERPL-MCNC: 0.53 MG/DL (ref 0.2–1)
BUN SERPL-MCNC: 15 MG/DL (ref 5–25)
CALCIUM SERPL-MCNC: 9.3 MG/DL (ref 8.3–10.1)
CHLORIDE SERPL-SCNC: 106 MMOL/L (ref 100–108)
CO2 SERPL-SCNC: 28 MMOL/L (ref 21–32)
CREAT SERPL-MCNC: 0.74 MG/DL (ref 0.6–1.3)
EOSINOPHIL # BLD AUTO: 0.08 THOUSAND/ΜL (ref 0–0.61)
EOSINOPHIL NFR BLD AUTO: 1 % (ref 0–6)
ERYTHROCYTE [DISTWIDTH] IN BLOOD BY AUTOMATED COUNT: 12.8 % (ref 11.6–15.1)
GFR SERPL CREATININE-BSD FRML MDRD: 105 ML/MIN/1.73SQ M
GLUCOSE P FAST SERPL-MCNC: 95 MG/DL (ref 65–99)
HCT VFR BLD AUTO: 48.3 % (ref 36.5–49.3)
HGB BLD-MCNC: 16.6 G/DL (ref 12–17)
IMM GRANULOCYTES # BLD AUTO: 0.11 THOUSAND/UL (ref 0–0.2)
IMM GRANULOCYTES NFR BLD AUTO: 1 % (ref 0–2)
LDH SERPL-CCNC: 226 U/L (ref 81–234)
LYMPHOCYTES # BLD AUTO: 1.51 THOUSANDS/ΜL (ref 0.6–4.47)
LYMPHOCYTES NFR BLD AUTO: 12 % (ref 14–44)
MAGNESIUM SERPL-MCNC: 2.5 MG/DL (ref 1.6–2.6)
MCH RBC QN AUTO: 32.2 PG (ref 26.8–34.3)
MCHC RBC AUTO-ENTMCNC: 34.4 G/DL (ref 31.4–37.4)
MCV RBC AUTO: 94 FL (ref 82–98)
MONOCYTES # BLD AUTO: 0.75 THOUSAND/ΜL (ref 0.17–1.22)
MONOCYTES NFR BLD AUTO: 6 % (ref 4–12)
NEUTROPHILS # BLD AUTO: 10.42 THOUSANDS/ΜL (ref 1.85–7.62)
NEUTS SEG NFR BLD AUTO: 80 % (ref 43–75)
NRBC BLD AUTO-RTO: 0 /100 WBCS
PLATELET # BLD AUTO: 163 THOUSANDS/UL (ref 149–390)
PMV BLD AUTO: 11.3 FL (ref 8.9–12.7)
POTASSIUM SERPL-SCNC: 4.6 MMOL/L (ref 3.5–5.3)
PROT SERPL-MCNC: 6.4 G/DL (ref 6.4–8.2)
RBC # BLD AUTO: 5.15 MILLION/UL (ref 3.88–5.62)
SODIUM SERPL-SCNC: 141 MMOL/L (ref 136–145)
WBC # BLD AUTO: 12.9 THOUSAND/UL (ref 4.31–10.16)

## 2020-09-25 PROCEDURE — 85025 COMPLETE CBC W/AUTO DIFF WBC: CPT

## 2020-09-25 PROCEDURE — U0003 INFECTIOUS AGENT DETECTION BY NUCLEIC ACID (DNA OR RNA); SEVERE ACUTE RESPIRATORY SYNDROME CORONAVIRUS 2 (SARS-COV-2) (CORONAVIRUS DISEASE [COVID-19]), AMPLIFIED PROBE TECHNIQUE, MAKING USE OF HIGH THROUGHPUT TECHNOLOGIES AS DESCRIBED BY CMS-2020-01-R: HCPCS | Performed by: RADIOLOGY

## 2020-09-25 PROCEDURE — 83615 LACTATE (LD) (LDH) ENZYME: CPT

## 2020-09-25 PROCEDURE — 77412 RADIATION TX DELIVERY LVL 3: CPT | Performed by: RADIOLOGY

## 2020-09-25 PROCEDURE — 36415 COLL VENOUS BLD VENIPUNCTURE: CPT

## 2020-09-25 PROCEDURE — 80053 COMPREHEN METABOLIC PANEL: CPT

## 2020-09-25 PROCEDURE — 83735 ASSAY OF MAGNESIUM: CPT | Performed by: INTERNAL MEDICINE

## 2020-09-25 NOTE — SOCIAL WORK
LSW received DT and problem list via email  Pt self scored 7/10 and noted concerns with bowel, fatigue, mouth sores, and nausea  LSW attempted contact with pt, no answer  LSW provided contact information and encouraged contact

## 2020-09-26 LAB — SARS-COV-2 RNA SPEC QL NAA+PROBE: NOT DETECTED

## 2020-09-28 ENCOUNTER — DOCUMENTATION (OUTPATIENT)
Dept: INFUSION CENTER | Facility: CLINIC | Age: 55
End: 2020-09-28

## 2020-09-28 DIAGNOSIS — G93.89 BRAIN MASS: Primary | ICD-10-CM

## 2020-09-28 PROCEDURE — 77412 RADIATION TX DELIVERY LVL 3: CPT | Performed by: RADIOLOGY

## 2020-09-28 NOTE — TELEPHONE ENCOUNTER
Callback received from [facility]) she states they have enough until Friday  Will await a response from Dr Devika Guerrero and let her know the update

## 2020-09-28 NOTE — TELEPHONE ENCOUNTER
Received a call from Janessa Eller requesting refill of Servando's dexamethasone  Review of chart shows at [address] on 9/16/2020 was directed to continue on dexa but unsure if we will continue to manage this or if it will be taken over by oncology  Will communicate with Dr Jordan Nunez to confirm and contact them back to advise

## 2020-09-28 NOTE — SOCIAL WORK
LSW returned call to pts Livia Ulloa stated she is overwhelmed with completing the needed paper work for state benefits and medical insurance  LSW utilized active listening  And normalization techniques during this conversation  Maddisonjas Ulloa stated pt does not speak to her of his illness or concerns which makes her feel letf out  LSW allowed time for Maddisonjas Dennisdarwin to voice her concerns and worries  Ned Ulloa thanked LSW for the time spent  LSW provided contact information and encouraged they call with needs and concerns

## 2020-09-29 ENCOUNTER — OFFICE VISIT (OUTPATIENT)
Dept: HEMATOLOGY ONCOLOGY | Facility: CLINIC | Age: 55
End: 2020-09-29
Payer: COMMERCIAL

## 2020-09-29 VITALS — WEIGHT: 177.1 LBS | HEIGHT: 66 IN | BODY MASS INDEX: 28.46 KG/M2

## 2020-09-29 DIAGNOSIS — C34.32 SMALL CELL LUNG CANCER, LEFT LOWER LOBE (HCC): Primary | ICD-10-CM

## 2020-09-29 DIAGNOSIS — C79.31 BRAIN METASTASES (HCC): Primary | ICD-10-CM

## 2020-09-29 DIAGNOSIS — Z71.6 ENCOUNTER FOR SMOKING CESSATION COUNSELING: ICD-10-CM

## 2020-09-29 DIAGNOSIS — C79.31 BRAIN METASTASES (HCC): ICD-10-CM

## 2020-09-29 DIAGNOSIS — C34.32 SMALL CELL LUNG CANCER, LEFT LOWER LOBE (HCC): ICD-10-CM

## 2020-09-29 PROCEDURE — 77336 RADIATION PHYSICS CONSULT: CPT | Performed by: RADIOLOGY

## 2020-09-29 PROCEDURE — 99211 OFF/OP EST MAY X REQ PHY/QHP: CPT

## 2020-09-29 PROCEDURE — 77412 RADIATION TX DELIVERY LVL 3: CPT | Performed by: RADIOLOGY

## 2020-09-29 RX ORDER — PANTOPRAZOLE SODIUM 40 MG/1
40 TABLET, DELAYED RELEASE ORAL DAILY
Qty: 30 TABLET | Refills: 11 | Status: SHIPPED | OUTPATIENT
Start: 2020-09-29

## 2020-09-29 RX ORDER — DEXAMETHASONE 2 MG/1
2 TABLET ORAL 2 TIMES DAILY WITH MEALS
Qty: 28 TABLET | Refills: 0 | Status: SHIPPED | OUTPATIENT
Start: 2020-09-29 | End: 2020-01-01 | Stop reason: SDUPTHER

## 2020-09-29 RX ORDER — NICOTINE 21-14-7MG
KIT TRANSDERMAL
Qty: 1 EACH | Refills: 0 | Status: SHIPPED | OUTPATIENT
Start: 2020-09-29 | End: 2021-01-01 | Stop reason: SDUPTHER

## 2020-09-29 RX ORDER — ONDANSETRON HYDROCHLORIDE 8 MG/1
8 TABLET, FILM COATED ORAL EVERY 8 HOURS PRN
Qty: 20 TABLET | Refills: 3 | Status: SHIPPED | OUTPATIENT
Start: 2020-09-29

## 2020-09-29 NOTE — PROGRESS NOTES
Pt and his significant other Torres Reina here for chemo teaching on Durvalumab, Carboplatin and Etoposide  Spoke to them regarding possible addition of Udenyca  Pt still having whole brain radiation and is S/P craniotomy  Pt still smoking and does not appear to be eager to stop smoking  Torres Reina is requesting Rx for smoking cessation  Rx for Zofran, Protonix and Nicotine patch sent in to local pharmacy  Pt is working with [employer] to obtain insurance  Chemo and blood consent signed  Torres Reina asked apropiate questions, pt still having vision problems with left eye and his eyelid was drooping  Pt still on steroids and instructed him to speak to prescribing Dr for that

## 2020-09-29 NOTE — TELEPHONE ENCOUNTER
Contacted [facility] to advise of response received from Dr Osman Barrett  Patient has appt with Rad onc on Friday 10/2/2020  Placed refill order for 2 weeks worth of medication  Left her a message advising to call back with questions and that rx will be eRx to pharmacy on file  Encouraged to call back with questions

## 2020-09-30 ENCOUNTER — HOSPITAL ENCOUNTER (OUTPATIENT)
Dept: RADIOLOGY | Age: 55
Discharge: HOME | End: 2020-09-30
Payer: COMMERCIAL

## 2020-09-30 DIAGNOSIS — C34.32 SMALL CELL LUNG CANCER, LEFT LOWER LOBE (HCC): ICD-10-CM

## 2020-09-30 LAB — GLUCOSE SERPL-MCNC: 91 MG/DL (ref 65–140)

## 2020-09-30 PROCEDURE — 77412 RADIATION TX DELIVERY LVL 3: CPT | Performed by: RADIOLOGY

## 2020-09-30 PROCEDURE — 77417 THER RADIOLOGY PORT IMAGE(S): CPT | Performed by: RADIOLOGY

## 2020-09-30 PROCEDURE — 78815 PET IMAGE W/CT SKULL-THIGH: CPT

## 2020-09-30 PROCEDURE — A9552 F18 FDG: HCPCS

## 2020-09-30 PROCEDURE — G1004 CDSM NDSC: HCPCS

## 2020-09-30 PROCEDURE — 82948 REAGENT STRIP/BLOOD GLUCOSE: CPT

## 2020-10-01 ENCOUNTER — APPOINTMENT (OUTPATIENT)
Dept: RADIATION ONCOLOGY | Facility: CLINIC | Age: 55
End: 2020-10-01
Attending: RADIOLOGY
Payer: COMMERCIAL

## 2020-10-01 PROCEDURE — 77412 RADIATION TX DELIVERY LVL 3: CPT | Performed by: RADIOLOGY

## 2020-10-02 ENCOUNTER — APPOINTMENT (OUTPATIENT)
Dept: RADIATION ONCOLOGY | Facility: CLINIC | Age: 55
End: 2020-10-02
Attending: RADIOLOGY
Payer: COMMERCIAL

## 2020-10-02 PROCEDURE — 77412 RADIATION TX DELIVERY LVL 3: CPT | Performed by: RADIOLOGY

## 2020-10-05 ENCOUNTER — TELEPHONE (OUTPATIENT)
Dept: RADIOLOGY | Facility: HOSPITAL | Age: 55
End: 2020-10-05

## 2020-10-05 ENCOUNTER — APPOINTMENT (OUTPATIENT)
Dept: RADIATION ONCOLOGY | Facility: CLINIC | Age: 55
End: 2020-10-05
Attending: RADIOLOGY
Payer: COMMERCIAL

## 2020-10-05 PROCEDURE — 77412 RADIATION TX DELIVERY LVL 3: CPT | Performed by: RADIOLOGY

## 2020-10-05 RX ORDER — CEFAZOLIN SODIUM 2 G/50ML
2000 SOLUTION INTRAVENOUS ONCE
Status: CANCELLED | OUTPATIENT
Start: 2020-10-05

## 2020-10-05 RX ORDER — SODIUM CHLORIDE 9 MG/ML
75 INJECTION, SOLUTION INTRAVENOUS CONTINUOUS
Status: CANCELLED | OUTPATIENT
Start: 2020-10-05

## 2020-10-06 ENCOUNTER — APPOINTMENT (OUTPATIENT)
Dept: RADIATION ONCOLOGY | Facility: CLINIC | Age: 55
End: 2020-10-06
Attending: RADIOLOGY
Payer: COMMERCIAL

## 2020-10-06 ENCOUNTER — TELEPHONE (OUTPATIENT)
Dept: RADIOLOGY | Facility: HOSPITAL | Age: 55
End: 2020-10-06

## 2020-10-06 DIAGNOSIS — C79.31 BRAIN METASTASES (HCC): Primary | ICD-10-CM

## 2020-10-06 PROCEDURE — 77412 RADIATION TX DELIVERY LVL 3: CPT | Performed by: RADIOLOGY

## 2020-10-06 PROCEDURE — 77336 RADIATION PHYSICS CONSULT: CPT | Performed by: RADIOLOGY

## 2020-10-07 ENCOUNTER — DOCUMENTATION (OUTPATIENT)
Dept: HEMATOLOGY ONCOLOGY | Facility: CLINIC | Age: 55
End: 2020-10-07

## 2020-10-07 ENCOUNTER — TELEPHONE (OUTPATIENT)
Dept: SURGERY | Facility: HOSPITAL | Age: 55
End: 2020-10-07

## 2020-10-07 ENCOUNTER — TELEPHONE (OUTPATIENT)
Dept: HEMATOLOGY ONCOLOGY | Facility: CLINIC | Age: 55
End: 2020-10-07

## 2020-10-07 DIAGNOSIS — C34.32 SMALL CELL LUNG CANCER, LEFT LOWER LOBE (HCC): Primary | ICD-10-CM

## 2020-10-07 DIAGNOSIS — C79.31 BRAIN METASTASES (HCC): ICD-10-CM

## 2020-10-07 RX ORDER — SODIUM CHLORIDE 9 MG/ML
20 INJECTION, SOLUTION INTRAVENOUS ONCE
Status: CANCELLED | OUTPATIENT
Start: 2020-10-12

## 2020-10-08 ENCOUNTER — TELEPHONE (OUTPATIENT)
Dept: NEUROSURGERY | Facility: CLINIC | Age: 55
End: 2020-10-08

## 2020-10-08 ENCOUNTER — HOSPITAL ENCOUNTER (OUTPATIENT)
Dept: RADIOLOGY | Facility: HOSPITAL | Age: 55
Discharge: HOME | End: 2020-10-08
Attending: RADIOLOGY | Admitting: RADIOLOGY
Payer: COMMERCIAL

## 2020-10-08 ENCOUNTER — OFFICE VISIT (OUTPATIENT)
Dept: HEMATOLOGY ONCOLOGY | Facility: CLINIC | Age: 55
End: 2020-10-08
Payer: COMMERCIAL

## 2020-10-08 VITALS
HEIGHT: 66 IN | RESPIRATION RATE: 16 BRPM | OXYGEN SATURATION: 97 % | SYSTOLIC BLOOD PRESSURE: 152 MMHG | DIASTOLIC BLOOD PRESSURE: 90 MMHG | HEART RATE: 62 BPM | BODY MASS INDEX: 29.2 KG/M2 | WEIGHT: 181.7 LBS | TEMPERATURE: 98 F

## 2020-10-08 VITALS
RESPIRATION RATE: 16 BRPM | HEIGHT: 66 IN | BODY MASS INDEX: 29.09 KG/M2 | DIASTOLIC BLOOD PRESSURE: 80 MMHG | WEIGHT: 181 LBS | OXYGEN SATURATION: 97 % | SYSTOLIC BLOOD PRESSURE: 122 MMHG | TEMPERATURE: 97.8 F | HEART RATE: 74 BPM

## 2020-10-08 DIAGNOSIS — C79.31 BRAIN METASTASES (HCC): ICD-10-CM

## 2020-10-08 DIAGNOSIS — C34.32 SMALL CELL LUNG CANCER, LEFT LOWER LOBE (HCC): Primary | ICD-10-CM

## 2020-10-08 DIAGNOSIS — C34.92 MALIGNANT NEOPLASM OF LEFT LUNG, UNSPECIFIED PART OF LUNG (HCC): ICD-10-CM

## 2020-10-08 PROCEDURE — 76937 US GUIDE VASCULAR ACCESS: CPT | Performed by: RADIOLOGY

## 2020-10-08 PROCEDURE — C1788 PORT, INDWELLING, IMP: HCPCS

## 2020-10-08 PROCEDURE — 76937 US GUIDE VASCULAR ACCESS: CPT

## 2020-10-08 PROCEDURE — 99152 MOD SED SAME PHYS/QHP 5/>YRS: CPT | Performed by: RADIOLOGY

## 2020-10-08 PROCEDURE — 99153 MOD SED SAME PHYS/QHP EA: CPT

## 2020-10-08 PROCEDURE — 99152 MOD SED SAME PHYS/QHP 5/>YRS: CPT

## 2020-10-08 PROCEDURE — 77001 FLUOROGUIDE FOR VEIN DEVICE: CPT | Performed by: RADIOLOGY

## 2020-10-08 PROCEDURE — 36561 INSERT TUNNELED CV CATH: CPT

## 2020-10-08 PROCEDURE — 36561 INSERT TUNNELED CV CATH: CPT | Performed by: RADIOLOGY

## 2020-10-08 PROCEDURE — C1894 INTRO/SHEATH, NON-LASER: HCPCS

## 2020-10-08 PROCEDURE — 99214 OFFICE O/P EST MOD 30 MIN: CPT | Performed by: NURSE PRACTITIONER

## 2020-10-08 RX ORDER — CEFAZOLIN SODIUM 2 G/50ML
2000 SOLUTION INTRAVENOUS ONCE
Status: COMPLETED | OUTPATIENT
Start: 2020-10-08 | End: 2020-10-08

## 2020-10-08 RX ORDER — FENTANYL CITRATE 50 UG/ML
INJECTION, SOLUTION INTRAMUSCULAR; INTRAVENOUS CODE/TRAUMA/SEDATION MEDICATION
Status: COMPLETED | OUTPATIENT
Start: 2020-10-08 | End: 2020-10-08

## 2020-10-08 RX ORDER — SODIUM CHLORIDE 9 MG/ML
75 INJECTION, SOLUTION INTRAVENOUS CONTINUOUS
Status: DISCONTINUED | OUTPATIENT
Start: 2020-10-08 | End: 2020-10-09 | Stop reason: HOSPADM

## 2020-10-08 RX ORDER — MIDAZOLAM HYDROCHLORIDE 2 MG/2ML
INJECTION, SOLUTION INTRAMUSCULAR; INTRAVENOUS CODE/TRAUMA/SEDATION MEDICATION
Status: COMPLETED | OUTPATIENT
Start: 2020-10-08 | End: 2020-10-08

## 2020-10-08 RX ADMIN — FENTANYL CITRATE 25 MCG: 50 INJECTION INTRAMUSCULAR; INTRAVENOUS at 13:24

## 2020-10-08 RX ADMIN — MIDAZOLAM 1 MG: 1 INJECTION INTRAMUSCULAR; INTRAVENOUS at 13:24

## 2020-10-08 RX ADMIN — FENTANYL CITRATE 25 MCG: 50 INJECTION INTRAMUSCULAR; INTRAVENOUS at 13:15

## 2020-10-08 RX ADMIN — MIDAZOLAM 1 MG: 1 INJECTION INTRAMUSCULAR; INTRAVENOUS at 13:36

## 2020-10-08 RX ADMIN — SODIUM CHLORIDE 75 ML/HR: 0.9 INJECTION, SOLUTION INTRAVENOUS at 11:52

## 2020-10-08 RX ADMIN — MIDAZOLAM 1 MG: 1 INJECTION INTRAMUSCULAR; INTRAVENOUS at 13:15

## 2020-10-08 RX ADMIN — CEFAZOLIN SODIUM 2000 MG: 2 SOLUTION INTRAVENOUS at 12:31

## 2020-10-08 RX ADMIN — FENTANYL CITRATE 50 MCG: 50 INJECTION INTRAMUSCULAR; INTRAVENOUS at 13:34

## 2020-10-09 RX ORDER — SODIUM CHLORIDE 9 MG/ML
20 INJECTION, SOLUTION INTRAVENOUS ONCE
Status: CANCELLED | OUTPATIENT
Start: 2020-10-14

## 2020-10-09 RX ORDER — SODIUM CHLORIDE 9 MG/ML
20 INJECTION, SOLUTION INTRAVENOUS ONCE
Status: CANCELLED | OUTPATIENT
Start: 2020-10-13

## 2020-10-09 NOTE — UTILIZATION REVIEW
URGENT/EMERGENT  INPATIENT/SPU AUTHORIZATION REQUEST    Date: 10/09/20            # Pages in this Request:     x New Request   Additional Information for PA#:     Office Contact Name:  Jacquelyn Foster Title: Utilization Review, [place] Nurse     Phone: [phone]  Ext  Availability (Date/Time): Wednesday - Friday 8 am- 4 pm    x Inpatient Review  SPU Review        Current       x Late Pick-up   · How your facility was first notified of the Late Pick-up:  EVS   · When your facility was first notified of the Late Pick-up (date): 10/7/2020         RECIPIENT INFORMATION    Recipient ID#:  [id number]    Recipient Name:  Raquel Santiago         YOB: 1965  49 y o  Recipient Alias:     Gender:  X Male  Female Medicaid Eligibility ([address] [place] [address]): INSURANCE INFORMATION    (All other private or governmental health insurance benefits must be utilized prior to billing the [place] Program)    Was this admission the result of an MVA, other accident, assault, injury, fall, gunshot, bite etc ? Yes x No                   If yes, provide a brief description of the incident  Does the recipient have other insurance coverage? Yes x No        Insurance Company Name/Policy #      Did that insurance pay on this claim? Yes  No        Did that insurance deny this claim? Yes  No    If yes, reason for denial:      Does the recipient have Medicare? Yes x No        Did Medicare exhaust prior to this admission? Yes  No        Did Medicare partially pay this claim? Yes  No        Did that insurance deny this claim? Yes  No    If yes, reason for denial:          Was the recipient a prisoner at the time of admission?   Yes x No            PROVIDER INFORMATION    Hospital Name: [address] [place] [address] Provider ID#: [id number]    [address] [place] [address] Physician Name: Robert Burgess Provider ID#: [id number]        ADMISSION INFORMATION    Type of Admission: (please choose one)     ED     X Direct    If yes, from where? [address] ED      Transfer    If yes, transferring hospital (inpatient, rehab, or psych) Provider Name/Provider ID#: Admission Floor or Unit Type: Me Surg     Dates/Times:        ED Date/Time:         Observation Date/Time:          Admission Date/Time: 8/30/20 1918        Discharge or Transfer Date/Time: 9/4/2020  1:16 PM        DIAGNOSIS/PROCEDURE CODES    Primary Diagnosis Code/Primary Diagnosis Code description:  C79 31  Secondary malignant neoplasm of brain     G93 6  Cerebral edema     G93 5  Compression of brain     C34 90  Malignant neoplasm of unspecified part of unspecified bronchus or lung    Additional Diagnosis Code(s) and Description(s)-(up to three additional codes):    Procedure Code (one) and description:  [id number]  Excision of Brain, Open Approach         CLINICAL INFORMATION - PRIOR ADMISSION ONLY    Is there a prior admission with a discharge date within 30 days of the date of this admission? X No (Proceed to the next section - "Clinical Information - General Review Checklist:)      Yes (Provide the following information)     Prior admission dates:    MA Prior Authorization Number:        Review Outcome:     Diagnosis Code(s)/Description:    Procedure Code/Description:    Findings:    Treatment:    Condition on Discharge:   Vitals:    Labs:   Imaging:   Medications: Follow-up Instructions:    Disposition:        CLINICAL INFORMATION - GENERAL REVIEW CHECKLIST    EMERGENCY DEPARTMENT: (Proceed to "ADMISSION" if Direct Admission)    Presenting Signs/Symptoms:    Medication/treatment prior to arrival in the ED:    Past Medical History:       Clinical Exam:    Initial Vital Signs: (Temp, Pulse, Resp, and BP)       Pertinent Repeat Vital Signs: (include times they were obtained)    Pertinent Sustained Findings: (include times they were obtained)    Weight in Kilograms:      Pertinent Labs (results):    Radiology (results):    EKG (results):      Other tests (results):    Tests pending final results:    Treatment in the ED:      Other treatments:      Change in condition while in the ED:     Response to ED Treatment:          OBSERVATION: (Proceed to "ADMISSION" if Direct Admission)    Orders written during the observation period  Meds Name, dose, route, time, how may doses given:  PRN Meds Name, dose, route, time, how many doses given within the first 24 hrs :  IVs Type, rate, and total amt  ordered/given:  Labs, imaging, other:  Consults and findings:    Test Results during the observation period  Pertinent Lab tests (dates/results):  Culture results (blood, urine, spinal, wound, respiratory, etc ):  Imaging tests (dates/results):  EKG (dates/results): Other test (dates/results):  Tests pending (dates/results):    Surgical or Invasive Procedures during the observation period  Name of surgery/procedure:  Date & Time:  Patient Response:  Post-operative orders:  Operative Report/Findings:    Response to Treatment, Major Change in Condition, Major Charge in Treatment during the observation period          ADMISSION:    DIRECT Admissions Only:    Presenting Signs/Symptoms: 49y Male, transfer from [address] [place] [address] to [facility] med surg unit presents with Left eye blindness, right facial droop secondary to brain mass  Initially presented to Urgent care earlier today for progressive decreased vision to his left eye and right-sided facial droop x1 week, referred to ED  PMH for untreated hypertension, hyperlipidemia and tobacco abuse  Transferred to [facility] for Neurosurgery evaluation  At [address] ED CT Head showed a large cystic mass in the hemisphere with mild edema and calcification  Found to be hypertensive 171/103  PMH for Tobacco abuse, Uncomplicated Alcohol Dependence, HLD and HTN  Drinks approx  2-3 beers every day  · Admit Inpatient level of care for Brain mass and Hypertension  Neurosurgery consult  MRI with and without gadolinium  Last seen by PCP about 5 yrs ago  Should be to be taking Benicar 40-25 mg  Start lisinopril daily  IV labetalol 5 mg Q4 PRN for SBP >160 mmHg   CIWA assess, low suspicious that he will withdrawn  Thiamine and folic acid  Lipid panel  On exam;  anisocoria, L pupil is fixed with nearly complete blindness  Right facial drooping  Decreased sensation on Left side, especially around L eye     ·   · Medication/treatment prior to arrival:  ·   · Past Medical History:  Past Medical History:   Diagnosis Date    [facility] cancer ([facility] )     COPD (chronic obstructive pulmonary disease) ([place] [address] )     Hyperlipidemia     Hypertension     Lung cancer ([place] [address] )    ·   · Clinical Exam on admission:  ·   · Vital Signs on admission: (Temp, Pulse, Resp, and BP)  Vitals   Temperature Pulse Respirations Blood Pressure SpO2   08/30/20 1935 08/30/20 1935 08/30/20 1935 08/30/20 1935 08/30/20 1935   98 8 °F (37 1 °C) 68 20 147/85 93 %       Temp Source Heart Rate Source Patient Position - Orthostatic VS BP Location FiO2 (%)   08/30/20 1935 08/30/20 1953 08/30/20 1935 08/30/20 1935 --   Tympanic Monitor Sitting Right arm         Pain Score           08/30/20 1951          Additional Vital Signs:   08/31/20 0740    97 9 °F (36 6 °C)    82    19    141/84        94 %            08/31/20 04:28:53    97 8 °F (36 6 °C)            139/86    104                08/31/20 0011    98 1 °F (36 7 °C)    77    20    142/82                Sitting    08/30/20 2119                147/87                    08/30/20 19:53:31    98 °F (36 7 °C)    68    20    171/103Abnormal                        ·   · Weight in kilograms:   08/30/20 86 2 kg (190 lb)       ALL Admissions:    Admission Orders and Other Orders written within the first 24 hrs after admission  Meds Name, dose, route, time, how may doses given:  albuterol, 2 puff, Inhalation, Q6H  atorvastatin, 40 mg, Oral, Daily With Dinner  folic acid, 1 mg, Oral, Daily  guaiFENesin, 600 mg, Oral, Q12H [address]  heparin (porcine), 5,000 Units, Subcutaneous, Q8H [address]  lisinopril, 10 mg, Oral, Daily  multivitamin-minerals, 1 tablet, Oral, Daily  nicotine, 1 patch, Transdermal, Daily  thiamine, 100 mg, Oral, Daily       PRN Meds Name, dose, route, time, how many doses given within the first 24 hrs :    Labetalol HCl, 10 mg, Intravenous, Q4H PRN 8/30 x1  IVs Type, rate, and total amt  ordered/given:  Labs, imaging, other:  8/30 CT Head - Large predominantly cystic mass within the left cerebral hemisphere with relatively mild amount of surrounding edema in the brain and minimal calcification along one margin of the lesion   This may be a cystic neoplasm (primary or metastatic) or less likely infection   Contrast-enhanced brain MRI is recommended for further workup  Mass effect on the left lateral ventricle with 4 mm rightward midline shift and some effacement of left-sided cerebral sulci  There is surgical consultation is advised      8/30 CT Chest/Abd/Pelvis - There is an approximately 4 x 4 x 3 cm mass in the mid to lower left hilum which extends into the left lower lobe of the lung and the left lingula   Nearby satellite masses and nodules are present   The appearance is highly suggestive of malignant neoplasm   Pulmonology consultation and Oncology consultation is recommended  There appears to be some hypodensity within some of the central left pulmonary artery branches   Pulmonary embolism and/or tumor thrombus should be excluded   Dedicated CT angiogram/PE protocol CTA of the chest is recommended for further evaluation  Bilateral adrenal masses, each measuring approximately 2 cm   MRI is recommended for further characterization, if there are no contraindications  The urinary bladder wall appears thickened   This is most pronounced anteriorly  Clinical correlation, laboratory correlation and follow-up is recommended  The prostate is mildly prominent   Clinical and laboratory correlation is recommended   Mild emphysema      8/31 MRI Brain - Multiple intracranial enhancing lesions with a dominant cystic lesion noted in the deep left inferior frontal lobe   Multiplicity of lesions with a suspicious primary pulmonary neoplasm is most suggestive of metastatic disease   Other neoplastic etiologies thought to be less likely  Note is made of lesions along the ependymal surface of the lateral ventricles and septum pellucidum suspicious for the presence of leptomeningeal/CSF spread of disease   Imaging of the remainder of the neuroaxis should be considered for further evaluation  Of note lesions are seen along the course of the left trigeminal nerve tracking into Meckel's cave as well as at the level of the hypothalamus and abutting the optic tracts  Stable 5 mm rightward midline shift      8/31 MRI Abd -    Bilateral adrenal nodules with imaging appearance most consistent with adenomas                   Consults and findings: Neurosurgery - 8/31 - left-handed gentleman with a past medical history of hypertension who had visual deficits and complaints for several weeks  He presented to the emergency room and had CT demonstrated a left frontal lesion  Subsequently CT of the chest abdomen pelvis for was revealing for multiple lung masses and metastatic lesions  Additionally had a left frontal mass with evidence of leptomeningeal spread      On my exam he is awake alert oriented  He has no speech deficits  He can name objects  His speech is fluent  His left pupil is larger and anisocoric by 1 mm however both are reactive  He has a partial 6th nerve palsy and partial 3rd nerve palsy  he has no diplopia  No field cut  He has full strength in his bilateral upper and lower extremities      After extensive conversation regarding his disease process as well as his radiographic findings we discussed the risks and benefits of a left frontal craniotomy and resection of mass    He understands that these risks include bleeding, infection, stroke, paralysis, seizure and death   He has elected proceed  Oncology Medical - 8/31 - Imaging findings highly suspicious for primary lung malignancy  Neurosurgery is following patient, if decided to proceed with surgery, will wait for final pathology diagnosis  If he eventually decided to forego surgery, will then need biopsy, may consider adrenal metastatic lesion  Anticancer treatment from medical oncology standpoint  will be in the outpatient setting  Pulmonary -8/31 - Assessment:  1  Left hilar mass  2  Concern for pulmonary embolism  3   Brain mass     Plan:  · Await a PE scan  · Depending on results of PE scan, will consider EBUS/TBNA biopsy for diagnosis of left hilar mass with brain cystic lesion  · Plan discussed with primary team       Test Results after admission  Pertinent Lab tests (dates/results):  Results from last 7 days   Lab Units 08/31/20  0511 08/30/20  2119 08/30/20  1422   WBC Thousand/uL 8 59  --  7 40   HEMOGLOBIN g/dL 15 9  --  16 6   HEMATOCRIT % 46 4  --  48 7*   PLATELETS Thousands/uL 206 191 193   NEUTROS ABS Thousands/µL 7 23  --  4 70                Results from last 7 days   Lab Units 08/31/20  0510 08/30/20  1422   SODIUM mmol/L 139 140   POTASSIUM mmol/L 4 0 4 1   CHLORIDE mmol/L 107 103   CO2 mmol/L 28 30   ANION GAP mmol/L 4 7   BUN mg/dL 16 14   CREATININE mg/dL 0 84 0 80   EGFR ml/min/1 73sq m 100 102   CALCIUM mg/dL 8 8 9 4            Results from last 7 days   Lab Units 08/31/20  0510 08/30/20  1422   AST U/L 16 16   ALT U/L 30 22   ALK PHOS U/L 84 69   TOTAL PROTEIN g/dL 7 4 7 3   ALBUMIN g/dL 3 6 4 7   TOTAL BILIRUBIN mg/dL 0 36 0 50           Results from last 7 days   Lab Units 08/30/20  1305   POC GLUCOSE mg/dl 107            Results from last 7 days   Lab Units 08/31/20  0510 08/30/20  1422   GLUCOSE RANDOM mg/dL 119 99              Results from last 7 days   Lab Units 08/30/20  1422   TROPONIN I ng/mL <0 03               Results from last 7 days   Lab Units 08/30/20  1422   PROTIME seconds 13 3   INR   1 02   PTT seconds 24                   Results from last 7 days   Lab Units 08/30/20  1422   LACTIC ACID mmol/L 1 2           Results from last 7 days   Lab Units 08/31/20  0622   CLARITY UA   Clear   COLOR UA   Yellow   SPEC GRAV UA   >1 045*   PH UA   6 0   GLUCOSE UA mg/dl Negative   KETONES UA mg/dl Negative   BLOOD UA   Negative   PROTEIN UA mg/dl Negative   NITRITE UA   Negative   BILIRUBIN UA   Negative   UROBILINOGEN UA E U /dl 1 0   LEUKOCYTES UA   Negative   WBC UA /hpf None Seen   RBC UA /hpf None Seen   BACTERIA UA /hpf None Seen   EPITHELIAL CELLS WET PREP /hpf None Seen        Culture results (blood, urine, spinal, wound, respiratory, etc ):  Imaging tests (dates/results):  EKG (dates/results): Other test (dates/results):  Tests pending (dates/results):    Surgical or Invasive Procedures  Name of surgery/procedure: CRANIOTOMY IMAGE-GUIDED FOR TUMOR (Left)  Date & Time: 9/1/2020  12:24  Patient Response: tolerated   Post-operative orders: same   Operative Report/Findings: Metastatic carcinoma on frozen section    Post Op neuro checks q 1 - pt in ICU level of care     Response to Treatment, Major Change in Condition, Major Charge in Treatment anytime during admission  28-year-old male with past medical history of untreated hypertension, hyperlipidemia, and tobacco abuse who presented to an urgent care for 1 week of progressive decreased vision in his left eye and right-sided facial droop, was found to have large cystic mass in the left hemisphere with mild edema calcification and 5 mm rightward midline shift on head CT  He was transferred to [facility] for neurosurgery evaluation  Labwork was unremarkable at that time  In the ED he was found to be hypertensive to 171/103    Patient had not seen a physician for 5 years previously and was not current with health maintenance (i e  Colonoscopy)      CT chest abdomen pelvis revealed 4 x 4 x 3 cm mass in the mid to lower left hilum which extends into the left lower lobe of the lung and the left lingula  Nearby satellite masses and nodules are present, highly suggestive of malignant neoplasm  Bilateral adrenal masses measuring 2 cm were also found  MRI brain showed multiple intracranial enhancing lesions with a dominant cystic lesion noted in the deep left inferior frontal lobe, with stable 5 mm midline shift  MRI abdomen confirmed 1 3 x 1 2 cm right and 1 5 x 1 8 cm left adrenal nodules distant with adenomas      Neurosurgery elected to proceed with left frontal craniotomy and resection of mass with placement of ALMA drain, performed 9/1  Following procedure, patient was awake and oriented but mildly dysarthric with some slight right facial droop  Patient was initiated on Decadron and Keppra for seizure precautions  Follow-up head CTs showed stable intraparenchymal hemorrhage and stable vasogenic edema and localized mass effect  The drain was removed, and DVT prophylaxis was restarted with subcu heparin  Patient was evaluated by PT/OT and found to be appropriate for discharge home with home skilled therapy  Oncology was consulted inpatient elected to follow patient as outpatient discharge anticancer treatment      At the time of discharge, patient is afebrile, hemodynamically stable, improving on exam with improved strength, facial symmetry, and dysarthria  He will continue Decadron taper per neurosurgery, as well as Keppra for 1 week for seizure prophylaxis  He will also continue to take atorvastatin 40 mg for hyperlipidemia and lisinopril 10 mg for hypertension    He will follow-up with Neurosurgery, Oncology, and the [facility] for further outpatient management            Disposition on Discharge  Home, Rehab, SNF, LTC, Shelter, etc : Home/Self Care    Cease to Breathe (CTB)  If a patient expires during an admission, in addition to the above information, please include:    Summary/timeline of the patient's decline in condition:    Medications and treatment:    Patient response to treatment:    Date and time patient ceased to breathe:        Is there a Readmission that follows this admission? Yes x No    If yes, provide dates:          InterQual Review    InterQual Criteria Met: X Yes  No  N/A        Please include the InterQual Review, InterQual year/version used, and the criteria selected:   Created Using  Review Status  Review Entered    Nayatek®   In Primary  10/9/2020 15:37         Criteria Set Name - Subset    LOC:Acute Adult-General Surgical        Criteria Review    REVIEW SUMMARY     Patient: Elder Chase  Review Number: [id number]  Review Status: In Primary  Criteria Status: Critical Met  Day Met: Operative Day     Condition Specific: Yes        OUTCOMES  Outcome Type: Primary           REVIEW DETAILS     Product: [employer] Delay Adult  Subset: General Surgical      (Symptom or finding within 24h)         (Excludes PO medications unless noted)          [X] Select Day, One:              [X] Operative Day, One:                  [X] CRITICAL, >= One:                      [X] Post neurosurgery and neurological assessment every 1-2h     Version: Araca 2019 1  Franky Morgan  © 2019 [employer] and/or one of its subsidiaries  All Rights Reserved  CPT only © 2018 [employer]  All Rights Reserved  PLEASE SUBMIT THE COMPLETED FORM TO THE DEPARTMENT OF HUMAN SERVICES - DIVISION OF CLINICAL  REVIEW VIA FAX [phone] or VIA E-MAIL TO [email or web address]    Signature: Jessica Wang Date:  10/09/20    Confidentiality Notice: The documents accompanying this telecopy may contain confidential information belonging to the sender  The information is intended only for the use of the individual named above   If you are not the intended recipient, you are hereby notified  That any disclosure, copying, distribution or taking of any telecopy is strictly prohibited

## 2020-10-12 ENCOUNTER — HOSPITAL ENCOUNTER (OUTPATIENT)
Dept: INFUSION CENTER | Facility: HOSPITAL | Age: 55
Discharge: HOME | End: 2020-10-12
Attending: INTERNAL MEDICINE
Payer: COMMERCIAL

## 2020-10-12 VITALS
SYSTOLIC BLOOD PRESSURE: 159 MMHG | HEIGHT: 67 IN | WEIGHT: 182.32 LBS | HEART RATE: 88 BPM | DIASTOLIC BLOOD PRESSURE: 80 MMHG | TEMPERATURE: 97.1 F | BODY MASS INDEX: 28.62 KG/M2 | RESPIRATION RATE: 18 BRPM

## 2020-10-12 DIAGNOSIS — C79.31 BRAIN METASTASES (HCC): Primary | ICD-10-CM

## 2020-10-12 DIAGNOSIS — C34.32 SMALL CELL LUNG CANCER, LEFT LOWER LOBE (HCC): ICD-10-CM

## 2020-10-12 PROCEDURE — 96367 TX/PROPH/DG ADDL SEQ IV INF: CPT

## 2020-10-12 PROCEDURE — 96417 CHEMO IV INFUS EACH ADDL SEQ: CPT

## 2020-10-12 PROCEDURE — 96413 CHEMO IV INFUSION 1 HR: CPT

## 2020-10-12 RX ORDER — SODIUM CHLORIDE 9 MG/ML
20 INJECTION, SOLUTION INTRAVENOUS ONCE
Status: COMPLETED | OUTPATIENT
Start: 2020-10-12 | End: 2020-10-12

## 2020-10-12 RX ADMIN — DEXAMETHASONE SODIUM PHOSPHATE: 10 INJECTION, SOLUTION INTRAMUSCULAR; INTRAVENOUS at 10:05

## 2020-10-12 RX ADMIN — SODIUM CHLORIDE 20 ML/HR: 0.9 INJECTION, SOLUTION INTRAVENOUS at 10:03

## 2020-10-12 RX ADMIN — CARBOPLATIN 723 MG: 10 INJECTION, SOLUTION INTRAVENOUS at 14:26

## 2020-10-12 RX ADMIN — ETOPOSIDE 200 MG: 20 INJECTION INTRAVENOUS at 13:11

## 2020-10-12 RX ADMIN — FOSAPREPITANT 150 MG: 150 INJECTION, POWDER, LYOPHILIZED, FOR SOLUTION INTRAVENOUS at 10:49

## 2020-10-12 RX ADMIN — DURVALUMAB 1500 MG: 500 INJECTION, SOLUTION INTRAVENOUS at 12:05

## 2020-10-13 ENCOUNTER — HOSPITAL ENCOUNTER (OUTPATIENT)
Dept: INFUSION CENTER | Facility: HOSPITAL | Age: 55
Discharge: HOME | End: 2020-10-13
Attending: INTERNAL MEDICINE
Payer: COMMERCIAL

## 2020-10-13 VITALS
WEIGHT: 182.32 LBS | SYSTOLIC BLOOD PRESSURE: 145 MMHG | BODY MASS INDEX: 28.62 KG/M2 | RESPIRATION RATE: 18 BRPM | DIASTOLIC BLOOD PRESSURE: 71 MMHG | HEIGHT: 67 IN | TEMPERATURE: 97.8 F | HEART RATE: 67 BPM

## 2020-10-13 DIAGNOSIS — C34.32 SMALL CELL LUNG CANCER, LEFT LOWER LOBE (HCC): ICD-10-CM

## 2020-10-13 DIAGNOSIS — C79.31 BRAIN METASTASES (HCC): Primary | ICD-10-CM

## 2020-10-13 PROCEDURE — 96413 CHEMO IV INFUSION 1 HR: CPT

## 2020-10-13 PROCEDURE — 96367 TX/PROPH/DG ADDL SEQ IV INF: CPT

## 2020-10-13 RX ORDER — SODIUM CHLORIDE 9 MG/ML
20 INJECTION, SOLUTION INTRAVENOUS ONCE
Status: COMPLETED | OUTPATIENT
Start: 2020-10-13 | End: 2020-10-13

## 2020-10-13 RX ADMIN — DEXAMETHASONE SODIUM PHOSPHATE: 10 INJECTION, SOLUTION INTRAMUSCULAR; INTRAVENOUS at 09:54

## 2020-10-13 RX ADMIN — SODIUM CHLORIDE 20 ML/HR: 0.9 INJECTION, SOLUTION INTRAVENOUS at 09:42

## 2020-10-13 RX ADMIN — ETOPOSIDE 200 MG: 20 INJECTION INTRAVENOUS at 10:21

## 2020-10-14 ENCOUNTER — HOSPITAL ENCOUNTER (OUTPATIENT)
Dept: INFUSION CENTER | Facility: HOSPITAL | Age: 55
Discharge: HOME | End: 2020-10-14
Attending: INTERNAL MEDICINE
Payer: COMMERCIAL

## 2020-10-14 VITALS
RESPIRATION RATE: 16 BRPM | HEART RATE: 59 BPM | DIASTOLIC BLOOD PRESSURE: 89 MMHG | WEIGHT: 182.32 LBS | TEMPERATURE: 98.1 F | HEIGHT: 67 IN | SYSTOLIC BLOOD PRESSURE: 155 MMHG | BODY MASS INDEX: 28.62 KG/M2

## 2020-10-14 DIAGNOSIS — C34.32 SMALL CELL LUNG CANCER, LEFT LOWER LOBE (HCC): ICD-10-CM

## 2020-10-14 DIAGNOSIS — C79.31 BRAIN METASTASES (HCC): Primary | ICD-10-CM

## 2020-10-14 PROCEDURE — 96413 CHEMO IV INFUSION 1 HR: CPT

## 2020-10-14 PROCEDURE — 96367 TX/PROPH/DG ADDL SEQ IV INF: CPT

## 2020-10-14 RX ORDER — SODIUM CHLORIDE 9 MG/ML
20 INJECTION, SOLUTION INTRAVENOUS ONCE
Status: COMPLETED | OUTPATIENT
Start: 2020-10-14 | End: 2020-10-14

## 2020-10-14 RX ADMIN — DEXAMETHASONE SODIUM PHOSPHATE: 10 INJECTION, SOLUTION INTRAMUSCULAR; INTRAVENOUS at 09:21

## 2020-10-14 RX ADMIN — ETOPOSIDE 200 MG: 20 INJECTION INTRAVENOUS at 09:56

## 2020-10-14 RX ADMIN — SODIUM CHLORIDE 20 ML/HR: 0.9 INJECTION, SOLUTION INTRAVENOUS at 09:12

## 2020-10-26 PROBLEM — Z45.2 ENCOUNTER FOR CENTRAL LINE CARE: Status: ACTIVE | Noted: 2020-01-01

## 2020-10-28 PROBLEM — Z71.89 COUNSELING REGARDING ADVANCED CARE PLANNING AND GOALS OF CARE: Status: ACTIVE | Noted: 2020-01-01

## 2020-11-17 NOTE — PROGRESS NOTES
Pt has not returned to OT since I E  will D/C OT will require new script to return  Thank you for the consult!   Please call if you have any questions: [phone]  DARIAN Burnett MBA, OTR/L, C-GCM, CSRS, CBIS  Director of Outpatient Neuro Occupational Therapy

## 2021-01-01 ENCOUNTER — PATIENT OUTREACH (OUTPATIENT)
Dept: CASE MANAGEMENT | Facility: HOSPITAL | Age: 56
End: 2021-01-01

## 2021-01-01 ENCOUNTER — TELEPHONE (OUTPATIENT)
Dept: HEMATOLOGY ONCOLOGY | Facility: CLINIC | Age: 56
End: 2021-01-01

## 2021-01-01 ENCOUNTER — HOSPITAL ENCOUNTER (OUTPATIENT)
Dept: INFUSION CENTER | Facility: HOSPITAL | Age: 56
Discharge: HOME | End: 2021-06-23
Attending: INTERNAL MEDICINE
Payer: COMMERCIAL

## 2021-01-01 ENCOUNTER — OFFICE VISIT (OUTPATIENT)
Dept: HEMATOLOGY ONCOLOGY | Facility: CLINIC | Age: 56
End: 2021-01-01
Payer: COMMERCIAL

## 2021-01-01 ENCOUNTER — DOCUMENTATION (OUTPATIENT)
Dept: HEMATOLOGY ONCOLOGY | Facility: CLINIC | Age: 56
End: 2021-01-01

## 2021-01-01 ENCOUNTER — HOSPITAL ENCOUNTER (OUTPATIENT)
Dept: RADIOLOGY | Facility: HOSPITAL | Age: 56
Discharge: HOME | End: 2021-03-02
Attending: INTERNAL MEDICINE | Admitting: RADIOLOGY
Payer: COMMERCIAL

## 2021-01-01 ENCOUNTER — HOSPITAL ENCOUNTER (OUTPATIENT)
Dept: CT IMAGING | Facility: HOSPITAL | Age: 56
Discharge: HOME | End: 2021-07-09
Attending: INTERNAL MEDICINE
Payer: COMMERCIAL

## 2021-01-01 ENCOUNTER — HOSPITAL ENCOUNTER (OUTPATIENT)
Dept: INFUSION CENTER | Facility: HOSPITAL | Age: 56
Discharge: HOME | End: 2021-06-03
Attending: INTERNAL MEDICINE
Payer: COMMERCIAL

## 2021-01-01 ENCOUNTER — HOSPITAL ENCOUNTER (OUTPATIENT)
Dept: INFUSION CENTER | Facility: HOSPITAL | Age: 56
Discharge: HOME | End: 2021-09-02
Attending: INTERNAL MEDICINE
Payer: COMMERCIAL

## 2021-01-01 ENCOUNTER — APPOINTMENT (OUTPATIENT)
Dept: LAB | Facility: HOSPITAL | Age: 56
End: 2021-01-01
Attending: INTERNAL MEDICINE
Payer: COMMERCIAL

## 2021-01-01 ENCOUNTER — CLINICAL SUPPORT (OUTPATIENT)
Dept: RADIATION ONCOLOGY | Facility: CLINIC | Age: 56
End: 2021-01-01
Attending: RADIOLOGY
Payer: COMMERCIAL

## 2021-01-01 ENCOUNTER — HOSPITAL ENCOUNTER (OUTPATIENT)
Dept: INFUSION CENTER | Facility: HOSPITAL | Age: 56
Discharge: HOME | End: 2021-03-17
Attending: INTERNAL MEDICINE
Payer: COMMERCIAL

## 2021-01-01 ENCOUNTER — HOSPITAL ENCOUNTER (OUTPATIENT)
Dept: RADIOLOGY | Facility: HOSPITAL | Age: 56
Discharge: HOME | End: 2021-02-23
Attending: INTERNAL MEDICINE
Payer: COMMERCIAL

## 2021-01-01 ENCOUNTER — HOSPITAL ENCOUNTER (OUTPATIENT)
Dept: INFUSION CENTER | Facility: HOSPITAL | Age: 56
Discharge: HOME | End: 2021-08-12
Attending: INTERNAL MEDICINE
Payer: COMMERCIAL

## 2021-01-01 ENCOUNTER — TELEPHONE (OUTPATIENT)
Dept: RADIOLOGY | Facility: HOSPITAL | Age: 56
End: 2021-01-01

## 2021-01-01 ENCOUNTER — APPOINTMENT (OUTPATIENT)
Dept: LAB | Facility: CLINIC | Age: 56
End: 2021-01-01
Payer: COMMERCIAL

## 2021-01-01 ENCOUNTER — HOSPITAL ENCOUNTER (OUTPATIENT)
Dept: INFUSION CENTER | Facility: HOSPITAL | Age: 56
Discharge: HOME | End: 2021-07-23
Attending: INTERNAL MEDICINE
Payer: COMMERCIAL

## 2021-01-01 ENCOUNTER — HOSPITAL ENCOUNTER (OUTPATIENT)
Dept: INFUSION CENTER | Facility: HOSPITAL | Age: 56
End: 2021-01-01
Attending: INTERNAL MEDICINE

## 2021-01-01 ENCOUNTER — DOCUMENTATION (OUTPATIENT)
Dept: RADIATION ONCOLOGY | Facility: CLINIC | Age: 56
End: 2021-01-01

## 2021-01-01 ENCOUNTER — HOSPITAL ENCOUNTER (OUTPATIENT)
Dept: INFUSION CENTER | Facility: HOSPITAL | Age: 56
Discharge: HOME | End: 2021-02-11
Attending: INTERNAL MEDICINE
Payer: COMMERCIAL

## 2021-01-01 ENCOUNTER — HOSPITAL ENCOUNTER (OUTPATIENT)
Dept: INFUSION CENTER | Facility: HOSPITAL | Age: 56
Discharge: HOME | End: 2021-06-24
Attending: INTERNAL MEDICINE
Payer: COMMERCIAL

## 2021-01-01 ENCOUNTER — HOSPITAL ENCOUNTER (OUTPATIENT)
Dept: RADIOLOGY | Facility: HOSPITAL | Age: 56
Discharge: HOME | End: 2021-04-30
Attending: INTERNAL MEDICINE

## 2021-01-01 ENCOUNTER — TRANSCRIBE ORDERS (OUTPATIENT)
Dept: ADMINISTRATIVE | Facility: HOSPITAL | Age: 56
End: 2021-01-01

## 2021-01-01 ENCOUNTER — SOCIAL WORK (OUTPATIENT)
Dept: PALLIATIVE MEDICINE | Facility: CLINIC | Age: 56
End: 2021-01-01
Payer: COMMERCIAL

## 2021-01-01 ENCOUNTER — RADIATION ONCOLOGY CONSULT (OUTPATIENT)
Dept: RADIATION ONCOLOGY | Facility: HOSPITAL | Age: 56
End: 2021-01-01
Attending: RADIOLOGY
Payer: COMMERCIAL

## 2021-01-01 ENCOUNTER — HOSPITAL ENCOUNTER (OUTPATIENT)
Dept: MRI IMAGING | Facility: HOSPITAL | Age: 56
Discharge: HOME | End: 2021-06-29
Attending: RADIOLOGY
Payer: COMMERCIAL

## 2021-01-01 ENCOUNTER — APPOINTMENT (EMERGENCY)
Dept: CT IMAGING | Facility: HOSPITAL | Age: 56
End: 2021-01-01
Payer: COMMERCIAL

## 2021-01-01 ENCOUNTER — HOSPITAL ENCOUNTER (OUTPATIENT)
Dept: RADIOLOGY | Facility: HOSPITAL | Age: 56
Discharge: HOME | End: 2021-02-16
Attending: INTERNAL MEDICINE | Admitting: RADIOLOGY
Payer: COMMERCIAL

## 2021-01-01 ENCOUNTER — HOSPITAL ENCOUNTER (OUTPATIENT)
Dept: INFUSION CENTER | Facility: HOSPITAL | Age: 56
Discharge: HOME | End: 2021-04-22
Attending: INTERNAL MEDICINE
Payer: COMMERCIAL

## 2021-01-01 ENCOUNTER — HOSPITAL ENCOUNTER (OUTPATIENT)
Dept: CT IMAGING | Facility: HOSPITAL | Age: 56
Discharge: HOME | End: 2021-04-09
Attending: INTERNAL MEDICINE
Payer: COMMERCIAL

## 2021-01-01 ENCOUNTER — HOSPITAL ENCOUNTER (OUTPATIENT)
Dept: INFUSION CENTER | Facility: HOSPITAL | Age: 56
Discharge: HOME | End: 2021-05-13
Attending: INTERNAL MEDICINE
Payer: COMMERCIAL

## 2021-01-01 ENCOUNTER — TELEMEDICINE (OUTPATIENT)
Dept: RADIATION ONCOLOGY | Facility: CLINIC | Age: 56
End: 2021-01-01
Attending: RADIOLOGY

## 2021-01-01 ENCOUNTER — HOSPITAL ENCOUNTER (OUTPATIENT)
Dept: INFUSION CENTER | Facility: HOSPITAL | Age: 56
Discharge: HOME | End: 2021-07-22
Attending: INTERNAL MEDICINE
Payer: COMMERCIAL

## 2021-01-01 ENCOUNTER — PREP FOR PROCEDURE (OUTPATIENT)
Dept: HEMATOLOGY ONCOLOGY | Facility: CLINIC | Age: 56
End: 2021-01-01

## 2021-01-01 ENCOUNTER — HOSPITAL ENCOUNTER (OUTPATIENT)
Dept: INFUSION CENTER | Facility: HOSPITAL | Age: 56
Discharge: HOME | End: 2021-08-13
Attending: INTERNAL MEDICINE
Payer: COMMERCIAL

## 2021-01-01 ENCOUNTER — HOSPITAL ENCOUNTER (OUTPATIENT)
Dept: RADIOLOGY | Facility: HOSPITAL | Age: 56
Discharge: HOME | End: 2021-02-09
Attending: INTERNAL MEDICINE | Admitting: RADIOLOGY
Payer: COMMERCIAL

## 2021-01-01 ENCOUNTER — HOSPITAL ENCOUNTER (OUTPATIENT)
Dept: MRI IMAGING | Facility: HOSPITAL | Age: 56
Discharge: HOME | End: 2021-02-10
Payer: COMMERCIAL

## 2021-01-01 ENCOUNTER — HOSPITAL ENCOUNTER (OUTPATIENT)
Dept: INFUSION CENTER | Facility: HOSPITAL | Age: 56
Discharge: HOME | End: 2021-09-23
Attending: INTERNAL MEDICINE

## 2021-01-01 ENCOUNTER — HOSPITAL ENCOUNTER (OUTPATIENT)
Dept: INFUSION CENTER | Facility: HOSPITAL | Age: 56
Discharge: HOME | End: 2021-02-10
Payer: COMMERCIAL

## 2021-01-01 ENCOUNTER — TELEMEDICINE (OUTPATIENT)
Dept: HEMATOLOGY ONCOLOGY | Facility: CLINIC | Age: 56
End: 2021-01-01
Payer: COMMERCIAL

## 2021-01-01 ENCOUNTER — TELEPHONE (OUTPATIENT)
Dept: PALLIATIVE MEDICINE | Facility: CLINIC | Age: 56
End: 2021-01-01

## 2021-01-01 ENCOUNTER — HOSPITAL ENCOUNTER (OUTPATIENT)
Dept: INFUSION CENTER | Facility: HOSPITAL | Age: 56
Discharge: HOME | End: 2021-04-15
Attending: INTERNAL MEDICINE

## 2021-01-01 ENCOUNTER — HOSPITAL ENCOUNTER (OUTPATIENT)
Dept: INFUSION CENTER | Facility: HOSPITAL | Age: 56
Discharge: HOME | End: 2021-05-13
Attending: INTERNAL MEDICINE

## 2021-01-01 ENCOUNTER — HOSPITAL ENCOUNTER (OUTPATIENT)
Dept: CT IMAGING | Facility: HOSPITAL | Age: 56
Discharge: HOME | End: 2021-09-14
Attending: INTERNAL MEDICINE
Payer: COMMERCIAL

## 2021-01-01 ENCOUNTER — DOCUMENTATION (OUTPATIENT)
Dept: SPEECH THERAPY | Facility: CLINIC | Age: 56
End: 2021-01-01

## 2021-01-01 ENCOUNTER — HOSPITAL ENCOUNTER (OUTPATIENT)
Dept: INFUSION CENTER | Facility: HOSPITAL | Age: 56
Discharge: HOME | End: 2021-09-03
Attending: INTERNAL MEDICINE
Payer: COMMERCIAL

## 2021-01-01 ENCOUNTER — HOSPITAL ENCOUNTER (OUTPATIENT)
Dept: RADIOLOGY | Facility: HOSPITAL | Age: 56
Discharge: HOME | End: 2021-03-30
Attending: INTERNAL MEDICINE
Payer: COMMERCIAL

## 2021-01-01 ENCOUNTER — OFFICE VISIT (OUTPATIENT)
Dept: PALLIATIVE MEDICINE | Facility: CLINIC | Age: 56
End: 2021-01-01
Payer: COMMERCIAL

## 2021-01-01 ENCOUNTER — APPOINTMENT (EMERGENCY)
Dept: RADIOLOGY | Facility: HOSPITAL | Age: 56
End: 2021-01-01
Payer: COMMERCIAL

## 2021-01-01 ENCOUNTER — HOSPITAL ENCOUNTER (OUTPATIENT)
Dept: INFUSION CENTER | Facility: HOSPITAL | Age: 56
Discharge: HOME | End: 2021-04-23
Attending: INTERNAL MEDICINE

## 2021-01-01 ENCOUNTER — HOSPITAL ENCOUNTER (OUTPATIENT)
Dept: INFUSION CENTER | Facility: HOSPITAL | Age: 56
Discharge: HOME | End: 2021-01-14
Payer: COMMERCIAL

## 2021-01-01 ENCOUNTER — OFFICE VISIT (OUTPATIENT)
Dept: NEUROSURGERY | Facility: CLINIC | Age: 56
End: 2021-01-01
Payer: COMMERCIAL

## 2021-01-01 ENCOUNTER — HOSPITAL ENCOUNTER (OUTPATIENT)
Dept: INFUSION CENTER | Facility: HOSPITAL | Age: 56
Discharge: HOME | End: 2021-07-14
Attending: INTERNAL MEDICINE

## 2021-01-01 ENCOUNTER — TELEPHONE (OUTPATIENT)
Dept: SPEECH THERAPY | Facility: CLINIC | Age: 56
End: 2021-01-01

## 2021-01-01 ENCOUNTER — HOSPITAL ENCOUNTER (OUTPATIENT)
Dept: RADIOLOGY | Facility: HOSPITAL | Age: 56
Discharge: HOME | End: 2021-02-02
Attending: INTERNAL MEDICINE

## 2021-01-01 ENCOUNTER — HOSPITAL ENCOUNTER (OUTPATIENT)
Dept: INFUSION CENTER | Facility: HOSPITAL | Age: 56
Discharge: HOME | End: 2021-07-23
Attending: INTERNAL MEDICINE

## 2021-01-01 ENCOUNTER — HOSPITAL ENCOUNTER (OUTPATIENT)
Dept: INFUSION CENTER | Facility: HOSPITAL | Age: 56
Discharge: HOME | End: 2021-06-02
Attending: INTERNAL MEDICINE
Payer: COMMERCIAL

## 2021-01-01 ENCOUNTER — HOSPITAL ENCOUNTER (EMERGENCY)
Facility: HOSPITAL | Age: 56
Discharge: HOME | End: 2021-08-25
Attending: INTERNAL MEDICINE | Admitting: INTERNAL MEDICINE
Payer: COMMERCIAL

## 2021-01-01 ENCOUNTER — HOSPITAL ENCOUNTER (OUTPATIENT)
Dept: INFUSION CENTER | Facility: HOSPITAL | Age: 56
Discharge: HOME | End: 2021-05-12
Attending: INTERNAL MEDICINE
Payer: COMMERCIAL

## 2021-01-01 ENCOUNTER — APPOINTMENT (OUTPATIENT)
Dept: LAB | Facility: CLINIC | Age: 56
End: 2021-01-01
Attending: RADIOLOGY
Payer: COMMERCIAL

## 2021-01-01 ENCOUNTER — HOSPITAL ENCOUNTER (OUTPATIENT)
Dept: INFUSION CENTER | Facility: HOSPITAL | Age: 56
Discharge: HOME | End: 2021-04-23
Attending: INTERNAL MEDICINE
Payer: COMMERCIAL

## 2021-01-01 VITALS
DIASTOLIC BLOOD PRESSURE: 85 MMHG | WEIGHT: 156.31 LBS | RESPIRATION RATE: 16 BRPM | HEIGHT: 67 IN | HEART RATE: 85 BPM | SYSTOLIC BLOOD PRESSURE: 119 MMHG | TEMPERATURE: 97.3 F | BODY MASS INDEX: 24.53 KG/M2

## 2021-01-01 VITALS
DIASTOLIC BLOOD PRESSURE: 78 MMHG | HEIGHT: 67 IN | TEMPERATURE: 96.1 F | WEIGHT: 163.4 LBS | SYSTOLIC BLOOD PRESSURE: 100 MMHG | RESPIRATION RATE: 18 BRPM | BODY MASS INDEX: 25.65 KG/M2 | HEART RATE: 97 BPM | OXYGEN SATURATION: 90 %

## 2021-01-01 VITALS
HEIGHT: 67 IN | HEART RATE: 80 BPM | WEIGHT: 151.2 LBS | SYSTOLIC BLOOD PRESSURE: 122 MMHG | OXYGEN SATURATION: 98 % | TEMPERATURE: 97.5 F | RESPIRATION RATE: 16 BRPM | BODY MASS INDEX: 23.73 KG/M2 | DIASTOLIC BLOOD PRESSURE: 88 MMHG

## 2021-01-01 VITALS
BODY MASS INDEX: 24.5 KG/M2 | HEIGHT: 67 IN | WEIGHT: 156.1 LBS | OXYGEN SATURATION: 92 % | HEART RATE: 87 BPM | RESPIRATION RATE: 18 BRPM | SYSTOLIC BLOOD PRESSURE: 122 MMHG | TEMPERATURE: 99.9 F | DIASTOLIC BLOOD PRESSURE: 86 MMHG

## 2021-01-01 VITALS
DIASTOLIC BLOOD PRESSURE: 85 MMHG | HEIGHT: 67 IN | WEIGHT: 160.05 LBS | HEART RATE: 64 BPM | RESPIRATION RATE: 18 BRPM | TEMPERATURE: 96.2 F | SYSTOLIC BLOOD PRESSURE: 126 MMHG | BODY MASS INDEX: 25.12 KG/M2

## 2021-01-01 VITALS
OXYGEN SATURATION: 97 % | SYSTOLIC BLOOD PRESSURE: 141 MMHG | HEIGHT: 67 IN | WEIGHT: 156 LBS | TEMPERATURE: 98.2 F | HEART RATE: 60 BPM | DIASTOLIC BLOOD PRESSURE: 85 MMHG | BODY MASS INDEX: 24.48 KG/M2 | RESPIRATION RATE: 20 BRPM

## 2021-01-01 VITALS
HEIGHT: 67 IN | SYSTOLIC BLOOD PRESSURE: 113 MMHG | TEMPERATURE: 97.9 F | WEIGHT: 137.13 LBS | TEMPERATURE: 97.5 F | BODY MASS INDEX: 21 KG/M2 | HEIGHT: 67 IN | OXYGEN SATURATION: 97 % | RESPIRATION RATE: 18 BRPM | DIASTOLIC BLOOD PRESSURE: 89 MMHG | RESPIRATION RATE: 16 BRPM | OXYGEN SATURATION: 92 % | HEART RATE: 61 BPM | SYSTOLIC BLOOD PRESSURE: 144 MMHG | BODY MASS INDEX: 21.52 KG/M2 | HEART RATE: 58 BPM | WEIGHT: 133.82 LBS | DIASTOLIC BLOOD PRESSURE: 75 MMHG

## 2021-01-01 VITALS
WEIGHT: 159.39 LBS | TEMPERATURE: 97.7 F | RESPIRATION RATE: 16 BRPM | BODY MASS INDEX: 25.02 KG/M2 | DIASTOLIC BLOOD PRESSURE: 64 MMHG | HEIGHT: 67 IN | SYSTOLIC BLOOD PRESSURE: 127 MMHG | HEART RATE: 55 BPM | OXYGEN SATURATION: 99 %

## 2021-01-01 VITALS
HEIGHT: 67 IN | TEMPERATURE: 97.7 F | HEART RATE: 90 BPM | SYSTOLIC BLOOD PRESSURE: 120 MMHG | BODY MASS INDEX: 25.11 KG/M2 | WEIGHT: 160 LBS | DIASTOLIC BLOOD PRESSURE: 78 MMHG | RESPIRATION RATE: 16 BRPM

## 2021-01-01 VITALS
HEIGHT: 67 IN | DIASTOLIC BLOOD PRESSURE: 86 MMHG | RESPIRATION RATE: 18 BRPM | SYSTOLIC BLOOD PRESSURE: 112 MMHG | WEIGHT: 138.7 LBS | TEMPERATURE: 97 F | BODY MASS INDEX: 21.77 KG/M2 | HEART RATE: 80 BPM

## 2021-01-01 VITALS
HEIGHT: 67 IN | HEART RATE: 89 BPM | DIASTOLIC BLOOD PRESSURE: 86 MMHG | TEMPERATURE: 97.4 F | RESPIRATION RATE: 18 BRPM | SYSTOLIC BLOOD PRESSURE: 126 MMHG | WEIGHT: 155.7 LBS | OXYGEN SATURATION: 99 % | BODY MASS INDEX: 24.44 KG/M2

## 2021-01-01 VITALS
HEIGHT: 67 IN | TEMPERATURE: 98.1 F | DIASTOLIC BLOOD PRESSURE: 75 MMHG | BODY MASS INDEX: 24.48 KG/M2 | WEIGHT: 156 LBS | RESPIRATION RATE: 72 BRPM | SYSTOLIC BLOOD PRESSURE: 113 MMHG | OXYGEN SATURATION: 97 % | HEART RATE: 97 BPM

## 2021-01-01 VITALS
HEART RATE: 76 BPM | SYSTOLIC BLOOD PRESSURE: 111 MMHG | TEMPERATURE: 97.6 F | BODY MASS INDEX: 24.39 KG/M2 | DIASTOLIC BLOOD PRESSURE: 77 MMHG | WEIGHT: 155.42 LBS | HEIGHT: 67 IN | OXYGEN SATURATION: 96 %

## 2021-01-01 VITALS
HEIGHT: 67 IN | BODY MASS INDEX: 24.64 KG/M2 | RESPIRATION RATE: 16 BRPM | WEIGHT: 157 LBS | HEART RATE: 79 BPM | TEMPERATURE: 96.5 F | DIASTOLIC BLOOD PRESSURE: 68 MMHG | SYSTOLIC BLOOD PRESSURE: 100 MMHG | OXYGEN SATURATION: 98 %

## 2021-01-01 VITALS
WEIGHT: 160 LBS | DIASTOLIC BLOOD PRESSURE: 90 MMHG | OXYGEN SATURATION: 98 % | TEMPERATURE: 99 F | BODY MASS INDEX: 25.11 KG/M2 | RESPIRATION RATE: 20 BRPM | HEIGHT: 67 IN | HEART RATE: 64 BPM | SYSTOLIC BLOOD PRESSURE: 150 MMHG

## 2021-01-01 VITALS
HEART RATE: 78 BPM | SYSTOLIC BLOOD PRESSURE: 126 MMHG | BODY MASS INDEX: 24.64 KG/M2 | RESPIRATION RATE: 18 BRPM | TEMPERATURE: 97.4 F | HEIGHT: 67 IN | DIASTOLIC BLOOD PRESSURE: 78 MMHG | OXYGEN SATURATION: 100 % | WEIGHT: 156.97 LBS

## 2021-01-01 VITALS
BODY MASS INDEX: 24.61 KG/M2 | SYSTOLIC BLOOD PRESSURE: 120 MMHG | HEART RATE: 107 BPM | WEIGHT: 156.8 LBS | DIASTOLIC BLOOD PRESSURE: 76 MMHG | TEMPERATURE: 98.4 F | OXYGEN SATURATION: 96 % | HEIGHT: 67 IN | RESPIRATION RATE: 16 BRPM

## 2021-01-01 VITALS — TEMPERATURE: 96.1 F

## 2021-01-01 VITALS
TEMPERATURE: 96.6 F | WEIGHT: 156.8 LBS | OXYGEN SATURATION: 100 % | RESPIRATION RATE: 16 BRPM | BODY MASS INDEX: 24.61 KG/M2 | HEIGHT: 67 IN | HEART RATE: 59 BPM | SYSTOLIC BLOOD PRESSURE: 142 MMHG | DIASTOLIC BLOOD PRESSURE: 88 MMHG

## 2021-01-01 VITALS
WEIGHT: 159.39 LBS | SYSTOLIC BLOOD PRESSURE: 131 MMHG | OXYGEN SATURATION: 100 % | TEMPERATURE: 97.8 F | DIASTOLIC BLOOD PRESSURE: 69 MMHG | RESPIRATION RATE: 16 BRPM | HEART RATE: 62 BPM | BODY MASS INDEX: 25.02 KG/M2 | HEIGHT: 67 IN

## 2021-01-01 VITALS
RESPIRATION RATE: 18 BRPM | DIASTOLIC BLOOD PRESSURE: 79 MMHG | OXYGEN SATURATION: 100 % | TEMPERATURE: 97.8 F | BODY MASS INDEX: 25.11 KG/M2 | WEIGHT: 160 LBS | HEART RATE: 53 BPM | HEIGHT: 67 IN | SYSTOLIC BLOOD PRESSURE: 144 MMHG

## 2021-01-01 VITALS
WEIGHT: 144.62 LBS | HEIGHT: 67 IN | SYSTOLIC BLOOD PRESSURE: 115 MMHG | RESPIRATION RATE: 16 BRPM | DIASTOLIC BLOOD PRESSURE: 71 MMHG | HEART RATE: 75 BPM | TEMPERATURE: 97.2 F | BODY MASS INDEX: 22.7 KG/M2

## 2021-01-01 VITALS
RESPIRATION RATE: 18 BRPM | HEART RATE: 45 BPM | SYSTOLIC BLOOD PRESSURE: 132 MMHG | OXYGEN SATURATION: 100 % | DIASTOLIC BLOOD PRESSURE: 73 MMHG

## 2021-01-01 VITALS
RESPIRATION RATE: 16 BRPM | SYSTOLIC BLOOD PRESSURE: 150 MMHG | HEIGHT: 66 IN | TEMPERATURE: 98 F | BODY MASS INDEX: 25.23 KG/M2 | WEIGHT: 157 LBS | OXYGEN SATURATION: 100 % | DIASTOLIC BLOOD PRESSURE: 90 MMHG | HEART RATE: 72 BPM

## 2021-01-01 VITALS
HEIGHT: 67 IN | TEMPERATURE: 97.1 F | RESPIRATION RATE: 16 BRPM | HEART RATE: 59 BPM | OXYGEN SATURATION: 95 % | SYSTOLIC BLOOD PRESSURE: 112 MMHG | WEIGHT: 159.2 LBS | DIASTOLIC BLOOD PRESSURE: 72 MMHG | BODY MASS INDEX: 24.99 KG/M2

## 2021-01-01 VITALS
DIASTOLIC BLOOD PRESSURE: 74 MMHG | RESPIRATION RATE: 18 BRPM | HEIGHT: 67 IN | TEMPERATURE: 97.8 F | BODY MASS INDEX: 24.67 KG/M2 | WEIGHT: 157.2 LBS | HEART RATE: 62 BPM | OXYGEN SATURATION: 98 % | SYSTOLIC BLOOD PRESSURE: 126 MMHG

## 2021-01-01 VITALS
DIASTOLIC BLOOD PRESSURE: 76 MMHG | TEMPERATURE: 97.5 F | RESPIRATION RATE: 16 BRPM | HEART RATE: 71 BPM | SYSTOLIC BLOOD PRESSURE: 117 MMHG

## 2021-01-01 VITALS — TEMPERATURE: 97.4 F

## 2021-01-01 VITALS
SYSTOLIC BLOOD PRESSURE: 114 MMHG | DIASTOLIC BLOOD PRESSURE: 78 MMHG | SYSTOLIC BLOOD PRESSURE: 132 MMHG | RESPIRATION RATE: 16 BRPM | TEMPERATURE: 97.5 F | HEART RATE: 72 BPM | TEMPERATURE: 98.1 F | HEART RATE: 64 BPM | DIASTOLIC BLOOD PRESSURE: 69 MMHG | OXYGEN SATURATION: 97 % | RESPIRATION RATE: 18 BRPM

## 2021-01-01 VITALS
HEART RATE: 61 BPM | OXYGEN SATURATION: 98 % | RESPIRATION RATE: 16 BRPM | WEIGHT: 160.05 LBS | SYSTOLIC BLOOD PRESSURE: 121 MMHG | DIASTOLIC BLOOD PRESSURE: 70 MMHG | HEIGHT: 67 IN | BODY MASS INDEX: 25.12 KG/M2 | TEMPERATURE: 98 F

## 2021-01-01 VITALS
TEMPERATURE: 98.7 F | BODY MASS INDEX: 25.16 KG/M2 | DIASTOLIC BLOOD PRESSURE: 78 MMHG | SYSTOLIC BLOOD PRESSURE: 116 MMHG | HEART RATE: 85 BPM | HEIGHT: 67 IN | WEIGHT: 160.27 LBS

## 2021-01-01 VITALS
HEART RATE: 62 BPM | TEMPERATURE: 97.7 F | DIASTOLIC BLOOD PRESSURE: 74 MMHG | SYSTOLIC BLOOD PRESSURE: 110 MMHG | OXYGEN SATURATION: 97 %

## 2021-01-01 VITALS — TEMPERATURE: 97.1 F

## 2021-01-01 VITALS
RESPIRATION RATE: 16 BRPM | OXYGEN SATURATION: 96 % | BODY MASS INDEX: 20.81 KG/M2 | SYSTOLIC BLOOD PRESSURE: 116 MMHG | DIASTOLIC BLOOD PRESSURE: 84 MMHG | HEIGHT: 67 IN | TEMPERATURE: 96.5 F | WEIGHT: 132.6 LBS | HEART RATE: 60 BPM

## 2021-01-01 VITALS — TEMPERATURE: 98 F

## 2021-01-01 VITALS — TEMPERATURE: 97.6 F

## 2021-01-01 DIAGNOSIS — C79.31 BRAIN METASTASES (HCC): ICD-10-CM

## 2021-01-01 DIAGNOSIS — C79.31 BRAIN METASTASES (HCC): Primary | ICD-10-CM

## 2021-01-01 DIAGNOSIS — C34.32 SMALL CELL LUNG CANCER, LEFT LOWER LOBE (HCC): Primary | ICD-10-CM

## 2021-01-01 DIAGNOSIS — T45.1X5A CHEMOTHERAPY-INDUCED NEUTROPENIA (HCC): Primary | ICD-10-CM

## 2021-01-01 DIAGNOSIS — D70.1 CHEMOTHERAPY-INDUCED NEUTROPENIA (HCC): ICD-10-CM

## 2021-01-01 DIAGNOSIS — T45.1X5A CHEMOTHERAPY-INDUCED NEUTROPENIA (HCC): ICD-10-CM

## 2021-01-01 DIAGNOSIS — C34.32 SMALL CELL LUNG CANCER, LEFT LOWER LOBE (HCC): ICD-10-CM

## 2021-01-01 DIAGNOSIS — D75.89 MACROCYTOSIS: ICD-10-CM

## 2021-01-01 DIAGNOSIS — I26.99 OTHER PULMONARY EMBOLISM WITHOUT ACUTE COR PULMONALE, UNSPECIFIED CHRONICITY (HCC): ICD-10-CM
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
## 2021-01-01 DIAGNOSIS — I82.491 DEEP VEIN THROMBOSIS (DVT) OF OTHER VEIN OF RIGHT LOWER EXTREMITY, UNSPECIFIED CHRONICITY (HCC): ICD-10-CM

## 2021-01-01 DIAGNOSIS — I82.491 ACUTE DEEP VEIN THROMBOSIS (DVT) OF OTHER SPECIFIED VEIN OF RIGHT LOWER EXTREMITY (HCC): ICD-10-CM

## 2021-01-01 DIAGNOSIS — C79.71 SECONDARY MALIGNANT NEOPLASM OF RIGHT ADRENAL GLAND (HCC): ICD-10-CM

## 2021-01-01 DIAGNOSIS — Z71.6 ENCOUNTER FOR SMOKING CESSATION COUNSELING: ICD-10-CM

## 2021-01-01 DIAGNOSIS — D70.1 CHEMOTHERAPY-INDUCED NEUTROPENIA (HCC): Primary | ICD-10-CM

## 2021-01-01 DIAGNOSIS — C79.31 LUNG CANCER METASTATIC TO BRAIN (HCC): Primary | ICD-10-CM

## 2021-01-01 DIAGNOSIS — R63.4 WEIGHT LOSS: ICD-10-CM

## 2021-01-01 DIAGNOSIS — R63.0 ANOREXIA: ICD-10-CM

## 2021-01-01 DIAGNOSIS — I26.99 OTHER ACUTE PULMONARY EMBOLISM WITHOUT ACUTE COR PULMONALE (HCC): ICD-10-CM

## 2021-01-01 DIAGNOSIS — H10.32 ACUTE BACTERIAL CONJUNCTIVITIS OF LEFT EYE: ICD-10-CM

## 2021-01-01 DIAGNOSIS — I82.4Z1 ACUTE DEEP VEIN THROMBOSIS (DVT) OF DISTAL VEIN OF RIGHT LOWER EXTREMITY (HCC): ICD-10-CM

## 2021-01-01 DIAGNOSIS — R47.01 APHASIA: ICD-10-CM

## 2021-01-01 DIAGNOSIS — R53.1 GENERALIZED WEAKNESS: ICD-10-CM

## 2021-01-01 DIAGNOSIS — R54 FRAILTY: ICD-10-CM

## 2021-01-01 DIAGNOSIS — I82.4Z1 ACUTE DEEP VEIN THROMBOSIS (DVT) OF DISTAL VEIN OF RIGHT LOWER EXTREMITY (HCC): Primary | ICD-10-CM

## 2021-01-01 DIAGNOSIS — D64.9 NORMOCYTIC ANEMIA: ICD-10-CM

## 2021-01-01 DIAGNOSIS — C34.32 MALIGNANT NEOPLASM OF LOWER LOBE, LEFT BRONCHUS OR LUNG (HCC): ICD-10-CM

## 2021-01-01 DIAGNOSIS — Z71.89 COUNSELING AND COORDINATION OF CARE: Primary | ICD-10-CM

## 2021-01-01 DIAGNOSIS — J43.9 PULMONARY EMPHYSEMA, UNSPECIFIED EMPHYSEMA TYPE (HCC): ICD-10-CM

## 2021-01-01 DIAGNOSIS — R26.9 GAIT ABNORMALITY: ICD-10-CM

## 2021-01-01 DIAGNOSIS — C34.90 LUNG CANCER METASTATIC TO BRAIN (HCC): Primary | ICD-10-CM

## 2021-01-01 DIAGNOSIS — G93.6 CEREBRAL EDEMA (HCC): ICD-10-CM

## 2021-01-01 DIAGNOSIS — G93.89 BRAIN MASS: Primary | ICD-10-CM

## 2021-01-01 DIAGNOSIS — Z23 ENCOUNTER FOR IMMUNIZATION: ICD-10-CM

## 2021-01-01 DIAGNOSIS — E53.8 B12 DEFICIENCY: ICD-10-CM

## 2021-01-01 DIAGNOSIS — C34.32 MALIGNANT NEOPLASM OF LOWER LOBE, LEFT BRONCHUS OR LUNG (HCC): Primary | ICD-10-CM

## 2021-01-01 DIAGNOSIS — Z71.89 COUNSELING REGARDING ADVANCED CARE PLANNING AND GOALS OF CARE: Primary | ICD-10-CM

## 2021-01-01 LAB
ALBUMIN SERPL BCP-MCNC: 3.5 G/DL (ref 3.5–5)
ALBUMIN SERPL BCP-MCNC: 3.6 G/DL (ref 3.5–5)
ALBUMIN SERPL BCP-MCNC: 3.8 G/DL (ref 3.5–5)
ALBUMIN SERPL BCP-MCNC: 3.8 G/DL (ref 3.5–5.7)
ALBUMIN SERPL BCP-MCNC: 3.9 G/DL (ref 3.5–5)
ALBUMIN SERPL BCP-MCNC: 4 G/DL (ref 3.5–5)
ALBUMIN SERPL BCP-MCNC: 4.1 G/DL (ref 3.5–5)
ALBUMIN SERPL BCP-MCNC: 4.1 G/DL (ref 3.5–5)
ALBUMIN SERPL BCP-MCNC: 4.2 G/DL (ref 3.5–5.7)
ALBUMIN SERPL BCP-MCNC: 4.3 G/DL (ref 3.5–5.7)
ALBUMIN SERPL BCP-MCNC: 4.3 G/DL (ref 3.5–5.7)
ALP SERPL-CCNC: 110 U/L (ref 46–116)
ALP SERPL-CCNC: 126 U/L (ref 40–150)
ALP SERPL-CCNC: 146 U/L (ref 46–116)
ALP SERPL-CCNC: 71 U/L (ref 40–150)
ALP SERPL-CCNC: 74 U/L (ref 40–150)
ALP SERPL-CCNC: 91 U/L (ref 40–150)
ALP SERPL-CCNC: 95 U/L (ref 46–116)
ALP SERPL-CCNC: 96 U/L (ref 46–116)
ALP SERPL-CCNC: 96 U/L (ref 46–116)
ALP SERPL-CCNC: 98 U/L (ref 46–116)
ALP SERPL-CCNC: 98 U/L (ref 46–116)
ALT SERPL W P-5'-P-CCNC: 18 U/L (ref 7–52)
ALT SERPL W P-5'-P-CCNC: 18 U/L (ref 7–52)
ALT SERPL W P-5'-P-CCNC: 20 U/L (ref 7–52)
ALT SERPL W P-5'-P-CCNC: 22 U/L (ref 12–78)
ALT SERPL W P-5'-P-CCNC: 24 U/L (ref 12–78)
ALT SERPL W P-5'-P-CCNC: 24 U/L (ref 12–78)
ALT SERPL W P-5'-P-CCNC: 28 U/L (ref 12–78)
ALT SERPL W P-5'-P-CCNC: 29 U/L (ref 12–78)
ALT SERPL W P-5'-P-CCNC: 29 U/L (ref 12–78)
ALT SERPL W P-5'-P-CCNC: 35 U/L (ref 12–78)
ALT SERPL W P-5'-P-CCNC: 83 U/L (ref 7–52)
ANION GAP SERPL CALCULATED.3IONS-SCNC: 10 MMOL/L (ref 4–13)
ANION GAP SERPL CALCULATED.3IONS-SCNC: 2 MMOL/L (ref 4–13)
ANION GAP SERPL CALCULATED.3IONS-SCNC: 3 MMOL/L (ref 4–13)
ANION GAP SERPL CALCULATED.3IONS-SCNC: 3 MMOL/L (ref 4–13)
ANION GAP SERPL CALCULATED.3IONS-SCNC: 5 MMOL/L (ref 4–13)
ANION GAP SERPL CALCULATED.3IONS-SCNC: 5 MMOL/L (ref 4–13)
ANION GAP SERPL CALCULATED.3IONS-SCNC: 6 MMOL/L (ref 4–13)
ANION GAP SERPL CALCULATED.3IONS-SCNC: 7 MMOL/L (ref 4–13)
ANION GAP SERPL CALCULATED.3IONS-SCNC: 7 MMOL/L (ref 4–13)
ANISOCYTOSIS BLD QL SMEAR: PRESENT
APTT PPP: 21 SECONDS (ref 23–37)
APTT PPP: 23 SECONDS (ref 23–37)
APTT PPP: 24 SECONDS (ref 23–37)
APTT PPP: 25 SECONDS (ref 23–37)
AST SERPL W P-5'-P-CCNC: 10 U/L (ref 5–45)
AST SERPL W P-5'-P-CCNC: 10 U/L (ref 5–45)
AST SERPL W P-5'-P-CCNC: 11 U/L (ref 5–45)
AST SERPL W P-5'-P-CCNC: 13 U/L (ref 5–45)
AST SERPL W P-5'-P-CCNC: 14 U/L (ref 13–39)
AST SERPL W P-5'-P-CCNC: 14 U/L (ref 5–45)
AST SERPL W P-5'-P-CCNC: 15 U/L (ref 13–39)
AST SERPL W P-5'-P-CCNC: 15 U/L (ref 5–45)
AST SERPL W P-5'-P-CCNC: 16 U/L (ref 13–39)
AST SERPL W P-5'-P-CCNC: 23 U/L (ref 5–45)
AST SERPL W P-5'-P-CCNC: 34 U/L (ref 13–39)
ATRIAL RATE: 56 BPM
BASOPHILS # BLD AUTO: 0 THOUSANDS/ΜL (ref 0–0.1)
BASOPHILS # BLD AUTO: 0.01 THOUSANDS/ΜL (ref 0–0.1)
BASOPHILS # BLD AUTO: 0.02 THOUSANDS/ΜL (ref 0–0.1)
BASOPHILS # BLD AUTO: 0.03 THOUSANDS/ΜL (ref 0–0.1)
BASOPHILS # BLD AUTO: 0.03 THOUSANDS/ΜL (ref 0–0.1)
BASOPHILS # BLD AUTO: 0.04 THOUSANDS/ΜL (ref 0–0.1)
BASOPHILS # BLD AUTO: 0.04 THOUSANDS/ΜL (ref 0–0.1)
BASOPHILS # BLD AUTO: 0.05 THOUSANDS/ΜL (ref 0–0.1)
BASOPHILS # BLD AUTO: 0.06 THOUSANDS/ΜL (ref 0–0.1)
BASOPHILS # BLD AUTO: 0.06 THOUSANDS/ΜL (ref 0–0.1)
BASOPHILS # BLD AUTO: 0.1 THOUSANDS/ΜL (ref 0–0.1)
BASOPHILS # BLD MANUAL: 0 THOUSAND/UL (ref 0–0.1)
BASOPHILS NFR BLD AUTO: 0 % (ref 0–1)
BASOPHILS NFR BLD AUTO: 0 % (ref 0–1)
BASOPHILS NFR BLD AUTO: 1 % (ref 0–1)
BASOPHILS NFR BLD AUTO: 1 % (ref 0–2)
BASOPHILS NFR BLD AUTO: 1 % (ref 0–2)
BASOPHILS NFR MAR MANUAL: 0 % (ref 0–1)
BILIRUB SERPL-MCNC: 0.3 MG/DL (ref 0.2–1)
BILIRUB SERPL-MCNC: 0.37 MG/DL (ref 0.2–1)
BILIRUB SERPL-MCNC: 0.4 MG/DL (ref 0.2–1)
BILIRUB SERPL-MCNC: 0.5 MG/DL (ref 0.2–1)
BILIRUB SERPL-MCNC: 0.54 MG/DL (ref 0.2–1)
BILIRUB SERPL-MCNC: 0.56 MG/DL (ref 0.2–1)
BILIRUB SERPL-MCNC: 0.56 MG/DL (ref 0.2–1)
BILIRUB SERPL-MCNC: 0.58 MG/DL (ref 0.2–1)
BILIRUB SERPL-MCNC: 0.6 MG/DL (ref 0.2–1)
BUN SERPL-MCNC: 11 MG/DL (ref 5–25)
BUN SERPL-MCNC: 11 MG/DL (ref 7–25)
BUN SERPL-MCNC: 12 MG/DL (ref 5–25)
BUN SERPL-MCNC: 12 MG/DL (ref 5–25)
BUN SERPL-MCNC: 13 MG/DL (ref 5–25)
BUN SERPL-MCNC: 13 MG/DL (ref 7–25)
BUN SERPL-MCNC: 14 MG/DL (ref 5–25)
BUN SERPL-MCNC: 14 MG/DL (ref 7–25)
BUN SERPL-MCNC: 7 MG/DL (ref 7–25)
BUN SERPL-MCNC: 9 MG/DL (ref 5–25)
BUN SERPL-MCNC: 9 MG/DL (ref 5–25)
CALCIUM SERPL-MCNC: 8.8 MG/DL (ref 8.3–10.1)
CALCIUM SERPL-MCNC: 8.9 MG/DL (ref 8.6–10.5)
CALCIUM SERPL-MCNC: 9.2 MG/DL (ref 8.3–10.1)
CALCIUM SERPL-MCNC: 9.4 MG/DL (ref 8.3–10.1)
CALCIUM SERPL-MCNC: 9.4 MG/DL (ref 8.3–10.1)
CALCIUM SERPL-MCNC: 9.5 MG/DL (ref 8.3–10.1)
CALCIUM SERPL-MCNC: 9.5 MG/DL (ref 8.6–10.5)
CALCIUM SERPL-MCNC: 9.6 MG/DL (ref 8.6–10.5)
CALCIUM SERPL-MCNC: 9.7 MG/DL (ref 8.6–10.5)
CALCIUM SERPL-MCNC: 9.8 MG/DL (ref 8.3–10.1)
CALCIUM SERPL-MCNC: 9.8 MG/DL (ref 8.3–10.1)
CHLORIDE SERPL-SCNC: 103 MMOL/L (ref 98–107)
CHLORIDE SERPL-SCNC: 106 MMOL/L (ref 98–107)
CHLORIDE SERPL-SCNC: 106 MMOL/L (ref 98–107)
CHLORIDE SERPL-SCNC: 107 MMOL/L (ref 100–108)
CHLORIDE SERPL-SCNC: 107 MMOL/L (ref 100–108)
CHLORIDE SERPL-SCNC: 107 MMOL/L (ref 98–107)
CHLORIDE SERPL-SCNC: 108 MMOL/L (ref 100–108)
CHLORIDE SERPL-SCNC: 108 MMOL/L (ref 100–108)
CHLORIDE SERPL-SCNC: 109 MMOL/L (ref 100–108)
CHLORIDE SERPL-SCNC: 109 MMOL/L (ref 100–108)
CHLORIDE SERPL-SCNC: 111 MMOL/L (ref 100–108)
CO2 SERPL-SCNC: 26 MMOL/L (ref 21–31)
CO2 SERPL-SCNC: 27 MMOL/L (ref 21–31)
CO2 SERPL-SCNC: 29 MMOL/L (ref 21–31)
CO2 SERPL-SCNC: 29 MMOL/L (ref 21–32)
CO2 SERPL-SCNC: 30 MMOL/L (ref 21–32)
CO2 SERPL-SCNC: 31 MMOL/L (ref 21–31)
CREAT SERPL-MCNC: 0.5 MG/DL (ref 0.7–1.3)
CREAT SERPL-MCNC: 0.54 MG/DL (ref 0.7–1.3)
CREAT SERPL-MCNC: 0.66 MG/DL (ref 0.6–1.3)
CREAT SERPL-MCNC: 0.66 MG/DL (ref 0.6–1.3)
CREAT SERPL-MCNC: 0.68 MG/DL (ref 0.6–1.3)
CREAT SERPL-MCNC: 0.71 MG/DL (ref 0.7–1.3)
CREAT SERPL-MCNC: 0.72 MG/DL (ref 0.6–1.3)
CREAT SERPL-MCNC: 0.76 MG/DL (ref 0.6–1.3)
CREAT SERPL-MCNC: 0.76 MG/DL (ref 0.6–1.3)
CREAT SERPL-MCNC: 0.78 MG/DL (ref 0.6–1.3)
CREAT SERPL-MCNC: 0.88 MG/DL (ref 0.7–1.3)
CRP SERPL QL: <3 MG/L
CRP SERPL QL: <3 MG/L
DACRYOCYTES BLD QL SMEAR: PRESENT
DAT POLY-SP REAG RBC QL: NEGATIVE
EOSINOPHIL # BLD AUTO: 0.01 THOUSAND/ΜL (ref 0–0.61)
EOSINOPHIL # BLD AUTO: 0.02 THOUSAND/ΜL (ref 0–0.61)
EOSINOPHIL # BLD AUTO: 0.05 THOUSAND/ΜL (ref 0–0.61)
EOSINOPHIL # BLD AUTO: 0.05 THOUSAND/ΜL (ref 0–0.61)
EOSINOPHIL # BLD AUTO: 0.06 THOUSAND/ΜL (ref 0–0.61)
EOSINOPHIL # BLD AUTO: 0.06 THOUSAND/ΜL (ref 0–0.61)
EOSINOPHIL # BLD AUTO: 0.07 THOUSAND/ΜL (ref 0–0.61)
EOSINOPHIL # BLD AUTO: 0.08 THOUSAND/ΜL (ref 0–0.61)
EOSINOPHIL # BLD AUTO: 0.09 THOUSAND/ΜL (ref 0–0.61)
EOSINOPHIL # BLD AUTO: 0.1 THOUSAND/ΜL (ref 0–0.61)
EOSINOPHIL # BLD AUTO: 0.1 THOUSAND/ΜL (ref 0–0.61)
EOSINOPHIL # BLD AUTO: 0.16 THOUSAND/ΜL (ref 0–0.61)
EOSINOPHIL # BLD MANUAL: 0.04 THOUSAND/UL (ref 0–0.4)
EOSINOPHIL NFR BLD AUTO: 0 % (ref 0–6)
EOSINOPHIL NFR BLD AUTO: 1 % (ref 0–5)
EOSINOPHIL NFR BLD AUTO: 1 % (ref 0–6)
EOSINOPHIL NFR BLD AUTO: 1 % (ref 0–6)
EOSINOPHIL NFR BLD AUTO: 2 % (ref 0–5)
EOSINOPHIL NFR BLD AUTO: 2 % (ref 0–6)
EOSINOPHIL NFR BLD AUTO: 5 % (ref 0–6)
EOSINOPHIL NFR BLD MANUAL: 1 % (ref 0–6)
ERYTHROCYTE [DISTWIDTH] IN BLOOD BY AUTOMATED COUNT: 12.4 % (ref 11.6–15.1)
ERYTHROCYTE [DISTWIDTH] IN BLOOD BY AUTOMATED COUNT: 12.8 % (ref 11.6–15.1)
ERYTHROCYTE [DISTWIDTH] IN BLOOD BY AUTOMATED COUNT: 13.7 % (ref 11.6–15.1)
ERYTHROCYTE [DISTWIDTH] IN BLOOD BY AUTOMATED COUNT: 13.9 % (ref 11.6–15.1)
ERYTHROCYTE [DISTWIDTH] IN BLOOD BY AUTOMATED COUNT: 14 % (ref 11.6–15.1)
ERYTHROCYTE [DISTWIDTH] IN BLOOD BY AUTOMATED COUNT: 14.8 % (ref 11.5–14.5)
ERYTHROCYTE [DISTWIDTH] IN BLOOD BY AUTOMATED COUNT: 14.8 % (ref 11.5–14.5)
ERYTHROCYTE [DISTWIDTH] IN BLOOD BY AUTOMATED COUNT: 14.9 % (ref 11.6–15.1)
ERYTHROCYTE [DISTWIDTH] IN BLOOD BY AUTOMATED COUNT: 14.9 % (ref 11.6–15.1)
ERYTHROCYTE [DISTWIDTH] IN BLOOD BY AUTOMATED COUNT: 15.1 % (ref 11.6–15.1)
ERYTHROCYTE [DISTWIDTH] IN BLOOD BY AUTOMATED COUNT: 15.1 % (ref 11.6–15.1)
ERYTHROCYTE [DISTWIDTH] IN BLOOD BY AUTOMATED COUNT: 15.9 % (ref 11.6–15.1)
ERYTHROCYTE [DISTWIDTH] IN BLOOD BY AUTOMATED COUNT: 16.8 % (ref 11.6–15.1)
ERYTHROCYTE [DISTWIDTH] IN BLOOD BY AUTOMATED COUNT: 17 % (ref 11.6–15.1)
ERYTHROCYTE [DISTWIDTH] IN BLOOD BY AUTOMATED COUNT: 17.4 % (ref 11.6–15.1)
ERYTHROCYTE [DISTWIDTH] IN BLOOD BY AUTOMATED COUNT: 19 % (ref 11.6–15.1)
ERYTHROCYTE [DISTWIDTH] IN BLOOD BY AUTOMATED COUNT: 23.6 % (ref 11.5–14.5)
ERYTHROCYTE [SEDIMENTATION RATE] IN BLOOD: 2 MM/HOUR (ref 0–19)
ERYTHROCYTE [SEDIMENTATION RATE] IN BLOOD: 9 MM/HOUR (ref 0–19)
FERRITIN SERPL-MCNC: 713 NG/ML (ref 8–388)
FOLATE SERPL-MCNC: 12.1 NG/ML (ref 3.1–17.5)
FOLATE SERPL-MCNC: >20 NG/ML (ref 3.1–17.5)
GFR SERPL CREATININE-BSD FRML MDRD: 102 ML/MIN/1.73SQ M
GFR SERPL CREATININE-BSD FRML MDRD: 103 ML/MIN/1.73SQ M
GFR SERPL CREATININE-BSD FRML MDRD: 103 ML/MIN/1.73SQ M
GFR SERPL CREATININE-BSD FRML MDRD: 106 ML/MIN/1.73SQ M
GFR SERPL CREATININE-BSD FRML MDRD: 106 ML/MIN/1.73SQ M
GFR SERPL CREATININE-BSD FRML MDRD: 108 ML/MIN/1.73SQ M
GFR SERPL CREATININE-BSD FRML MDRD: 110 ML/MIN/1.73SQ M
GFR SERPL CREATININE-BSD FRML MDRD: 110 ML/MIN/1.73SQ M
GFR SERPL CREATININE-BSD FRML MDRD: 119 ML/MIN/1.73SQ M
GFR SERPL CREATININE-BSD FRML MDRD: 123 ML/MIN/1.73SQ M
GFR SERPL CREATININE-BSD FRML MDRD: 97 ML/MIN/1.73SQ M
GLUCOSE P FAST SERPL-MCNC: 106 MG/DL (ref 65–99)
GLUCOSE P FAST SERPL-MCNC: 80 MG/DL (ref 65–99)
GLUCOSE P FAST SERPL-MCNC: 92 MG/DL (ref 65–99)
GLUCOSE P FAST SERPL-MCNC: 95 MG/DL (ref 65–99)
GLUCOSE P FAST SERPL-MCNC: 99 MG/DL (ref 65–99)
GLUCOSE SERPL-MCNC: 101 MG/DL (ref 65–99)
GLUCOSE SERPL-MCNC: 109 MG/DL (ref 65–140)
GLUCOSE SERPL-MCNC: 84 MG/DL (ref 65–140)
GLUCOSE SERPL-MCNC: 89 MG/DL (ref 65–140)
GLUCOSE SERPL-MCNC: 99 MG/DL (ref 65–99)
GLUCOSE SERPL-MCNC: 99 MG/DL (ref 65–99)
HAPTOGLOB SERPL-MCNC: 168 MG/DL (ref 29–370)
HCT VFR BLD AUTO: 28.5 % (ref 36.5–49.3)
HCT VFR BLD AUTO: 29.3 % (ref 42–47)
HCT VFR BLD AUTO: 34.1 % (ref 36.5–49.3)
HCT VFR BLD AUTO: 34.2 % (ref 36.5–49.3)
HCT VFR BLD AUTO: 34.3 % (ref 36.5–49.3)
HCT VFR BLD AUTO: 34.3 % (ref 36.5–49.3)
HCT VFR BLD AUTO: 35 % (ref 36.5–49.3)
HCT VFR BLD AUTO: 38.8 % (ref 36.5–49.3)
HCT VFR BLD AUTO: 39.1 % (ref 36.5–49.3)
HCT VFR BLD AUTO: 39.3 % (ref 36.5–49.3)
HCT VFR BLD AUTO: 39.9 % (ref 36.5–49.3)
HCT VFR BLD AUTO: 41.1 % (ref 42–47)
HCT VFR BLD AUTO: 42.7 % (ref 36.5–49.3)
HCT VFR BLD AUTO: 42.8 % (ref 36.5–49.3)
HCT VFR BLD AUTO: 43.2 % (ref 42–47)
HCT VFR BLD AUTO: 43.7 % (ref 36.5–49.3)
HCT VFR BLD AUTO: 44.9 % (ref 36.5–49.3)
HGB BLD-MCNC: 11.2 G/DL (ref 12–17)
HGB BLD-MCNC: 11.3 G/DL (ref 12–17)
HGB BLD-MCNC: 11.3 G/DL (ref 12–17)
HGB BLD-MCNC: 11.4 G/DL (ref 12–17)
HGB BLD-MCNC: 11.7 G/DL (ref 12–17)
HGB BLD-MCNC: 13.1 G/DL (ref 12–17)
HGB BLD-MCNC: 13.2 G/DL (ref 12–17)
HGB BLD-MCNC: 13.2 G/DL (ref 12–17)
HGB BLD-MCNC: 13.3 G/DL (ref 12–17)
HGB BLD-MCNC: 13.7 G/DL (ref 14–18)
HGB BLD-MCNC: 14.1 G/DL (ref 12–17)
HGB BLD-MCNC: 14.2 G/DL (ref 12–17)
HGB BLD-MCNC: 14.3 G/DL (ref 12–17)
HGB BLD-MCNC: 14.7 G/DL (ref 14–18)
HGB BLD-MCNC: 14.9 G/DL (ref 12–17)
HGB BLD-MCNC: 8.9 G/DL (ref 12–17)
HGB BLD-MCNC: 9.8 G/DL (ref 14–18)
HYPERCHROMIA BLD QL SMEAR: PRESENT
IMM GRANULOCYTES # BLD AUTO: 0.01 THOUSAND/UL (ref 0–0.2)
IMM GRANULOCYTES # BLD AUTO: 0.02 THOUSAND/UL (ref 0–0.2)
IMM GRANULOCYTES # BLD AUTO: 0.03 THOUSAND/UL (ref 0–0.2)
IMM GRANULOCYTES # BLD AUTO: 0.05 THOUSAND/UL (ref 0–0.2)
IMM GRANULOCYTES # BLD AUTO: 0.05 THOUSAND/UL (ref 0–0.2)
IMM GRANULOCYTES # BLD AUTO: 0.06 THOUSAND/UL (ref 0–0.2)
IMM GRANULOCYTES # BLD AUTO: 0.07 THOUSAND/UL (ref 0–0.2)
IMM GRANULOCYTES NFR BLD AUTO: 0 % (ref 0–2)
IMM GRANULOCYTES NFR BLD AUTO: 1 % (ref 0–2)
INR PPP: 0.95 (ref 0.84–1.19)
INR PPP: 0.96 (ref 0.84–1.19)
INR PPP: 0.97 (ref 0.84–1.19)
INR PPP: 0.97 (ref 0.84–1.19)
INR PPP: 1.01 (ref 0.84–1.19)
INR PPP: 1.05 (ref 0.84–1.19)
IRON SATN MFR SERPL: 21 %
IRON SERPL-MCNC: 78 UG/DL (ref 65–175)
LDH SERPL-CCNC: 123 U/L (ref 84–246)
LDH SERPL-CCNC: 181 U/L (ref 81–234)
LDH SERPL-CCNC: 206 U/L (ref 81–234)
LDH SERPL-CCNC: 223 U/L (ref 81–234)
LDH SERPL-CCNC: 229 U/L (ref 81–234)
LYMPHOCYTES # BLD AUTO: 0.38 THOUSANDS/ΜL (ref 0.6–4.47)
LYMPHOCYTES # BLD AUTO: 0.4 THOUSANDS/ΜL (ref 0.6–4.47)
LYMPHOCYTES # BLD AUTO: 0.4 THOUSANDS/ΜL (ref 0.6–4.47)
LYMPHOCYTES # BLD AUTO: 0.44 THOUSANDS/ΜL (ref 0.6–4.47)
LYMPHOCYTES # BLD AUTO: 0.45 THOUSANDS/ΜL (ref 0.6–4.47)
LYMPHOCYTES # BLD AUTO: 0.5 THOUSANDS/ΜL (ref 0.6–4.47)
LYMPHOCYTES # BLD AUTO: 0.52 THOUSANDS/ΜL (ref 0.6–4.47)
LYMPHOCYTES # BLD AUTO: 0.53 THOUSANDS/ΜL (ref 0.6–4.47)
LYMPHOCYTES # BLD AUTO: 0.54 THOUSANDS/ΜL (ref 0.6–4.47)
LYMPHOCYTES # BLD AUTO: 0.63 THOUSANDS/ΜL (ref 0.6–4.47)
LYMPHOCYTES # BLD AUTO: 0.67 THOUSANDS/ΜL (ref 0.6–4.47)
LYMPHOCYTES # BLD AUTO: 0.69 THOUSANDS/ΜL (ref 0.6–4.47)
LYMPHOCYTES # BLD AUTO: 0.69 THOUSANDS/ΜL (ref 0.6–4.47)
LYMPHOCYTES # BLD AUTO: 0.98 THOUSAND/UL (ref 0.6–4.47)
LYMPHOCYTES # BLD AUTO: 1 THOUSANDS/ΜL (ref 0.6–4.47)
LYMPHOCYTES # BLD AUTO: 1.14 THOUSAND/UL (ref 0.6–4.47)
LYMPHOCYTES # BLD AUTO: 23 % (ref 14–44)
LYMPHOCYTES # BLD AUTO: 7 % (ref 20–51)
LYMPHOCYTES NFR BLD AUTO: 11 % (ref 14–44)
LYMPHOCYTES NFR BLD AUTO: 11 % (ref 21–51)
LYMPHOCYTES NFR BLD AUTO: 12 % (ref 14–44)
LYMPHOCYTES NFR BLD AUTO: 13 % (ref 14–44)
LYMPHOCYTES NFR BLD AUTO: 13 % (ref 14–44)
LYMPHOCYTES NFR BLD AUTO: 13 % (ref 21–51)
LYMPHOCYTES NFR BLD AUTO: 14 % (ref 14–44)
LYMPHOCYTES NFR BLD AUTO: 14 % (ref 14–44)
LYMPHOCYTES NFR BLD AUTO: 15 % (ref 14–44)
LYMPHOCYTES NFR BLD AUTO: 15 % (ref 14–44)
LYMPHOCYTES NFR BLD AUTO: 9 % (ref 14–44)
MACROCYTES BLD QL AUTO: PRESENT
MAGNESIUM SERPL-MCNC: 1.8 MG/DL (ref 1.9–2.7)
MAGNESIUM SERPL-MCNC: 1.9 MG/DL (ref 1.9–2.7)
MAGNESIUM SERPL-MCNC: 2 MG/DL (ref 1.9–2.7)
MAGNESIUM SERPL-MCNC: 2.2 MG/DL (ref 1.6–2.6)
MAGNESIUM SERPL-MCNC: 2.3 MG/DL (ref 1.6–2.6)
MAGNESIUM SERPL-MCNC: 2.5 MG/DL (ref 1.6–2.6)
MAGNESIUM SERPL-MCNC: 2.6 MG/DL (ref 1.6–2.6)
MCH RBC QN AUTO: 31.4 PG (ref 26.8–34.3)
MCH RBC QN AUTO: 32.5 PG (ref 26.8–34.3)
MCH RBC QN AUTO: 32.5 PG (ref 26–34)
MCH RBC QN AUTO: 32.8 PG (ref 26.8–34.3)
MCH RBC QN AUTO: 32.9 PG (ref 26.8–34.3)
MCH RBC QN AUTO: 33 PG (ref 26.8–34.3)
MCH RBC QN AUTO: 33 PG (ref 26–34)
MCH RBC QN AUTO: 33.1 PG (ref 26.8–34.3)
MCH RBC QN AUTO: 33.3 PG (ref 26.8–34.3)
MCH RBC QN AUTO: 33.3 PG (ref 26.8–34.3)
MCH RBC QN AUTO: 33.5 PG (ref 26.8–34.3)
MCH RBC QN AUTO: 33.5 PG (ref 26–34)
MCH RBC QN AUTO: 33.6 PG (ref 26.8–34.3)
MCH RBC QN AUTO: 33.7 PG (ref 26.8–34.3)
MCH RBC QN AUTO: 33.7 PG (ref 26.8–34.3)
MCH RBC QN AUTO: 33.9 PG (ref 26.8–34.3)
MCH RBC QN AUTO: 34.1 PG (ref 26.8–34.3)
MCHC RBC AUTO-ENTMCNC: 31.2 G/DL (ref 31.4–37.4)
MCHC RBC AUTO-ENTMCNC: 32.3 G/DL (ref 31.4–37.4)
MCHC RBC AUTO-ENTMCNC: 32.7 G/DL (ref 31.4–37.4)
MCHC RBC AUTO-ENTMCNC: 32.7 G/DL (ref 31.4–37.4)
MCHC RBC AUTO-ENTMCNC: 32.9 G/DL (ref 31.4–37.4)
MCHC RBC AUTO-ENTMCNC: 33 G/DL (ref 31.4–37.4)
MCHC RBC AUTO-ENTMCNC: 33.2 G/DL (ref 31.4–37.4)
MCHC RBC AUTO-ENTMCNC: 33.2 G/DL (ref 31.4–37.4)
MCHC RBC AUTO-ENTMCNC: 33.3 G/DL (ref 31.4–37.4)
MCHC RBC AUTO-ENTMCNC: 33.4 G/DL (ref 31.4–37.4)
MCHC RBC AUTO-ENTMCNC: 33.4 G/DL (ref 31–37)
MCHC RBC AUTO-ENTMCNC: 33.5 G/DL (ref 31.4–37.4)
MCHC RBC AUTO-ENTMCNC: 33.5 G/DL (ref 31–37)
MCHC RBC AUTO-ENTMCNC: 33.6 G/DL (ref 31.4–37.4)
MCHC RBC AUTO-ENTMCNC: 34 G/DL (ref 31.4–37.4)
MCHC RBC AUTO-ENTMCNC: 34 G/DL (ref 31–37)
MCHC RBC AUTO-ENTMCNC: 34.2 G/DL (ref 31.4–37.4)
MCV RBC AUTO: 100 FL (ref 82–98)
MCV RBC AUTO: 101 FL (ref 82–98)
MCV RBC AUTO: 102 FL (ref 82–98)
MCV RBC AUTO: 102 FL (ref 82–98)
MCV RBC AUTO: 97 FL (ref 81–99)
MCV RBC AUTO: 98 FL (ref 82–98)
MCV RBC AUTO: 99 FL (ref 81–99)
MCV RBC AUTO: 99 FL (ref 81–99)
MCV RBC AUTO: 99 FL (ref 82–98)
METAMYELOCYTES NFR BLD MANUAL: 1 % (ref 0–1)
MISCELLANEOUS LAB TEST RESULT: NORMAL
MONOCYTES # BLD AUTO: 0.35 THOUSAND/ΜL (ref 0.17–1.22)
MONOCYTES # BLD AUTO: 0.38 THOUSAND/UL (ref 0–1.22)
MONOCYTES # BLD AUTO: 0.38 THOUSAND/ΜL (ref 0.17–1.22)
MONOCYTES # BLD AUTO: 0.38 THOUSAND/ΜL (ref 0.17–1.22)
MONOCYTES # BLD AUTO: 0.4 THOUSAND/ΜL (ref 0.17–1.22)
MONOCYTES # BLD AUTO: 0.46 THOUSAND/ΜL (ref 0.17–1.22)
MONOCYTES # BLD AUTO: 0.49 THOUSAND/UL (ref 0–1.22)
MONOCYTES # BLD AUTO: 0.49 THOUSAND/ΜL (ref 0.17–1.22)
MONOCYTES # BLD AUTO: 0.51 THOUSAND/ΜL (ref 0.17–1.22)
MONOCYTES # BLD AUTO: 0.55 THOUSAND/ΜL (ref 0.17–1.22)
MONOCYTES # BLD AUTO: 0.6 THOUSAND/ΜL (ref 0.17–1.22)
MONOCYTES # BLD AUTO: 0.6 THOUSAND/ΜL (ref 0.17–1.22)
MONOCYTES # BLD AUTO: 0.62 THOUSAND/ΜL (ref 0.17–1.22)
MONOCYTES # BLD AUTO: 0.77 THOUSAND/ΜL (ref 0.17–1.22)
MONOCYTES # BLD AUTO: 0.78 THOUSAND/ΜL (ref 0.17–1.22)
MONOCYTES # BLD AUTO: 0.84 THOUSAND/ΜL (ref 0.17–1.22)
MONOCYTES NFR BLD AUTO: 10 % (ref 4–12)
MONOCYTES NFR BLD AUTO: 11 % (ref 4–12)
MONOCYTES NFR BLD AUTO: 11 % (ref 4–12)
MONOCYTES NFR BLD AUTO: 12 % (ref 2–12)
MONOCYTES NFR BLD AUTO: 13 % (ref 4–12)
MONOCYTES NFR BLD AUTO: 14 % (ref 4–12)
MONOCYTES NFR BLD AUTO: 3 % (ref 4–12)
MONOCYTES NFR BLD AUTO: 7 % (ref 2–12)
MONOCYTES NFR BLD AUTO: 8 % (ref 4–12)
MONOCYTES NFR BLD AUTO: 9 % (ref 4–12)
MONOCYTES NFR BLD: 9 % (ref 4–12)
MYELOCYTES NFR BLD MANUAL: 1 % (ref 0–1)
NEUTROPHILS # BLD AUTO: 1.78 THOUSANDS/ΜL (ref 1.85–7.62)
NEUTROPHILS # BLD AUTO: 1.9 THOUSANDS/ΜL (ref 1.85–7.62)
NEUTROPHILS # BLD AUTO: 2.25 THOUSANDS/ΜL (ref 1.85–7.62)
NEUTROPHILS # BLD AUTO: 2.33 THOUSANDS/ΜL (ref 1.85–7.62)
NEUTROPHILS # BLD AUTO: 2.4 THOUSANDS/ΜL (ref 1.4–6.5)
NEUTROPHILS # BLD AUTO: 2.55 THOUSANDS/ΜL (ref 1.85–7.62)
NEUTROPHILS # BLD AUTO: 2.95 THOUSANDS/ΜL (ref 1.85–7.62)
NEUTROPHILS # BLD AUTO: 3.82 THOUSANDS/ΜL (ref 1.85–7.62)
NEUTROPHILS # BLD AUTO: 4.59 THOUSANDS/ΜL (ref 1.85–7.62)
NEUTROPHILS # BLD AUTO: 4.62 THOUSANDS/ΜL (ref 1.85–7.62)
NEUTROPHILS # BLD AUTO: 4.75 THOUSANDS/ΜL (ref 1.85–7.62)
NEUTROPHILS # BLD AUTO: 5.41 THOUSANDS/ΜL (ref 1.85–7.62)
NEUTROPHILS # BLD AUTO: 6.01 THOUSANDS/ΜL (ref 1.85–7.62)
NEUTROPHILS # BLD AUTO: 6.9 THOUSANDS/ΜL (ref 1.4–6.5)
NEUTROPHILS # BLD MANUAL: 2.76 THOUSAND/UL (ref 1.85–7.62)
NEUTS BAND NFR BLD MANUAL: 20 % (ref 0–8)
NEUTS SEG # BLD: 14.67 THOUSAND/UL (ref 1.81–6.82)
NEUTS SEG NFR BLD AUTO: 45 % (ref 43–75)
NEUTS SEG NFR BLD AUTO: 67 % (ref 43–75)
NEUTS SEG NFR BLD AUTO: 69 % (ref 43–75)
NEUTS SEG NFR BLD AUTO: 70 % (ref 43–75)
NEUTS SEG NFR BLD AUTO: 71 % (ref 43–75)
NEUTS SEG NFR BLD AUTO: 73 % (ref 42–75)
NEUTS SEG NFR BLD AUTO: 73 % (ref 43–75)
NEUTS SEG NFR BLD AUTO: 73 % (ref 43–75)
NEUTS SEG NFR BLD AUTO: 76 % (ref 43–75)
NEUTS SEG NFR BLD AUTO: 79 % (ref 43–75)
NEUTS SEG NFR BLD AUTO: 80 % (ref 42–75)
NEUTS SEG NFR BLD AUTO: 80 % (ref 43–75)
NEUTS SEG NFR BLD AUTO: 81 % (ref 43–75)
NEUTS SEG NFR BLD AUTO: 90 % (ref 42–75)
NRBC BLD AUTO-RTO: 0 /100 WBCS
OVALOCYTES BLD QL SMEAR: PRESENT
P AXIS: 56 DEGREES
PLATELET # BLD AUTO: 108 THOUSANDS/UL (ref 149–390)
PLATELET # BLD AUTO: 148 THOUSANDS/UL (ref 149–390)
PLATELET # BLD AUTO: 151 THOUSANDS/UL (ref 149–390)
PLATELET # BLD AUTO: 153 THOUSANDS/UL (ref 149–390)
PLATELET # BLD AUTO: 153 THOUSANDS/UL (ref 149–390)
PLATELET # BLD AUTO: 161 THOUSANDS/UL (ref 149–390)
PLATELET # BLD AUTO: 164 THOUSANDS/UL (ref 149–390)
PLATELET # BLD AUTO: 167 THOUSANDS/UL (ref 149–390)
PLATELET # BLD AUTO: 169 THOUSANDS/UL (ref 149–390)
PLATELET # BLD AUTO: 170 THOUSANDS/UL (ref 149–390)
PLATELET # BLD AUTO: 174 THOUSANDS/UL (ref 149–390)
PLATELET # BLD AUTO: 176 THOUSANDS/UL (ref 149–390)
PLATELET # BLD AUTO: 180 THOUSANDS/UL (ref 149–390)
PLATELET # BLD AUTO: 181 THOUSANDS/UL (ref 149–390)
PLATELET # BLD AUTO: 183 THOUSANDS/UL (ref 149–390)
PLATELET # BLD AUTO: 185 THOUSANDS/UL (ref 149–390)
PLATELET # BLD AUTO: 187 THOUSANDS/UL (ref 149–390)
PLATELET # BLD AUTO: 202 THOUSANDS/UL (ref 149–390)
PLATELET BLD QL SMEAR: ABNORMAL
PLATELET BLD QL SMEAR: ADEQUATE
PLATELET BLD QL SMEAR: ADEQUATE
PMV BLD AUTO: 10 FL (ref 8.9–12.7)
PMV BLD AUTO: 10 FL (ref 8.9–12.7)
PMV BLD AUTO: 10.6 FL (ref 8.9–12.7)
PMV BLD AUTO: 10.8 FL (ref 8.9–12.7)
PMV BLD AUTO: 10.9 FL (ref 8.9–12.7)
PMV BLD AUTO: 11.2 FL (ref 8.9–12.7)
PMV BLD AUTO: 11.3 FL (ref 8.9–12.7)
PMV BLD AUTO: 11.3 FL (ref 8.9–12.7)
PMV BLD AUTO: 11.5 FL (ref 8.9–12.7)
PMV BLD AUTO: 11.7 FL (ref 8.9–12.7)
PMV BLD AUTO: 7.9 FL (ref 8.6–11.7)
PMV BLD AUTO: 8.7 FL (ref 8.6–11.7)
PMV BLD AUTO: 9.6 FL (ref 8.9–12.7)
PMV BLD AUTO: 9.7 FL (ref 8.6–11.7)
PMV BLD AUTO: 9.7 FL (ref 8.9–12.7)
PMV BLD AUTO: 9.8 FL (ref 8.9–12.7)
PMV BLD AUTO: 9.9 FL (ref 8.9–12.7)
PMV BLD AUTO: 9.9 FL (ref 8.9–12.7)
POLYCHROMASIA BLD QL SMEAR: PRESENT
POTASSIUM SERPL-SCNC: 3.7 MMOL/L (ref 3.5–5.3)
POTASSIUM SERPL-SCNC: 3.7 MMOL/L (ref 3.5–5.5)
POTASSIUM SERPL-SCNC: 3.8 MMOL/L (ref 3.5–5.3)
POTASSIUM SERPL-SCNC: 3.8 MMOL/L (ref 3.5–5.5)
POTASSIUM SERPL-SCNC: 3.9 MMOL/L (ref 3.5–5.5)
POTASSIUM SERPL-SCNC: 3.9 MMOL/L (ref 3.5–5.5)
POTASSIUM SERPL-SCNC: 4 MMOL/L (ref 3.5–5.3)
POTASSIUM SERPL-SCNC: 4.2 MMOL/L (ref 3.5–5.3)
POTASSIUM SERPL-SCNC: 4.3 MMOL/L (ref 3.5–5.3)
POTASSIUM SERPL-SCNC: 4.4 MMOL/L (ref 3.5–5.3)
POTASSIUM SERPL-SCNC: 4.4 MMOL/L (ref 3.5–5.3)
PR INTERVAL: 188 MS
PROT SERPL-MCNC: 6 G/DL (ref 6.4–8.9)
PROT SERPL-MCNC: 6.4 G/DL (ref 6.4–8.2)
PROT SERPL-MCNC: 6.4 G/DL (ref 6.4–8.2)
PROT SERPL-MCNC: 6.4 G/DL (ref 6.4–8.9)
PROT SERPL-MCNC: 6.8 G/DL (ref 6.4–8.2)
PROT SERPL-MCNC: 6.8 G/DL (ref 6.4–8.9)
PROT SERPL-MCNC: 6.8 G/DL (ref 6.4–8.9)
PROT SERPL-MCNC: 6.9 G/DL (ref 6.4–8.2)
PROT SERPL-MCNC: 7.2 G/DL (ref 6.4–8.2)
PROT SERPL-MCNC: 7.2 G/DL (ref 6.4–8.2)
PROT SERPL-MCNC: 7.6 G/DL (ref 6.4–8.2)
PROTHROMBIN TIME: 12.7 SECONDS (ref 11.6–14.5)
PROTHROMBIN TIME: 12.7 SECONDS (ref 11.6–14.5)
PROTHROMBIN TIME: 12.9 SECONDS (ref 11.6–14.5)
PROTHROMBIN TIME: 12.9 SECONDS (ref 11.6–14.5)
PROTHROMBIN TIME: 13.3 SECONDS (ref 11.6–14.5)
PROTHROMBIN TIME: 13.7 SECONDS (ref 11.6–14.5)
QRS AXIS: -40 DEGREES
QRSD INTERVAL: 80 MS
QT INTERVAL: 394 MS
QTC INTERVAL: 380 MS
RBC # BLD AUTO: 2.83 MILLION/UL (ref 3.88–5.62)
RBC # BLD AUTO: 3.01 MILLION/UL (ref 4.3–5.9)
RBC # BLD AUTO: 3.38 MILLION/UL (ref 3.88–5.62)
RBC # BLD AUTO: 3.4 MILLION/UL (ref 3.88–5.62)
RBC # BLD AUTO: 3.41 MILLION/UL (ref 3.88–5.62)
RBC # BLD AUTO: 3.43 MILLION/UL (ref 3.88–5.62)
RBC # BLD AUTO: 3.48 MILLION/UL (ref 3.88–5.62)
RBC # BLD AUTO: 3.89 MILLION/UL (ref 3.88–5.62)
RBC # BLD AUTO: 3.91 MILLION/UL (ref 3.88–5.62)
RBC # BLD AUTO: 3.96 MILLION/UL (ref 3.88–5.62)
RBC # BLD AUTO: 4.05 MILLION/UL (ref 3.88–5.62)
RBC # BLD AUTO: 4.16 MILLION/UL (ref 4.3–5.9)
RBC # BLD AUTO: 4.18 MILLION/UL (ref 3.88–5.62)
RBC # BLD AUTO: 4.23 MILLION/UL (ref 3.88–5.62)
RBC # BLD AUTO: 4.3 MILLION/UL (ref 3.88–5.62)
RBC # BLD AUTO: 4.37 MILLION/UL (ref 4.3–5.9)
RBC # BLD AUTO: 4.51 MILLION/UL (ref 3.88–5.62)
RBC MORPH BLD: ABNORMAL
RBC MORPH BLD: NORMAL
SCAN RESULT: NORMAL
SODIUM SERPL-SCNC: 139 MMOL/L (ref 134–143)
SODIUM SERPL-SCNC: 139 MMOL/L (ref 136–145)
SODIUM SERPL-SCNC: 140 MMOL/L (ref 134–143)
SODIUM SERPL-SCNC: 140 MMOL/L (ref 136–145)
SODIUM SERPL-SCNC: 141 MMOL/L (ref 136–145)
SODIUM SERPL-SCNC: 142 MMOL/L (ref 134–143)
SODIUM SERPL-SCNC: 143 MMOL/L (ref 134–143)
SODIUM SERPL-SCNC: 143 MMOL/L (ref 136–145)
SODIUM SERPL-SCNC: 144 MMOL/L (ref 136–145)
T WAVE AXIS: 82 DEGREES
T3FREE SERPL-MCNC: 1.77 PG/ML (ref 2.3–4.2)
TIBC SERPL-MCNC: 371 UG/DL (ref 250–450)
TOTAL CELLS COUNTED SPEC: 100
TOTAL CELLS COUNTED SPEC: 100
TSH SERPL DL<=0.05 MIU/L-ACNC: 0.86 UIU/ML (ref 0.45–5.33)
TSH SERPL DL<=0.05 MIU/L-ACNC: 0.97 UIU/ML (ref 0.45–5.33)
TSH SERPL DL<=0.05 MIU/L-ACNC: 1.05 UIU/ML (ref 0.36–3.74)
TSH SERPL DL<=0.05 MIU/L-ACNC: 1.56 UIU/ML (ref 0.36–3.74)
TSH SERPL DL<=0.05 MIU/L-ACNC: 2.27 UIU/ML (ref 0.36–3.74)
VENTRICULAR RATE: 56 BPM
VIT B12 SERPL-MCNC: 1989 PG/ML (ref 100–900)
VIT B12 SERPL-MCNC: 313 PG/ML (ref 100–900)
WBC # BLD AUTO: 16.3 THOUSAND/UL (ref 4.8–10.8)
WBC # BLD AUTO: 2.64 THOUSAND/UL (ref 4.31–10.16)
WBC # BLD AUTO: 2.71 THOUSAND/UL (ref 4.31–10.16)
WBC # BLD AUTO: 2.74 THOUSAND/UL (ref 4.31–10.16)
WBC # BLD AUTO: 3.24 THOUSAND/UL (ref 4.31–10.16)
WBC # BLD AUTO: 3.3 THOUSAND/UL (ref 4.8–10.8)
WBC # BLD AUTO: 3.41 THOUSAND/UL (ref 4.31–10.16)
WBC # BLD AUTO: 3.63 THOUSAND/UL (ref 4.31–10.16)
WBC # BLD AUTO: 4.03 THOUSAND/UL (ref 4.31–10.16)
WBC # BLD AUTO: 4.25 THOUSAND/UL (ref 4.31–10.16)
WBC # BLD AUTO: 5.38 THOUSAND/UL (ref 4.31–10.16)
WBC # BLD AUTO: 5.79 THOUSAND/UL (ref 4.31–10.16)
WBC # BLD AUTO: 6.21 THOUSAND/UL (ref 4.31–10.16)
WBC # BLD AUTO: 6.22 THOUSAND/UL (ref 4.31–10.16)
WBC # BLD AUTO: 6.77 THOUSAND/UL (ref 4.31–10.16)
WBC # BLD AUTO: 7.5 THOUSAND/UL (ref 4.31–10.16)
WBC # BLD AUTO: 8.6 THOUSAND/UL (ref 4.8–10.8)

## 2021-01-01 PROCEDURE — 77300 RADIATION THERAPY DOSE PLAN: CPT | Performed by: RADIOLOGY

## 2021-01-01 PROCEDURE — 88184 FLOWCYTOMETRY/ TC 1 MARKER: CPT | Performed by: INTERNAL MEDICINE

## 2021-01-01 PROCEDURE — 88108 CYTOPATH CONCENTRATE TECH: CPT | Performed by: PATHOLOGY

## 2021-01-01 PROCEDURE — 96413 CHEMO IV INFUSION 1 HR: CPT

## 2021-01-01 PROCEDURE — 36415 COLL VENOUS BLD VENIPUNCTURE: CPT | Performed by: INTERNAL MEDICINE

## 2021-01-01 PROCEDURE — 96450 CHEMOTHERAPY INTO CNS: CPT

## 2021-01-01 PROCEDURE — 85610 PROTHROMBIN TIME: CPT | Performed by: INTERNAL MEDICINE

## 2021-01-01 PROCEDURE — 85025 COMPLETE CBC W/AUTO DIFF WBC: CPT

## 2021-01-01 PROCEDURE — 96372 THER/PROPH/DIAG INJ SC/IM: CPT

## 2021-01-01 PROCEDURE — 84443 ASSAY THYROID STIM HORMONE: CPT | Performed by: INTERNAL MEDICINE

## 2021-01-01 PROCEDURE — 70450 CT HEAD/BRAIN W/O DYE: CPT

## 2021-01-01 PROCEDURE — 82746 ASSAY OF FOLIC ACID SERUM: CPT

## 2021-01-01 PROCEDURE — 36415 COLL VENOUS BLD VENIPUNCTURE: CPT

## 2021-01-01 PROCEDURE — 83550 IRON BINDING TEST: CPT

## 2021-01-01 PROCEDURE — G1004 CDSM NDSC: HCPCS

## 2021-01-01 PROCEDURE — 77336 RADIATION PHYSICS CONSULT: CPT | Performed by: RADIOLOGY

## 2021-01-01 PROCEDURE — 99214 OFFICE O/P EST MOD 30 MIN: CPT | Performed by: INTERNAL MEDICINE

## 2021-01-01 PROCEDURE — 77412 RADIATION TX DELIVERY LVL 3: CPT | Performed by: RADIOLOGY

## 2021-01-01 PROCEDURE — 99497 ADVNCD CARE PLAN 30 MIN: CPT | Performed by: INTERNAL MEDICINE

## 2021-01-01 PROCEDURE — 80053 COMPREHEN METABOLIC PANEL: CPT

## 2021-01-01 PROCEDURE — 96367 TX/PROPH/DG ADDL SEQ IV INF: CPT

## 2021-01-01 PROCEDURE — 77370 RADIATION PHYSICS CONSULT: CPT | Performed by: RADIOLOGY

## 2021-01-01 PROCEDURE — 85025 COMPLETE CBC W/AUTO DIFF WBC: CPT | Performed by: INTERNAL MEDICINE

## 2021-01-01 PROCEDURE — 99215 OFFICE O/P EST HI 40 MIN: CPT | Performed by: NURSE PRACTITIONER

## 2021-01-01 PROCEDURE — 77387 GUIDANCE FOR RADJ TX DLVR: CPT | Performed by: RADIOLOGY

## 2021-01-01 PROCEDURE — 85610 PROTHROMBIN TIME: CPT | Performed by: PHYSICIAN ASSISTANT

## 2021-01-01 PROCEDURE — 88185 FLOWCYTOMETRY/TC ADD-ON: CPT

## 2021-01-01 PROCEDURE — 85730 THROMBOPLASTIN TIME PARTIAL: CPT | Performed by: PHYSICIAN ASSISTANT

## 2021-01-01 PROCEDURE — 77280 THER RAD SIMULAJ FIELD SMPL: CPT | Performed by: RADIOLOGY

## 2021-01-01 PROCEDURE — 83615 LACTATE (LD) (LDH) ENZYME: CPT

## 2021-01-01 PROCEDURE — 96417 CHEMO IV INFUS EACH ADDL SEQ: CPT

## 2021-01-01 PROCEDURE — 74177 CT ABD & PELVIS W/CONTRAST: CPT

## 2021-01-01 PROCEDURE — 83735 ASSAY OF MAGNESIUM: CPT

## 2021-01-01 PROCEDURE — 77334 RADIATION TREATMENT AID(S): CPT | Performed by: RADIOLOGY

## 2021-01-01 PROCEDURE — NC001 PR NO CHARGE

## 2021-01-01 PROCEDURE — 84443 ASSAY THYROID STIM HORMONE: CPT

## 2021-01-01 PROCEDURE — 72170 X-RAY EXAM OF PELVIS: CPT

## 2021-01-01 PROCEDURE — 93005 ELECTROCARDIOGRAM TRACING: CPT

## 2021-01-01 PROCEDURE — 85027 COMPLETE CBC AUTOMATED: CPT | Performed by: INTERNAL MEDICINE

## 2021-01-01 PROCEDURE — 77331 SPECIAL RADIATION DOSIMETRY: CPT | Performed by: RADIOLOGY

## 2021-01-01 PROCEDURE — 86880 COOMBS TEST DIRECT: CPT

## 2021-01-01 PROCEDURE — A9585 GADOBUTROL INJECTION: HCPCS | Performed by: RADIOLOGY

## 2021-01-01 PROCEDURE — 80053 COMPREHEN METABOLIC PANEL: CPT | Performed by: INTERNAL MEDICINE

## 2021-01-01 PROCEDURE — 71045 X-RAY EXAM CHEST 1 VIEW: CPT

## 2021-01-01 PROCEDURE — 99211 OFF/OP EST MAY X REQ PHY/QHP: CPT | Performed by: RADIOLOGY

## 2021-01-01 PROCEDURE — 85652 RBC SED RATE AUTOMATED: CPT

## 2021-01-01 PROCEDURE — 83735 ASSAY OF MAGNESIUM: CPT | Performed by: INTERNAL MEDICINE

## 2021-01-01 PROCEDURE — 83540 ASSAY OF IRON: CPT

## 2021-01-01 PROCEDURE — 72158 MRI LUMBAR SPINE W/O & W/DYE: CPT

## 2021-01-01 PROCEDURE — 71260 CT THORAX DX C+: CPT

## 2021-01-01 PROCEDURE — 85730 THROMBOPLASTIN TIME PARTIAL: CPT | Performed by: INTERNAL MEDICINE

## 2021-01-01 PROCEDURE — 70553 MRI BRAIN STEM W/O & W/DYE: CPT

## 2021-01-01 PROCEDURE — 99285 EMERGENCY DEPT VISIT HI MDM: CPT | Performed by: INTERNAL MEDICINE

## 2021-01-01 PROCEDURE — 85027 COMPLETE CBC AUTOMATED: CPT

## 2021-01-01 PROCEDURE — 93010 ELECTROCARDIOGRAM REPORT: CPT | Performed by: INTERNAL MEDICINE

## 2021-01-01 PROCEDURE — 85049 AUTOMATED PLATELET COUNT: CPT | Performed by: PHYSICIAN ASSISTANT

## 2021-01-01 PROCEDURE — 72125 CT NECK SPINE W/O DYE: CPT

## 2021-01-01 PROCEDURE — 77417 THER RADIOLOGY PORT IMAGE(S): CPT | Performed by: RADIOLOGY

## 2021-01-01 PROCEDURE — 99213 OFFICE O/P EST LOW 20 MIN: CPT | Performed by: RADIOLOGY

## 2021-01-01 PROCEDURE — 83010 ASSAY OF HAPTOGLOBIN QUANT: CPT

## 2021-01-01 PROCEDURE — 85007 BL SMEAR W/DIFF WBC COUNT: CPT | Performed by: INTERNAL MEDICINE

## 2021-01-01 PROCEDURE — 85025 COMPLETE CBC W/AUTO DIFF WBC: CPT | Performed by: PHYSICIAN ASSISTANT

## 2021-01-01 PROCEDURE — 99215 OFFICE O/P EST HI 40 MIN: CPT | Performed by: NEUROLOGICAL SURGERY

## 2021-01-01 PROCEDURE — 99285 EMERGENCY DEPT VISIT HI MDM: CPT

## 2021-01-01 PROCEDURE — 77295 3-D RADIOTHERAPY PLAN: CPT | Performed by: RADIOLOGY

## 2021-01-01 PROCEDURE — 86140 C-REACTIVE PROTEIN: CPT

## 2021-01-01 PROCEDURE — 85007 BL SMEAR W/DIFF WBC COUNT: CPT

## 2021-01-01 PROCEDURE — 99214 OFFICE O/P EST MOD 30 MIN: CPT | Performed by: NURSE PRACTITIONER

## 2021-01-01 PROCEDURE — 88184 FLOWCYTOMETRY/ TC 1 MARKER: CPT | Performed by: NURSE PRACTITIONER

## 2021-01-01 PROCEDURE — 82607 VITAMIN B-12: CPT

## 2021-01-01 PROCEDURE — 77290 THER RAD SIMULAJ FIELD CPLX: CPT | Performed by: RADIOLOGY

## 2021-01-01 PROCEDURE — 99211 OFF/OP EST MAY X REQ PHY/QHP: CPT

## 2021-01-01 PROCEDURE — 82728 ASSAY OF FERRITIN: CPT

## 2021-01-01 PROCEDURE — 84481 FREE ASSAY (FT-3): CPT

## 2021-01-01 RX ORDER — SODIUM CHLORIDE 9 MG/ML
20 INJECTION, SOLUTION INTRAVENOUS ONCE
Status: DISCONTINUED | OUTPATIENT
Start: 2021-01-01 | End: 2021-01-01 | Stop reason: HOSPADM

## 2021-01-01 RX ORDER — SODIUM CHLORIDE 9 MG/ML
20 INJECTION, SOLUTION INTRAVENOUS ONCE
Status: CANCELLED | OUTPATIENT
Start: 2021-01-01

## 2021-01-01 RX ORDER — ALBUTEROL SULFATE 90 UG/1
2 AEROSOL, METERED RESPIRATORY (INHALATION) EVERY 6 HOURS PRN
Qty: 1 INHALER | Refills: 3 | Status: SHIPPED | OUTPATIENT
Start: 2021-01-01

## 2021-01-01 RX ORDER — PREDNISONE 20 MG/1
20 TABLET ORAL DAILY
Qty: 30 TABLET | Refills: 2 | Status: SHIPPED | OUTPATIENT
Start: 2021-01-01

## 2021-01-01 RX ORDER — NICOTINE 21-14-7MG
KIT TRANSDERMAL
Qty: 1 EACH | Refills: 0 | Status: SHIPPED | OUTPATIENT
Start: 2021-01-01 | End: 2021-01-01 | Stop reason: ALTCHOICE

## 2021-01-01 RX ORDER — ACETAMINOPHEN 325 MG/1
975 TABLET ORAL ONCE
Status: DISCONTINUED | OUTPATIENT
Start: 2021-01-01 | End: 2021-01-01 | Stop reason: HOSPADM

## 2021-01-01 RX ORDER — SODIUM CHLORIDE 9 MG/ML
20 INJECTION, SOLUTION INTRAVENOUS ONCE
Status: COMPLETED | OUTPATIENT
Start: 2021-01-01 | End: 2021-01-01

## 2021-01-01 RX ORDER — ACETAMINOPHEN 325 MG/1
650 TABLET ORAL EVERY 6 HOURS PRN
Status: DISCONTINUED | OUTPATIENT
Start: 2021-01-01 | End: 2021-01-01 | Stop reason: HOSPADM

## 2021-01-01 RX ORDER — DEXAMETHASONE 4 MG/1
8 TABLET ORAL 2 TIMES DAILY WITH MEALS
Qty: 12 TABLET | Refills: 6 | Status: SHIPPED | OUTPATIENT
Start: 2021-01-01

## 2021-01-01 RX ORDER — OFLOXACIN 3 MG/ML
1 SOLUTION/ DROPS OPHTHALMIC 4 TIMES DAILY
Qty: 5 ML | Refills: 1 | Status: SHIPPED | OUTPATIENT
Start: 2021-01-01

## 2021-01-01 RX ORDER — ACETAMINOPHEN 325 MG/1
650 TABLET ORAL EVERY 6 HOURS PRN
Status: CANCELLED | OUTPATIENT
Start: 2021-01-01

## 2021-01-01 RX ORDER — SODIUM CHLORIDE 9 MG/ML
20 INJECTION, SOLUTION INTRAVENOUS ONCE
Status: DISCONTINUED | OUTPATIENT
Start: 2021-01-01 | End: 2021-01-01

## 2021-01-01 RX ORDER — OFLOXACIN 3 MG/ML
1 SOLUTION/ DROPS OPHTHALMIC 4 TIMES DAILY
Status: DISCONTINUED | OUTPATIENT
Start: 2021-01-01 | End: 2021-01-01

## 2021-01-01 RX ADMIN — DEXAMETHASONE SODIUM PHOSPHATE: 10 INJECTION, SOLUTION INTRAMUSCULAR; INTRAVENOUS at 12:29

## 2021-01-01 RX ADMIN — DURVALUMAB 1500 MG: 500 INJECTION, SOLUTION INTRAVENOUS at 11:23

## 2021-01-01 RX ADMIN — SODIUM CHLORIDE 20 ML/HR: 0.9 INJECTION, SOLUTION INTRAVENOUS at 11:49

## 2021-01-01 RX ADMIN — LURBINECTEDIN 5.8 MG: 0.5 INJECTION, POWDER, LYOPHILIZED, FOR SOLUTION INTRAVENOUS at 11:37

## 2021-01-01 RX ADMIN — DEXAMETHASONE SODIUM PHOSPHATE: 10 INJECTION, SOLUTION INTRAMUSCULAR; INTRAVENOUS at 12:14

## 2021-01-01 RX ADMIN — DOCETAXEL 140 MG: 20 INJECTION INTRAVENOUS at 12:57

## 2021-01-01 RX ADMIN — Medication 200 MG: at 12:35

## 2021-01-01 RX ADMIN — SODIUM CHLORIDE 12 MG: 9 INJECTION, SOLUTION INTRAMUSCULAR; INTRAVENOUS; SUBCUTANEOUS at 14:00

## 2021-01-01 RX ADMIN — SODIUM CHLORIDE 20 ML/HR: 0.9 INJECTION, SOLUTION INTRAVENOUS at 09:41

## 2021-01-01 RX ADMIN — SODIUM CHLORIDE 20 ML/HR: 0.9 INJECTION, SOLUTION INTRAVENOUS at 12:09

## 2021-01-01 RX ADMIN — SODIUM CHLORIDE 20 ML/HR: 0.9 INJECTION, SOLUTION INTRAVENOUS at 10:51

## 2021-01-01 RX ADMIN — DEXAMETHASONE SODIUM PHOSPHATE: 100 INJECTION INTRAMUSCULAR; INTRAVENOUS at 09:03

## 2021-01-01 RX ADMIN — IOHEXOL 100 ML: 350 INJECTION, SOLUTION INTRAVENOUS at 11:10

## 2021-01-01 RX ADMIN — SODIUM CHLORIDE 20 ML/HR: 0.9 INJECTION, SOLUTION INTRAVENOUS at 10:12

## 2021-01-01 RX ADMIN — PEGFILGRASTIM 6 MG: 6 INJECTION SUBCUTANEOUS at 14:31

## 2021-01-01 RX ADMIN — PEGFILGRASTIM 6 MG: 6 INJECTION SUBCUTANEOUS at 15:19

## 2021-01-01 RX ADMIN — DEXAMETHASONE SODIUM PHOSPHATE: 10 INJECTION, SOLUTION INTRAMUSCULAR; INTRAVENOUS at 14:14

## 2021-01-01 RX ADMIN — PEGFILGRASTIM 6 MG: 6 INJECTION SUBCUTANEOUS at 13:17

## 2021-01-01 RX ADMIN — FAMOTIDINE 20 MG: 10 INJECTION INTRAVENOUS at 10:43

## 2021-01-01 RX ADMIN — FAMOTIDINE 20 MG: 10 INJECTION INTRAVENOUS at 13:48

## 2021-01-01 RX ADMIN — DOCETAXEL 140 MG: 20 INJECTION INTRAVENOUS at 14:49

## 2021-01-01 RX ADMIN — SODIUM CHLORIDE 20 ML/HR: 0.9 INJECTION, SOLUTION INTRAVENOUS at 12:30

## 2021-01-01 RX ADMIN — SODIUM CHLORIDE 20 ML/HR: 0.9 INJECTION, SOLUTION INTRAVENOUS at 10:56

## 2021-01-01 RX ADMIN — LURBINECTEDIN 5.8 MG: 0.5 INJECTION, POWDER, LYOPHILIZED, FOR SOLUTION INTRAVENOUS at 10:55

## 2021-01-01 RX ADMIN — DURVALUMAB 1500 MG: 500 INJECTION, SOLUTION INTRAVENOUS at 12:59

## 2021-01-01 RX ADMIN — FAMOTIDINE 20 MG: 10 INJECTION INTRAVENOUS at 12:11

## 2021-01-01 RX ADMIN — PEGFILGRASTIM 6 MG: 6 INJECTION SUBCUTANEOUS at 14:22

## 2021-01-01 RX ADMIN — DOCETAXEL 140 MG: 20 INJECTION, SOLUTION, CONCENTRATE INTRAVENOUS at 11:09

## 2021-01-01 RX ADMIN — GADOBUTROL 7.5 ML: 604.72 INJECTION INTRAVENOUS at 17:37

## 2021-01-01 RX ADMIN — IOHEXOL 100 ML: 350 INJECTION, SOLUTION INTRAVENOUS at 11:01

## 2021-01-01 RX ADMIN — FAMOTIDINE 20 MG: 10 INJECTION INTRAVENOUS at 11:45

## 2021-01-01 RX ADMIN — SODIUM CHLORIDE 12 MG: 9 INJECTION, SOLUTION INTRAMUSCULAR; INTRAVENOUS; SUBCUTANEOUS at 14:15

## 2021-01-01 RX ADMIN — PEGFILGRASTIM 6 MG: 6 INJECTION SUBCUTANEOUS at 16:18

## 2021-01-01 RX ADMIN — SODIUM CHLORIDE 20 ML/HR: 0.9 INJECTION, SOLUTION INTRAVENOUS at 11:44

## 2021-01-01 RX ADMIN — PEGFILGRASTIM 6 MG: 6 INJECTION SUBCUTANEOUS at 12:28

## 2021-01-01 RX ADMIN — DEXAMETHASONE SODIUM PHOSPHATE: 100 INJECTION INTRAMUSCULAR; INTRAVENOUS at 09:51

## 2021-01-01 RX ADMIN — PEGFILGRASTIM 6 MG: 6 INJECTION SUBCUTANEOUS at 13:12

## 2021-01-01 RX ADMIN — DOCETAXEL 140 MG: 20 INJECTION, SOLUTION, CONCENTRATE INTRAVENOUS at 12:37

## 2021-01-01 RX ADMIN — LURBINECTEDIN 5.8 MG: 0.5 INJECTION, POWDER, LYOPHILIZED, FOR SOLUTION INTRAVENOUS at 13:18

## 2021-01-01 RX ADMIN — GADOBUTROL 8 ML: 604.72 INJECTION INTRAVENOUS at 15:08

## 2021-01-01 RX ADMIN — DEXAMETHASONE SODIUM PHOSPHATE: 10 INJECTION, SOLUTION INTRAMUSCULAR; INTRAVENOUS at 10:15

## 2021-01-01 RX ADMIN — DURVALUMAB 1500 MG: 500 INJECTION, SOLUTION INTRAVENOUS at 11:05

## 2021-01-01 RX ADMIN — Medication 200 MG: at 10:03

## 2021-01-01 RX ADMIN — Medication 200 MG: at 10:29

## 2021-01-01 RX ADMIN — IOHEXOL 100 ML: 350 INJECTION, SOLUTION INTRAVENOUS at 11:40

## 2021-01-01 RX ADMIN — DEXAMETHASONE SODIUM PHOSPHATE: 100 INJECTION INTRAMUSCULAR; INTRAVENOUS at 11:49

## 2021-01-01 RX ADMIN — SODIUM CHLORIDE 20 ML/HR: 0.9 INJECTION, SOLUTION INTRAVENOUS at 13:50

## 2021-01-01 RX ADMIN — SODIUM CHLORIDE 20 ML/HR: 0.9 INJECTION, SOLUTION INTRAVENOUS at 09:03

## 2021-01-05 NOTE — PROGRESS NOTES
Hematology/Oncology Outpatient Follow-up  Ning Villaseñor 48 y o  male 1965 [id number]    Date:  1/5/2021      Assessment and Plan:  1  Small cell lung cancer, left lower lobe (HCC)  The patient seems to be responding to his palliative treatment for his extensive stage small cell lung cancer with Carboplatin, Etoposide and Durvalumab according to his repeat imaging from last week  He has completed 4 cycles thus far  The plan is to discontinue the chemotherapy portion of his treatment at this point and maintain him on maintenance immunotherapy with Durvalumab on an every three-week basis  He is scheduled for his immunotherapy again next week 1/12/20  The patient will follow up again with Dr Leandro bess in 3 weeks  Patient and his significant are looking into filing for long-term disability which is very appropriate  The patient's significant other had multiple questions today about his recent imaging, thrombosis and intrathecal chemotherapy all which were answered to her satisfaction  We discussed how intrathecal chemotherapy is performed and possible complications including bleeding, severe headaches and other neurological symptoms     - CBC and differential; Future  - Comprehensive metabolic panel; Future  - CBC and differential; Future  - Comprehensive metabolic panel; Future  - TSH, 3rd generation with Free T4 reflex; Future    2  Brain metastases [facility])  Patient is status post craniotomy/resection of the left frontal mass and whole brain radiation  He is currently receiving palliative radiation to his drops seed metastasis at the lumbar thecal sac  Has completed 4/10 plan treatments  There has been some discussion regarding starting patient on intrathecal chemotherapy with methotrexate 12 mg twice a week for 4 weeks followed by weekly thereafter for 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    The patient and his significant other seem to be interested in this approach and wants to be overall aggressive with treatment  Did discuss with Dr Lisset Mcmanus this morning; plan is to complete palliative radiation and start intrathecal methotrexate via LP about 2 weeks afterward  We will send the fluid for cytology each time  Also decided to decrease the frequency of the intrathecal methotrexate to weekly to decrease the risk of bleeding complications and time off anticoagulation with new finding of PE/DVT  His anticoagulation will be changed to Lovenox in preparation  The patient will be seen again in about 3 weeks prior tp initiating the intrathecal methotrexate     - CBC and differential; Future  - Comprehensive metabolic panel; Future    3  Normocytic anemia  Patient noted to have worsening anemia hemoglobin 8 9  He is currently in his flako from his recent chemotherapy 2 weeks ago which is the likely reason  He denies any obvious bleeding from any site  We will continue to monitor  Additional anemia workup ordered to be done before his next follow-up  - CBC and differential; Future  - Comprehensive metabolic panel; Future  - Iron Panel (Includes Ferritin, Iron Sat%, Iron, and TIBC); Future  - Ferritin; Future  - LD,Blood; Future  - Occult Blood, Fecal Immunochemical; Future  - Folate; Future  - Vitamin B12; Future  - C-reactive protein; Future  - Sedimentation rate, automated; Future  - Direct antiglobulin test; Future  - Haptoglobin; Future    4  Acute deep vein thrombosis (DVT) of other specified vein of right lower extremity ([address] )  Patient was recently found to have extensive occlusive DVT to the right lower extremity  His CT scan of the chest abdomen pelvis from last week also revealed right PE involving the main pulmonary artery at the bifurcation without evidence of heart strain  He has already been started on anticoagulation with Xarelto 15 mg twice a day which he reports compliance with    We did discuss changing his anticoagulation to low-molecular weight heparin/Lovenox injections 1 milligram/kilogram every 12 hours to decrease his risk of bleeding/decrease time off AC in preparing for his upcoming intrathecal methotrexate chemotherapy  Script sent to patient's local pharmacy  His significant other will contact us once she receives to arrange for nurse visit/Education on how to administer      - enoxaparin (LOVENOX) 80 mg/0 8 mL; Inject 0 7 mL (70 mg total) under the skin every 12 (twelve) hours  Dispense: 60 Syringe; Refill: 5    5  Other acute pulmonary embolism without acute cor pulmonale ([facility] [address] )  As above  HPI:  The patient presents today for follow-up visit accompanied by his significant other  He has no new complaints  Reports that his right lower extremity edema significantly decreased after starting the Xarelto 15 mg twice a day  Denies bleeding from any site  He reports that continues to be fatigue and has decreased appetite  He continues to smoke cigarettes on a daily basis without desire to quit  O2 sat on room air upon arrival today was 90%; did improve to 98% without intervention after rest   The patient is currently receiving palliative radiation to his drop see metastasis of the lumbar spine completed 4/10 treatments  The patient most recent CBC from this morning showed decreased WBC 4 25 with normal ANC 2 76 he has worsening macrocytic anemia H&H 8 9/28 5,  and decreased platelet count a [id number]  Alk-phos 129, glucose 114, albumin 3 4 remaining metabolic panel is normal     He had a venous duplex of the bilateral lower extremity 12/23/2020 after reporting significant edema in the right lower extremity which showed acute mostly occlusive DVT in the right lower common, femoral, popliteal, gastrocnemius, peroneal and posterior tibial veins  He was started on anticoagulation with Xarelto 15 mg twice a day which he reports compliance with      He also had a CT scan of the chest abdomen pelvis since his insurance company denied the PET-CT scan after completing 4 cycles of chemotherapy 12/31/2020 which was read:  "IMPRESSION:  Interval development of pulmonary embolism involving the right main pulmonary artery at the bifurcation  No CT evidence for right heart strain  Significant interval decrease in size of the left hilar mass as well as improvement in adjacent satellite nodules within the left lower lobe  No new suspicious pulmonary nodules identified      No evidence for metastatic disease to the abdomen or pelvis "    Oncology History Overview Note   Patient has a  history of COPD, hypertension, tobacco abuse, etc   Presented initially to the emergency room with loss of vision in the left eye with numbness on the left face  He was then evaluated with a CT scan of the head on 08/30/2020 which showed large cystic mass in the left cerebral hemisphere with surrounding edema  CT scan of the chest abdomen pelvis with contrast was then done on 08/30/2020 which showed:  IMPRESSION:  There is an approximately 4 x 4 x 3 cm mass in the mid to lower left hilum which extends into the left lower lobe of the lung and the left lingula  Nearby satellite masses and nodules are present  The appearance is highly suggestive of malignant neoplasm  There appears to be some hypodensity within some of the central left pulmonary artery branches  Pulmonary embolism and/or tumor thrombus should be excluded  Dedicated CT angiogram/PE protocol CTA of the chest is recommended for further evaluation  Bilateral adrenal masses, each measuring approximately 2 cm  MRI is recommended for further characterization, if there are no contraindications  The urinary bladder wall appears thickened  This is most pronounced anteriorly  Clinical correlation, laboratory correlation and follow-up is recommended  The prostate is mildly prominent  Clinical and laboratory correlation is recommended  Mild emphysema       MRI of the brain was done on 08/31/2020 which showed multiple intracranial enhancing lesions without dominant cystic lesion in the deep left inferior frontal lobe with the multiple lesions with suspicious primary pulmonary neoplasm  He also had an MRI of the abdomen for further evaluation of the adrenal glands this showed bilateral adenomas  The patient then had craniotomy and resection of the left frontal mass on 09/01/2020 which came back compatible with high-grade neuroendocrine tumor compatible with metastatic small cell lung cancer primary  The patient started radiation treatment to the brain on the 23rd of September 2020  He has very difficult time expressing himself  He only can answer questions with yes and no     10/30/2020 MRI of the brain showed improvement, with no new lesions  11/23/2020 he had a f/u with [facility] ophthalmology because of  vision loss  OS  Exam revealed Optic nerve atrophy, OS  He was referred to  neurology, MRI orbits ordered, and MD  contacted Dr Shaila Stevenson, to explain findings and concern for Meningeal/csf involvement  11/27/2020 pt had a Neurology consult @ [facility]  Dr Nathalie Godinez  12/1/2020 MRI lumbar spine: Drop seed metastases within the distal thecal sac  This is relatively diffuse, with small nodular foci at the upper L2 and, L5 levels  Chronic bilateral L4 spondylolysis, grade 1 spondylolisthesis  Asymmetric right foraminal stenosis, correlate for right L4 radiculitis  12/9/2020 MRI cervical spine: Cervical degenerative disc disease with disc herniations and endplate/uncinate joint hypertrophic change present  Moderate canal stenosis at C3-4 without cord compression or abnormal cord signal    At C4-5 there is severe right and moderate left foraminal narrowing  There is a left-sided disc extrusion at C5-6 with moderate left lateral canal stenosis  Severe right foraminal narrowing at C7-T1 secondary to right foraminal disc osteophyte complex       12/9/2020 MRI thoracic spine:    Mild thoracic degenerative change with small disc herniations and adjacent endplate osteophytes at T6-7, T7-8 and T9-10  No cord compression  12/10/2020 MRI orbits/brain  MRA neck @ [facility]  Impression:  1  Stable size of left frontal resection cavity, with persistent T1 hyperintense hemorrhage/blood products filling the resection cavity  Previously noted prominent irregular enhancement along the superior aspect of the resection cavity has essentially resolved  Nodular enhancement along the medial/posterior aspect of the resection cavity is slightly decreased since the prior study  There remains mild peripheral enhancement anteriorly  2  Decreased size of T1 hyperintense lesions along the ependymal surface of the frontal horn of the right lateral ventricle  Previously noted lesions along the left ventricular ependymal surface appear to have resolved  No new lesions are identified  3  No definite abnormal signal or enhancement in the optic nerve/optic chiasm  No definite intraorbital mass identified  4  No evidence of cervical carotid stenosis    5  Stenosis at the right greater than left vertebral artery origi     Brain metastases ([facility] [address] )   9/1/2020 Initial Diagnosis    Brain metastases ([facility] [address] )     9/1/2020 Surgery    Left frontal CRANIOTOMY IMAGE-GUIDED FOR TUMOR (Left)  Surgeon: Dr Shannon Givens, left frontal mass:  -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 , 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         9/23/2020 - 10/6/2020 Radiation    Plan ID Energy Fractions Dose per Fraction (cGy) Dose Correction (cGy) Total Dose Delivered (cGy) Elapsed Days   Whole BraE # 6X 10 / 10 300 0 3,000 13        10/12/2020 -  Chemotherapy    pegfilgrastim-cbqv (UDENYCA) subcutaneous injection 6 mg, 6 mg, Subcutaneous, Once, 2 of 2 cycles  Administration: 6 mg (12/4/2020), 6 mg (12/26/2020)  fosaprepitant (EMEND) 150 mg in sodium chloride 0 9 % 250 mL IVPB, 150 mg, Intravenous, Once, 4 of 4 cycles  Administration: 150 mg (10/12/2020), 150 mg (11/4/2020), 150 mg (12/1/2020), 150 mg (12/22/2020)  CARBOplatin (PARAPLATIN) 723 mg in sodium chloride 0 9 % 250 mL IVPB, 723 mg (561 8 % of original dose 128 7 mg), Intravenous, Once, 4 of 4 cycles  Dose modification: 723 mg (original dose 128 7 mg, Cycle 1, Reason: Other (See Comments), Comment: use creat 9/25/2020)  Administration: 723 mg (10/12/2020), 750 mg (11/4/2020), 750 mg (12/1/2020), 736 5 mg (12/22/2020)  Durvalumab 1,500 mg in sodium chloride 0 9 % 100 mL IVPB, 1,500 mg (100 % of original dose 1,500 mg), Intravenous, Once, 4 of 10 cycles  Dose modification: 1,500 mg (original dose 1,500 mg, Cycle 1)  Administration: 1,500 mg (10/12/2020), 1,500 mg (11/4/2020), 1,500 mg (12/1/2020), 1,500 mg (12/22/2020)  etoposide (TOPOSAR) 200 mg in sodium chloride 0 9 % 500 mL chemo infusion, 198 mg, Intravenous, Once, 4 of 4 cycles  Administration: 200 mg (10/12/2020), 200 mg (10/13/2020), 200 mg (11/4/2020), 200 mg (10/14/2020), 200 mg (11/5/2020), 200 mg (12/1/2020), 200 mg (11/6/2020), 200 mg (12/2/2020), 200 mg (12/22/2020), 200 mg (12/3/2020), 200 mg (12/23/2020), 200 mg (12/24/2020)     Small cell lung cancer, left lower lobe ([address] )   9/24/2020 Initial Diagnosis    Small cell lung cancer, left lower lobe (HCC)  Extensive Stage     10/12/2020 -  Chemotherapy    pegfilgrastim-cbqv (UDENYCA) subcutaneous injection 6 mg, 6 mg, Subcutaneous, Once, 2 of 2 cycles  Administration: 6 mg (12/4/2020), 6 mg (12/26/2020)  fosaprepitant (EMEND) 150 mg in sodium chloride 0 9 % 250 mL IVPB, 150 mg, Intravenous, Once, 4 of 4 cycles  Administration: 150 mg (10/12/2020), 150 mg (11/4/2020), 150 mg (12/1/2020), 150 mg (12/22/2020)  CARBOplatin (PARAPLATIN) 723 mg in sodium chloride 0 9 % 250 mL IVPB, 723 mg (561 8 % of original dose 128 7 mg), Intravenous, Once, 4 of 4 cycles  Dose modification: 723 mg (original dose 128 7 mg, Cycle 1, Reason: Other (See Comments), Comment: use creat 9/25/2020)  Administration: 723 mg (10/12/2020), 750 mg (11/4/2020), 750 mg (12/1/2020), 736 5 mg (12/22/2020)  Durvalumab 1,500 mg in sodium chloride 0 9 % 100 mL IVPB, 1,500 mg (100 % of original dose 1,500 mg), Intravenous, Once, 4 of 10 cycles  Dose modification: 1,500 mg (original dose 1,500 mg, Cycle 1)  Administration: 1,500 mg (10/12/2020), 1,500 mg (11/4/2020), 1,500 mg (12/1/2020), 1,500 mg (12/22/2020)  etoposide (TOPOSAR) 200 mg in sodium chloride 0 9 % 500 mL chemo infusion, 198 mg, Intravenous, Once, 4 of 4 cycles  Administration: 200 mg (10/12/2020), 200 mg (10/13/2020), 200 mg (11/4/2020), 200 mg (10/14/2020), 200 mg (11/5/2020), 200 mg (12/1/2020), 200 mg (11/6/2020), 200 mg (12/2/2020), 200 mg (12/22/2020), 200 mg (12/3/2020), 200 mg (12/23/2020), 200 mg (12/24/2020)         Interval history:    ROS: Review of Systems   Constitutional: Positive for appetite change and fatigue  Negative for activity change, chills, fever and unexpected weight change  HENT: Negative for congestion, mouth sores, nosebleeds, sore throat and trouble swallowing  Eyes: Negative  Respiratory: Positive for cough and shortness of breath  Negative for chest tightness  Cardiovascular: Negative for chest pain, palpitations and leg swelling  Gastrointestinal: Negative for abdominal distention, abdominal pain, blood in stool, constipation, diarrhea, nausea and vomiting  Genitourinary: Negative for difficulty urinating, dysuria, frequency, hematuria and urgency  Musculoskeletal: Negative for arthralgias, back pain, gait problem, joint swelling and myalgias  Skin: Negative for color change, pallor and rash  Neurological: Negative for dizziness, weakness, light-headedness, numbness and headaches  Hematological: Negative for adenopathy  Does not bruise/bleed easily  Psychiatric/Behavioral: Positive for dysphoric mood  Negative for sleep disturbance         Past Medical History:   Diagnosis Date    Brain cancer ([facility] )     COPD (chronic obstructive pulmonary disease) (HCC)     Hyperlipidemia     Hypertension     Lung cancer ([facility] )        Past Surgical History:   Procedure Laterality Date    BILATERAL KNEE ARTHROSCOPY Bilateral     CRANIOTOMY Left 9/1/2020    Procedure: Left frontal CRANIOTOMY IMAGE-GUIDED FOR TUMOR;  Surgeon: Beverly Lindo MD;  [facility]: [facility];  Service: Neurosurgery    HAND SURGERY Left     IR PORT PLACEMENT  10/8/2020    WISDOM TOOTH EXTRACTION         Social History     Socioeconomic History    Marital status: Single     Spouse name: None    Number of children: None    Years of education: None    Highest education level: None   Occupational History    None   Social Needs    Financial resource strain: None    Food insecurity     Worry: None     Inability: None    Transportation needs     Medical: None     Non-medical: None   Tobacco Use    Smoking status: Current Every Day Smoker     Packs/day: 1 50    Smokeless tobacco: Never Used    Tobacco comment: was smoking 2-3 packs/day   Substance and Sexual Activity    Alcohol use: Not Currently     Frequency: 4 or more times a week     Binge frequency: Daily or almost daily     Comment: Not at present    Drug use: Never    Sexual activity: None   Lifestyle    Physical activity     Days per week: None     Minutes per session: None    Stress: None   Relationships    Social connections     Talks on phone: None     Gets together: None     Attends Orthodoxy service: None     Active member of club or organization: None     Attends meetings of clubs or organizations: None     Relationship status: None    Intimate partner violence     Fear of current or ex partner: None     Emotionally abused: None     Physically abused: None     Forced sexual activity: None   Other Topics Concern    None   Social History Narrative    None       Family History   Problem Relation Age of Onset    Lymphoma Mother        No Known Allergies      Current Outpatient Medications:     albuterol (PROVENTIL HFA,VENTOLIN HFA) 90 mcg/act inhaler, Inhale 2 puffs every 6 (six) hours as needed for wheezing, Disp: , Rfl:     aspirin (ECOTRIN LOW STRENGTH) 81 mg EC tablet, Take 162 mg by mouth daily, Disp: , Rfl:     rivaroxaban (XARELTO) 15 mg tablet, Take 1 tablet (15 mg total) by mouth 2 (two) times a day For 21 days followed by Xarelto 20 mg daily, Disp: 42 tablet, Rfl: 0    thiamine (VITAMIN B1) 100 mg tablet, Take 100 mg by mouth daily, Disp: , Rfl:     atorvastatin (LIPITOR) 40 mg tablet, Take 1 tablet (40 mg total) by mouth daily with dinner, Disp: 30 tablet, Rfl: 0    dexamethasone (DECADRON) 2 mg tablet, Take 1 tablet (2 mg total) by mouth 2 (two) times a day with meals (Patient not taking: Reported on 12/18/2020), Disp: 28 tablet, Rfl: 0    enoxaparin (LOVENOX) 80 mg/0 8 mL, Inject 0 7 mL (70 mg total) under the skin every 12 (twelve) hours, Disp: 60 Syringe, Rfl: 5    folic acid (FOLVITE) 1 mg tablet, Take 1 tablet (1 mg total) by mouth daily for 29 days, Disp: 29 tablet, Rfl: 0    levETIRAcetam (KEPPRA) 500 mg tablet, Take 1 tablet (500 mg total) by mouth every 12 (twelve) hours for 14 doses (Patient not taking: Reported on 1/5/2021), Disp: 14 tablet, Rfl: 0    lisinopril (ZESTRIL) 10 mg tablet, Take 1 tablet (10 mg total) by mouth daily, Disp: 30 tablet, Rfl: 0    methocarbamol (ROBAXIN) 500 mg tablet, Take 1 tablet (500 mg total) by mouth every 6 (six) hours for 14 days (Patient not taking: Reported on 9/18/2020), Disp: 56 tablet, Rfl: 0    Nicotine 21-14-7 MG/24HR KIT, Apply 21 mg patch daily X 6 wks then 14 mg patch daily for 2 wks then 7 mg patch daily for 2 wks (Patient not taking: Reported on 1/5/2021), Disp: 1 each, Rfl: 0    ondansetron (ZOFRAN) 8 mg tablet, Take 1 tablet (8 mg total) by mouth every 8 (eight) hours as needed for nausea or vomiting (Patient not taking: Reported on 1/5/2021), Disp: 20 tablet, Rfl: 3   pantoprazole (PROTONIX) 40 mg tablet, Take 1 tablet (40 mg total) by mouth daily (Patient not taking: Reported on 1/5/2021), Disp: 30 tablet, Rfl: 11    thiamine 100 MG tablet, Take 1 tablet (100 mg total) by mouth daily, Disp: 30 tablet, Rfl: 0      Physical Exam:  /78 (BP Location: Left arm, Patient Position: Sitting, Cuff Size: Large)   Pulse 97   Temp (!) 96 1 °F (35 6 °C) (Tympanic)   Resp 18   Ht 5' 7" (1 702 m)   Wt 74 1 kg (163 lb 6 4 oz)   SpO2 90%   BMI 25 59 kg/m²     Physical Exam  Vitals signs reviewed  Constitutional:       General: He is not in acute distress  Appearance: He is well-developed  He is not diaphoretic  HENT:      Head: Normocephalic and atraumatic  Eyes:      General: Lids are normal  No scleral icterus  Conjunctiva/sclera: Conjunctivae normal       Pupils: Pupils are equal, round, and reactive to light  Neck:      Musculoskeletal: Normal range of motion and neck supple  Thyroid: No thyromegaly  Cardiovascular:      Rate and Rhythm: Normal rate and regular rhythm  Heart sounds: Normal heart sounds  No murmur  Pulmonary:      Effort: Pulmonary effort is normal  No respiratory distress  Breath sounds: Examination of the left-lower field reveals wheezing  Decreased breath sounds and wheezing present  Abdominal:      General: There is no distension  Palpations: Abdomen is soft  There is no hepatomegaly or splenomegaly  Tenderness: There is no abdominal tenderness  Musculoskeletal: Normal range of motion  General: No swelling  Right lower leg: Edema (+1) present  Lymphadenopathy:      Cervical: No cervical adenopathy  Upper Body:      Right upper body: No axillary adenopathy  Left upper body: No axillary adenopathy  Skin:     General: Skin is warm and dry  Findings: No erythema or rash  Nails: There is clubbing  Neurological:      General: No focal deficit present        Mental Status: He is alert and oriented to person, place, and time  Psychiatric:         Mood and Affect: Mood and affect normal          Behavior: Behavior is cooperative  Judgment: Judgment is not inappropriate  Comments: Does not talk much  Answers simple questions appropriately  Labs:  Lab Results   Component Value Date    WBC 4 25 (L) 01/05/2021    HGB 8 9 (L) 01/05/2021    HCT 28 5 (L) 01/05/2021     (H) 01/05/2021     (L) 01/05/2021     Lab Results   Component Value Date    K 4 1 11/30/2020     11/30/2020    CO2 26 11/30/2020    BUN 8 11/30/2020    CREATININE 0 73 11/30/2020    GLUF 95 09/25/2020    CALCIUM 9 5 11/30/2020    CORRECTEDCA 10 1 11/30/2020    AST 24 11/30/2020    ALT 38 11/30/2020    ALKPHOS 116 11/30/2020    EGFR 106 11/30/2020       Patient voiced understanding and agreement in the above discussion  Aware to contact our office with questions/symptoms in the interim  This note has been generated by voice recognition software system  Therefore, there may be spelling, grammar, and or syntax errors  Please contact if questions arise

## 2021-01-11 NOTE — PROGRESS NOTES
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 given his lung mass  He is now 4 months status post surgery  He has underwent chemotherapy and radiation therapy  He is currently undergoing spinal radiation therapy for drop metastasis  Intrathecal chemo starting in the near future  At this juncture there is no further neurosurgical intervention required  Should they require placement of [place] reservoir feel free to contact me  I had an extensive conversation with regarding the natural history of this disease with the patient's son and girlfriend  I explained that this is not curable disease  I explained that there are normal changes that occur in the context of leptomeningeal disease and cancer  I have recommended they establish care with palliative care and discuss his goals of care  Additionally they were interested in obtaining a 2nd oncology opinion for possible trials ect  I spent 40 minutes with the patient greater than 50 percent of which was spent in counseling  Chief Complaint: Follow-up        HPI:   This is a pleasant 81-year-old left-handed gentleman who presented with several weeks of visual complaints and speech deficits  CT chest abdomen pelvis was revealing for multiple lung masses as well as a dominant left frontal mass with leptomeningeal spread  On his hospitalization his speech was slow but he is awake alert oriented  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  He denies any diplopia  He complained of decreased vision in his left eye  He is now more than 4 months status post surgery    He has had a general decline in activity and interest   This has been quite disturbing for his girlfriend  He is currently undergoing radiation chemotherapy  His vision continues to bother him and his left eye  Wife he has had a slight improvement  Recently had an MRI but this is not available for review  Review of systems obtained by the MA reviewed and updated below  Review of Systems   Constitutional: Negative  HENT: Negative  Eyes: Positive for visual disturbance (left eye)  Respiratory: Negative  Cardiovascular: Negative  Gastrointestinal: Negative  Endocrine: Negative  Genitourinary: Negative  Musculoskeletal: Negative  Skin: Negative  Allergic/Immunologic: Negative  Neurological: Positive for weakness (right leg)  Negative for dizziness, tremors, seizures, light-headedness, numbness and headaches  Hematological: Bruises/bleeds easily (medication)  Psychiatric/Behavioral: Negative  Physical Exam  Vitals:    01/11/21 1349   BP: 120/78   Pulse: 90   Resp: 16   Temp: 97 7 °F (36 5 °C)   He is awake alert oriented  His pupils are equal round reactive to light  His visual fields are intact to confrontation however there is decreased counting on the left temporal field  His extraocular movements are intact  He denies any diplopia  His face is symmetric  He moves both his upper and lower extremities symmetrically      The following portions of the patient's history were reviewed and updated as appropriate: allergies, current medications, past family history, past medical history, past social history, past surgical history and problem list     Active Ambulatory Problems     Diagnosis Date Noted    Brain mass 08/30/2020    Hypertension 08/30/2020    Tobacco abuse 30/83/3781    Uncomplicated alcohol dependence ([facility] ) 08/30/2020    Hyperlipidemia 08/30/2020    Emphysema lung ([facility] ) 08/31/2020    Cerebral edema ([facility] ) 08/31/2020    Lung mass 09/02/2020    Brain metastases (HCC)     Small cell lung cancer, left lower lobe [facility] ) 09/24/2020    Encounter for central line care 10/26/2020    Counseling regarding advanced care planning and goals of care 10/28/2020     Resolved Ambulatory Problems     Diagnosis Date Noted    Brain compression [facility]) 08/31/2020     Past Medical History:   Diagnosis Date    Brain cancer ([facility] )     COPD (chronic obstructive pulmonary disease) [facility] )     [facility]        Past Surgical History:   Procedure Laterality Date    BILATERAL KNEE ARTHROSCOPY Bilateral     CRANIOTOMY Left 9/1/2020    Procedure: Left frontal CRANIOTOMY IMAGE-GUIDED FOR TUMOR;  Surgeon: Laisha Soto MD;  [facility]: [facility];  Service: Neurosurgery    HAND SURGERY Left     IR PORT PLACEMENT  10/8/2020    WISDOM TOOTH EXTRACTION           Current Outpatient Medications:     albuterol (PROVENTIL HFA,VENTOLIN HFA) 90 mcg/act inhaler, Inhale 2 puffs every 6 (six) hours as needed for wheezing, Disp: , Rfl:     atorvastatin (LIPITOR) 40 mg tablet, Take 1 tablet (40 mg total) by mouth daily with dinner, Disp: 30 tablet, Rfl: 0    enoxaparin (LOVENOX) 80 mg/0 8 mL, Inject 0 7 mL (70 mg total) under the skin every 12 (twelve) hours, Disp: 60 Syringe, Rfl: 5    folic acid (FOLVITE) 1 mg tablet, Take 1 tablet (1 mg total) by mouth daily for 29 days, Disp: 29 tablet, Rfl: 0    rivaroxaban (XARELTO) 15 mg tablet, Take 1 tablet (15 mg total) by mouth 2 (two) times a day For 21 days followed by Xarelto 20 mg daily, Disp: 42 tablet, Rfl: 0    thiamine (VITAMIN B1) 100 mg tablet, Take 100 mg by mouth daily, Disp: , Rfl:     thiamine 100 MG tablet, Take 1 tablet (100 mg total) by mouth daily, Disp: 30 tablet, Rfl: 0    aspirin (ECOTRIN LOW STRENGTH) 81 mg EC tablet, Take 162 mg by mouth daily, Disp: , Rfl:     dexamethasone (DECADRON) 2 mg tablet, Take 1 tablet (2 mg total) by mouth 2 (two) times a day with meals (Patient not taking: Reported on 12/18/2020), Disp: 28 tablet, Rfl: 0    levETIRAcetam (KEPPRA) 500 mg tablet, Take 1 tablet (500 mg total) by mouth every 12 (twelve) hours for 14 doses (Patient not taking: Reported on 1/5/2021), Disp: 14 tablet, Rfl: 0    lisinopril (ZESTRIL) 10 mg tablet, Take 1 tablet (10 mg total) by mouth daily, Disp: 30 tablet, Rfl: 0    methocarbamol (ROBAXIN) 500 mg tablet, Take 1 tablet (500 mg total) by mouth every 6 (six) hours for 14 days (Patient not taking: Reported on 9/18/2020), Disp: 56 tablet, Rfl: 0    Nicotine 21-14-7 MG/24HR KIT, Apply 21 mg patch daily X 6 wks then 14 mg patch daily for 2 wks then 7 mg patch daily for 2 wks (Patient not taking: Reported on 1/5/2021), Disp: 1 each, Rfl: 0    ondansetron (ZOFRAN) 8 mg tablet, Take 1 tablet (8 mg total) by mouth every 8 (eight) hours as needed for nausea or vomiting (Patient not taking: Reported on 1/5/2021), Disp: 20 tablet, Rfl: 3    pantoprazole (PROTONIX) 40 mg tablet, Take 1 tablet (40 mg total) by mouth daily (Patient not taking: Reported on 1/5/2021), Disp: 30 tablet, Rfl: 11    Results/Data:  New imaging not available at this time

## 2021-01-14 NOTE — TELEPHONE ENCOUNTER
Phoned pts girlfriend Ignacia Rosa to ask if she had picked up Rx Lovenox   She had not but knew it was ready at the pharmacy, she will  tomorrow so they can come in Tues 1/19/21 to [facility] at noon for teaching on how to administer as pt will need to switch from Eliquis to lovenox as intrathecal Mtx needs to be scheduled for 1/29/21

## 2021-01-14 NOTE — PLAN OF CARE
Problem: PAIN - ADULT  Goal: Verbalizes/displays adequate comfort level or baseline comfort level  Description: Interventions:  - Encourage patient to monitor pain and request assistance  - Assess pain using appropriate pain scale  - Administer analgesics based on type and severity of pain and evaluate response  - Implement non-pharmacological measures as appropriate and evaluate response  - Consider cultural and social influences on pain and pain management  - Notify physician/advanced practitioner if interventions unsuccessful or patient reports new pain  Outcome: Progressing     Problem: SAFETY ADULT  Goal: Patient will remain free of falls  Description: INTERVENTIONS:  - Assess patient frequently for physical needs  -  Identify cognitive and physical deficits and behaviors that affect risk of falls    -  Gibson fall precautions as indicated by assessment   - Educate patient/family on patient safety including physical limitations  - Instruct patient to call for assistance with activity based on assessment  - Modify environment to reduce risk of injury  - Consider OT/PT consult to assist with strengthening/mobility  Outcome: Progressing     Problem: DISCHARGE PLANNING  Goal: Discharge to home or other facility with appropriate resources  Description: INTERVENTIONS:  - Identify barriers to discharge w/patient and caregiver  - Arrange for needed discharge resources and transportation as appropriate  - Identify discharge learning needs (meds, wound care, etc )  - Arrange for interpretive services to assist at discharge as needed  - Refer to Case Management Department for coordinating discharge planning if the patient needs post-hospital services based on physician/advanced practitioner order or complex needs related to functional status, cognitive ability, or social support system  Outcome: Progressing

## 2021-01-14 NOTE — PROGRESS NOTES
Patient tolerated Imfinzi infusion without adverse reaction  Declined AVS, aware of future appointments  Voices no questions or other concerns at discharge

## 2021-01-18 NOTE — TELEPHONE ENCOUNTER
JAMSHID with "micheline" to confirm if all future appointments should be cancelled or not  Patient had appointments scheduled today but did not show and quite a few previous appointments have already by cancelled  Patient last seen for therapy 12/28/20

## 2021-01-19 NOTE — PROGRESS NOTES
Pt came in to office today accompanied by his significant other Althea to have teaching on Lovenox injection  Pt did not take Eliquis today and was shown how to self administer his Lovenox  His syringes are 80 mg/0 8 ml  Pt was shown how to waste the 0 1 ml before self injection and where to inject two inches away form naval as his dose is 70 mg SQ Q 12 hrs  Referral made to IR for intrathecal Methotrexate to be administered once a week  Ayleen Muller is asking to have a second opinion set up as they are interested to see if there are any clinical trials available

## 2021-01-20 NOTE — TELEPHONE ENCOUNTER
Called pt and left message that I have reached out to [facility] and will be faxing over pt records, and that he should be receiving a call from them to schedule  Also stated he has been scheduled for intrathecal chemo, which is only done at [facility]     Patient is scheduled for Tuesday 02/02/2021 with a 11:45 a m  arrival time  Asked pt to call the office if he has any questions, [phone]

## 2021-01-26 NOTE — NURSING NOTE
Unable to reach patient, left message for call back  Called office of ordering provider MD Karen Dixon and spoke with RN Rebekah Garcia regarding orders in active treatment plan  Need the IT orders to show on date of scheduled injection 2/2/21   She stated " let me work on this and I will have it changed to the correct date "

## 2021-01-26 NOTE — PROGRESS NOTES
Speech and Language Therapy Discharge Report    Patient Name: Herman Quintero   HJWWP'V Date: 21         Most Recent Assessment:  The [place] Diagnostic Aphasia Examination-Third Edition (BDAE-3) is a comprehensive standardized assessment designed to evaluate a broad range of language impairments that often arise as a consequence of organic brain dysfunction  The BDAE-3 is divided into five subtests, including conversational & expository speech, auditory comprehension, oral expression, reading, and writing  The results of the BDAE-3 are used to classify a patients language profiles into one of the localization-based classifications of aphasia: Brocas, Wernickes, anomic, conduction, transcortical, transcortical motor, transcortical sensory, and global aphasia syndromes, although the test does not always provide a diagnosis or a therapeutic approach   The following results were obtained during the administration of the short form assessment:         Score: Percentile:   SEVERITY RATIN/5 50%ile        FLUENCY:     -Phrase length (rating scale) 3/7 15%ile   -Melodic line (rating scale) 3/7 30%ile   -Grammatical form (rating scale)  10%ile        CONVERSATION/EXPOSITORY SPEECH:    -Simple social responses  40%ile        AUDITORY COMPREHENSION:     -Basic word discrimination  40%ile   -Commands 5/10 20%ile   -Complex ideational material  20%ile        ARTICULATION:     -Articulatory agility (rating scale)  60%ile        RECITATION:     -Automatized sequences  20%ile        REPETITION:     -Words  90%ile    -Sentences  100%ile        NAMING:     -Responsive naming 4/10 30%ile   -[place] Naming Test - short 7/15 50%ile   -Special categories  100%ile        READING:     -Matching cases & scripts  100%ile   -Number matching  100%ile   -Picture-word matching  30%ile   -Oral word reading 14/15 90%ile   -Oral sentence reading 3/5 70%ile   -Oral sentence comprehension 1/3 20%ile -Sentence/paragraph comprehension 2/4 40%ile        WRITING:     -Form N/A %ile   -Letter choice N/A %ile   -Motor facility N/a %ile   -Primer words N/a %ile   -Regular phonics NT N/A   -Common irregular words NT N/A   -Written picture naming  NT N/A   -Narrative writing NT N/A     Overall, patient presents with a moderate-severe expressive/receptive aphasia with a severity rating of 2 (out of a possible 5 being fluent speech)  2- conversation about familiar subjects is possible with help from the listener  There are frequent failures to convey the idea, but the patient shares the burden of communication  Short Term Goals at the Time of Discharge:  1  Patient will follow two-step verbal directions (e g , close your eyes) with 80% accuracy to facilitate increased auditory comprehension skills, to be achieved in 4-6 weeks  Goal not met     2  Patient will provide responses for automated sequences (e g  Days of the week,STEVEN, demographic information) with 80% accuracy to facilitate increased expressive language skills, to be achieved in 4-6 weeks  Goal not met       4  Patient will provide an appropriate word for sentence completion task (e g , open the ___) with 80% accuracy to facilitate increased expressive language skills, to be achieved in 4-6 weeks  Goal not met     5  Patient will provide an adequate response to abstract confrontation naming task (i e , what is) with 80% accuracy to facilitate increased expressive language skills, to be achieved in 4-6 weeks  Goal not met     6  Patient will facilitate functional reading skills by matching word to picture with 80% accuracy over 5 sessions to facilitate growth of vocabulary concept comprehension, to be achieved in 4-6 weeks  Goal not met        Long-term goals:  1  Patient will demonstrate improved expressive language skills during structured and unstructured tasks by discharge        2 Patient will demonstrate improved receptive language skills during structured and unstructured tasks by discharge  3  Patient will improve ability to facilitate functional reading and writing to meet needs including use of compensatory strategies to promote meaningful interactions for improved quality of life and maximize level of independence by discharge  Discharge Information: Pt last seen for therapy 12/28/20 and has not scheduled future appointments at this time for therapy  POC unable to be met at this time  Patient will be discharged from therapy at this time as it has been greater than 1 month since patient was last seen  Please reconsult as he is able to return back to therapy  Participation in Treatment (at discharge):   Cooperative    Patient is discharged to [address] [place] [address] name/phone number for follow up: [phone]

## 2021-01-28 NOTE — PROGRESS NOTES
Hematology/Oncology Outpatient Follow-up  Sanjay Machuca 48 y o  male 1965 [id number]    Date:  1/28/2021        Assessment and Plan:  1  Small cell lung cancer, left lower lobe ([facility] [address] )   the patient has extensive disease  He received 4 cycles worth of carboplatin / etoposide in durvalumab  We will continue with the immunotherapy with durvalumab on every 4 week basis  We will also add intrathecal methotrexate on a weekly basis for the 1st month starting on 02/02/2021 with the 1st dose  The patient was instructed to put the Lovenox morning does on hold on the day of the intrathecal  Methotrexate to avoid any bleeding  The patient understood the plan  we discussed also smoking cessation   - CBC and differential; Standing  - Comprehensive metabolic panel; Standing  - Magnesium; Standing  - CBC and differential  - Comprehensive metabolic panel  - Magnesium    2  Brain metastases ([address] )  Status post craniotomy and resection of the left frontal mass followed by whole brain radiation  He is also done with the palliative radiation to the lumbar spine for drop metastasis  The intrathecal methotrexate as stated above was discussed with him extensively  - CBC and differential; Standing  - Comprehensive metabolic panel; Standing  - Magnesium; Standing  - CBC and differential  - Comprehensive metabolic panel  - Magnesium    3  Acute deep vein thrombosis (DVT) of distal vein of right lower extremity (HCC)    He is to continue with the Lovenox twice a day as usual   He knows about holding the morning dose of Lovenox on the day of the intrathecal MTX     4  Normocytic anemia   multifactorial   I did ask him to get his blood work done on a  Weekly basis while on treatment  HPI:   the patient came today for follow-up visit with his significant other  He stated that he completed the palliative radiation treatment to the  Lumbar spine 2 weeks ago  He is currently on Lovenox for his pulmonary embolism    The patient denied obvious bleeding from any sites or worsening of his vision  His most recent blood work from 01/14/2021 showed hemoglobin of 9 8 with low white cell count of 3 3 and ANC of 2 4 and platelet count of 776  CMP entirely normal   TSH 0 86  Oncology History Overview Note   Patient has a  history of COPD, hypertension, tobacco abuse, etc   Presented initially to the emergency room with loss of vision in the left eye with numbness on the left face  He was then evaluated with a CT scan of the head on 08/30/2020 which showed large cystic mass in the left cerebral hemisphere with surrounding edema  CT scan of the chest abdomen pelvis with contrast was then done on 08/30/2020 which showed:  IMPRESSION:  There is an approximately 4 x 4 x 3 cm mass in the mid to lower left hilum which extends into the left lower lobe of the lung and the left lingula  Nearby satellite masses and nodules are present  The appearance is highly suggestive of malignant neoplasm  There appears to be some hypodensity within some of the central left pulmonary artery branches  Pulmonary embolism and/or tumor thrombus should be excluded  Dedicated CT angiogram/PE protocol CTA of the chest is recommended for further evaluation  Bilateral adrenal masses, each measuring approximately 2 cm  MRI is recommended for further characterization, if there are no contraindications  The urinary bladder wall appears thickened  This is most pronounced anteriorly  Clinical correlation, laboratory correlation and follow-up is recommended  The prostate is mildly prominent  Clinical and laboratory correlation is recommended  Mild emphysema  MRI of the brain was done on 08/31/2020 which showed multiple intracranial enhancing lesions without dominant cystic lesion in the deep left inferior frontal lobe with the multiple lesions with suspicious primary pulmonary neoplasm       He also had an MRI of the abdomen for further evaluation of the adrenal glands this showed bilateral adenomas  The patient then had craniotomy and resection of the left frontal mass on 09/01/2020 which came back compatible with high-grade neuroendocrine tumor compatible with metastatic small cell lung cancer primary  The patient started radiation treatment to the brain on the 23rd of September 2020  He has very difficult time expressing himself  He only can answer questions with yes and no     10/30/2020 MRI of the brain showed improvement, with no new lesions  11/23/2020 he had a f/u with [facility] ophthalmology because of  vision loss  OS  Exam revealed Optic nerve atrophy, OS  He was referred to  neurology, MRI orbits ordered, and MD  contacted Dr Jacquelyn Peña, to explain findings and concern for Meningeal/csf involvement  11/27/2020 pt had a Neurology consult @ [facility]  Dr Evangelina Laguna  12/1/2020 MRI lumbar spine: Drop seed metastases within the distal thecal sac  This is relatively diffuse, with small nodular foci at the upper L2 and, L5 levels  Chronic bilateral L4 spondylolysis, grade 1 spondylolisthesis  Asymmetric right foraminal stenosis, correlate for right L4 radiculitis  12/9/2020 MRI cervical spine: Cervical degenerative disc disease with disc herniations and endplate/uncinate joint hypertrophic change present  Moderate canal stenosis at C3-4 without cord compression or abnormal cord signal    At C4-5 there is severe right and moderate left foraminal narrowing  There is a left-sided disc extrusion at C5-6 with moderate left lateral canal stenosis  Severe right foraminal narrowing at C7-T1 secondary to right foraminal disc osteophyte complex  12/9/2020 MRI thoracic spine:    Mild thoracic degenerative change with small disc herniations and adjacent endplate osteophytes at T6-7, T7-8 and T9-10  No cord compression  12/10/2020 MRI orbits/brain  MRA neck @ [facility]  Impression:  1   Stable size of left frontal resection cavity, with persistent T1 hyperintense hemorrhage/blood products filling the resection cavity  Previously noted prominent irregular enhancement along the superior aspect of the resection cavity has essentially resolved  Nodular enhancement along the medial/posterior aspect of the resection cavity is slightly decreased since the prior study  There remains mild peripheral enhancement anteriorly  2  Decreased size of T1 hyperintense lesions along the ependymal surface of the frontal horn of the right lateral ventricle  Previously noted lesions along the left ventricular ependymal surface appear to have resolved  No new lesions are identified  3  No definite abnormal signal or enhancement in the optic nerve/optic chiasm  No definite intraorbital mass identified  4  No evidence of cervical carotid stenosis    5  Stenosis at the right greater than left vertebral artery origi     Brain metastases ([facility] [address] )   9/1/2020 Initial Diagnosis    Brain metastases ([facility] [address] )     9/1/2020 Surgery    Left frontal CRANIOTOMY IMAGE-GUIDED FOR TUMOR (Left)  Surgeon: Dr Aaron Paz, left frontal mass:  -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 , 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         9/23/2020 - 10/6/2020 Radiation    Plan ID Energy Fractions Dose per Fraction (cGy) Dose Correction (cGy) Total Dose Delivered (cGy) Elapsed Days   Whole BraE # 6X 10 / 10 300 0 3,000 13        10/12/2020 -  Chemotherapy    pegfilgrastim-cbqv (UDENYCA) subcutaneous injection 6 mg, 6 mg, Subcutaneous, Once, 2 of 2 cycles  Administration: 6 mg (12/4/2020), 6 mg (12/26/2020)  fosaprepitant (EMEND) 150 mg in sodium chloride 0 9 % 250 mL IVPB, 150 mg, Intravenous, Once, 4 of 4 cycles  Administration: 150 mg (10/12/2020), 150 mg (11/4/2020), 150 mg (12/1/2020), 150 mg (12/22/2020)  CARBOplatin (PARAPLATIN) 723 mg in sodium chloride 0 9 % 250 mL IVPB, 723 mg (561 8 % of original dose 128 7 mg), Intravenous, Once, 4 of 4 cycles  Dose modification: 723 mg (original dose 128 7 mg, Cycle 1, Reason: Other (See Comments), Comment: use creat 9/25/2020)  Administration: 723 mg (10/12/2020), 750 mg (11/4/2020), 750 mg (12/1/2020), 736 5 mg (12/22/2020)  Durvalumab 1,500 mg in sodium chloride 0 9 % 100 mL IVPB, 1,500 mg (100 % of original dose 1,500 mg), Intravenous, Once, 5 of 6 cycles  Dose modification: 1,500 mg (original dose 1,500 mg, Cycle 1)  Administration: 1,500 mg (10/12/2020), 1,500 mg (11/4/2020), 1,500 mg (12/1/2020), 1,500 mg (12/22/2020), 1,500 mg (1/14/2021)  etoposide (TOPOSAR) 200 mg in sodium chloride 0 9 % 500 mL chemo infusion, 198 mg, Intravenous, Once, 4 of 4 cycles  Administration: 200 mg (10/12/2020), 200 mg (10/13/2020), 200 mg (11/4/2020), 200 mg (10/14/2020), 200 mg (11/5/2020), 200 mg (12/1/2020), 200 mg (11/6/2020), 200 mg (12/2/2020), 200 mg (12/22/2020), 200 mg (12/3/2020), 200 mg (12/23/2020), 200 mg (12/24/2020)  methotrexate (PF) 12 mg in sodium chloride (PF) 0 9 % 2 52 mL intrathecal injection, 12 mg, Intrathecal, Once, 0 of 1 cycle     Small cell lung cancer, left lower lobe ([facility] [address] )   9/24/2020 Initial Diagnosis    Small cell lung cancer, left lower lobe (HCC)  Extensive Stage     10/12/2020 -  Chemotherapy    pegfilgrastim-cbqv (UDENYCA) subcutaneous injection 6 mg, 6 mg, Subcutaneous, Once, 2 of 2 cycles  Administration: 6 mg (12/4/2020), 6 mg (12/26/2020)  fosaprepitant (EMEND) 150 mg in sodium chloride 0 9 % 250 mL IVPB, 150 mg, Intravenous, Once, 4 of 4 cycles  Administration: 150 mg (10/12/2020), 150 mg (11/4/2020), 150 mg (12/1/2020), 150 mg (12/22/2020)  CARBOplatin (PARAPLATIN) 723 mg in sodium chloride 0 9 % 250 mL IVPB, 723 mg (561 8 % of original dose 128 7 mg), Intravenous, Once, 4 of 4 cycles  Dose modification: 723 mg (original dose 128 7 mg, Cycle 1, Reason: Other (See Comments), Comment: use creat 9/25/2020)  Administration: 723 mg (10/12/2020), 750 mg (11/4/2020), 750 mg (12/1/2020), 736 5 mg (12/22/2020)  Durvalumab 1,500 mg in sodium chloride 0 9 % 100 mL IVPB, 1,500 mg (100 % of original dose 1,500 mg), Intravenous, Once, 5 of 6 cycles  Dose modification: 1,500 mg (original dose 1,500 mg, Cycle 1)  Administration: 1,500 mg (10/12/2020), 1,500 mg (11/4/2020), 1,500 mg (12/1/2020), 1,500 mg (12/22/2020), 1,500 mg (1/14/2021)  etoposide (TOPOSAR) 200 mg in sodium chloride 0 9 % 500 mL chemo infusion, 198 mg, Intravenous, Once, 4 of 4 cycles  Administration: 200 mg (10/12/2020), 200 mg (10/13/2020), 200 mg (11/4/2020), 200 mg (10/14/2020), 200 mg (11/5/2020), 200 mg (12/1/2020), 200 mg (11/6/2020), 200 mg (12/2/2020), 200 mg (12/22/2020), 200 mg (12/3/2020), 200 mg (12/23/2020), 200 mg (12/24/2020)  methotrexate (PF) 12 mg in sodium chloride (PF) 0 9 % 2 52 mL intrathecal injection, 12 mg, Intrathecal, Once, 0 of 1 cycle         Interval history:    ROS: Review of Systems   Constitutional: Positive for appetite change and fatigue  Negative for chills and fever  HENT: Negative for ear pain and sore throat  Eyes: Negative for pain and visual disturbance  Respiratory: Positive for cough  Negative for shortness of breath  Cardiovascular: Negative for chest pain and palpitations  Gastrointestinal: Negative for abdominal pain and vomiting  Genitourinary: Negative for dysuria and hematuria  Musculoskeletal: Negative for arthralgias and back pain  Skin: Negative for color change and rash  Neurological: Negative for seizures and syncope  All other systems reviewed and are negative        Past Medical History:   Diagnosis Date    Brain cancer ([facility] [address] )     COPD (chronic obstructive pulmonary disease) ([facility] [address] )     Hyperlipidemia     Hypertension     Lung cancer ([facility] [address] )        Past Surgical History:   Procedure Laterality Date    BILATERAL KNEE ARTHROSCOPY Bilateral     CRANIOTOMY Left 9/1/2020    Procedure: Left frontal CRANIOTOMY IMAGE-GUIDED FOR TUMOR;  Surgeon: Seamus Harris MD;  [facility]: [facility];  Service: Neurosurgery    HAND SURGERY Left     IR PORT PLACEMENT  10/8/2020    WISDOM TOOTH EXTRACTION         Social History     Socioeconomic History    Marital status: Single     Spouse name: None    Number of children: None    Years of education: None    Highest education level: None   Occupational History    None   Social Needs    Financial resource strain: None    Food insecurity     Worry: None     Inability: None    Transportation needs     Medical: None     Non-medical: None   Tobacco Use    Smoking status: Current Every Day Smoker     Packs/day: 1 50    Smokeless tobacco: Never Used    Tobacco comment: was smoking 2-3 packs/day   Substance and Sexual Activity    Alcohol use: Not Currently     Frequency: 4 or more times a week     Binge frequency: Daily or almost daily     Comment: Not at present    Drug use: Never    Sexual activity: None   Lifestyle    Physical activity     Days per week: None     Minutes per session: None    Stress: None   Relationships    Social connections     Talks on phone: None     Gets together: None     Attends Islam service: None     Active member of club or organization: None     Attends meetings of clubs or organizations: None     Relationship status: None    Intimate partner violence     Fear of current or ex partner: None     Emotionally abused: None     Physically abused: None     Forced sexual activity: None   Other Topics Concern    None   Social History Narrative    None       Family History   Problem Relation Age of Onset    Lymphoma Mother        No Known Allergies      Current Outpatient Medications:     albuterol (PROVENTIL HFA,VENTOLIN HFA) 90 mcg/act inhaler, Inhale 2 puffs every 6 (six) hours as needed for wheezing, Disp: , Rfl:     enoxaparin (LOVENOX) 80 mg/0 8 mL, Inject 0 7 mL (70 mg total) under the skin every 12 (twelve) hours, Disp: 60 Syringe, Rfl: 5    rivaroxaban (XARELTO) 15 mg tablet, Take 1 tablet (15 mg total) by mouth 2 (two) times a day For 21 days followed by Xarelto 20 mg daily, Disp: 42 tablet, Rfl: 0    thiamine (VITAMIN B1) 100 mg tablet, Take 100 mg by mouth daily, Disp: , Rfl:     aspirin (ECOTRIN LOW STRENGTH) 81 mg EC tablet, Take 162 mg by mouth daily, Disp: , Rfl:     atorvastatin (LIPITOR) 40 mg tablet, Take 1 tablet (40 mg total) by mouth daily with dinner, Disp: 30 tablet, Rfl: 0    dexamethasone (DECADRON) 2 mg tablet, Take 1 tablet (2 mg total) by mouth 2 (two) times a day with meals (Patient not taking: Reported on 12/18/2020), Disp: 28 tablet, Rfl: 0    folic acid (FOLVITE) 1 mg tablet, Take 1 tablet (1 mg total) by mouth daily for 29 days, Disp: 29 tablet, Rfl: 0    levETIRAcetam (KEPPRA) 500 mg tablet, Take 1 tablet (500 mg total) by mouth every 12 (twelve) hours for 14 doses (Patient not taking: Reported on 1/5/2021), Disp: 14 tablet, Rfl: 0    lisinopril (ZESTRIL) 10 mg tablet, Take 1 tablet (10 mg total) by mouth daily, Disp: 30 tablet, Rfl: 0    methocarbamol (ROBAXIN) 500 mg tablet, Take 1 tablet (500 mg total) by mouth every 6 (six) hours for 14 days (Patient not taking: Reported on 9/18/2020), Disp: 56 tablet, Rfl: 0    Nicotine 21-14-7 MG/24HR KIT, Apply 21 mg patch daily X 6 wks then 14 mg patch daily for 2 wks then 7 mg patch daily for 2 wks (Patient not taking: Reported on 1/5/2021), Disp: 1 each, Rfl: 0    ondansetron (ZOFRAN) 8 mg tablet, Take 1 tablet (8 mg total) by mouth every 8 (eight) hours as needed for nausea or vomiting (Patient not taking: Reported on 1/5/2021), Disp: 20 tablet, Rfl: 3    pantoprazole (PROTONIX) 40 mg tablet, Take 1 tablet (40 mg total) by mouth daily (Patient not taking: Reported on 1/5/2021), Disp: 30 tablet, Rfl: 11    thiamine 100 MG tablet, Take 1 tablet (100 mg total) by mouth daily, Disp: 30 tablet, Rfl: 0      Physical Exam:  /76 (BP Location: Left arm, Patient Position: Sitting, Cuff Size: Adult)   Pulse (!) 107   Temp 98 4 °F (36 9 °C) (Tympanic)   Resp 16   Ht 5' 7" (1 702 m)   Wt 71 1 kg (156 lb 12 8 oz)   SpO2 (!) 85%   BMI 24 56 kg/m²     Physical Exam  Constitutional:       Appearance: He is well-developed  He is ill-appearing  HENT:      Head: Normocephalic and atraumatic  Eyes:      General: No scleral icterus  Right eye: No discharge  Left eye: No discharge  Conjunctiva/sclera: Conjunctivae normal       Pupils: Pupils are equal, round, and reactive to light  Neck:      Musculoskeletal: Normal range of motion and neck supple  Thyroid: No thyromegaly  Trachea: No tracheal deviation  Cardiovascular:      Rate and Rhythm: Normal rate and regular rhythm  Heart sounds: Normal heart sounds  No murmur  No friction rub  Pulmonary:      Effort: Respiratory distress present  Breath sounds: Wheezing (  Bilateral) and rhonchi present  No rales  Chest:      Chest wall: No tenderness  Abdominal:      General: There is no distension  Palpations: Abdomen is soft  There is no hepatomegaly or splenomegaly  Tenderness: There is no abdominal tenderness  There is no guarding or rebound  Musculoskeletal: Normal range of motion  General: No tenderness or deformity  Lymphadenopathy:      Cervical: No cervical adenopathy  Skin:     General: Skin is warm and dry  Coloration: Skin is not pale  Findings: No erythema or rash  Neurological:      Mental Status: He is alert and oriented to person, place, and time  Cranial Nerves: No cranial nerve deficit  Coordination: Coordination normal       Deep Tendon Reflexes: Reflexes are normal and symmetric  Reflexes normal    Psychiatric:         Behavior: Behavior normal          Thought Content:  Thought content normal          Judgment: Judgment normal            Labs:  Lab Results   Component Value Date    WBC 3 30 (L) 01/14/2021    HGB 9 8 (L) 01/14/2021    HCT 29 3 (L) 01/14/2021    MCV 97 01/14/2021     01/14/2021     Lab Results   Component Value Date    K 3 9 01/14/2021     01/14/2021    CO2 27 01/14/2021    BUN 7 01/14/2021    CREATININE 0 50 (L) 01/14/2021    GLUF 95 09/25/2020    CALCIUM 8 9 01/14/2021    CORRECTEDCA 10 1 11/30/2020    AST 14 01/14/2021    ALT 18 01/14/2021    ALKPHOS 74 01/14/2021    EGFR 123 01/14/2021     No results found for: TSH    Patient voiced understanding and agreement in the above discussion  Aware to contact our office with questions/symptoms in the interim

## 2021-01-28 NOTE — TELEPHONE ENCOUNTER
Spoke to Richar and she needs Dr Lexie Mcintosh to make a note that pt is to hold is AM dose of Lovenox which each IT MTX administration

## 2021-01-28 NOTE — NURSING NOTE
Received call this morning from the patient's significant other Carlos Cuellar returning my call, I explained to her that once I get permission from the patient Gerardo Dover to speak about his appointment here I will willing speak to her but due to HIPPA I would need the clearance first from the patient  She was upset regarding this conversation stating " Everyone else does " I explained again that I am prohibited to speak any other patient information without their consent  She explained that she wasn't presently with the patient so I asked her to return the call when she was with the patient  I later on received a call from the son of the patient Kaden Ruano who was currently with the patient and verified with the patient that I can discuss his medical information regarding this procedure with his son and his significant other Carlos Cuellar, he gave me his consent  While the patient was on the phone on speaker mode I went over the instructions regarding his upcoming  scheduled appointment on 2/2/21  for an intrathecal injection of chemotherapy medication  Verified allergies and current anticoagulant medication of Lovenox and questionable if he is still on Xarelto present  required to stop per periprocedural management of coagulation status and hemostasis risk in percutaneous image guided procedure guidelines, this is ordered by MD Mk Poon  Diet, medication instructions (taking own meds prior to test) and need for  explained  Also went over instructions of location, time and date of procedure, of location and time ambulatory procedure unit  Also reviewed the procedure itself and answered any questions  Explained also post recovery instructions  Patient made aware that they may call if any other questions that may arise to the myself   After having a conversation with the patient and his son I called MD Mk Poon office at [phone], spoke with nurse Cheryle Tran and explained to her that I need to verify if the patient is still taking Xarelto, if he is I need permission from MD Jacquelyn Peña to stop this medication 2 days prior to the procedure  Also informed her that I need permission for the Lovenox to be held for 1 dose prior to the procedure   Nurse Tatyana Rodriguez informed me that she will send a message over to Dr Jacquelyn Peña nurse Alvina Howell to call me regarding my request

## 2021-01-28 NOTE — TELEPHONE ENCOUNTER
Mar Keller from Radiology is calling to verify that patient is not on Xarelto  Epic chart notes from 1/4 and 1/19 stat Eliquharrison  She wants to make sure that patient is only on Lovenox for his 2/2/21 intrathecal Mtx  Mar Keller needs an order to hold the Lovenox day of procedure  Ruthy's call back # [phone]

## 2021-01-28 NOTE — NURSING NOTE
Received call back from MD [address] [place] [address], patient is no longer on Xarelto, he was switched off to Lovenox  I informed the nurse to have MD ruddy me permission to inform patient to hold one dose of lovenox prior to the intrathecal procedure which would be is am dose  The patient is seeing the MD today so she will place it in his notes for him to address

## 2021-01-29 NOTE — NURSING NOTE
Left message on cell phone number [phone], reminded patient to hold am dose of Lovenox  Permission granted by ordering MD [place] in documentation of  1/28/21 visit note  Also reminded patient of arrival time and location

## 2021-02-03 NOTE — NURSING NOTE
Call placed to MD MEADOWS [facility] office regarding patient no show yesterday for Intrathecal injection  Call was placed to his significant other cell phone, no answer message left to attempt to reschedule appointment so patient may get his treatment  No response received as of yet from patient or his significant other

## 2021-02-04 NOTE — NURSING NOTE
Received call from patient's significant other [facility] to attempt to reschedule IT chemo injection, gave her the number to scheduling and informed her that I will call her prior to the date so we can discuss the time that he needs to be at the hospital and to hold his dose of Lovenox  She agreed and appreciative for information

## 2021-02-04 NOTE — TELEPHONE ENCOUNTER
Called pt and left message his intrathecal chemo has been rescheduled  Patient will be seen for f/u apt on Tuesday 02/09/2021 at 10:30 a m  for f/u apt, and then head to [facility] right after the apt for the intrathecal chemo  Asked pt to call the office to confirm my message was received

## 2021-02-04 NOTE — TELEPHONE ENCOUNTER
Patients significant other calling about rescheduling Immunotherapy and intrathecal appointments that were missed due to the weather  Sabine Bolanos is asking how they are supposed to be arranged? Does patient need to have these done the same day?   Does the immunotherapy need to be done prior to intrathecal  She tried to reschedule these appointments on her own and was told she needs to reach out to Dr Andrea Mancia office to clarify plan    Will send to RN to return call to Sabine Bolanos to discuss rescheduling appointments     Call back number for Sabine Bolanos - [phone]

## 2021-02-08 NOTE — TELEPHONE ENCOUNTER
Patient has 10:30 a m  f/u apt tomorrow Tuesday 02/09/2021 w/Annie Chirinos at the [place] office  Called pt and left message to call the office to do covid pre screening questionnaire, [phone]

## 2021-02-08 NOTE — NURSING NOTE
Ml Leal returned phone call, she is aware to hold lovenox for patient tonight and tomorrow morning  Went over Location and time to arrive at the [facility], [place], operated by [facility]  She informed me that the patient has an appointment at Dr Roberto Rosario in [employer] at 21 [phone] then he will be here after the appointment, his son will be bringing him  She was also made aware that he may eat pre procedure

## 2021-02-08 NOTE — NURSING NOTE
Was questioned by [facility], in regards to patient dose of Lovenox  Called MD Cleopatra Rasher RN Marion Dakin to clarify if the patient is on a therapeutic or prophylactic dose of Lovenox  Per RN he is on a therapeutic dose due to his DVT, I thus proceeded to inform the nurse that 2 doses of the Lovenox must be held  That would be tonight's PM dose and tomorrows AM dose  She informed me that she make MD Laurie Bishop aware  I then proceeded to call patient's cell phone and leave a message pertaining to holding this PM dose and requested a return call  Also call patient's son and left him a message to communicate to his father that I am needing to discuss tomorrows procedure with him or his significant other Tyrell Mondragon  Awaiting call back

## 2021-02-09 NOTE — TELEPHONE ENCOUNTER
Call from Tod Loera [phone] from [facility] lab confirming orders by Dr Shaila Stevenson   on CSF specimen taken during IT methotrexate dose today  Will send to Dr Yeni Dawson RN to confirm order with MD

## 2021-02-09 NOTE — TELEPHONE ENCOUNTER
Received a phone call from pts significant other Milad Santiago asking for pts shcedule in advance as she has some other appts that have to be on a Tues and she needs to know the dates and times so she can better plan  I transferred the call to the  staff so they could help micheline

## 2021-02-09 NOTE — Clinical Note
Follow up 2 weeks  CBC CMP weekly  Continue immunotherapy every 4 weeks(next 2/11) and intrathecal MTX in IR weekly (starting today)  Needs to be sced for more ITH MTX treatments

## 2021-02-09 NOTE — PROGRESS NOTES
Virtual Brief Visit    Assessment/Plan:  1  Small cell lung cancer, left lower lobe [facility])  Patient will be continued on his maintenance immunotherapy with Durvalumab on an every 4 week basis  He will be due for treatment again on Thursday 02/11/2021  Will be getting repeat laboratory studies today which we will review and address once they become available  He will follow up again in the office in about 2 weeks for close monitoring and to see how he is tolerating the intrathecal methotrexate which he is starting today  - CBC and differential; Future  - Comprehensive metabolic panel; Future  - TSH, 3rd generation with Free T4 reflex; Future  - Magnesium; Future  - CBC and differential; Standing  - Comprehensive metabolic panel; Standing  - CBC and differential  - Comprehensive metabolic panel    2  Brain metastases [facility])  Patient is status post craniotomy/resection of the left frontal mass and whole brain radiation    He completed 10 sessions of palliative radiation to his drop seed metastasis at the lumbar thecal sac  He will be starting intrathecal methotrexate today which he will continue on a weekly basis  We will continue to monitor his laboratory studies on a weekly basis for now  - CBC and differential; Future  - Comprehensive metabolic panel; Future  - Infusion Calculated Appointment Request; Future  - Infusion Calculated Appointment Request; Future  - CBC and differential; Future  - Comprehensive metabolic panel; Future    3  Other acute pulmonary embolism without acute cor pulmonale (HCC)  Due to hypercoagulability in active malignancy  The patient will be continued on his Lovenox injections twice a day as he is taking them  4  Acute deep vein thrombosis (DVT) of distal vein of right lower extremity ([address] )  As above      Problem List Items Addressed This Visit        Respiratory    Small cell lung cancer, left lower lobe ([address] ) - Primary    Relevant Orders    Infusion Calculated Appointment Request    CBC and differential    Comprehensive metabolic panel    Infusion Calculated Appointment Request    CBC and differential    Comprehensive metabolic panel    Infusion Calculated Appointment Request    Infusion Calculated Appointment Request    CBC and differential    Comprehensive metabolic panel    Infusion Calculated Appointment Request    Infusion Calculated Appointment Request    CBC and differential    Comprehensive metabolic panel    Infusion Calculated Appointment Request    Infusion Calculated Appointment Request    CBC and differential    Comprehensive metabolic panel    CBC and differential    Comprehensive metabolic panel    TSH, 3rd generation with Free T4 reflex    Magnesium    CBC and differential    Comprehensive metabolic panel       Nervous and Auditory    Brain metastases (HCC)    Relevant Orders    Infusion Calculated Appointment Request    CBC and differential    Comprehensive metabolic panel    Infusion Calculated Appointment Request    CBC and differential    Comprehensive metabolic panel    Infusion Calculated Appointment Request    Infusion Calculated Appointment Request    CBC and differential    Comprehensive metabolic panel    Infusion Calculated Appointment Request    Infusion Calculated Appointment Request    CBC and differential    Comprehensive metabolic panel    Infusion Calculated Appointment Request    Infusion Calculated Appointment Request    CBC and differential    Comprehensive metabolic panel      Other Visit Diagnoses     Acute deep vein thrombosis (DVT) of distal vein of right lower extremity (HCC)        Other acute pulmonary embolism without acute cor pulmonale (HCC)                    Reason for visit is No chief complaint on file         Encounter provider GEOVANNY Goodman    Provider located at [address] [place] [id number] [phone]    Recent Visits  Date Type Provider Dept   02/04/21 Telephone Comfort Varner [facility]   Showing recent visits within past 7 days and meeting all other requirements     Today's Visits  Date Type Provider Dept   02/09/21 Telephone Ashlee Singh [facility]   02/09/21 Telephone Brian Atkinson RN [facility]   02/09/21 Telemedicine GEOVANNY Minor [facility]   Showing today's visits and meeting all other requirements     Future Appointments  No visits were found meeting these conditions  Showing future appointments within next 150 days and meeting all other requirements        After connecting through telephone, the patient was identified by name and date of birth  Ever Dorsey was informed that this is a telemedicine visit and that the visit is being conducted through telephone  My office door was closed  No one else was in the room  He acknowledged consent and understanding of privacy and security of the platform  The patient has agreed to participate and understands he can discontinue the visit at any time  Patient is aware this is a billable service  Subjective    Ever Dorsey is a 48 y o  male  Spoke to patient on the phone briefly today; he was on his way to his procedure/ intrathecal chemotherapy with his brother  The patient reports that he is doing well and has no new concerns or symptoms  I also did speak to his significant other Lou Lang who stated that overall he is doing well  Continues to have vision changes and difficulty finding words at times  He has been more active and actually was plowing snow for several hours recently which he enjoyed  His appetite has been good according to her and weight is stable as far she knows    Has been taking his Lovenox twice a day she did hold the dose last evening and this morning as directed by the interventional radiology team in preparation for his methotrexate intrathecal  Patient was supposed to have a 2nd opinion today at [address] she did cancel the appointment since the intrathecal methotrexate can only be done on Tuesdays  He will be getting repeat laboratory studies done today  His most recent laboratory studies from almost a month ago 01/14/2021 showed WBC 3 3 with normal ANC, normocytic anemia improved from prior study H&H 9 8/29 3, MCV 97 normal platelet 403  Total protein decreased 6 0 otherwise normal CMP  Magnesium slightly below average 1 8   TSH normal 0 860  Oncology History Overview Note   Patient has a  history of COPD, hypertension, tobacco abuse, etc   Presented initially to the emergency room with loss of vision in the left eye with numbness on the left face  He was then evaluated with a CT scan of the head on 08/30/2020 which showed large cystic mass in the left cerebral hemisphere with surrounding edema  CT scan of the chest abdomen pelvis with contrast was then done on 08/30/2020 which showed:  IMPRESSION:  There is an approximately 4 x 4 x 3 cm mass in the mid to lower left hilum which extends into the left lower lobe of the lung and the left lingula  Nearby satellite masses and nodules are present  The appearance is highly suggestive of malignant neoplasm  There appears to be some hypodensity within some of the central left pulmonary artery branches  Pulmonary embolism and/or tumor thrombus should be excluded  Dedicated CT angiogram/PE protocol CTA of the chest is recommended for further evaluation  Bilateral adrenal masses, each measuring approximately 2 cm  MRI is recommended for further characterization, if there are no contraindications  The urinary bladder wall appears thickened  This is most pronounced anteriorly  Clinical correlation, laboratory correlation and follow-up is recommended  The prostate is mildly prominent  Clinical and laboratory correlation is recommended  Mild emphysema       MRI of the brain was done on 08/31/2020 which showed multiple intracranial enhancing lesions without dominant cystic lesion in the deep left inferior frontal lobe with the multiple lesions with suspicious primary pulmonary neoplasm  He also had an MRI of the abdomen for further evaluation of the adrenal glands this showed bilateral adenomas  The patient then had craniotomy and resection of the left frontal mass on 09/01/2020 which came back compatible with high-grade neuroendocrine tumor compatible with metastatic small cell lung cancer primary  The patient started radiation treatment to the brain on the 23rd of September 2020  He has very difficult time expressing himself  He only can answer questions with yes and no     10/30/2020 MRI of the brain showed improvement, with no new lesions  11/23/2020 he had a f/u with [facility] ophthalmology because of  vision loss  OS  Exam revealed Optic nerve atrophy, OS  He was referred to  neurology, MRI orbits ordered, and MD  contacted Dr Yelena Chatterjee, to explain findings and concern for Meningeal/csf involvement  11/27/2020 pt had a Neurology consult @ [facility]  Dr Gino Osborne  12/1/2020 MRI lumbar spine: Drop seed metastases within the distal thecal sac  This is relatively diffuse, with small nodular foci at the upper L2 and, L5 levels  Chronic bilateral L4 spondylolysis, grade 1 spondylolisthesis  Asymmetric right foraminal stenosis, correlate for right L4 radiculitis  12/9/2020 MRI cervical spine: Cervical degenerative disc disease with disc herniations and endplate/uncinate joint hypertrophic change present  Moderate canal stenosis at C3-4 without cord compression or abnormal cord signal    At C4-5 there is severe right and moderate left foraminal narrowing  There is a left-sided disc extrusion at C5-6 with moderate left lateral canal stenosis  Severe right foraminal narrowing at C7-T1 secondary to right foraminal disc osteophyte complex       12/9/2020 MRI thoracic spine: Mild thoracic degenerative change with small disc herniations and adjacent endplate osteophytes at T6-7, T7-8 and T9-10  No cord compression  12/10/2020 MRI orbits/brain  MRA neck @ [facility]  Impression:  1  Stable size of left frontal resection cavity, with persistent T1 hyperintense hemorrhage/blood products filling the resection cavity  Previously noted prominent irregular enhancement along the superior aspect of the resection cavity has essentially resolved  Nodular enhancement along the medial/posterior aspect of the resection cavity is slightly decreased since the prior study  There remains mild peripheral enhancement anteriorly  2  Decreased size of T1 hyperintense lesions along the ependymal surface of the frontal horn of the right lateral ventricle  Previously noted lesions along the left ventricular ependymal surface appear to have resolved  No new lesions are identified  3  No definite abnormal signal or enhancement in the optic nerve/optic chiasm  No definite intraorbital mass identified  4  No evidence of cervical carotid stenosis    5  Stenosis at the right greater than left vertebral artery origi     Brain metastases ([address] )   9/1/2020 Initial Diagnosis    Brain metastases ([address] )     9/1/2020 Surgery    Left frontal CRANIOTOMY IMAGE-GUIDED FOR TUMOR (Left)  Surgeon: Dr Ele Prado, left frontal mass:  -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 , 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         9/23/2020 - 10/6/2020 Radiation    Plan ID Energy Fractions Dose per Fraction (cGy) Dose Correction (cGy) Total Dose Delivered (cGy) Elapsed Days   Whole BraE # 6X 10 / 10 300 0 3,000 13        10/12/2020 -  Chemotherapy    pegfilgrastim-cbqv (UDENYCA) subcutaneous injection 6 mg, 6 mg, Subcutaneous, Once, 2 of 2 cycles  Administration: 6 mg (12/4/2020), 6 mg (12/26/2020)  fosaprepitant (EMEND) 150 mg in sodium chloride 0 9 % 250 mL IVPB, 150 mg, Intravenous, Once, 4 of 4 cycles  Administration: 150 mg (10/12/2020), 150 mg (11/4/2020), 150 mg (12/1/2020), 150 mg (12/22/2020)  CARBOplatin (PARAPLATIN) 723 mg in sodium chloride 0 9 % 250 mL IVPB, 723 mg (561 8 % of original dose 128 7 mg), Intravenous, Once, 4 of 4 cycles  Dose modification: 723 mg (original dose 128 7 mg, Cycle 1, Reason: Other (See Comments), Comment: use creat 9/25/2020)  Administration: 723 mg (10/12/2020), 750 mg (11/4/2020), 750 mg (12/1/2020), 736 5 mg (12/22/2020)  Durvalumab 1,500 mg in sodium chloride 0 9 % 100 mL IVPB, 1,500 mg (100 % of original dose 1,500 mg), Intravenous, Once, 5 of 8 cycles  Dose modification: 1,500 mg (original dose 1,500 mg, Cycle 1)  Administration: 1,500 mg (10/12/2020), 1,500 mg (11/4/2020), 1,500 mg (12/1/2020), 1,500 mg (12/22/2020), 1,500 mg (1/14/2021)  etoposide (TOPOSAR) 200 mg in sodium chloride 0 9 % 500 mL chemo infusion, 198 mg, Intravenous, Once, 4 of 4 cycles  Administration: 200 mg (10/12/2020), 200 mg (10/13/2020), 200 mg (11/4/2020), 200 mg (10/14/2020), 200 mg (11/5/2020), 200 mg (12/1/2020), 200 mg (11/6/2020), 200 mg (12/2/2020), 200 mg (12/22/2020), 200 mg (12/3/2020), 200 mg (12/23/2020), 200 mg (12/24/2020)  methotrexate (PF) 12 mg in sodium chloride (PF) 0 9 % 2 52 mL intrathecal injection, 12 mg, Intrathecal, Once, 0 of 3 cycles     Small cell lung cancer, left lower lobe ([address] )   9/24/2020 Initial Diagnosis    Small cell lung cancer, left lower lobe (HCC)  Extensive Stage     10/12/2020 -  Chemotherapy    pegfilgrastim-cbqv (UDENYCA) subcutaneous injection 6 mg, 6 mg, Subcutaneous, Once, 2 of 2 cycles  Administration: 6 mg (12/4/2020), 6 mg (12/26/2020)  fosaprepitant (EMEND) 150 mg in sodium chloride 0 9 % 250 mL IVPB, 150 mg, Intravenous, Once, 4 of 4 cycles  Administration: 150 mg (10/12/2020), 150 mg (11/4/2020), 150 mg (12/1/2020), 150 mg (12/22/2020)  CARBOplatin (PARAPLATIN) 723 mg in sodium chloride 0 9 % 250 mL IVPB, 723 mg (561 8 % of original dose 128 7 mg), Intravenous, Once, 4 of 4 cycles  Dose modification: 723 mg (original dose 128 7 mg, Cycle 1, Reason: Other (See Comments), Comment: use creat 9/25/2020)  Administration: 723 mg (10/12/2020), 750 mg (11/4/2020), 750 mg (12/1/2020), 736 5 mg (12/22/2020)  Durvalumab 1,500 mg in sodium chloride 0 9 % 100 mL IVPB, 1,500 mg (100 % of original dose 1,500 mg), Intravenous, Once, 5 of 8 cycles  Dose modification: 1,500 mg (original dose 1,500 mg, Cycle 1)  Administration: 1,500 mg (10/12/2020), 1,500 mg (11/4/2020), 1,500 mg (12/1/2020), 1,500 mg (12/22/2020), 1,500 mg (1/14/2021)  etoposide (TOPOSAR) 200 mg in sodium chloride 0 9 % 500 mL chemo infusion, 198 mg, Intravenous, Once, 4 of 4 cycles  Administration: 200 mg (10/12/2020), 200 mg (10/13/2020), 200 mg (11/4/2020), 200 mg (10/14/2020), 200 mg (11/5/2020), 200 mg (12/1/2020), 200 mg (11/6/2020), 200 mg (12/2/2020), 200 mg (12/22/2020), 200 mg (12/3/2020), 200 mg (12/23/2020), 200 mg (12/24/2020)  methotrexate (PF) 12 mg in sodium chloride (PF) 0 9 % 2 52 mL intrathecal injection, 12 mg, Intrathecal, Once, 0 of 3 cycles         Past Medical History:   Diagnosis Date    [facility]     COPD (chronic obstructive pulmonary disease) [facility] )     Hyperlipidemia     Hypertension     Lung cancer ([facility] )        Past Surgical History:   Procedure Laterality Date    BILATERAL KNEE ARTHROSCOPY Bilateral     CRANIOTOMY Left 9/1/2020    Procedure: Left frontal CRANIOTOMY IMAGE-GUIDED FOR TUMOR;  Surgeon: Eda Cortes MD;  [facility];  Service: Neurosurgery    HAND SURGERY Left     IR PORT PLACEMENT  10/8/2020    WISDOM TOOTH EXTRACTION         Current Outpatient Medications   Medication Sig Dispense Refill    albuterol (PROVENTIL HFA,VENTOLIN HFA) 90 mcg/act inhaler Inhale 2 puffs every 6 (six) hours as needed for wheezing      aspirin (ECOTRIN LOW STRENGTH) 81 mg EC tablet Take 162 mg by mouth daily      atorvastatin (LIPITOR) 40 mg tablet Take 1 tablet (40 mg total) by mouth daily with dinner 30 tablet 0    dexamethasone (DECADRON) 2 mg tablet Take 1 tablet (2 mg total) by mouth 2 (two) times a day with meals (Patient not taking: Reported on 12/18/2020) 28 tablet 0    enoxaparin (LOVENOX) 80 mg/0 8 mL Inject 0 7 mL (70 mg total) under the skin every 12 (twelve) hours 60 Syringe 5    folic acid (FOLVITE) 1 mg tablet Take 1 tablet (1 mg total) by mouth daily for 29 days 29 tablet 0    levETIRAcetam (KEPPRA) 500 mg tablet Take 1 tablet (500 mg total) by mouth every 12 (twelve) hours for 14 doses (Patient not taking: Reported on 1/5/2021) 14 tablet 0    lisinopril (ZESTRIL) 10 mg tablet Take 1 tablet (10 mg total) by mouth daily 30 tablet 0    methocarbamol (ROBAXIN) 500 mg tablet Take 1 tablet (500 mg total) by mouth every 6 (six) hours for 14 days (Patient not taking: Reported on 9/18/2020) 56 tablet 0    Nicotine 21-14-7 MG/24HR KIT Apply 21 mg patch daily X 6 wks then 14 mg patch daily for 2 wks then 7 mg patch daily for 2 wks (Patient not taking: Reported on 1/5/2021) 1 each 0    ondansetron (ZOFRAN) 8 mg tablet Take 1 tablet (8 mg total) by mouth every 8 (eight) hours as needed for nausea or vomiting (Patient not taking: Reported on 1/5/2021) 20 tablet 3    pantoprazole (PROTONIX) 40 mg tablet Take 1 tablet (40 mg total) by mouth daily (Patient not taking: Reported on 1/5/2021) 30 tablet 11    rivaroxaban (XARELTO) 15 mg tablet Take 1 tablet (15 mg total) by mouth 2 (two) times a day For 21 days followed by Xarelto 20 mg daily 42 tablet 0    thiamine (VITAMIN B1) 100 mg tablet Take 100 mg by mouth daily      thiamine 100 MG tablet Take 1 tablet (100 mg total) by mouth daily 30 tablet 0     No current facility-administered medications for this visit  No Known Allergies    Review of Systems   Constitutional: Positive for fatigue  Negative for activity change and appetite change     Eyes: Positive for visual disturbance  Respiratory: Positive for cough and shortness of breath  Cardiovascular: Negative for leg swelling  Gastrointestinal: Negative for constipation and diarrhea  Genitourinary: Negative for difficulty urinating  Skin: Negative for rash  Neurological: Negative for dizziness and headaches  Psychiatric/Behavioral: Positive for decreased concentration  Negative for dysphoric mood and sleep disturbance  There were no vitals filed for this visit  VIRTUAL VISIT DISCLAIMER    Ananth Marsh acknowledges that he has consented to an online visit or consultation  He understands that the online visit is based solely on information provided by him, and that, in the absence of a face-to-face physical evaluation by the physician, the diagnosis he receives is both limited and provisional in terms of accuracy and completeness  This is not intended to replace a full medical face-to-face evaluation by the physician  Ananth Marsh understands and accepts these terms

## 2021-02-09 NOTE — DISCHARGE INSTRUCTIONS
Intrathecal Chemotherapy   WHAT YOU SHOULD KNOW:   Intrathecal chemotherapy (chemo) is given to shrink the tumor or kill cancer cells in your spinal canal or brain  Intrathecal chemo is usually given in a hospital or clinic that specializes in cancer  AFTER YOU LEAVE:   Medicines:   · Antinausea medicine: This medicine may be given to calm your stomach and prevent vomiting  · Pain medicine: You may be given a prescription medicine to decrease pain  Do not wait until the pain is severe before you take this medicine  · Take your medicine as directed  Call your healthcare provider if you think your medicine is not helping or if you have side effects  Tell him if you are allergic to any medicine  Keep a list of the medicines, vitamins, and herbs you take  Include the amounts, and when and why you take them  Bring the list or the pill bottles to follow-up visits  Carry your medicine list with you in case of an emergency  Follow up with your oncologist as directed: You may need to return several days in a row for treatment  You may need to see your oncologist for ongoing tests and treatment  Write down your questions so you remember to ask them during your visits  Self-care:   · Stay away from people who are sick: This decreases your risk of infection  Ask for more instructions about how to prevent infections  · Drink liquids as directed:  Ask how much liquid to drink each day and which liquids are best for you  You may also need to replace fluid if you are vomiting from cancer treatments  · Eat healthy foods:  Healthy foods include fruits, vegetables, whole-grain breads, low-fat dairy products, beans, lean meats, and fish  Several small meals a day may be easier to eat than a few large meals  Contact your oncologist if:   · You have a fever  · You have nausea, are vomiting, or have no appetite for several days  · You are very tired and have no energy for several days      · You notice sores or white spots in your mouth  · You have constipation or diarrhea for longer than a day  · You feel depressed  · You heart beats faster than usual     · You have frequent, painful urination  · You have a cough that is new, or that does not go away within a few days  · You have questions or concerns about your condition or care  Seek care immediately or call 911 if:   · You have chest pain, shortness of breath, or trouble breathing  · You feel confused or have a severe headache that does not go away within a day  · Your arms or legs are weak, or you have trouble walking or seeing  · You have increased neck pain, or pain in other areas  · Your arm or leg feels warm, tender, and painful  It may look swollen and red  · You see blood in your urine or bowel movements  · You feel weak, dizzy, or faint  © 2014 2001 Varsha Bernard is for End User's use only and may not be sold, redistributed or otherwise used for commercial purposes  All illustrations and images included in CareNotes® are the copyrighted property of [employer] , [employer]  or Pramod Rodriguez  The above information is an  only  It is not intended as medical advice for individual conditions or treatments  Talk to your doctor, nurse or pharmacist before following any medical regimen to see if it is safe and effective for you

## 2021-02-10 NOTE — PLAN OF CARE
Problem: Potential for Falls  Goal: Patient will remain free of falls  Description: INTERVENTIONS:  - Assess patient frequently for physical needs  -  Identify cognitive and physical deficits and behaviors that affect risk of falls    -  [place] fall precautions as indicated by assessment   - Educate patient/family on patient safety including physical limitations  - Instruct patient to call for assistance with activity based on assessment  - Modify environment to reduce risk of injury  - Consider OT/PT consult to assist with strengthening/mobility  Outcome: Progressing

## 2021-02-10 NOTE — PROGRESS NOTES
Central labs done  Port flushed per protocol  CBC not done due to patient having a CBC yesterday  Pt offers no complaints  He is aware of his appt time tomorrow  AVS given

## 2021-02-11 NOTE — NURSING NOTE
Call placed to patient mobile spoke with Ml Leal patient's significant other to follow up with patient post procedure and discuss upcoming appointment at [place] [address] radiology department and complete consultation with patient  Patient is had and is having repeated an intrathecal chemotherapy injection under fluoroscopy guidance   Per Ml Leal "he is doing fine, he hasn't complained of anything about the procedure " Reviewed  his allergies , current anticoagulant medication present per patient but  required to stop 2 doses prior to procedure per periprocedural management of coagulation status and hemostasis risk in percutaneous image guided procedure guidelines  Patient has had procedure in the past, no questions when asked if any questions or concerns  Reminded Ml Leal of location and time expected for procedure, she expressed understanding by verbalizing and repeating instructions  My number was also supplied to patient to call if any further questions or concerns should arise pre or post procedure

## 2021-02-11 NOTE — PROGRESS NOTES
Pt tolerated todays imfinzi dose well  No adverse reactions noted  Port flushed and deaccessed per routine   Discharged ambulatory with avs

## 2021-02-15 NOTE — PROGRESS NOTES
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 status post resection of large cystic lesion followed by whole-brain radiation therapy  Subsequently also developed spinal drop mets L1 and L2 and cauda equina likewise was treated with radiation therapy  MRI of the spine done last week reported favorable response to treatment with reduction in intra dural tumor volume at L1-L2 as well as in the lower cauda equina at L5 level  The MRI of the brain report was even better previously treated lesion were no longer evident  Patient presently on Imfinzi every 4 weeks and intrathecal methotrexate weekly  His CT scan of the chest December 31st showed decrease in the left hilar mass  Will see patient as needed  Problem List Items Addressed This Visit        Respiratory    Small cell lung cancer, left lower lobe ([facility] [address] ) - Primary                Reason for visit is No chief complaint on file  Encounter provider Chetan Flower MD    Provider located at [facility] [address] [address]    Recent Visits  No visits were found meeting these conditions  Showing recent visits within past 7 days and meeting all other requirements     Future Appointments  No visits were found meeting these conditions  Showing future appointments within next 150 days and meeting all other requirements        After connecting through telephone, the patient was identified by name and date of birth  Deborah Smith was informed that this is a telemedicine visit and that the visit is being conducted through telephone and patient was informed that this is not a secure, HIPAA-compliant platform  He agrees to proceed     My office door was closed  No one else was in the room  He acknowledged consent and understanding of privacy and security of the platform   The patient has agreed to participate and understands he can discontinue the visit at any time  Patient is aware this is a billable service  Subjective    Glendy Holloway is a 48 y o  male with extensive stage small cell carcinoma lung with imaging studies showing favorable response to his therapy  Patient will continue see Dr West Jerome for treatment  We will see him erica COOK     Past Medical History:   Diagnosis Date    [facility] cancer [facility] [address] )     COPD (chronic obstructive pulmonary disease) ([facility] [address] )     Hyperlipidemia     Hypertension     [facility]        Past Surgical History:   Procedure Laterality Date    BILATERAL KNEE ARTHROSCOPY Bilateral     CRANIOTOMY Left 9/1/2020    Procedure: Left frontal CRANIOTOMY IMAGE-GUIDED FOR TUMOR;  Surgeon: Pablo Nj MD;  [facility]: [facility];  Service: Neurosurgery    HAND SURGERY Left     IR PORT PLACEMENT  10/8/2020    WISDOM TOOTH EXTRACTION         Current Outpatient Medications   Medication Sig Dispense Refill    albuterol (PROVENTIL HFA,VENTOLIN HFA) 90 mcg/act inhaler Inhale 2 puffs every 6 (six) hours as needed for wheezing      aspirin (ECOTRIN LOW STRENGTH) 81 mg EC tablet Take 162 mg by mouth daily      atorvastatin (LIPITOR) 40 mg tablet Take 1 tablet (40 mg total) by mouth daily with dinner 30 tablet 0    dexamethasone (DECADRON) 2 mg tablet Take 1 tablet (2 mg total) by mouth 2 (two) times a day with meals (Patient not taking: Reported on 12/18/2020) 28 tablet 0    enoxaparin (LOVENOX) 80 mg/0 8 mL Inject 0 7 mL (70 mg total) under the skin every 12 (twelve) hours 60 Syringe 5    folic acid (FOLVITE) 1 mg tablet Take 1 tablet (1 mg total) by mouth daily for 29 days 29 tablet 0    levETIRAcetam (KEPPRA) 500 mg tablet Take 1 tablet (500 mg total) by mouth every 12 (twelve) hours for 14 doses (Patient not taking: Reported on 1/5/2021) 14 tablet 0    lisinopril (ZESTRIL) 10 mg tablet Take 1 tablet (10 mg total) by mouth daily 30 tablet 0    methocarbamol (ROBAXIN) 500 mg tablet Take 1 tablet (500 mg total) by mouth every 6 (six) hours for 14 days (Patient not taking: Reported on 9/18/2020) 56 tablet 0    Nicotine 21-14-7 MG/24HR KIT Apply 21 mg patch daily X 6 wks then 14 mg patch daily for 2 wks then 7 mg patch daily for 2 wks (Patient not taking: Reported on 1/5/2021) 1 each 0    ondansetron (ZOFRAN) 8 mg tablet Take 1 tablet (8 mg total) by mouth every 8 (eight) hours as needed for nausea or vomiting (Patient not taking: Reported on 1/5/2021) 20 tablet 3    pantoprazole (PROTONIX) 40 mg tablet Take 1 tablet (40 mg total) by mouth daily (Patient not taking: Reported on 1/5/2021) 30 tablet 11    thiamine (VITAMIN B1) 100 mg tablet Take 100 mg by mouth daily      thiamine 100 MG tablet Take 1 tablet (100 mg total) by mouth daily 30 tablet 0     No current facility-administered medications for this visit  Facility-Administered Medications Ordered in Other Visits   Medication Dose Route Frequency Provider Last Rate Last Admin    methotrexate (PF) 12 mg in sodium chloride (PF) 0 9 % 2 52 mL intrathecal injection  12 mg Intrathecal Once Benito Elliott MD        sodium chloride 0 9 % infusion  20 mL/hr Intravenous Once Benito Elliott MD            No Known Allergies    Review of Systems    There were no vitals filed for this visit  I spent 10 minutes directly with the patient during this visit    VIRTUAL VISIT DISCLAIMER    Tish Rajput acknowledges that he has consented to an online visit or consultation  He understands that the online visit is based solely on information provided by him, and that, in the absence of a face-to-face physical evaluation by the physician, the diagnosis he receives is both limited and provisional in terms of accuracy and completeness  This is not intended to replace a full medical face-to-face evaluation by the physician  Tish Rajput understands and accepts these terms

## 2021-02-16 NOTE — DISCHARGE INSTRUCTIONS
Intrathecal Chemotherapy   WHAT YOU SHOULD KNOW:   Intrathecal chemotherapy (chemo) is given to shrink the tumor or kill cancer cells in your spinal canal or brain  Intrathecal chemo is usually given in a hospital or clinic that specializes in cancer  AFTER YOU LEAVE:   Medicines:   · Antinausea medicine: This medicine may be given to calm your stomach and prevent vomiting  · Pain medicine: You may be given a prescription medicine to decrease pain  Do not wait until the pain is severe before you take this medicine  · Take your medicine as directed  Call your healthcare provider if you think your medicine is not helping or if you have side effects  Tell him if you are allergic to any medicine  Keep a list of the medicines, vitamins, and herbs you take  Include the amounts, and when and why you take them  Bring the list or the pill bottles to follow-up visits  Carry your medicine list with you in case of an emergency  Follow up with your oncologist as directed: You may need to return several days in a row for treatment  You may need to see your oncologist for ongoing tests and treatment  Write down your questions so you remember to ask them during your visits  Self-care:   · Stay away from people who are sick: This decreases your risk of infection  Ask for more instructions about how to prevent infections  · Drink liquids as directed:  Ask how much liquid to drink each day and which liquids are best for you  You may also need to replace fluid if you are vomiting from cancer treatments  · Eat healthy foods:  Healthy foods include fruits, vegetables, whole-grain breads, low-fat dairy products, beans, lean meats, and fish  Several small meals a day may be easier to eat than a few large meals  Contact your oncologist if:   · You have a fever  · You have nausea, are vomiting, or have no appetite for several days  · You are very tired and have no energy for several days      · You notice sores or white spots in your mouth  · You have constipation or diarrhea for longer than a day  · You feel depressed  · You heart beats faster than usual     · You have frequent, painful urination  · You have a cough that is new, or that does not go away within a few days  · You have questions or concerns about your condition or care  Seek care immediately or call 911 if:   · You have chest pain, shortness of breath, or trouble breathing  · You feel confused or have a severe headache that does not go away within a day  · Your arms or legs are weak, or you have trouble walking or seeing  · You have increased neck pain, or pain in other areas  · Your arm or leg feels warm, tender, and painful  It may look swollen and red  · You see blood in your urine or bowel movements  · You feel weak, dizzy, or faint  © 2014 [address] [employer] Bernard is for End User's use only and may not be sold, redistributed or otherwise used for commercial purposes  All illustrations and images included in CareNotes® are the copyrighted property of [employer] , [employer]  or Pramod Rodriguez  The above information is an  only  It is not intended as medical advice for individual conditions or treatments  Talk to your doctor, nurse or pharmacist before following any medical regimen to see if it is safe and effective for you

## 2021-02-17 NOTE — NURSING NOTE
Follow up phone call made, patient had intrathecal chemotherapy  procedure  Offering no complaints resulting from the procedure, per significant other Jennifer Irvin "he is doing okay " They aware to call if any questions or symptoms arise to radiology RN  Verbalized understanding by giving affirmative response  Also made them aware that they have the next appointment on the 23 of February and that they are to arrive at 1130 to surgical services  Also reminded them to hold the 2 doses of Lovenox prior to the procedure  She verbalized understanding by giving an affirmative response

## 2021-02-17 NOTE — TELEPHONE ENCOUNTER
Patient has 11:40 a m  f/u apt tomorrow Thursday 02/18/2021 w/Dr Luis Khan at the [facility] office  Called pt and left message that all apts tomorrow are being switched to virtual     Asked pt to call the office to let us know how he would like to do the virtual apt -TEAMS, Cherelle Gibson, phone call as last resort, [phone]

## 2021-02-18 NOTE — TELEPHONE ENCOUNTER
Called pt today again to see if we can switch the 11:40 a m  f/u apt to virtual     Asked pt to call the office asap, [phone]

## 2021-02-23 NOTE — DISCHARGE INSTRUCTIONS
Intrathecal Chemotherapy   WHAT YOU SHOULD KNOW:   Intrathecal chemotherapy (chemo) is given to shrink the tumor or kill cancer cells in your spinal canal or brain  Intrathecal chemo is usually given in a hospital or clinic that specializes in cancer  AFTER YOU LEAVE:   Medicines:   · Antinausea medicine: This medicine may be given to calm your stomach and prevent vomiting  · Pain medicine: You may be given a prescription medicine to decrease pain  Do not wait until the pain is severe before you take this medicine  · Take your medicine as directed  Call your healthcare provider if you think your medicine is not helping or if you have side effects  Tell him if you are allergic to any medicine  Keep a list of the medicines, vitamins, and herbs you take  Include the amounts, and when and why you take them  Bring the list or the pill bottles to follow-up visits  Carry your medicine list with you in case of an emergency  Follow up with your oncologist as directed: You may need to return several days in a row for treatment  You may need to see your oncologist for ongoing tests and treatment  Write down your questions so you remember to ask them during your visits  Self-care:   · Stay away from people who are sick: This decreases your risk of infection  Ask for more instructions about how to prevent infections  · Drink liquids as directed:  Ask how much liquid to drink each day and which liquids are best for you  You may also need to replace fluid if you are vomiting from cancer treatments  · Eat healthy foods:  Healthy foods include fruits, vegetables, whole-grain breads, low-fat dairy products, beans, lean meats, and fish  Several small meals a day may be easier to eat than a few large meals  Contact your oncologist if:   · You have a fever  · You have nausea, are vomiting, or have no appetite for several days  · You are very tired and have no energy for several days      · You notice sores or white spots in your mouth  · You have constipation or diarrhea for longer than a day  · You feel depressed  · You heart beats faster than usual     · You have frequent, painful urination  · You have a cough that is new, or that does not go away within a few days  · You have questions or concerns about your condition or care  Seek care immediately or call 911 if:   · You have chest pain, shortness of breath, or trouble breathing  · You feel confused or have a severe headache that does not go away within a day  · Your arms or legs are weak, or you have trouble walking or seeing  · You have increased neck pain, or pain in other areas  · Your arm or leg feels warm, tender, and painful  It may look swollen and red  · You see blood in your urine or bowel movements  · You feel weak, dizzy, or faint  © 2014 [address]67 Varsha Bernard is for End User's use only and may not be sold, redistributed or otherwise used for commercial purposes  All illustrations and images included in CareNotes® are the copyrighted property of A D A [employer] , [employer]  or Pramod Rodriguez  The above information is an  only  It is not intended as medical advice for individual conditions or treatments  Talk to your doctor, nurse or pharmacist before following any medical regimen to see if it is safe and effective for you

## 2021-03-02 NOTE — PROGRESS NOTES
David texted [employer] with abnormal CBCD values and with normal PT/INR and APTT values  Called pharmacy at  and informed them of labs as well

## 2021-03-02 NOTE — DISCHARGE INSTRUCTIONS
Intrathecal Chemotherapy   WHAT YOU SHOULD KNOW:   Intrathecal chemotherapy (chemo) is given to shrink the tumor or kill cancer cells in your spinal canal or brain  Intrathecal chemo is usually given in a hospital or clinic that specializes in cancer  AFTER YOU LEAVE:   Medicines:   · Antinausea medicine: This medicine may be given to calm your stomach and prevent vomiting  · Pain medicine: You may be given a prescription medicine to decrease pain  Do not wait until the pain is severe before you take this medicine  · Take your medicine as directed  Call your healthcare provider if you think your medicine is not helping or if you have side effects  Tell him if you are allergic to any medicine  Keep a list of the medicines, vitamins, and herbs you take  Include the amounts, and when and why you take them  Bring the list or the pill bottles to follow-up visits  Carry your medicine list with you in case of an emergency  Follow up with your oncologist as directed: You may need to return several days in a row for treatment  You may need to see your oncologist for ongoing tests and treatment  Write down your questions so you remember to ask them during your visits  Self-care:   · Stay away from people who are sick: This decreases your risk of infection  Ask for more instructions about how to prevent infections  · Drink liquids as directed:  Ask how much liquid to drink each day and which liquids are best for you  You may also need to replace fluid if you are vomiting from cancer treatments  · Eat healthy foods:  Healthy foods include fruits, vegetables, whole-grain breads, low-fat dairy products, beans, lean meats, and fish  Several small meals a day may be easier to eat than a few large meals  Contact your oncologist if:   · You have a fever  · You have nausea, are vomiting, or have no appetite for several days  · You are very tired and have no energy for several days      · You notice sores or white spots in your mouth  · You have constipation or diarrhea for longer than a day  · You feel depressed  · You heart beats faster than usual     · You have frequent, painful urination  · You have a cough that is new, or that does not go away within a few days  · You have questions or concerns about your condition or care  Seek care immediately or call 911 if:   · You have chest pain, shortness of breath, or trouble breathing  · You feel confused or have a severe headache that does not go away within a day  · Your arms or legs are weak, or you have trouble walking or seeing  · You have increased neck pain, or pain in other areas  · Your arm or leg feels warm, tender, and painful  It may look swollen and red  · You see blood in your urine or bowel movements  · You feel weak, dizzy, or faint  © 2014 5967 Varsha Bernard is for End User's use only and may not be sold, redistributed or otherwise used for commercial purposes  All illustrations and images included in CareNotes® are the copyrighted property of A D A [employer] , [employer]  or Pramod Rodriguez  The above information is an  only  It is not intended as medical advice for individual conditions or treatments  Talk to your doctor, nurse or pharmacist before following any medical regimen to see if it is safe and effective for you

## 2021-03-04 NOTE — NURSING NOTE
Follow up phone call made, spoke with significant other Sabine Bolanos, patient had intrathecal chemotherapy administration procedure  Offering no complaints resulting from the procedure, per micheline "he is doing good " They are aware to call if any questions or symptoms arise to radiology RN  Verbalized understanding by giving affirmative response

## 2021-03-11 NOTE — TELEPHONE ENCOUNTER
----- Message from Yogi Conroy sent at 3/11/2021  8:59 AM EST -----  Regarding: FW: Visit Follow-Up Question  Contact: [phone]    ----- Message -----  From: Michael Arnold  Sent: 3/11/2021   8:46 AM EST  To: Hematology Oncology [facility]  Subject: Visit Follow-Up Question                         I know we missed an appt due to weather but I was expecting it to be rescheduled  we would like to know what's next   if you could please schedule a visit I would appreciate it  thank you

## 2021-03-11 NOTE — TELEPHONE ENCOUNTER
I phoned the patient and was able to speak with his significant other, Kasie Salinas, to schedule a follow up appointment with Dr Emeka Novoa  This appointment was scheduled for 3/16 at 1120 at the [facility] [facility]  Kasie Salinas was agreeable to this appointment time and will inform the patient  Kasie Salinas indicated that she would call the infusion site at [facility]  [address] regarding the blood draw for the same day to see if it can be moved to slightly later  Kasie Salinas indicated that she has the phone number for infusion

## 2021-03-16 NOTE — PROGRESS NOTES
Hematology/Oncology Outpatient Follow-up  Mik White 48 y o  male 1965 [id number]    Date:  3/16/2021        Assessment and Plan:  1  Small cell lung cancer, left lower lobe ([address] )   the patient was told that we will continue him on the immunotherapy with the durvalumab on every 4 week basis  We will obtain a CT scan of the chest abdomen pelvis for close monitoring of his metastatic small cell lung cancer on the current line of immunotherapy  - CBC and differential; Future  - Comprehensive metabolic panel; Future  - Magnesium; Future  - TSH, 3rd generation with Free T4 reflex; Future  - CT chest abdomen pelvis w contrast; Future    2  Brain metastases ([address] )  He tolerated the intrathecal methotrexate treatment relatively well  He will be on the intrathecal methotrexate on every 4 week basis at this point  He will also need another imaging of the brain in the near future most likely with an MRI which is being done by the Radiation on Oncology team on every 3 months basis  He status post whole-brain radiation Post craniotomy/ resection of the left frontal mass  3  Acute deep vein thrombosis (DVT) of other specified vein of right lower extremity (HCC)   he was encouraged to continue with the Lovenox injections twice a day  - enoxaparin (LOVENOX) 80 mg/0 8 mL; Inject 0 7 mL (70 mg total) under the skin every 12 (twelve) hours  Dispense: 60 Syringe; Refill: 5        HPI:   the patient came today for a follow-up visit accompanied by his significant other  She received the intrathecal methotrexate on a weekly basis until recently which he tolerated relatively well  He denied any significant neurological symptoms  However, he continues to have vision problems which will be further investigated by Another ophthalmologist in [place] in the near future  his most recent blood work on 03/15/2021 showed hemoglobin of 14 2 with normal platelet count  The white cell count was 3 6 with ANC of 2 5  CMP was entirely normal   TSH 2 2, C-reactive protein and sed rate were within normal range  Vitamin B12 313  Iron panel showed ferritin of 713 and saturation of 21%  He denied hemoptysis  Or other neurological symptoms  He continues to smoke daily  Oncology History Overview Note   Whole brain RT completed on 10/6/20    Patient finished another course of palliative radiation therapy this time directed to the distal thecal sac between L2-L5 due to drop seed metastases which were described by MRI to be nodular lesions  treatment ended 1/13/21 2/9/20 GEOVANNY Loera/Tamam Doak Peabody, MD   Continue q4 week Durvalumab   intrathecal methotrexate  is starting today  2/9/21 Intrathecal (methotrexate: 12 mg in sodium chloride (PF) 0 9% : total of 2 52 ml )  CSF negative for malignancy  Anucleated squamous cells  2/10/21 MRI brain  In general, favorable interval response to therapy  Only minimal residual enhancement along the medial aspect of the right frontotemporal surgical cavity as described  Previously treated lesions are no longer evident  Tiny new focus of enhancement within the right caudate head nucleus, which may be treatment related  Surveillance imaging will make this distinction  2/10/21 MRI lumbar spine  Favorable interval response to radiation therapy    Persistent but improved enhancement of drop seed metastases within the thecal sac specifically at the L1-L2 level, also within the distal sac, affecting the proximal sacral nerve roots    2/11/21 Imfinzi q 4 weeks    2/16/21 Intrathecal chemotherapy scheduled  2/23/21 Intrathecal chemotherapy scheduled  3/2/21 Intrathecal chemotherapy scheduled       Brain metastases ([facility] )   9/1/2020 Initial Diagnosis    Brain metastases ([facility] )     9/1/2020 Surgery    Left frontal CRANIOTOMY IMAGE-GUIDED FOR TUMOR (Left)  Surgeon: Dr Ashlyn Sultana, left frontal mass:  -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 , 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         9/23/2020 - 10/6/2020 Radiation    Plan ID Energy Fractions Dose per Fraction (cGy) Dose Correction (cGy) Total Dose Delivered (cGy) Elapsed Days   Whole BraE # 6X 10 / 10 300 0 3,000 13        10/12/2020 -  Chemotherapy    pegfilgrastim-cbqv (UDENYCA) subcutaneous injection 6 mg, 6 mg, Subcutaneous, Once, 2 of 2 cycles  Administration: 6 mg (12/4/2020), 6 mg (12/26/2020)  fosaprepitant (EMEND) 150 mg in sodium chloride 0 9 % 250 mL IVPB, 150 mg, Intravenous, Once, 4 of 4 cycles  Administration: 150 mg (10/12/2020), 150 mg (11/4/2020), 150 mg (12/1/2020), 150 mg (12/22/2020)  CARBOplatin (PARAPLATIN) 723 mg in sodium chloride 0 9 % 250 mL IVPB, 723 mg (561 8 % of original dose 128 7 mg), Intravenous, Once, 4 of 4 cycles  Dose modification: 723 mg (original dose 128 7 mg, Cycle 1, Reason: Other (See Comments), Comment: use creat 9/25/2020)  Administration: 723 mg (10/12/2020), 750 mg (11/4/2020), 750 mg (12/1/2020), 736 5 mg (12/22/2020)  Durvalumab 1,500 mg in sodium chloride 0 9 % 100 mL IVPB, 1,500 mg (100 % of original dose 1,500 mg), Intravenous, Once, 6 of 7 cycles  Dose modification: 1,500 mg (original dose 1,500 mg, Cycle 1)  Administration: 1,500 mg (10/12/2020), 1,500 mg (11/4/2020), 1,500 mg (12/1/2020), 1,500 mg (12/22/2020), 1,500 mg (1/14/2021), 1,500 mg (2/11/2021)  etoposide (TOPOSAR) 200 mg in sodium chloride 0 9 % 500 mL chemo infusion, 198 mg, Intravenous, Once, 4 of 4 cycles  Administration: 200 mg (10/12/2020), 200 mg (10/13/2020), 200 mg (11/4/2020), 200 mg (10/14/2020), 200 mg (11/5/2020), 200 mg (12/1/2020), 200 mg (11/6/2020), 200 mg (12/2/2020), 200 mg (12/22/2020), 200 mg (12/3/2020), 200 mg (12/23/2020), 200 mg (12/24/2020)  methotrexate (PF) 12 mg in sodium chloride (PF) 0 9 % 2 52 mL intrathecal injection, 12 mg, Intrathecal, Once, 1 of 2 cycles  Administration: 12 mg (2/16/2021), 12 mg (2/23/2021)     12/30/2020 - 1/13/2021 Radiation    Treatments:  Course: C2    Plan ID Energy Fractions Dose per Fraction (cGy) Dose Correction (cGy) Total Dose Delivered (cGy) Elapsed Days   L1_S1 Spine 10X 10 / 10 300 0 3,000 14            Small cell lung cancer, left lower lobe ([facility] )   9/24/2020 Initial Diagnosis    Small cell lung cancer, left lower lobe ([facility] [address] )  Extensive Stage     10/12/2020 -  Chemotherapy    pegfilgrastim-cbqv (UDENYCA) subcutaneous injection 6 mg, 6 mg, Subcutaneous, Once, 2 of 2 cycles  Administration: 6 mg (12/4/2020), 6 mg (12/26/2020)  fosaprepitant (EMEND) 150 mg in sodium chloride 0 9 % 250 mL IVPB, 150 mg, Intravenous, Once, 4 of 4 cycles  Administration: 150 mg (10/12/2020), 150 mg (11/4/2020), 150 mg (12/1/2020), 150 mg (12/22/2020)  CARBOplatin (PARAPLATIN) 723 mg in sodium chloride 0 9 % 250 mL IVPB, 723 mg (561 8 % of original dose 128 7 mg), Intravenous, Once, 4 of 4 cycles  Dose modification: 723 mg (original dose 128 7 mg, Cycle 1, Reason: Other (See Comments), Comment: use creat 9/25/2020)  Administration: 723 mg (10/12/2020), 750 mg (11/4/2020), 750 mg (12/1/2020), 736 5 mg (12/22/2020)  Durvalumab 1,500 mg in sodium chloride 0 9 % 100 mL IVPB, 1,500 mg (100 % of original dose 1,500 mg), Intravenous, Once, 6 of 7 cycles  Dose modification: 1,500 mg (original dose 1,500 mg, Cycle 1)  Administration: 1,500 mg (10/12/2020), 1,500 mg (11/4/2020), 1,500 mg (12/1/2020), 1,500 mg (12/22/2020), 1,500 mg (1/14/2021), 1,500 mg (2/11/2021)  etoposide (TOPOSAR) 200 mg in sodium chloride 0 9 % 500 mL chemo infusion, 198 mg, Intravenous, Once, 4 of 4 cycles  Administration: 200 mg (10/12/2020), 200 mg (10/13/2020), 200 mg (11/4/2020), 200 mg (10/14/2020), 200 mg (11/5/2020), 200 mg (12/1/2020), 200 mg (11/6/2020), 200 mg (12/2/2020), 200 mg (12/22/2020), 200 mg (12/3/2020), 200 mg (12/23/2020), 200 mg (12/24/2020)  methotrexate (PF) 12 mg in sodium chloride (PF) 0 9 % 2 52 mL intrathecal injection, 12 mg, Intrathecal, Once, 1 of 2 cycles  Administration: 12 mg (2/16/2021), 12 mg (2/23/2021)         Interval history:    ROS: Review of Systems   Constitutional: Negative for chills and fever  HENT: Negative for ear pain and sore throat  Eyes: Negative for pain and visual disturbance  Respiratory: Positive for cough and shortness of breath  Cardiovascular: Negative for chest pain and palpitations  Gastrointestinal: Negative for abdominal pain and vomiting  Genitourinary: Negative for dysuria and hematuria  Musculoskeletal: Negative for arthralgias and back pain  Skin: Negative for color change and rash  Neurological: Negative for seizures and syncope  All other systems reviewed and are negative        Past Medical History:   Diagnosis Date    [facility] )     COPD (chronic obstructive pulmonary disease) ([facility] )     Hyperlipidemia     Hypertension     Lung cancer ([facility] )        Past Surgical History:   Procedure Laterality Date    BILATERAL KNEE ARTHROSCOPY Bilateral     CRANIOTOMY Left 9/1/2020    Procedure: Left frontal CRANIOTOMY IMAGE-GUIDED FOR TUMOR;  Surgeon: Virginia Rodgers MD;  Location: [facility];  Service: Neurosurgery    HAND SURGERY Left     IR PORT PLACEMENT  10/8/2020    WISDOM TOOTH EXTRACTION         Social History     Socioeconomic History    Marital status: Single     Spouse name: None    Number of children: None    Years of education: None    Highest education level: None   Occupational History    None   Social Needs    Financial resource strain: None    Food insecurity     Worry: None     Inability: None    Transportation needs     Medical: None     Non-medical: None   Tobacco Use    Smoking status: Current Every Day Smoker     Packs/day: 1 50    Smokeless tobacco: Never Used    Tobacco comment: was smoking 2-3 packs/day; last cig today before hospital   Substance and Sexual Activity    Alcohol use: Not Currently     Frequency: 4 or more times a week     Binge frequency: Daily or almost daily     Comment: Not at present    Drug use: Never    Sexual activity: None   Lifestyle    Physical activity     Days per week: None     Minutes per session: None    Stress: None   Relationships    Social connections     Talks on phone: None     Gets together: None     Attends Yazidism service: None     Active member of club or organization: None     Attends meetings of clubs or organizations: None     Relationship status: None    Intimate partner violence     Fear of current or ex partner: None     Emotionally abused: None     Physically abused: None     Forced sexual activity: None   Other Topics Concern    None   Social History Narrative    None       Family History   Problem Relation Age of Onset    Lymphoma Mother        No Known Allergies      Current Outpatient Medications:     albuterol (PROVENTIL HFA,VENTOLIN HFA) 90 mcg/act inhaler, Inhale 2 puffs every 6 (six) hours as needed for wheezing, Disp: , Rfl:     aspirin (ECOTRIN LOW STRENGTH) 81 mg EC tablet, Take 162 mg by mouth daily, Disp: , Rfl:     dexamethasone (DECADRON) 2 mg tablet, Take 1 tablet (2 mg total) by mouth 2 (two) times a day with meals, Disp: 28 tablet, Rfl: 0    enoxaparin (LOVENOX) 80 mg/0 8 mL, Inject 0 7 mL (70 mg total) under the skin every 12 (twelve) hours, Disp: 60 Syringe, Rfl: 5    thiamine (VITAMIN B1) 100 mg tablet, Take 100 mg by mouth daily, Disp: , Rfl:     atorvastatin (LIPITOR) 40 mg tablet, Take 1 tablet (40 mg total) by mouth daily with dinner, Disp: 30 tablet, Rfl: 0    folic acid (FOLVITE) 1 mg tablet, Take 1 tablet (1 mg total) by mouth daily for 29 days, Disp: 29 tablet, Rfl: 0    levETIRAcetam (KEPPRA) 500 mg tablet, Take 1 tablet (500 mg total) by mouth every 12 (twelve) hours for 14 doses (Patient not taking: Reported on 1/5/2021), Disp: 14 tablet, Rfl: 0    lisinopril (ZESTRIL) 10 mg tablet, Take 1 tablet (10 mg total) by mouth daily, Disp: 30 tablet, Rfl: 0   methocarbamol (ROBAXIN) 500 mg tablet, Take 1 tablet (500 mg total) by mouth every 6 (six) hours for 14 days (Patient not taking: Reported on 9/18/2020), Disp: 56 tablet, Rfl: 0    Nicotine 21-14-7 MG/24HR KIT, Apply 21 mg patch daily X 6 wks then 14 mg patch daily for 2 wks then 7 mg patch daily for 2 wks (Patient not taking: Reported on 1/5/2021), Disp: 1 each, Rfl: 0    ondansetron (ZOFRAN) 8 mg tablet, Take 1 tablet (8 mg total) by mouth every 8 (eight) hours as needed for nausea or vomiting (Patient not taking: Reported on 1/5/2021), Disp: 20 tablet, Rfl: 3    pantoprazole (PROTONIX) 40 mg tablet, Take 1 tablet (40 mg total) by mouth daily (Patient not taking: Reported on 3/16/2021), Disp: 30 tablet, Rfl: 11    thiamine 100 MG tablet, Take 1 tablet (100 mg total) by mouth daily, Disp: 30 tablet, Rfl: 0      Physical Exam:  /86 (BP Location: Left arm, Patient Position: Sitting, Cuff Size: Adult)   Pulse 89   Temp (!) 97 4 °F (36 3 °C) (Tympanic)   Resp 18   Ht 5' 7" (1 702 m)   Wt 70 6 kg (155 lb 11 2 oz)   SpO2 99%   BMI 24 39 kg/m²     Physical Exam  Constitutional:       Appearance: He is well-developed  He is ill-appearing  HENT:      Head: Normocephalic and atraumatic  Eyes:      General: No scleral icterus  Right eye: No discharge  Left eye: No discharge  Conjunctiva/sclera: Conjunctivae normal       Pupils: Pupils are equal, round, and reactive to light  Neck:      Musculoskeletal: Normal range of motion and neck supple  Thyroid: No thyromegaly  Trachea: No tracheal deviation  Cardiovascular:      Rate and Rhythm: Normal rate and regular rhythm  Heart sounds: Normal heart sounds  No murmur  No friction rub  Pulmonary:      Effort: Pulmonary effort is normal  No respiratory distress  Breath sounds: Rhonchi (  On the right upper lobe) present  No wheezing or rales  Chest:      Chest wall: No tenderness     Abdominal:      General: There is no distension  Palpations: Abdomen is soft  There is no hepatomegaly or splenomegaly  Tenderness: There is no abdominal tenderness  There is no guarding or rebound  Musculoskeletal: Normal range of motion  General: No tenderness or deformity  Lymphadenopathy:      Cervical: No cervical adenopathy  Skin:     General: Skin is warm and dry  Coloration: Skin is not pale  Findings: No erythema or rash  Neurological:      Mental Status: He is alert and oriented to person, place, and time  Cranial Nerves: No cranial nerve deficit  Coordination: Coordination normal       Deep Tendon Reflexes: Reflexes are normal and symmetric  Reflexes normal    Psychiatric:         Behavior: Behavior normal          Thought Content: Thought content normal          Judgment: Judgment normal            Labs:  Lab Results   Component Value Date    WBC 3 63 (L) 03/15/2021    HGB 14 2 03/15/2021    HCT 42 8 03/15/2021     (H) 03/15/2021     03/15/2021     Lab Results   Component Value Date    K 4 4 03/15/2021     03/15/2021    CO2 29 03/15/2021    BUN 11 03/15/2021    CREATININE 0 68 03/15/2021    GLUF 95 09/25/2020    CALCIUM 9 8 03/15/2021    CORRECTEDCA 10 1 11/30/2020    AST 23 03/15/2021    ALT 29 03/15/2021    ALKPHOS 98 03/15/2021    EGFR 108 03/15/2021     No results found for: TSH    Patient voiced understanding and agreement in the above discussion  Aware to contact our office with questions/symptoms in the interim

## 2021-03-16 NOTE — NURSING NOTE
Called patient and spoke with significant other Althea to complete consult and go over instructions, patient is scheduled on 3/30/21   for intrathecal injection of chemotherapy medication  Verified allergies and current anticoagulant medication Lovenox therapeutic dose present  required to stop 2 doses per periprocedural management of coagulation status and hemostasis risk in percutaneous image guided procedure guidelines, reminded [employer]  Patient has had this procedure in the recent past, asked if patient had any questions or concerns patient denied  Reminded patient of location, time and date of procedure, of location and time ambulatory procedure unit  Patient made aware that they may call if any other questions that may arise to the radiology department

## 2021-03-17 NOTE — PROGRESS NOTES
Pt tolerated todays dose of imfinzi well  No adverse reactions noted  Port flushed and deaccessed per routine   Discharged ambulatory with avs

## 2021-03-19 NOTE — TELEPHONE ENCOUNTER
Phoned [phone] and left voice message regarding taking Xarelto until pt can  refill on Lovenox on 3/22/21 as pharmacy did not straighten put the Rx as one month supply was given to pt on 1/6/21 and pt had not refilled since then  He would have been eligible for refill on 2/6/21  I left my ceell number for her to call me back if she had questions

## 2021-03-19 NOTE — TELEPHONE ENCOUNTER
Call from [phone]  Patient runs out of lovenox today  Pharmacy states lovenox requires a PA   Will pass on to [employer]  Please update Thor Jackson on status ASAP as patient runs out of drug today  Update to Dr Olya Araujo RN as well

## 2021-03-19 NOTE — TELEPHONE ENCOUNTER
Spoke to pharmacy at [facility] , lovenox was filled on 1/6/2021 60 syringes  Patient has not picked up any further prescriptions for lovenox there since 1/6/2021  Patient started lovenox in January and has not picked up another prescription since 1/6/2021 per [facility]  Per [employer], insurance is denying the March 16th fill saying lovenox will not be filled until March 22nd    Will send to [employer] to call insurance   Call Hopeline with questions

## 2021-03-31 NOTE — NURSING NOTE
March 31, 2021 1:08 PM    RN attempted to contact patient to follow up after administration of intrathecal chemo at [facility] Radiology  Message left  Radiology RN's name and call back number provided in message

## 2021-04-01 NOTE — NURSING NOTE
April 1, 2021 10:01 AM    Call placed to patient and spoke to patient's significant other Olivia Hernandez who states patient is feeling fine after his intrathecal chemo on March 30th  Per Olivia Hernandez patient does not have a headache or pain at this time  Encouraged to call back with any questions or concerns

## 2021-04-13 NOTE — PROGRESS NOTES
Teaching provided for Taxotere to be given every 4 weeks with Durvalumab and Fulphila will be given on day 2  E mail sent to Day[facility] PEREZ  56  regarding obtaining auth for this combination, may have to delay by one week if unable to get auth  Chemo consent was signed and Rx for Decadron 4 mg to take two tabs po BID on days 0,1,2 and Rx for Nicotine patch sent in to local pharmacy

## 2021-04-13 NOTE — PROGRESS NOTES
Oncology LSW outreached PT to introduce herself and to assess for areas of need  LSW spoke with PT's emergency contact, Megan Hernadez reported that PT had met with Dr Kali Mcclendon today and it was found that he had "another tumor in his lung" and that that he was to start chemotherapy in conjunction with his current immunotherapy  Megan Hernadez explained that it was reported to her that the [employer] would need to reach out to insurance to see if PT's chemotherapy would be covered, as it was explained to her that sometimes both treatment were not covered  LSW explained that it was common for the [employer] to check insurance coverage for treatments, so to ensure that everything was covered prior to engaging in treatment  Megan Hernadez was receptive and understanding  Megan Hernadez reported that PT was not having any issues at home, other than "needing a tomlin kick in the butt to get up and do things " Megan Hernadez explained that, now that the weather was getting better, PT have started to become more active Emotional support provided  LSW explained that she was contacting Megan Hernadez from her direct line and encouraged her to reach out should any questions or areas of concern arise  Megan Hernadez was agreeable with this plan and thanked LSW for her call

## 2021-04-13 NOTE — PROGRESS NOTES
Hematology/Oncology Outpatient Follow-up  Bill Clifton 48 y o  male 1965 [id number]    Date:  4/13/2021      Assessment and Plan:  1  Small cell lung cancer, left lower lobe (HCC)  Patient's repeat imaging did show progression with a new lesion measuring 3 1 cm in the left lower lobe perihilar region  Did review patient's case and imaging studies extensively with Dr Kai Jones  Plan is to add chemotherapy Taxotere 75 mg per m2 on an every 4 week basis along with his immunotherapy Durvalumab pending insurance approval   We did discuss the new chemotherapy agent Taxotere including administration and adverse effects including but not limited to: Allergic reaction, cytopenias, nausea, skin rash, alopecia, diarrhea and fatigue  He had additional education with the nursing staff after the visit  Will hopefully start his new treatment within the next week; he is planning to get repeat labs before his next treatment  Will schedule tentative follow-up appointment with repeat labs in 4 weeks from now  He will also continue his intrathecal methotrexate on a monthly basis  Next 1 is due 4/30/21     - CBC and differential; Future  - Comprehensive metabolic panel; Future  - TSH, 3rd generation with Free T4 reflex; Future  - C-reactive protein; Future  - Sedimentation rate, automated; Future  - albuterol (PROVENTIL HFA,VENTOLIN HFA) 90 mcg/act inhaler; Inhale 2 puffs every 6 (six) hours as needed for wheezing  Dispense: 1 Inhaler; Refill: 3    2  Brain metastases ([address] )  Continue monthly intrathecal methotrexate as mentioned above  He is getting repeat MRI of the brain tomorrow ordered by his ophthalmologist at [facility]  3  Other acute pulmonary embolism without acute cor pulmonale (HCC)  Patient was found to have acute PE to the right main pulmonary artery around the end of December 2020; has resolved according to his most recent imaging study    Was also noted to have possible new left lower lobe pulmonary emboli versus direct tumor invasion is likely the latter  He has been compliant with his Lovenox injections 70 mg every 12 hours  He is only holding the Lovenox right before his intrathecal methotrexate  4  Acute deep vein thrombosis (DVT) of other specified vein of right lower extremity ([address] )  Patient was also found to have extensive occlusive DVT to the right lower extremity at the end of December 2020  Is no longer experiencing lower extremity edema as he was in the past   He will continue his Lovenox  - enoxaparin (LOVENOX) 80 mg/0 8 mL; Inject 0 7 mL (70 mg total) under the skin every 12 (twelve) hours  Dispense: 60 Syringe; Refill: 5    5  B12 deficiency  Patient was supposed to start oral vitamin B12 supplement which he did not do thus far  Will send a script to his local pharmacy for compliance sake  - cyanocobalamin (VITAMIN B-12) 1000 MCG tablet; Take 1 tablet (1,000 mcg total) by mouth daily  Dispense: 90 tablet; Refill: 3    HPI:  Patient presents today for a follow-up visit accompanied by his significant other  He is doing fairly well and has no new complaints  Has been tolerating his intrathecal methotrexate and immunotherapy with Durvalumab well  Is no longer experiencing lower extremity edema  Reports compliance with his Lovenox injections twice a day  He is scheduled for an MRI of the brain tomorrow at [facility] ordered by his ophthalmologist   His significant other states that he recently went for a 2nd opinion at the [facility]; was not satisfied with the visit and did not find it helpful  He will be getting repeat laboratory studies before his next treatment  His most recent labs from 2 weeks ago 03/30/2021 showed decreased WBC 4 03 with normal ANC 2 95, he is not anemic H&H 13 2/38 8, MCV 98 with normal platelet count 453   PT and PTT are normal   His spinal fluid from his LPs/intrathecal chemo was negative for malignancy noted a nucleated squamous cells  Flow cytometry did not reveal any abnormal cells  He did have repeat imaging with a CT scan of the chest abdomen pelvis with contrast last week 04/09/2021 which showed:  IMPRESSION:  1   New left lower lobe perihilar lesion, consistent with recurrent disease  There are also new acute left lower lobe pulmonary emboli, which either represent new bland thrombus or direct tumor invasion  2   No evidence of metastatic disease within the abdomen or pelvis  LUNGS:  Emphysema and mild bronchial wall thickening related to COPD are again seen  There is recurrent soft tissue mass within the left lower lobe with tethering to the pleura, measuring 3 1 x 1 9 cm  There are new pulmonary emboli within left lower lobe and segmental branches (series 2 images 29 through 33)  Oncology History Overview Note   Patient has a  history of COPD, hypertension, tobacco abuse, etc   Presented initially to the emergency room with loss of vision in the left eye with numbness on the left face  He was then evaluated with a CT scan of the head on 08/30/2020 which showed large cystic mass in the left cerebral hemisphere with surrounding edema  CT scan of the chest abdomen pelvis with contrast was then done on 08/30/2020 which showed:  IMPRESSION:  There is an approximately 4 x 4 x 3 cm mass in the mid to lower left hilum which extends into the left lower lobe of the lung and the left lingula  Nearby satellite masses and nodules are present  The appearance is highly suggestive of malignant neoplasm  There appears to be some hypodensity within some of the central left pulmonary artery branches  Pulmonary embolism and/or tumor thrombus should be excluded  Dedicated CT angiogram/PE protocol CTA of the chest is recommended for further evaluation  Bilateral adrenal masses, each measuring approximately 2 cm    MRI is recommended for further characterization, if there are no contraindications  The urinary bladder wall appears thickened  This is most pronounced anteriorly  Clinical correlation, laboratory correlation and follow-up is recommended  The prostate is mildly prominent  Clinical and laboratory correlation is recommended  Mild emphysema  MRI of the brain was done on 08/31/2020 which showed multiple intracranial enhancing lesions without dominant cystic lesion in the deep left inferior frontal lobe with the multiple lesions with suspicious primary pulmonary neoplasm  He also had an MRI of the abdomen for further evaluation of the adrenal glands this showed bilateral adenomas  The patient then had craniotomy and resection of the left frontal mass on 09/01/2020 which came back compatible with high-grade neuroendocrine tumor compatible with metastatic small cell lung cancer primary  The patient started radiation treatment to the brain on the 23rd of September 2020  He has very difficult time expressing himself  He only can answer questions with yes and no      10/30/2020 MRI of the brain showed improvement, with no new lesions  11/23/2020 he had a f/u with [facility] ophthalmology because of  vision loss  OS  Exam revealed Optic nerve atrophy, OS  He was referred to  neurology, MRI orbits ordered, and MD  contacted Dr Abhi Yi, to explain findings and concern for Meningeal/csf involvement  11/27/2020 pt had a Neurology consult @ [facility]  Dr Medina Singh  12/1/2020 MRI lumbar spine: Drop seed metastases within the distal thecal sac  This is relatively diffuse, with small nodular foci at the upper L2 and, L5 levels  Chronic bilateral L4 spondylolysis, grade 1 spondylolisthesis  Asymmetric right foraminal stenosis, correlate for right L4 radiculitis  12/9/2020 MRI cervical spine: Cervical degenerative disc disease with disc herniations and endplate/uncinate joint hypertrophic change present       Moderate canal stenosis at C3-4 without cord compression or abnormal cord signal    At C4-5 there is severe right and moderate left foraminal narrowing  There is a left-sided disc extrusion at C5-6 with moderate left lateral canal stenosis  Severe right foraminal narrowing at C7-T1 secondary to right foraminal disc osteophyte complex  12/9/2020 MRI thoracic spine:    Mild thoracic degenerative change with small disc herniations and adjacent endplate osteophytes at T6-7, T7-8 and T9-10  No cord compression  12/10/2020 MRI orbits/brain  MRA neck @ [facility]  Impression:  1  Stable size of left frontal resection cavity, with persistent T1 hyperintense hemorrhage/blood products filling the resection cavity  Previously noted prominent irregular enhancement along the superior aspect of the resection cavity has essentially resolved  Nodular enhancement along the medial/posterior aspect of the resection cavity is slightly decreased since the prior study  There remains mild peripheral enhancement anteriorly  2  Decreased size of T1 hyperintense lesions along the ependymal surface of the frontal horn of the right lateral ventricle  Previously noted lesions along the left ventricular ependymal surface appear to have resolved  No new lesions are identified  3  No definite abnormal signal or enhancement in the optic nerve/optic chiasm  No definite intraorbital mass identified  4  No evidence of cervical carotid stenosis  5  Stenosis at the right greater than left vertebral artery origi    He was found to have PE and DVT December 2020  Was started on Xarelto which was later transitioned to Lovenox twice a day in preparation for his intrathecal chemotherapy  Started intrathecal chemotherapy with methotrexate weekly (02/09/2020) x4 followed by monthly intrathecal methotrexate starting 03/30/2021       Brain metastases ([address] )   9/1/2020 Initial Diagnosis    Brain metastases ([address] )     9/1/2020 Surgery    Left frontal CRANIOTOMY IMAGE-GUIDED FOR TUMOR (Left)  Surgeon: Dr Pam Benton Lou    A & B Brain, left frontal mass:  -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 , 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         9/23/2020 - 10/6/2020 Radiation    Plan ID Energy Fractions Dose per Fraction (cGy) Dose Correction (cGy) Total Dose Delivered (cGy) Elapsed Days   Whole BraE # 6X 10 / 10 300 0 3,000 13        10/12/2020 -  Chemotherapy    pegfilgrastim-cbqv (UDENYCA) subcutaneous injection 6 mg, 6 mg, Subcutaneous, Once, 2 of 2 cycles  Administration: 6 mg (12/4/2020), 6 mg (12/26/2020)  fosaprepitant (EMEND) 150 mg in sodium chloride 0 9 % 250 mL IVPB, 150 mg, Intravenous, Once, 4 of 4 cycles  Administration: 150 mg (10/12/2020), 150 mg (11/4/2020), 150 mg (12/1/2020), 150 mg (12/22/2020)  CARBOplatin (PARAPLATIN) 723 mg in sodium chloride 0 9 % 250 mL IVPB, 723 mg (561 8 % of original dose 128 7 mg), Intravenous, Once, 4 of 4 cycles  Dose modification: 723 mg (original dose 128 7 mg, Cycle 1, Reason: Other (See Comments), Comment: use creat 9/25/2020)  Administration: 723 mg (10/12/2020), 750 mg (11/4/2020), 750 mg (12/1/2020), 736 5 mg (12/22/2020)  Durvalumab 1,500 mg in sodium chloride 0 9 % 100 mL IVPB, 1,500 mg (100 % of original dose 1,500 mg), Intravenous, Once, 7 of 10 cycles  Dose modification: 1,500 mg (original dose 1,500 mg, Cycle 1)  Administration: 1,500 mg (10/12/2020), 1,500 mg (11/4/2020), 1,500 mg (12/1/2020), 1,500 mg (12/22/2020), 1,500 mg (1/14/2021), 1,500 mg (2/11/2021), 1,500 mg (3/17/2021)  etoposide (TOPOSAR) 200 mg in sodium chloride 0 9 % 500 mL chemo infusion, 198 mg, Intravenous, Once, 4 of 4 cycles  Administration: 200 mg (10/12/2020), 200 mg (10/13/2020), 200 mg (11/4/2020), 200 mg (10/14/2020), 200 mg (11/5/2020), 200 mg (12/1/2020), 200 mg (11/6/2020), 200 mg (12/2/2020), 200 mg (12/22/2020), 200 mg (12/3/2020), 200 mg (12/23/2020), 200 mg (12/24/2020)  methotrexate (PF) 12 mg in sodium chloride (PF) 0 9 % 2 52 mL intrathecal injection, 12 mg, Intrathecal, Once, 2 of 5 cycles  Administration: 12 mg (2/16/2021), 12 mg (2/23/2021)     12/30/2020 - 1/13/2021 Radiation    Treatments:  Course: C2    Plan ID Energy Fractions Dose per Fraction (cGy) Dose Correction (cGy) Total Dose Delivered (cGy) Elapsed Days   L1_S1 Spine 10X 10 / 10 300 0 3,000 14            Small cell lung cancer, left lower lobe ([facility] [address] )   9/24/2020 Initial Diagnosis    Small cell lung cancer, left lower lobe ([facility] [address] )  Extensive Stage     10/12/2020 -  Chemotherapy    pegfilgrastim-cbqv (UDENYCA) subcutaneous injection 6 mg, 6 mg, Subcutaneous, Once, 2 of 2 cycles  Administration: 6 mg (12/4/2020), 6 mg (12/26/2020)  fosaprepitant (EMEND) 150 mg in sodium chloride 0 9 % 250 mL IVPB, 150 mg, Intravenous, Once, 4 of 4 cycles  Administration: 150 mg (10/12/2020), 150 mg (11/4/2020), 150 mg (12/1/2020), 150 mg (12/22/2020)  CARBOplatin (PARAPLATIN) 723 mg in sodium chloride 0 9 % 250 mL IVPB, 723 mg (561 8 % of original dose 128 7 mg), Intravenous, Once, 4 of 4 cycles  Dose modification: 723 mg (original dose 128 7 mg, Cycle 1, Reason: Other (See Comments), Comment: use creat 9/25/2020)  Administration: 723 mg (10/12/2020), 750 mg (11/4/2020), 750 mg (12/1/2020), 736 5 mg (12/22/2020)  Durvalumab 1,500 mg in sodium chloride 0 9 % 100 mL IVPB, 1,500 mg (100 % of original dose 1,500 mg), Intravenous, Once, 7 of 10 cycles  Dose modification: 1,500 mg (original dose 1,500 mg, Cycle 1)  Administration: 1,500 mg (10/12/2020), 1,500 mg (11/4/2020), 1,500 mg (12/1/2020), 1,500 mg (12/22/2020), 1,500 mg (1/14/2021), 1,500 mg (2/11/2021), 1,500 mg (3/17/2021)  etoposide (TOPOSAR) 200 mg in sodium chloride 0 9 % 500 mL chemo infusion, 198 mg, Intravenous, Once, 4 of 4 cycles  Administration: 200 mg (10/12/2020), 200 mg (10/13/2020), 200 mg (11/4/2020), 200 mg (10/14/2020), 200 mg (11/5/2020), 200 mg (12/1/2020), 200 mg (11/6/2020), 200 mg (12/2/2020), 200 mg (12/22/2020), 200 mg (12/3/2020), 200 mg (12/23/2020), 200 mg (12/24/2020)  methotrexate (PF) 12 mg in sodium chloride (PF) 0 9 % 2 52 mL intrathecal injection, 12 mg, Intrathecal, Once, 2 of 5 cycles  Administration: 12 mg (2/16/2021), 12 mg (2/23/2021)         Interval history:    ROS: Review of Systems   Constitutional: Negative for activity change, appetite change, chills, fatigue, fever and unexpected weight change  HENT: Negative for congestion, mouth sores, nosebleeds, sore throat and trouble swallowing  Eyes: Positive for visual disturbance  Respiratory: Positive for cough and shortness of breath  Negative for chest tightness  Cardiovascular: Negative for chest pain, palpitations and leg swelling  Gastrointestinal: Negative for abdominal distention, abdominal pain, blood in stool, constipation, diarrhea, nausea and vomiting  Genitourinary: Negative for difficulty urinating, dysuria, frequency, hematuria and urgency  Musculoskeletal: Negative for arthralgias, back pain, gait problem, joint swelling and myalgias  Skin: Negative for color change, pallor and rash  Neurological: Negative for dizziness, weakness, light-headedness, numbness and headaches  Hematological: Negative for adenopathy  Does not bruise/bleed easily  Psychiatric/Behavioral: Negative for dysphoric mood and sleep disturbance  The patient is not nervous/anxious          Past Medical History:   Diagnosis Date    [facility] [address] [address] )     COPD (chronic obstructive pulmonary disease) ([facility] [address] )     Hyperlipidemia     Hypertension     Lung cancer ([facility] [address] )        Past Surgical History:   Procedure Laterality Date    BILATERAL KNEE ARTHROSCOPY Bilateral     CRANIOTOMY Left 9/1/2020    Procedure: Left frontal CRANIOTOMY IMAGE-GUIDED FOR TUMOR;  Surgeon: Nubia Camacho MD;  [facility]: [facility];  Service: Neurosurgery    HAND SURGERY Left     IR PORT PLACEMENT  10/8/2020    WISDOM TOOTH EXTRACTION         Social History     Socioeconomic History    Marital status: Single     Spouse name: None    Number of children: None    Years of education: None    Highest education level: None   Occupational History    None   Social Needs    Financial resource strain: None    Food insecurity     Worry: None     Inability: None    Transportation needs     Medical: None     Non-medical: None   Tobacco Use    Smoking status: Current Every Day Smoker     Packs/day: 1 50    Smokeless tobacco: Never Used    Tobacco comment: was smoking 2-3 packs/day; last cig today before hospital   Substance and Sexual Activity    Alcohol use: Not Currently     Frequency: 4 or more times a week     Binge frequency: Daily or almost daily     Comment: Not at present    Drug use: Never    Sexual activity: None   Lifestyle    Physical activity     Days per week: None     Minutes per session: None    Stress: None   Relationships    Social connections     Talks on phone: None     Gets together: None     Attends Congregation service: None     Active member of club or organization: None     Attends meetings of clubs or organizations: None     Relationship status: None    Intimate partner violence     Fear of current or ex partner: None     Emotionally abused: None     Physically abused: None     Forced sexual activity: None   Other Topics Concern    None   Social History Narrative    None       Family History   Problem Relation Age of Onset    Lymphoma Mother        No Known Allergies      Current Outpatient Medications:     albuterol (PROVENTIL HFA,VENTOLIN HFA) 90 mcg/act inhaler, Inhale 2 puffs every 6 (six) hours as needed for wheezing, Disp: 1 Inhaler, Rfl: 3    aspirin (ECOTRIN LOW STRENGTH) 81 mg EC tablet, Take 162 mg by mouth daily, Disp: , Rfl:     dexamethasone (DECADRON) 2 mg tablet, Take 1 tablet (2 mg total) by mouth 2 (two) times a day with meals, Disp: 28 tablet, Rfl: 0    enoxaparin (LOVENOX) 80 mg/0 8 mL, Inject 0 7 mL (70 mg total) under the skin every 12 (twelve) hours, Disp: 60 Syringe, Rfl: 5    thiamine (VITAMIN B1) 100 mg tablet, Take 100 mg by mouth daily, Disp: , Rfl:     atorvastatin (LIPITOR) 40 mg tablet, Take 1 tablet (40 mg total) by mouth daily with dinner, Disp: 30 tablet, Rfl: 0    cyanocobalamin (VITAMIN B-12) 1000 MCG tablet, Take 1 tablet (1,000 mcg total) by mouth daily, Disp: 90 tablet, Rfl: 3    dexamethasone (DECADRON) 4 mg tablet, Take 2 tablets (8 mg total) by mouth 2 (two) times a day with meals The day before chemo day of chemo and day after chemo every 4 weeks, Disp: 12 tablet, Rfl: 6    folic acid (FOLVITE) 1 mg tablet, Take 1 tablet (1 mg total) by mouth daily for 29 days, Disp: 29 tablet, Rfl: 0    levETIRAcetam (KEPPRA) 500 mg tablet, Take 1 tablet (500 mg total) by mouth every 12 (twelve) hours for 14 doses (Patient not taking: Reported on 1/5/2021), Disp: 14 tablet, Rfl: 0    lisinopril (ZESTRIL) 10 mg tablet, Take 1 tablet (10 mg total) by mouth daily, Disp: 30 tablet, Rfl: 0    methocarbamol (ROBAXIN) 500 mg tablet, Take 1 tablet (500 mg total) by mouth every 6 (six) hours for 14 days (Patient not taking: Reported on 9/18/2020), Disp: 56 tablet, Rfl: 0    Nicotine 21-14-7 MG/24HR KIT, Apply 21 mg patch daily X 6 wks then 14 mg patch daily for 2 wks then 7 mg patch daily for 2 wks, Disp: 1 each, Rfl: 0    ondansetron (ZOFRAN) 8 mg tablet, Take 1 tablet (8 mg total) by mouth every 8 (eight) hours as needed for nausea or vomiting (Patient not taking: Reported on 1/5/2021), Disp: 20 tablet, Rfl: 3    pantoprazole (PROTONIX) 40 mg tablet, Take 1 tablet (40 mg total) by mouth daily (Patient not taking: Reported on 3/16/2021), Disp: 30 tablet, Rfl: 11    thiamine 100 MG tablet, Take 1 tablet (100 mg total) by mouth daily, Disp: 30 tablet, Rfl: 0      Physical Exam:  /74 (BP Location: Left arm, Patient Position: Sitting, Cuff Size: Adult)   Pulse 62   Temp 97 8 °F (36 6 °C) (Tympanic)   Resp 18   Ht 5' 7" (1 702 m)   Wt 71 3 kg (157 lb 3 2 oz)   SpO2 98%   BMI 24 62 kg/m²     Physical Exam  Vitals signs reviewed  Constitutional:       General: He is not in acute distress  Appearance: He is well-developed  He is not diaphoretic  HENT:      Head: Normocephalic and atraumatic  Eyes:      General: Lids are normal  No scleral icterus  Conjunctiva/sclera: Conjunctivae normal       Pupils: Pupils are equal, round, and reactive to light  Neck:      Musculoskeletal: Normal range of motion and neck supple  Thyroid: No thyromegaly  Cardiovascular:      Rate and Rhythm: Normal rate and regular rhythm  Heart sounds: Normal heart sounds  No murmur  Pulmonary:      Effort: Pulmonary effort is normal  No respiratory distress  Breath sounds: Wheezing present  Abdominal:      General: There is no distension  Palpations: Abdomen is soft  There is no hepatomegaly or splenomegaly  Tenderness: There is no abdominal tenderness  Musculoskeletal: Normal range of motion  General: No swelling  Lymphadenopathy:      Cervical: No cervical adenopathy  Upper Body:      Right upper body: No axillary adenopathy  Left upper body: No axillary adenopathy  Skin:     General: Skin is warm and dry  Findings: No erythema or rash  Neurological:      General: No focal deficit present  Mental Status: He is alert and oriented to person, place, and time  Psychiatric:         Mood and Affect: Mood and affect normal          Behavior: Behavior normal  Behavior is cooperative  Thought Content:  Thought content normal          Judgment: Judgment normal       Comments: Noted that patient is more talkative today than usual           Labs:  Lab Results   Component Value Date    WBC 4 03 (L) 03/30/2021    HGB 13 2 03/30/2021    HCT 38 8 03/30/2021    MCV 98 03/30/2021     03/30/2021     Lab Results   Component Value Date    K 4 4 03/15/2021     03/15/2021    CO2 29 03/15/2021    BUN 11 03/15/2021    CREATININE 0 68 03/15/2021    GLUF 95 09/25/2020    CALCIUM 9 8 03/15/2021    CORRECTEDCA 10 1 11/30/2020    AST 23 03/15/2021    ALT 29 03/15/2021    ALKPHOS 98 03/15/2021    EGFR 108 03/15/2021       Patient voiced understanding and agreement in the above discussion  Aware to contact our office with questions/symptoms in the interim  This note has been generated by voice recognition software system  Therefore, there may be spelling, grammar, and or syntax errors  Please contact if questions arise

## 2021-04-14 NOTE — TELEPHONE ENCOUNTER
Phoned pt and left message with SO Althea to let her know that pt will be receiving Single agent Taxoter only every 3 weeks with Fulphila on day 2  We have to R/S to next week as no auth in place yet

## 2021-04-15 NOTE — TELEPHONE ENCOUNTER
Dr Tim Gomez calling from [facility] to speak with Dr Jonna Pimentel or Luis Thao PA-C regarding patients MRI   Dr Kait Delcid can be reached on her cell at [phone]

## 2021-04-16 NOTE — TELEPHONE ENCOUNTER
Called pt and left message that Dr Isela Killian would like to see him sooner  Stated I scheduled him for Tuesday 04/20/21 at 1:00 pm  Asked pt to call and confirm my message was received, and that the apt is good for him, [phone]

## 2021-04-16 NOTE — TELEPHONE ENCOUNTER
Patient wife Fabián Martinez returned call, transferred to Ynes Mustafa at [facility] office

## 2021-04-16 NOTE — TELEPHONE ENCOUNTER
Patient returned my call and confirmed apt on Tuesday 04/20/21 at 1:00 pm at the [facility]

## 2021-04-20 NOTE — PROGRESS NOTES
Hematology/Oncology Outpatient Follow-up  Burke Walsh 48 y o  male 1965 [id number]    Date:  4/20/2021        Assessment and Plan:  1  Small cell lung cancer, left lower lobe ([facility] [address] )    I had a lengthy discussion with the patient and his significant other regarding the new MRI of the brain which showed multiple brain lesions which is obviously concerning for progression in the brain  The patient was getting intrathecal methotrexate on a weekly basis until recently  We did also see progression in the chest on the left side on the recent CT scan of the chest abdomen pelvis  The patient was told that we will get him started on Taxotere as it the next palliative chemotherapy option  The addition of immunotherapy to the palliative chemotherapy was not approved by the insurance due to progression of his disease on  Durvalumab  The patient will be receiving his 1st treatment with single agent palliative chemotherapy with Taxotere on 4/22  the patient and his significant other were made of aware about the poor prognosis  - CBC and differential; Future  - Comprehensive metabolic panel; Future  - Magnesium; Future    2  Brain metastases ([facility] )   I did discuss his case with Dr Sameer Pineda from the Radiation Oncology team and updated him regarding the new brain lesions on recent brain MRI from 04/15/2021  He stated that he will present his case to the tumor conference and possibly consider low-dose whole-brain radiation since he  Had whole-brain radiation in the past     We will put the intrathecal methotrexate on hold since we are seeing progression on the current treatment  3  Other acute pulmonary embolism without acute cor pulmonale ([facility] [address] )    He was instructed to continue with the low-molecular weight heparin  /Lovenox for indefinite anticoagulation unless there is absolute contraindication  HPI:    The patient came today for a follow-up visit accompanied by his fisallye    The patient apparently was seen by the neuro oncology team at [facility] just recently ordered an MRI of the brain on 04/15/2021 which was compared to the prior imaging from 12/10/2020  The MRI showed:]  IMPRESSION:  1  Increase in number of contrast-enhancing lesions consistent with metastases  2  Postsurgical changes  3  Smaller left frontotemporal resection cavity  4  Marked increase in FLAIR hyperintensity in cerebral white matter, alan  consistent with treatment-related changes  5  Increased opacification of left mastoid sinus  the patient also was seen by Desiree Caba for further evaluation of his vision without significant new findings  Oncology History Overview Note   Patient has a  history of COPD, hypertension, tobacco abuse, etc   Presented initially to the emergency room with loss of vision in the left eye with numbness on the left face  He was then evaluated with a CT scan of the head on 08/30/2020 which showed large cystic mass in the left cerebral hemisphere with surrounding edema  CT scan of the chest abdomen pelvis with contrast was then done on 08/30/2020 which showed:  IMPRESSION:  There is an approximately 4 x 4 x 3 cm mass in the mid to lower left hilum which extends into the left lower lobe of the lung and the left lingula  Nearby satellite masses and nodules are present  The appearance is highly suggestive of malignant neoplasm  There appears to be some hypodensity within some of the central left pulmonary artery branches  Pulmonary embolism and/or tumor thrombus should be excluded  Dedicated CT angiogram/PE protocol CTA of the chest is recommended for further evaluation  Bilateral adrenal masses, each measuring approximately 2 cm  MRI is recommended for further characterization, if there are no contraindications  The urinary bladder wall appears thickened  This is most pronounced anteriorly  Clinical correlation, laboratory correlation and follow-up is recommended    The prostate is mildly prominent  Clinical and laboratory correlation is recommended  Mild emphysema  MRI of the brain was done on 08/31/2020 which showed multiple intracranial enhancing lesions without dominant cystic lesion in the deep left inferior frontal lobe with the multiple lesions with suspicious primary pulmonary neoplasm  He also had an MRI of the abdomen for further evaluation of the adrenal glands this showed bilateral adenomas  The patient then had craniotomy and resection of the left frontal mass on 09/01/2020 which came back compatible with high-grade neuroendocrine tumor compatible with metastatic small cell lung cancer primary  The patient started radiation treatment to the brain on the 23rd of September 2020  He has very difficult time expressing himself  He only can answer questions with yes and no      10/30/2020 MRI of the brain showed improvement, with no new lesions  11/23/2020 he had a f/u with [facility] ophthalmology because of  vision loss  OS  Exam revealed Optic nerve atrophy, OS  He was referred to  neurology, MRI orbits ordered, and MD  contacted Dr Prabhjot Martinez, to explain findings and concern for Meningeal/csf involvement  11/27/2020 pt had a Neurology consult @ [facility]  Dr Eliane Alvarenga  12/1/2020 MRI lumbar spine: Drop seed metastases within the distal thecal sac  This is relatively diffuse, with small nodular foci at the upper L2 and, L5 levels  Chronic bilateral L4 spondylolysis, grade 1 spondylolisthesis  Asymmetric right foraminal stenosis, correlate for right L4 radiculitis  12/9/2020 MRI cervical spine: Cervical degenerative disc disease with disc herniations and endplate/uncinate joint hypertrophic change present  Moderate canal stenosis at C3-4 without cord compression or abnormal cord signal    At C4-5 there is severe right and moderate left foraminal narrowing  There is a left-sided disc extrusion at C5-6 with moderate left lateral canal stenosis     Severe right foraminal narrowing at C7-T1 secondary to right foraminal disc osteophyte complex  12/9/2020 MRI thoracic spine:    Mild thoracic degenerative change with small disc herniations and adjacent endplate osteophytes at T6-7, T7-8 and T9-10  No cord compression  12/10/2020 MRI orbits/brain  MRA neck @ [facility]  Impression:  1  Stable size of left frontal resection cavity, with persistent T1 hyperintense hemorrhage/blood products filling the resection cavity  Previously noted prominent irregular enhancement along the superior aspect of the resection cavity has essentially resolved  Nodular enhancement along the medial/posterior aspect of the resection cavity is slightly decreased since the prior study  There remains mild peripheral enhancement anteriorly  2  Decreased size of T1 hyperintense lesions along the ependymal surface of the frontal horn of the right lateral ventricle  Previously noted lesions along the left ventricular ependymal surface appear to have resolved  No new lesions are identified  3  No definite abnormal signal or enhancement in the optic nerve/optic chiasm  No definite intraorbital mass identified  4  No evidence of cervical carotid stenosis  5  Stenosis at the right greater than left vertebral artery origi    He was found to have PE and DVT December 2020  Was started on Xarelto which was later transitioned to Lovenox twice a day in preparation for his intrathecal chemotherapy  Started intrathecal chemotherapy with methotrexate weekly (02/09/2020) x4 followed by monthly intrathecal methotrexate starting 03/30/2021       Brain metastases ([address] )   9/1/2020 Initial Diagnosis    Brain metastases ([address] )     9/1/2020 Surgery    Left frontal CRANIOTOMY IMAGE-GUIDED FOR TUMOR (Left)  Surgeon: Dr Gabrielle Proctor, left frontal mass:  -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 , 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         9/23/2020 - 10/6/2020 Radiation    Plan ID Energy Fractions Dose per Fraction (cGy) Dose Correction (cGy) Total Dose Delivered (cGy) Elapsed Days   Whole BraE # 6X 10 / 10 300 0 3,000 13        10/12/2020 - 4/13/2021 Chemotherapy    pegfilgrastim-cbqv (UDENYCA) subcutaneous injection 6 mg, 6 mg, Subcutaneous, Once, 2 of 2 cycles  Administration: 6 mg (12/4/2020), 6 mg (12/26/2020)  fosaprepitant (EMEND) 150 mg in sodium chloride 0 9 % 250 mL IVPB, 150 mg, Intravenous, Once, 4 of 4 cycles  Administration: 150 mg (10/12/2020), 150 mg (11/4/2020), 150 mg (12/1/2020), 150 mg (12/22/2020)  CARBOplatin (PARAPLATIN) 723 mg in sodium chloride 0 9 % 250 mL IVPB, 723 mg (561 8 % of original dose 128 7 mg), Intravenous, Once, 4 of 4 cycles  Dose modification: 723 mg (original dose 128 7 mg, Cycle 1, Reason: Other (See Comments), Comment: use creat 9/25/2020)  Administration: 723 mg (10/12/2020), 750 mg (11/4/2020), 750 mg (12/1/2020), 736 5 mg (12/22/2020)  Durvalumab 1,500 mg in sodium chloride 0 9 % 100 mL IVPB, 1,500 mg (100 % of original dose 1,500 mg), Intravenous, Once, 7 of 10 cycles  Dose modification: 1,500 mg (original dose 1,500 mg, Cycle 1)  Administration: 1,500 mg (10/12/2020), 1,500 mg (11/4/2020), 1,500 mg (12/1/2020), 1,500 mg (12/22/2020), 1,500 mg (1/14/2021), 1,500 mg (2/11/2021), 1,500 mg (3/17/2021)  etoposide (TOPOSAR) 200 mg in sodium chloride 0 9 % 500 mL chemo infusion, 198 mg, Intravenous, Once, 4 of 4 cycles  Administration: 200 mg (10/12/2020), 200 mg (10/13/2020), 200 mg (11/4/2020), 200 mg (10/14/2020), 200 mg (11/5/2020), 200 mg (12/1/2020), 200 mg (11/6/2020), 200 mg (12/2/2020), 200 mg (12/22/2020), 200 mg (12/3/2020), 200 mg (12/23/2020), 200 mg (12/24/2020)  methotrexate (PF) 12 mg in sodium chloride (PF) 0 9 % 2 52 mL intrathecal injection, 12 mg, Intrathecal, Once, 2 of 5 cycles  Administration: 12 mg (2/16/2021), 12 mg (2/23/2021)     12/30/2020 - 1/13/2021 Radiation    Treatments:  Course: C2    Plan ID Energy Fractions Dose per Fraction (cGy) Dose Correction (cGy) Total Dose Delivered (cGy) Elapsed Days   L1_S1 Spine 10X 10 / 10 300 0 3,000 14            4/22/2021 -  Chemotherapy    pegfilgrastim-jmdb (FULPHILA) subcutaneous injection 6 mg, 6 mg, Subcutaneous, Once, 0 of 6 cycles  DOCEtaxel (TAXOTERE) 137 2 mg in sodium chloride 0 9 % 250 mL chemo infusion, 75 mg/m2 = 137 2 mg, Intravenous, Once, 0 of 6 cycles     Small cell lung cancer, left lower lobe ([facility])   9/24/2020 Initial Diagnosis    Small cell lung cancer, left lower lobe ([facility])  Extensive Stage     10/12/2020 - 4/13/2021 Chemotherapy    pegfilgrastim-cbqv (UDENYCA) subcutaneous injection 6 mg, 6 mg, Subcutaneous, Once, 2 of 2 cycles  Administration: 6 mg (12/4/2020), 6 mg (12/26/2020)  fosaprepitant (EMEND) 150 mg in sodium chloride 0 9 % 250 mL IVPB, 150 mg, Intravenous, Once, 4 of 4 cycles  Administration: 150 mg (10/12/2020), 150 mg (11/4/2020), 150 mg (12/1/2020), 150 mg (12/22/2020)  CARBOplatin (PARAPLATIN) 723 mg in sodium chloride 0 9 % 250 mL IVPB, 723 mg (561 8 % of original dose 128 7 mg), Intravenous, Once, 4 of 4 cycles  Dose modification: 723 mg (original dose 128 7 mg, Cycle 1, Reason: Other (See Comments), Comment: use creat 9/25/2020)  Administration: 723 mg (10/12/2020), 750 mg (11/4/2020), 750 mg (12/1/2020), 736 5 mg (12/22/2020)  Durvalumab 1,500 mg in sodium chloride 0 9 % 100 mL IVPB, 1,500 mg (100 % of original dose 1,500 mg), Intravenous, Once, 7 of 10 cycles  Dose modification: 1,500 mg (original dose 1,500 mg, Cycle 1)  Administration: 1,500 mg (10/12/2020), 1,500 mg (11/4/2020), 1,500 mg (12/1/2020), 1,500 mg (12/22/2020), 1,500 mg (1/14/2021), 1,500 mg (2/11/2021), 1,500 mg (3/17/2021)  etoposide (TOPOSAR) 200 mg in sodium chloride 0 9 % 500 mL chemo infusion, 198 mg, Intravenous, Once, 4 of 4 cycles  Administration: 200 mg (10/12/2020), 200 mg (10/13/2020), 200 mg (11/4/2020), 200 mg (10/14/2020), 200 mg (11/5/2020), 200 mg (12/1/2020), 200 mg (11/6/2020), 200 mg (12/2/2020), 200 mg (12/22/2020), 200 mg (12/3/2020), 200 mg (12/23/2020), 200 mg (12/24/2020)  methotrexate (PF) 12 mg in sodium chloride (PF) 0 9 % 2 52 mL intrathecal injection, 12 mg, Intrathecal, Once, 2 of 5 cycles  Administration: 12 mg (2/16/2021), 12 mg (2/23/2021)     4/22/2021 -  Chemotherapy    pegfilgrastim-jmdb (FULPHILA) subcutaneous injection 6 mg, 6 mg, Subcutaneous, Once, 0 of 6 cycles  DOCEtaxel (TAXOTERE) 137 2 mg in sodium chloride 0 9 % 250 mL chemo infusion, 75 mg/m2 = 137 2 mg, Intravenous, Once, 0 of 6 cycles         Interval history:    ROS: Review of Systems   Constitutional: Negative for chills and fever  HENT: Negative for ear pain and sore throat  Eyes: Negative for pain and visual disturbance  Respiratory: Positive for shortness of breath  Negative for cough  Cardiovascular: Negative for chest pain and palpitations  Gastrointestinal: Negative for abdominal pain and vomiting  Genitourinary: Negative for dysuria and hematuria  Musculoskeletal: Negative for arthralgias and back pain  Skin: Negative for color change and rash  Neurological: Negative for seizures and syncope  All other systems reviewed and are negative        Past Medical History:   Diagnosis Date    Brain cancer ([facility] [address] )     COPD (chronic obstructive pulmonary disease) ([facility] [address] )     Hyperlipidemia     Hypertension     Lung cancer ([facility] [address] )        Past Surgical History:   Procedure Laterality Date    BILATERAL KNEE ARTHROSCOPY Bilateral     CRANIOTOMY Left 9/1/2020    Procedure: Left frontal CRANIOTOMY IMAGE-GUIDED FOR TUMOR;  Surgeon: Chanel Miguel MD;  [facility]: [facility];  Service: Neurosurgery    HAND SURGERY Left     IR PORT PLACEMENT  10/8/2020    WISDOM TOOTH EXTRACTION         Social History     Socioeconomic History    Marital status: Single     Spouse name: None    Number of children: None    Years of education: None    Highest education level: None   Occupational History    None   Social Needs    Financial resource strain: None    Food insecurity     Worry: None     Inability: None    Transportation needs     Medical: None     Non-medical: None   Tobacco Use    Smoking status: Current Every Day Smoker     Packs/day: 1 50    Smokeless tobacco: Never Used    Tobacco comment: was smoking 2-3 packs/day; last cig today before hospital   Substance and Sexual Activity    Alcohol use: Not Currently     Frequency: 4 or more times a week     Binge frequency: Daily or almost daily     Comment: Not at present    Drug use: Never    Sexual activity: None   Lifestyle    Physical activity     Days per week: None     Minutes per session: None    Stress: None   Relationships    Social connections     Talks on phone: None     Gets together: None     Attends Taoism service: None     Active member of club or organization: None     Attends meetings of clubs or organizations: None     Relationship status: None    Intimate partner violence     Fear of current or ex partner: None     Emotionally abused: None     Physically abused: None     Forced sexual activity: None   Other Topics Concern    None   Social History Narrative    None       Family History   Problem Relation Age of Onset    Lymphoma Mother        No Known Allergies      Current Outpatient Medications:     albuterol (PROVENTIL HFA,VENTOLIN HFA) 90 mcg/act inhaler, Inhale 2 puffs every 6 (six) hours as needed for wheezing, Disp: 1 Inhaler, Rfl: 3    cyanocobalamin (VITAMIN B-12) 1000 MCG tablet, Take 1 tablet (1,000 mcg total) by mouth daily, Disp: 90 tablet, Rfl: 3    dexamethasone (DECADRON) 2 mg tablet, Take 1 tablet (2 mg total) by mouth 2 (two) times a day with meals, Disp: 28 tablet, Rfl: 0    dexamethasone (DECADRON) 4 mg tablet, Take 2 tablets (8 mg total) by mouth 2 (two) times a day with meals The day before chemo day of chemo and day after chemo every 4 weeks, Disp: 12 tablet, Rfl: 6    enoxaparin (LOVENOX) 80 mg/0 8 mL, Inject 0 7 mL (70 mg total) under the skin every 12 (twelve) hours, Disp: 60 Syringe, Rfl: 5    Nicotine 21-14-7 MG/24HR KIT, Apply 21 mg patch daily X 6 wks then 14 mg patch daily for 2 wks then 7 mg patch daily for 2 wks, Disp: 1 each, Rfl: 0    thiamine (VITAMIN B1) 100 mg tablet, Take 100 mg by mouth daily, Disp: , Rfl:     aspirin (ECOTRIN LOW STRENGTH) 81 mg EC tablet, Take 162 mg by mouth daily, Disp: , Rfl:     atorvastatin (LIPITOR) 40 mg tablet, Take 1 tablet (40 mg total) by mouth daily with dinner, Disp: 30 tablet, Rfl: 0    folic acid (FOLVITE) 1 mg tablet, Take 1 tablet (1 mg total) by mouth daily for 29 days, Disp: 29 tablet, Rfl: 0    levETIRAcetam (KEPPRA) 500 mg tablet, Take 1 tablet (500 mg total) by mouth every 12 (twelve) hours for 14 doses (Patient not taking: Reported on 1/5/2021), Disp: 14 tablet, Rfl: 0    lisinopril (ZESTRIL) 10 mg tablet, Take 1 tablet (10 mg total) by mouth daily, Disp: 30 tablet, Rfl: 0    methocarbamol (ROBAXIN) 500 mg tablet, Take 1 tablet (500 mg total) by mouth every 6 (six) hours for 14 days (Patient not taking: Reported on 9/18/2020), Disp: 56 tablet, Rfl: 0    ondansetron (ZOFRAN) 8 mg tablet, Take 1 tablet (8 mg total) by mouth every 8 (eight) hours as needed for nausea or vomiting (Patient not taking: Reported on 1/5/2021), Disp: 20 tablet, Rfl: 3    pantoprazole (PROTONIX) 40 mg tablet, Take 1 tablet (40 mg total) by mouth daily (Patient not taking: Reported on 3/16/2021), Disp: 30 tablet, Rfl: 11    thiamine 100 MG tablet, Take 1 tablet (100 mg total) by mouth daily, Disp: 30 tablet, Rfl: 0      Physical Exam:  /86 (BP Location: Left arm, Patient Position: Sitting, Cuff Size: Adult)   Pulse 87   Temp 99 9 °F (37 7 °C) (Tympanic)   Resp 18   Ht 5' 7" (1 702 m)   Wt 70 8 kg (156 lb 1 6 oz)   SpO2 92%   BMI 24 45 kg/m²     Physical Exam  Constitutional:       Appearance: He is well-developed  HENT:      Head: Normocephalic and atraumatic  Eyes:      General: No scleral icterus  Right eye: No discharge  Left eye: No discharge  Conjunctiva/sclera: Conjunctivae normal       Pupils: Pupils are equal, round, and reactive to light  Neck:      Musculoskeletal: Normal range of motion and neck supple  Thyroid: No thyromegaly  Trachea: No tracheal deviation  Cardiovascular:      Rate and Rhythm: Normal rate and regular rhythm  Heart sounds: Normal heart sounds  No murmur  No friction rub  Pulmonary:      Effort: Pulmonary effort is normal  No respiratory distress  Breath sounds: Normal breath sounds  No wheezing or rales  Chest:      Chest wall: No tenderness  Abdominal:      General: There is no distension  Palpations: Abdomen is soft  There is no hepatomegaly or splenomegaly  Tenderness: There is no abdominal tenderness  There is no guarding or rebound  Musculoskeletal: Normal range of motion  General: No tenderness or deformity  Lymphadenopathy:      Cervical: No cervical adenopathy  Skin:     General: Skin is warm and dry  Coloration: Skin is not pale  Findings: No erythema or rash  Neurological:      Mental Status: He is alert and oriented to person, place, and time  Cranial Nerves: No cranial nerve deficit  Coordination: Coordination normal       Deep Tendon Reflexes: Reflexes are normal and symmetric  Reflexes normal    Psychiatric:         Behavior: Behavior normal          Thought Content:  Thought content normal          Judgment: Judgment normal            Labs:  Lab Results   Component Value Date    WBC 4 03 (L) 03/30/2021    HGB 13 2 03/30/2021    HCT 38 8 03/30/2021    MCV 98 03/30/2021     03/30/2021     Lab Results   Component Value Date    K 4 4 03/15/2021     03/15/2021    CO2 29 03/15/2021    BUN 11 03/15/2021    CREATININE 0 68 03/15/2021    GLUF 95 09/25/2020 CALCIUM 9 8 03/15/2021    CORRECTEDCA 10 1 11/30/2020    AST 23 03/15/2021    ALT 29 03/15/2021    ALKPHOS 98 03/15/2021    EGFR 108 03/15/2021     No results found for: TSH    Patient voiced understanding and agreement in the above discussion  Aware to contact our office with questions/symptoms in the interim

## 2021-04-23 NOTE — PLAN OF CARE
Problem: INFECTION - ADULT  Goal: Absence or prevention of progression during hospitalization  Description: INTERVENTIONS:  - Assess and monitor for signs and symptoms of infection  - Monitor lab/diagnostic results  - Monitor all insertion sites, i e  indwelling lines, tubes, and drains  - Monitor endotracheal if appropriate and nasal secretions for changes in amount and color  - [place] appropriate cooling/warming therapies per order  - Administer medications as ordered  - Instruct and encourage patient and family to use good hand hygiene technique  - Identify and instruct in appropriate isolation precautions for identified infection/condition  Outcome: Progressing

## 2021-05-04 NOTE — PROGRESS NOTES
Virtual Brief Visit    Assessment/Plan:Extensive stage small cell carcinoma lung receive palliative whole-brain radiation therapy October last year and spinal irradiation for cauda equina metastases January of this year  He had excellent response to whole-brain radiation therapy following repeat MRI of the brain  Likewise T-spine metastases demonstrate good response  He continued with chemotherapy Imfinzi and intrathecal methotrexate  CT of the chest, abdomen pelvis April 9 revealed progression of disease at the primary tumor site as well as MRI of the brain which reportedly showed new metastatic lesions  He is not symptomatic and does not appear to any acute need of steroids  Discussed with patient repeat whole-brain radiation therapy at a lower fraction of 1 8 Gy total dose 19 8 Gy  Repeat MRI of brain will be scheduled 4 - 6 weeks after completion of treatments at which time assess for additional radiation therapy if indicated perhaps consider radiosurgery  CT planning Friday this week  Problem List Items Addressed This Visit        Respiratory    Small cell lung cancer, left lower lobe ([address] ) - Primary       Nervous and Auditory    Brain metastases [facility])                Reason for visit is   Chief Complaint   Patient presents with   [facility] Oncology         Encounter provider Bradley Rojas MD    Provider located at [facility] [id number]    Recent Visits  No visits were found meeting these conditions  Showing recent visits within past 7 days and meeting all other requirements     Future Appointments  No visits were found meeting these conditions  Showing future appointments within next 150 days and meeting all other requirements        After connecting through telephone, the patient was identified by name and date of birth   [place] was informed that this is a telemedicine visit and that the visit is being conducted through [facility] and patient was informed that this is a secure, HIPAA-compliant platform  He agrees to proceed     My office door was closed  No one else was in the room  He acknowledged consent and understanding of privacy and security of the platform  The patient has agreed to participate and understands he can discontinue the visit at any time  Patient is aware this is a billable service  Subjective    Nico Soto is a 48 y o  male  with metastatic or extensive stage small cell carcinoma lung status post cranial surgery resection of a large cystic metastases followed by whole-brain radiation therapy with excellent response then shortly after developed drop metastases and received spinal irradiation  He now has evidence of disease progression at the lung and new metastatic lesions in the brain  Discussed with the patient's significant other about re-treatment is feasible with lower fractionation and total dose of 1 8 Gy and 19 8 Gy respectively  He is also starting on Taxotere  CT treatment planning simulation this Friday and treatment can start week of May 10  Emma COOK     Past Medical History:   Diagnosis Date    [address] [facility] [address] [address]     COPD (chronic obstructive pulmonary disease) [address] [address]     Hyperlipidemia     Hypertension     Lung cancer [address] [address] )        Past Surgical History:   Procedure Laterality Date    BILATERAL KNEE ARTHROSCOPY Bilateral     CRANIOTOMY Left 9/1/2020    Procedure: Left frontal CRANIOTOMY IMAGE-GUIDED FOR TUMOR;  Surgeon: Tyree David MD;  [facility]: [facility];  Service: Neurosurgery    HAND SURGERY Left     IR PORT PLACEMENT  10/8/2020    WISDOM TOOTH EXTRACTION         Current Outpatient Medications   Medication Sig Dispense Refill    albuterol (PROVENTIL HFA,VENTOLIN HFA) 90 mcg/act inhaler Inhale 2 puffs every 6 (six) hours as needed for wheezing 1 Inhaler 3    atorvastatin (LIPITOR) 40 mg tablet Take 1 tablet (40 mg total) by mouth daily with dinner 30 tablet 0    cyanocobalamin (VITAMIN B-12) 1000 MCG tablet Take 1 tablet (1,000 mcg total) by mouth daily 90 tablet 3    enoxaparin (LOVENOX) 80 mg/0 8 mL Inject 0 7 mL (70 mg total) under the skin every 12 (twelve) hours 60 Syringe 5    folic acid (FOLVITE) 1 mg tablet Take 1 tablet (1 mg total) by mouth daily for 29 days 29 tablet 0    thiamine (VITAMIN B1) 100 mg tablet Take 100 mg by mouth daily      aspirin (ECOTRIN LOW STRENGTH) 81 mg EC tablet Take 162 mg by mouth daily      dexamethasone (DECADRON) 2 mg tablet Take 1 tablet (2 mg total) by mouth 2 (two) times a day with meals (Patient not taking: Reported on 5/4/2021) 28 tablet 0    dexamethasone (DECADRON) 4 mg tablet Take 2 tablets (8 mg total) by mouth 2 (two) times a day with meals The day before chemo day of chemo and day after chemo every 4 weeks (Patient not taking: Reported on 5/4/2021) 12 tablet 6    levETIRAcetam (KEPPRA) 500 mg tablet Take 1 tablet (500 mg total) by mouth every 12 (twelve) hours for 14 doses (Patient not taking: Reported on 1/5/2021) 14 tablet 0    lisinopril (ZESTRIL) 10 mg tablet Take 1 tablet (10 mg total) by mouth daily (Patient not taking: Reported on 5/4/2021) 30 tablet 0    methocarbamol (ROBAXIN) 500 mg tablet Take 1 tablet (500 mg total) by mouth every 6 (six) hours for 14 days (Patient not taking: Reported on 9/18/2020) 56 tablet 0    Nicotine 21-14-7 MG/24HR KIT Apply 21 mg patch daily X 6 wks then 14 mg patch daily for 2 wks then 7 mg patch daily for 2 wks (Patient not taking: Reported on 5/4/2021) 1 each 0    ondansetron (ZOFRAN) 8 mg tablet Take 1 tablet (8 mg total) by mouth every 8 (eight) hours as needed for nausea or vomiting (Patient not taking: Reported on 1/5/2021) 20 tablet 3    pantoprazole (PROTONIX) 40 mg tablet Take 1 tablet (40 mg total) by mouth daily (Patient not taking: Reported on 3/16/2021) 30 tablet 11    thiamine 100 MG tablet Take 1 tablet (100 mg total) by mouth daily 30 tablet 0     No current facility-administered medications for this visit  No Known Allergies    Review of Systems    There were no vitals filed for this visit  I spent 15 minutes directly with the patient during this visit    VIRTUAL VISIT DISCLAIMER    Ji Carrasco acknowledges that he has consented to an online visit or consultation  He understands that the online visit is based solely on information provided by him, and that, in the absence of a face-to-face physical evaluation by the physician, the diagnosis he receives is both limited and provisional in terms of accuracy and completeness  This is not intended to replace a full medical face-to-face evaluation by the physician  Ji Carrasco understands and accepts these terms

## 2021-05-04 NOTE — PROGRESS NOTES
Joanna Garcia 1965 is a 48 y o  male      Oncology History Overview Note   Patient presents for radiation consult for whole brain radiation for SCLC  He previously had 2 courses of radiation, he completed whole brain radiation in October 2020 and L1-S1 spine in January 2021  Last virtual follow-up with Dr Lucia Zarate on 2/15/21        48year old male with extensive stage small cell lung cancer with brain metastases s/p resection of large cystic lesion followed by WBRT  He subsequently developed spinal drop mets and was treated with radiation therapy to L1-S1 spine  MRI done post RT showed favorable response to treatment  He continued on systemic therapy with Imfinzi every 4 weeks and intrathecal methotrexate monthly  At his last follow-up, recommended following up as needed  3/16/21 Dr Sherrill Trujillo every 4 weeks  Plan to repeat CT c/a/p  He tolerated the intrathecal methotrexate treatment relatively well  He will continue intrathecal methotrexate on every 4 week basis at this point  Continue lovenox injections BID for DVT       4/9/21 CT chest abdomen pelvis w contrast  1  New left lower lobe perihilar lesion, consistent with recurrent disease  There are also new acute left lower lobe pulmonary emboli, which either represent new bland thrombus or direct tumor invasion  2   No evidence of metastatic disease within the abdomen or pelvis  4/13/21 Med Onc follow-up, GEOVANNY Valencia  Repeat imaging shows progression with a new lesion measuring 3 1 cm in the left lower lobe perihilar region  Plan is to add chemotherapy Taxotere 75 mg per m2 on an every 4 week basis along with his immunotherapy Imfinzi pending insurance approval      He will also continue his intrathecal methotrexate on a monthly basis  Next treatment is due 4/30/21        4/15/21 MRI brain w wo contrast  IMPRESSION:  1  Increase in number of contrast-enhancing lesions consistent with metastases    2  Postsurgical changes  3  Smaller left frontotemporal resection cavity  4  Marked increase in FLAIR hyperintensity in cerebral white matter, alan  consistent with treatment-related changes  5  Increased opacification of left mastoid sinus  4/20/21 Dr Jonna Pimentel follow-up  Discussion with the patient and his significant other regarding the new MRI of the brain which showed multiple brain lesions which is obviously concerning for progression in the brain  The patient was getting intrathecal methotrexate on a weekly basis until recently  There is also progression in the chest on the left side on the recent CT scan of the chest abdomen pelvis  The patient was told that he will start on Taxotere as it  is the next palliative chemotherapy option  The addition of immunotherapy to the palliative chemotherapy was not approved by the insurance due to progression of his disease on Imfinzi  He will be receiving his 1st treatment with single agent palliative chemotherapy with Taxotere on 4/22  Case discussed with Dr Denzel Ragland, regarding new brain lesions on recent brain MRI from 4/15, case will be presented at tumor conference and possibly consider low dose whole brain radiation since he received WBRT previously  Intrathecal methotrexate is on hold due to progression         5/11/21 Dr Jonna Pimentel follow-up         Brain metastases [facility])   9/1/2020 Initial Diagnosis    Brain metastases ([address] )     9/1/2020 Surgery    Left frontal CRANIOTOMY IMAGE-GUIDED FOR TUMOR (Left)  Surgeon: Dr Stephane Arreola, left frontal mass:  -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 , 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         9/23/2020 - 10/6/2020 Radiation    Plan ID Energy Fractions Dose per Fraction (cGy) Dose Correction (cGy) Total Dose Delivered (cGy) Elapsed Days   Whole Brain  6X 10 / 10 300 0 3,000 13        10/12/2020 - 4/13/2021 Chemotherapy    pegfilgrastim-cbqv (UDENYCA) subcutaneous injection 6 mg, 6 mg, Subcutaneous, Once, 2 of 2 cycles  Administration: 6 mg (12/4/2020), 6 mg (12/26/2020)  fosaprepitant (EMEND) 150 mg in sodium chloride 0 9 % 250 mL IVPB, 150 mg, Intravenous, Once, 4 of 4 cycles  Administration: 150 mg (10/12/2020), 150 mg (11/4/2020), 150 mg (12/1/2020), 150 mg (12/22/2020)  CARBOplatin (PARAPLATIN) 723 mg in sodium chloride 0 9 % 250 mL IVPB, 723 mg (561 8 % of original dose 128 7 mg), Intravenous, Once, 4 of 4 cycles  Dose modification: 723 mg (original dose 128 7 mg, Cycle 1, Reason: Other (See Comments), Comment: use creat 9/25/2020)  Administration: 723 mg (10/12/2020), 750 mg (11/4/2020), 750 mg (12/1/2020), 736 5 mg (12/22/2020)  Durvalumab 1,500 mg in sodium chloride 0 9 % 100 mL IVPB, 1,500 mg (100 % of original dose 1,500 mg), Intravenous, Once, 7 of 10 cycles  Dose modification: 1,500 mg (original dose 1,500 mg, Cycle 1)  Administration: 1,500 mg (10/12/2020), 1,500 mg (11/4/2020), 1,500 mg (12/1/2020), 1,500 mg (12/22/2020), 1,500 mg (1/14/2021), 1,500 mg (2/11/2021), 1,500 mg (3/17/2021)  etoposide (TOPOSAR) 200 mg in sodium chloride 0 9 % 500 mL chemo infusion, 198 mg, Intravenous, Once, 4 of 4 cycles  Administration: 200 mg (10/12/2020), 200 mg (10/13/2020), 200 mg (11/4/2020), 200 mg (10/14/2020), 200 mg (11/5/2020), 200 mg (12/1/2020), 200 mg (11/6/2020), 200 mg (12/2/2020), 200 mg (12/22/2020), 200 mg (12/3/2020), 200 mg (12/23/2020), 200 mg (12/24/2020)  methotrexate (PF) 12 mg in sodium chloride (PF) 0 9 % 2 52 mL intrathecal injection, 12 mg, Intrathecal, Once, 2 of 5 cycles  Administration: 12 mg (2/16/2021), 12 mg (2/23/2021)     12/30/2020 - 1/13/2021 Radiation    Treatments:  Course: C2    Plan ID Energy Fractions Dose per Fraction (cGy) Dose Correction (cGy) Total Dose Delivered (cGy) Elapsed Days   L1_S1 Spine 10X 10 / 10 300 0 3,000 14            4/22/2021 -  Chemotherapy    pegfilgrastim-jmdb (FULPHILA) subcutaneous injection 6 mg, 6 mg, Subcutaneous, Once, 1 of 6 cycles  Administration: 6 mg (4/23/2021)  DOCEtaxel (TAXOTERE) 140 mg in sodium chloride 0 9 % 250 mL chemo infusion, 137 2 mg, Intravenous, Once, 1 of 6 cycles  Administration: 140 mg (4/22/2021)     Small cell lung cancer, left lower lobe ([address] )   9/24/2020 Initial Diagnosis    Small cell lung cancer, left lower lobe ([facility])  Extensive Stage     10/12/2020 - 4/13/2021 Chemotherapy    pegfilgrastim-cbqv (UDENYCA) subcutaneous injection 6 mg, 6 mg, Subcutaneous, Once, 2 of 2 cycles  Administration: 6 mg (12/4/2020), 6 mg (12/26/2020)  fosaprepitant (EMEND) 150 mg in sodium chloride 0 9 % 250 mL IVPB, 150 mg, Intravenous, Once, 4 of 4 cycles  Administration: 150 mg (10/12/2020), 150 mg (11/4/2020), 150 mg (12/1/2020), 150 mg (12/22/2020)  CARBOplatin (PARAPLATIN) 723 mg in sodium chloride 0 9 % 250 mL IVPB, 723 mg (561 8 % of original dose 128 7 mg), Intravenous, Once, 4 of 4 cycles  Dose modification: 723 mg (original dose 128 7 mg, Cycle 1, Reason: Other (See Comments), Comment: use creat 9/25/2020)  Administration: 723 mg (10/12/2020), 750 mg (11/4/2020), 750 mg (12/1/2020), 736 5 mg (12/22/2020)  Durvalumab 1,500 mg in sodium chloride 0 9 % 100 mL IVPB, 1,500 mg (100 % of original dose 1,500 mg), Intravenous, Once, 7 of 10 cycles  Dose modification: 1,500 mg (original dose 1,500 mg, Cycle 1)  Administration: 1,500 mg (10/12/2020), 1,500 mg (11/4/2020), 1,500 mg (12/1/2020), 1,500 mg (12/22/2020), 1,500 mg (1/14/2021), 1,500 mg (2/11/2021), 1,500 mg (3/17/2021)  etoposide (TOPOSAR) 200 mg in sodium chloride 0 9 % 500 mL chemo infusion, 198 mg, Intravenous, Once, 4 of 4 cycles  Administration: 200 mg (10/12/2020), 200 mg (10/13/2020), 200 mg (11/4/2020), 200 mg (10/14/2020), 200 mg (11/5/2020), 200 mg (12/1/2020), 200 mg (11/6/2020), 200 mg (12/2/2020), 200 mg (12/22/2020), 200 mg (12/3/2020), 200 mg (12/23/2020), 200 mg (12/24/2020)  methotrexate (PF) 12 mg in sodium chloride (PF) 0 9 % 2 52 mL intrathecal injection, 12 mg, Intrathecal, Once, 2 of 5 cycles  Administration: 12 mg (2/16/2021), 12 mg (2/23/2021)     4/22/2021 -  Chemotherapy    pegfilgrastim-jmdb (FULPHILA) subcutaneous injection 6 mg, 6 mg, Subcutaneous, Once, 1 of 6 cycles  Administration: 6 mg (4/23/2021)  DOCEtaxel (TAXOTERE) 140 mg in sodium chloride 0 9 % 250 mL chemo infusion, 137 2 mg, Intravenous, Once, 1 of 6 cycles  Administration: 140 mg (4/22/2021)         Clinical Trial:       Health Maintenance   Topic Date Due    Pneumococcal Vaccine: Pediatrics (0 to 5 Years) and At-Risk Patients (6 to 59 Years) (1 of 3 - PCV13) Never done    HIV Screening  Never done    COVID-19 Vaccine (1) Never done    BMI: Followup Plan  Never done    Annual Physical  Never done    DTaP,Tdap,and Td Vaccines (1 - Tdap) Never done    Colorectal Cancer Screening  Never done    OT PLAN OF CARE  01/06/2021    Influenza Vaccine (Season Ended) 09/01/2021    Depression Screening PHQ  12/18/2021    BMI: Adult  04/22/2022    HIB Vaccine  Aged Out    Hepatitis B Vaccine  Aged Out    IPV Vaccine  Aged Out    Hepatitis A Vaccine  Aged Out    Meningococcal ACWY Vaccine  Aged Out    HPV Vaccine  Aged Out       Past Medical History:   Diagnosis Date    [facility] [address] [address]     COPD (chronic obstructive pulmonary disease) ([facility] [address] )     Hyperlipidemia     Hypertension     Lung cancer ([facility] [address] )        Past Surgical History:   Procedure Laterality Date    BILATERAL KNEE ARTHROSCOPY Bilateral     CRANIOTOMY Left 9/1/2020    Procedure: Left frontal CRANIOTOMY IMAGE-GUIDED FOR TUMOR;  Surgeon: Naga Cunningham MD;  Location:  MAIN OR;  Service: Neurosurgery    HAND SURGERY Left     IR PORT PLACEMENT  10/8/2020    WISDOM TOOTH EXTRACTION         Family History   Problem Relation Age of Onset    Lymphoma Mother        Social History     Tobacco Use    Smoking status: Current Every Day Smoker     Packs/day: 0 25    Smokeless tobacco: Never Used   Earna Jia Tobacco comment: as pf 5/4/21, smoking 3/4 pack    Substance Use Topics    Alcohol use: Not Currently     Frequency: 4 or more times a week     Binge frequency: Daily or almost daily     Comment: Not at present    Drug use: Never          Current Outpatient Medications:     albuterol (PROVENTIL HFA,VENTOLIN HFA) 90 mcg/act inhaler, Inhale 2 puffs every 6 (six) hours as needed for wheezing, Disp: 1 Inhaler, Rfl: 3    atorvastatin (LIPITOR) 40 mg tablet, Take 1 tablet (40 mg total) by mouth daily with dinner, Disp: 30 tablet, Rfl: 0    cyanocobalamin (VITAMIN B-12) 1000 MCG tablet, Take 1 tablet (1,000 mcg total) by mouth daily, Disp: 90 tablet, Rfl: 3    enoxaparin (LOVENOX) 80 mg/0 8 mL, Inject 0 7 mL (70 mg total) under the skin every 12 (twelve) hours, Disp: 60 Syringe, Rfl: 5    folic acid (FOLVITE) 1 mg tablet, Take 1 tablet (1 mg total) by mouth daily for 29 days, Disp: 29 tablet, Rfl: 0    thiamine (VITAMIN B1) 100 mg tablet, Take 100 mg by mouth daily, Disp: , Rfl:     aspirin (ECOTRIN LOW STRENGTH) 81 mg EC tablet, Take 162 mg by mouth daily, Disp: , Rfl:     dexamethasone (DECADRON) 2 mg tablet, Take 1 tablet (2 mg total) by mouth 2 (two) times a day with meals (Patient not taking: Reported on 5/4/2021), Disp: 28 tablet, Rfl: 0    dexamethasone (DECADRON) 4 mg tablet, Take 2 tablets (8 mg total) by mouth 2 (two) times a day with meals The day before chemo day of chemo and day after chemo every 4 weeks (Patient not taking: Reported on 5/4/2021), Disp: 12 tablet, Rfl: 6    levETIRAcetam (KEPPRA) 500 mg tablet, Take 1 tablet (500 mg total) by mouth every 12 (twelve) hours for 14 doses (Patient not taking: Reported on 1/5/2021), Disp: 14 tablet, Rfl: 0    lisinopril (ZESTRIL) 10 mg tablet, Take 1 tablet (10 mg total) by mouth daily (Patient not taking: Reported on 5/4/2021), Disp: 30 tablet, Rfl: 0    methocarbamol (ROBAXIN) 500 mg tablet, Take 1 tablet (500 mg total) by mouth every 6 (six) hours for 14 days (Patient not taking: Reported on 9/18/2020), Disp: 56 tablet, Rfl: 0    Nicotine 21-14-7 MG/24HR KIT, Apply 21 mg patch daily X 6 wks then 14 mg patch daily for 2 wks then 7 mg patch daily for 2 wks (Patient not taking: Reported on 5/4/2021), Disp: 1 each, Rfl: 0    ondansetron (ZOFRAN) 8 mg tablet, Take 1 tablet (8 mg total) by mouth every 8 (eight) hours as needed for nausea or vomiting (Patient not taking: Reported on 1/5/2021), Disp: 20 tablet, Rfl: 3    pantoprazole (PROTONIX) 40 mg tablet, Take 1 tablet (40 mg total) by mouth daily (Patient not taking: Reported on 3/16/2021), Disp: 30 tablet, Rfl: 11    thiamine 100 MG tablet, Take 1 tablet (100 mg total) by mouth daily, Disp: 30 tablet, Rfl: 0    No Known Allergies     Review of Systems:  Review of Systems   Constitutional: Positive for appetite change (mild loss of appetite )  HENT: Negative  Eyes: Negative  Respiratory: Negative  Cardiovascular: Negative  Gastrointestinal: Negative  Endocrine: Negative  Genitourinary: Negative  Musculoskeletal: Negative  Skin: Negative  Allergic/Immunologic: Negative  Neurological: Negative  Denies headaches or any neurological changes   Hematological: Negative  Psychiatric/Behavioral: Negative  There were no vitals filed for this visit  Pain assessment: 0    PFT: n/a    Imaging:No images are attached to the encounter       Teaching: whole brain RT (pt previously treated with WB)    MST completed     Implantable Devices (Port, pacemaker, pain stimulator): n/a    Hip Replacement: n/a

## 2021-05-11 NOTE — PROGRESS NOTES
Hematology/Oncology Outpatient Follow-up  Lalit Pineda 48 y o  male 1965 [id number]    Date:  5/11/2021        Assessment and Plan:    1  Small cell lung cancer, left lower lobe (HCC)    The patient tolerated the palliative chemotherapy with Taxotere cycle1  as single agent relatively well as expected  We will aim to add Keytruda to the Taxotere either through the health insurance approval or as a compassionate use since his tumor genetic evaluation through [facility] was found to show high tumor mutational burden  We will aim for about 4 cycles before we pursue imaging to see if this current palliative chemotherapy plus-minus immunotherapy his showing some control of the metastatic small cell lung cancer     - CBC and differential; Future  - Comprehensive metabolic panel; Future  - Magnesium; Future    2  Brain metastases ([facility] [address] )   the patient is about to get the 2nd round of low-dose whole-brain radiation after he was found to have progression on recent brain MRI from 04/15/2021     3  Other acute pulmonary embolism without acute cor pulmonale (HCC)    The patient was wondering if he can go back on Xarelto instead of giving himself Lovenox injections since intrathecal methotrexate was stopped recently  I did explain to the patient that he can go back on the Xarelto at the usual dose of 20 mg once a day once he ran out of the Lovenox  HPI:      The patient came today for a follow-up visit accompanied by his fiancee  He was recently started on the   Of palliative treatment with the single agent Taxotere cycle 1 on 04/22 with the support of GCSF  He was also seen by the Radiation Oncology team who is planning to pursue low-dose whole-brain radiation in the near future for about 11 sessions  The patient's most recent blood work from today is still pending      Oncology History Overview Note    Patient has a  history of COPD, hypertension, tobacco abuse, etc   Presented initially to the emergency room with loss of vision in the left eye with numbness on the left face   He was then evaluated with a CT scan of the head on 08/30/2020 which showed large cystic mass in the left cerebral hemisphere with surrounding edema   CT scan of the chest abdomen pelvis with contrast was then done on 08/30/2020 which showed:  IMPRESSION:  There is an approximately 4 x 4 x 3 cm mass in the mid to lower left hilum which extends into the left lower lobe of the lung and the left lingula   Nearby satellite masses and nodules are present   The appearance is highly suggestive of malignant neoplasm     There appears to be some hypodensity within some of the central left pulmonary artery branches   Pulmonary embolism and/or tumor thrombus should be excluded   Dedicated CT angiogram/PE protocol CTA of the chest is recommended for further evaluation  Bilateral adrenal masses, each measuring approximately 2 cm   MRI is recommended for further characterization, if there are no contraindications  The urinary bladder wall appears thickened   This is most pronounced anteriorly   Clinical correlation, laboratory correlation and follow-up is recommended  The prostate is mildly prominent   Clinical and laboratory correlation is recommended    Mild emphysema      MRI of the brain was done on 08/31/2020 which showed multiple intracranial enhancing lesions without dominant cystic lesion in the deep left inferior frontal lobe with the multiple lesions with suspicious primary pulmonary neoplasm      He also had an MRI of the abdomen for further evaluation of the adrenal glands this showed bilateral adenomas      The patient then had craniotomy and resection of the left frontal mass on 09/01/2020 which came back compatible with high-grade neuroendocrine tumor compatible with metastatic small cell lung cancer primary      The patient started radiation treatment to the brain on the 23rd of September 2020      He has very difficult time expressing himself  Larissa Mathis only can answer questions with yes and no      10/30/2020 MRI of the brain showed improvement, with no new lesions       11/23/2020 he had a f/u with [facility] ophthalmology because of  vision loss  OS  Exam revealed Optic nerve atrophy, OS  He was referred to  neurology, MRI orbits ordered, and MD  contacted Dr Loreta Collins, to explain findings and concern for Meningeal/csf involvement       11/27/2020 pt had a Neurology consult @ [facility]  Dr Alf Kruger       12/1/2020 MRI lumbar spine: Drop seed metastases within the distal thecal sac   This is relatively diffuse, with small nodular foci at the upper L2 and, L5 levels  Chronic bilateral L4 spondylolysis, grade 1 spondylolisthesis   Asymmetric right foraminal stenosis, correlate for right L4 radiculitis       12/9/2020 MRI cervical spine: Cervical degenerative disc disease with disc herniations and endplate/uncinate joint hypertrophic change present      Moderate canal stenosis at C3-4 without cord compression or abnormal cord signal    At C4-5 there is severe right and moderate left foraminal narrowing    There is a left-sided disc extrusion at C5-6 with moderate left lateral canal stenosis    Severe right foraminal narrowing at C7-T1 secondary to right foraminal disc osteophyte complex      12/9/2020 MRI thoracic spine:    Mild thoracic degenerative change with small disc herniations and adjacent endplate osteophytes at T6-7, T7-8 and T9-10   No cord compression      12/10/2020 MRI orbits/brain  MRA neck @ [facility]  Impression:  1  Stable size of left frontal resection cavity, with persistent T1 hyperintense hemorrhage/blood products filling the resection cavity  Previously noted prominent irregular enhancement along the superior aspect of the resection cavity has essentially resolved  Nodular enhancement along the medial/posterior aspect of the resection cavity is slightly decreased since the prior study   There remains mild peripheral enhancement anteriorly  2  Decreased size of T1 hyperintense lesions along the ependymal surface of the frontal horn of the right lateral ventricle  Previously noted lesions along the left ventricular ependymal surface appear to have resolved  No new lesions are identified  3  No definite abnormal signal or enhancement in the optic nerve/optic chiasm  No definite intraorbital mass identified  4  No evidence of cervical carotid stenosis  5  Stenosis at the right greater than left vertebral artery origi     He was found to have PE and DVT December 2020  Was started on Xarelto which was later transitioned to Lovenox twice a day in preparation for his intrathecal chemotherapy      Started intrathecal chemotherapy with methotrexate weekly (02/09/2020) x4 followed by monthly intrathecal methotrexate starting 03/30/2021         Oncology History   Brain metastases ([facility] [address] )   9/1/2020 Initial Diagnosis    Brain metastases ([facility] [address] )     9/1/2020 Surgery    Left frontal CRANIOTOMY IMAGE-GUIDED FOR TUMOR (Left)  Surgeon: Dr Hai Jose, left frontal mass:  -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 , 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         9/23/2020 - 10/6/2020 Radiation    Plan ID Energy Fractions Dose per Fraction (cGy) Dose Correction (cGy) Total Dose Delivered (cGy) Elapsed Days   Whole Brain  6X 10 / 10 300 0 3,000 13        10/12/2020 - 4/13/2021 Chemotherapy    pegfilgrastim-cbqv (UDENYCA) subcutaneous injection 6 mg, 6 mg, Subcutaneous, Once, 2 of 2 cycles  Administration: 6 mg (12/4/2020), 6 mg (12/26/2020)  fosaprepitant (EMEND) 150 mg in sodium chloride 0 9 % 250 mL IVPB, 150 mg, Intravenous, Once, 4 of 4 cycles  Administration: 150 mg (10/12/2020), 150 mg (11/4/2020), 150 mg (12/1/2020), 150 mg (12/22/2020)  CARBOplatin (PARAPLATIN) 723 mg in sodium chloride 0 9 % 250 mL IVPB, 723 mg (561 8 % of original dose 128 7 mg), Intravenous, Once, 4 of 4 cycles  Dose modification: 723 mg (original dose 128 7 mg, Cycle 1, Reason: Other (See Comments), Comment: use creat 9/25/2020)  Administration: 723 mg (10/12/2020), 750 mg (11/4/2020), 750 mg (12/1/2020), 736 5 mg (12/22/2020)  Durvalumab 1,500 mg in sodium chloride 0 9 % 100 mL IVPB, 1,500 mg (100 % of original dose 1,500 mg), Intravenous, Once, 7 of 10 cycles  Dose modification: 1,500 mg (original dose 1,500 mg, Cycle 1)  Administration: 1,500 mg (10/12/2020), 1,500 mg (11/4/2020), 1,500 mg (12/1/2020), 1,500 mg (12/22/2020), 1,500 mg (1/14/2021), 1,500 mg (2/11/2021), 1,500 mg (3/17/2021)  etoposide (TOPOSAR) 200 mg in sodium chloride 0 9 % 500 mL chemo infusion, 198 mg, Intravenous, Once, 4 of 4 cycles  Administration: 200 mg (10/12/2020), 200 mg (10/13/2020), 200 mg (11/4/2020), 200 mg (10/14/2020), 200 mg (11/5/2020), 200 mg (12/1/2020), 200 mg (11/6/2020), 200 mg (12/2/2020), 200 mg (12/22/2020), 200 mg (12/3/2020), 200 mg (12/23/2020), 200 mg (12/24/2020)  methotrexate (PF) 12 mg in sodium chloride (PF) 0 9 % 2 52 mL intrathecal injection, 12 mg, Intrathecal, Once, 2 of 5 cycles  Administration: 12 mg (2/16/2021), 12 mg (2/23/2021)     12/30/2020 - 1/13/2021 Radiation    Treatments:  Course: C2    Plan ID Energy Fractions Dose per Fraction (cGy) Dose Correction (cGy) Total Dose Delivered (cGy) Elapsed Days   L1_S1 Spine 10X 10 / 10 300 0 3,000 14            4/22/2021 -  Chemotherapy    pegfilgrastim-jmdb (FULPHILA) subcutaneous injection 6 mg, 6 mg, Subcutaneous, Once, 1 of 6 cycles  Administration: 6 mg (4/23/2021)  DOCEtaxel (TAXOTERE) 140 mg in sodium chloride 0 9 % 250 mL chemo infusion, 137 2 mg, Intravenous, Once, 1 of 6 cycles  Administration: 140 mg (4/22/2021)     Small cell lung cancer, left lower lobe ([facility] [address] )   9/24/2020 Initial Diagnosis    Small cell lung cancer, left lower lobe (HCC)  Extensive Stage     10/12/2020 - 4/13/2021 Chemotherapy    pegfilgrastim-cbqv (UDENYCA) subcutaneous injection 6 mg, 6 mg, Subcutaneous, Once, 2 of 2 cycles  Administration: 6 mg (12/4/2020), 6 mg (12/26/2020)  fosaprepitant (EMEND) 150 mg in sodium chloride 0 9 % 250 mL IVPB, 150 mg, Intravenous, Once, 4 of 4 cycles  Administration: 150 mg (10/12/2020), 150 mg (11/4/2020), 150 mg (12/1/2020), 150 mg (12/22/2020)  CARBOplatin (PARAPLATIN) 723 mg in sodium chloride 0 9 % 250 mL IVPB, 723 mg (561 8 % of original dose 128 7 mg), Intravenous, Once, 4 of 4 cycles  Dose modification: 723 mg (original dose 128 7 mg, Cycle 1, Reason: Other (See Comments), Comment: use creat 9/25/2020)  Administration: 723 mg (10/12/2020), 750 mg (11/4/2020), 750 mg (12/1/2020), 736 5 mg (12/22/2020)  Durvalumab 1,500 mg in sodium chloride 0 9 % 100 mL IVPB, 1,500 mg (100 % of original dose 1,500 mg), Intravenous, Once, 7 of 10 cycles  Dose modification: 1,500 mg (original dose 1,500 mg, Cycle 1)  Administration: 1,500 mg (10/12/2020), 1,500 mg (11/4/2020), 1,500 mg (12/1/2020), 1,500 mg (12/22/2020), 1,500 mg (1/14/2021), 1,500 mg (2/11/2021), 1,500 mg (3/17/2021)  etoposide (TOPOSAR) 200 mg in sodium chloride 0 9 % 500 mL chemo infusion, 198 mg, Intravenous, Once, 4 of 4 cycles  Administration: 200 mg (10/12/2020), 200 mg (10/13/2020), 200 mg (11/4/2020), 200 mg (10/14/2020), 200 mg (11/5/2020), 200 mg (12/1/2020), 200 mg (11/6/2020), 200 mg (12/2/2020), 200 mg (12/22/2020), 200 mg (12/3/2020), 200 mg (12/23/2020), 200 mg (12/24/2020)  methotrexate (PF) 12 mg in sodium chloride (PF) 0 9 % 2 52 mL intrathecal injection, 12 mg, Intrathecal, Once, 2 of 5 cycles  Administration: 12 mg (2/16/2021), 12 mg (2/23/2021)     4/22/2021 -  Chemotherapy    pegfilgrastim-jmdb (FULPHILA) subcutaneous injection 6 mg, 6 mg, Subcutaneous, Once, 1 of 6 cycles  Administration: 6 mg (4/23/2021)  DOCEtaxel (TAXOTERE) 140 mg in sodium chloride 0 9 % 250 mL chemo infusion, 137 2 mg, Intravenous, Once, 1 of 6 cycles  Administration: 140 mg (4/22/2021)         Interval history:    ROS: Review of Systems   Constitutional: Positive for fatigue  Negative for chills and fever  HENT: Negative for ear pain and sore throat  Eyes: Negative for pain and visual disturbance  Respiratory: Positive for cough and shortness of breath  Cardiovascular: Negative for chest pain and palpitations  Gastrointestinal: Negative for abdominal pain and vomiting  Genitourinary: Negative for dysuria and hematuria  Musculoskeletal: Negative for arthralgias and back pain  Skin: Negative for color change and rash  Neurological: Negative for seizures and syncope  All other systems reviewed and are negative        Past Medical History:   Diagnosis Date    [facility] cancer ([facility] )     COPD (chronic obstructive pulmonary disease) ([facility] )     Hyperlipidemia     Hypertension     Lung cancer ([place] [address] )        Past Surgical History:   Procedure Laterality Date    BILATERAL KNEE ARTHROSCOPY Bilateral     CRANIOTOMY Left 9/1/2020    Procedure: Left frontal CRANIOTOMY IMAGE-GUIDED FOR TUMOR;  Surgeon: Sunny Mojica MD;  [facility]: [facility];  Service: Neurosurgery    HAND SURGERY Left     IR PORT PLACEMENT  10/8/2020    WISDOM TOOTH EXTRACTION         Social History     Socioeconomic History    Marital status: Single     Spouse name: None    Number of children: None    Years of education: None    Highest education level: None   Occupational History    None   Social Needs    Financial resource strain: None    Food insecurity     Worry: None     Inability: None    Transportation needs     Medical: None     Non-medical: None   Tobacco Use    Smoking status: Current Every Day Smoker     Packs/day: 0 25    Smokeless tobacco: Never Used    Tobacco comment: as pf 5/4/21, smoking 3/4 pack    Substance and Sexual Activity    Alcohol use: Not Currently     Frequency: 4 or more times a week     Binge frequency: Daily or almost daily     Comment: Not at present    Drug use: Never    Sexual activity: None   Lifestyle  Physical activity     Days per week: None     Minutes per session: None    Stress: None   Relationships    Social connections     Talks on phone: None     Gets together: None     Attends Sikhism service: None     Active member of club or organization: None     Attends meetings of clubs or organizations: None     Relationship status: None    Intimate partner violence     Fear of current or ex partner: None     Emotionally abused: None     Physically abused: None     Forced sexual activity: None   Other Topics Concern    None   Social History Narrative    None       Family History   Problem Relation Age of Onset    Lymphoma Mother        No Known Allergies      Current Outpatient Medications:     albuterol (PROVENTIL HFA,VENTOLIN HFA) 90 mcg/act inhaler, Inhale 2 puffs every 6 (six) hours as needed for wheezing, Disp: 1 Inhaler, Rfl: 3    atorvastatin (LIPITOR) 40 mg tablet, Take 1 tablet (40 mg total) by mouth daily with dinner, Disp: 30 tablet, Rfl: 0    cyanocobalamin (VITAMIN B-12) 1000 MCG tablet, Take 1 tablet (1,000 mcg total) by mouth daily, Disp: 90 tablet, Rfl: 3    dexamethasone (DECADRON) 2 mg tablet, Take 1 tablet (2 mg total) by mouth 2 (two) times a day with meals, Disp: 28 tablet, Rfl: 0    dexamethasone (DECADRON) 4 mg tablet, Take 2 tablets (8 mg total) by mouth 2 (two) times a day with meals The day before chemo day of chemo and day after chemo every 4 weeks, Disp: 12 tablet, Rfl: 6    enoxaparin (LOVENOX) 80 mg/0 8 mL, Inject 0 7 mL (70 mg total) under the skin every 12 (twelve) hours, Disp: 60 Syringe, Rfl: 5    thiamine (VITAMIN B1) 100 mg tablet, Take 100 mg by mouth daily, Disp: , Rfl:     thiamine 100 MG tablet, Take 1 tablet (100 mg total) by mouth daily, Disp: 30 tablet, Rfl: 0    aspirin (ECOTRIN LOW STRENGTH) 81 mg EC tablet, Take 162 mg by mouth daily, Disp: , Rfl:     folic acid (FOLVITE) 1 mg tablet, Take 1 tablet (1 mg total) by mouth daily for 29 days (Patient not taking: Reported on 5/11/2021), Disp: 29 tablet, Rfl: 0    levETIRAcetam (KEPPRA) 500 mg tablet, Take 1 tablet (500 mg total) by mouth every 12 (twelve) hours for 14 doses (Patient not taking: Reported on 1/5/2021), Disp: 14 tablet, Rfl: 0    lisinopril (ZESTRIL) 10 mg tablet, Take 1 tablet (10 mg total) by mouth daily (Patient not taking: Reported on 5/4/2021), Disp: 30 tablet, Rfl: 0    methocarbamol (ROBAXIN) 500 mg tablet, Take 1 tablet (500 mg total) by mouth every 6 (six) hours for 14 days (Patient not taking: Reported on 9/18/2020), Disp: 56 tablet, Rfl: 0    Nicotine 21-14-7 MG/24HR KIT, Apply 21 mg patch daily X 6 wks then 14 mg patch daily for 2 wks then 7 mg patch daily for 2 wks (Patient not taking: Reported on 5/4/2021), Disp: 1 each, Rfl: 0    ondansetron (ZOFRAN) 8 mg tablet, Take 1 tablet (8 mg total) by mouth every 8 (eight) hours as needed for nausea or vomiting (Patient not taking: Reported on 1/5/2021), Disp: 20 tablet, Rfl: 3    pantoprazole (PROTONIX) 40 mg tablet, Take 1 tablet (40 mg total) by mouth daily (Patient not taking: Reported on 3/16/2021), Disp: 30 tablet, Rfl: 11      Physical Exam:  /72 (BP Location: Left arm, Patient Position: Sitting, Cuff Size: Standard)   Pulse 59   Temp (!) 97 1 °F (36 2 °C) (Tympanic)   Resp 16   Ht 5' 7" (1 702 m)   Wt 72 2 kg (159 lb 3 2 oz)   SpO2 95%   BMI 24 93 kg/m²     Physical Exam  Constitutional:       Appearance: He is well-developed  HENT:      Head: Normocephalic and atraumatic  Eyes:      General: No scleral icterus  Right eye: No discharge  Left eye: No discharge  Conjunctiva/sclera: Conjunctivae normal       Pupils: Pupils are equal, round, and reactive to light  Neck:      Musculoskeletal: Normal range of motion and neck supple  Thyroid: No thyromegaly  Trachea: No tracheal deviation  Cardiovascular:      Rate and Rhythm: Normal rate and regular rhythm        Heart sounds: Normal heart sounds  No murmur  No friction rub  Pulmonary:      Effort: Pulmonary effort is normal  No respiratory distress  Breath sounds: Normal breath sounds  No wheezing or rales  Chest:      Chest wall: No tenderness  Abdominal:      General: There is no distension  Palpations: Abdomen is soft  There is no hepatomegaly or splenomegaly  Tenderness: There is no abdominal tenderness  There is no guarding or rebound  Musculoskeletal: Normal range of motion  General: No tenderness or deformity  Lymphadenopathy:      Cervical: No cervical adenopathy  Skin:     General: Skin is warm and dry  Coloration: Skin is not pale  Findings: No erythema or rash  Neurological:      Mental Status: He is alert and oriented to person, place, and time  Cranial Nerves: No cranial nerve deficit  Coordination: Coordination normal       Deep Tendon Reflexes: Reflexes are normal and symmetric  Reflexes normal    Psychiatric:         Behavior: Behavior normal          Thought Content: Thought content normal          Judgment: Judgment normal            Labs:  Lab Results   Component Value Date    WBC 6 77 04/20/2021    HGB 13 3 04/20/2021    HCT 39 9 04/20/2021    MCV 99 (H) 04/20/2021     04/20/2021     Lab Results   Component Value Date    K 4 2 04/20/2021     04/20/2021    CO2 29 04/20/2021    BUN 9 04/20/2021    CREATININE 0 78 04/20/2021    GLUF 106 (H) 04/20/2021    CALCIUM 9 4 04/20/2021    CORRECTEDCA 10 1 11/30/2020    AST 15 04/20/2021    ALT 35 04/20/2021    ALKPHOS 96 04/20/2021    EGFR 102 04/20/2021     No results found for: TSH    Patient voiced understanding and agreement in the above discussion  Aware to contact our office with questions/symptoms in the interim

## 2021-05-12 NOTE — PLAN OF CARE
Problem: Potential for Falls  Goal: Patient will remain free of falls  Description: INTERVENTIONS:  - Assess patient frequently for physical needs  -  Identify cognitive and physical deficits and behaviors that affect risk of falls    -  [place] fall precautions as indicated by assessment   - Educate patient/family on patient safety including physical limitations  - Instruct patient to call for assistance with activity based on assessment  - Modify environment to reduce risk of injury  - Consider OT/PT consult to assist with strengthening/mobility  Outcome: Progressing

## 2021-05-12 NOTE — PROGRESS NOTES
recvd email  From mik that the pt is in need of help for [place]  Called the pt to get some info for the application but got his voicemail left a message for the pt to call  Me back       Emailed mik the [employer] application for the drs signature   & let her know about the clinical worksheet  I aslo asked her to send them back to me so I can fax to [facility] [place] program & spoke to [facility] & she was going to fax the clinical worksheet to the drs office

## 2021-05-13 NOTE — PROGRESS NOTES
Oncology LSW outreached PT's EC, Althea, to provide support and assess for areas of need  Emmanuel Levi reported that PT was "doing well" and was currently undergoing radiation treatment  Emmanuel Sims further reported that she was working with Omid Ewing from the [employer] on financial assistance applications  Sanfordlucie Sims explained that she had filled out the forms to the best of her ability, but was unsure of some on PT's insurance information  LSW explained that this should not be an issue, as [employer] information was on file with [facility]  Emmanuel Levi reported she was understanding of this information and reiterated that PT was doing well, still working, and did not have any areas of concern at this time  Emotional support provided

## 2021-05-14 NOTE — PROGRESS NOTES
Faxed the application & the clinical worksheet  [facility] [employer] [facility]   Will f/u next week to make sure that they recvd it

## 2021-05-18 NOTE — PROGRESS NOTES
simón from [employer] called me & sd that they will refer this to the pt foundation program & tat the pt needs to sign the application he only typed in his info     I emailed th application back to pt for his signature

## 2021-05-28 NOTE — TELEPHONE ENCOUNTER
Laura Gonzalez calling to have application completed  They are missing the patient's signature, the number of people in the household,Income  Will forward to Oncology finance  Dr Sergo Antony RN notified as Magdalene Gonzalez [phone]

## 2021-06-01 NOTE — PROGRESS NOTES
Hematology/Oncology Outpatient Follow-up  Kendall Head 48 y o  male 1965 [id number]    Date:  6/1/2021        Assessment and Plan:    1  Small cell lung cancer, left lower lobe ([address] )    The patient will be continued on the current palliative chemotherapy with Taxotere cycle 3 to be given tomorrow  We were also planning to add Keytruda as immunotherapy, however, that was not approved by the insurance  We will aim for imaging after 4 cycles worth of treatment on the current line of palliative chemotherapy  - CBC and differential; Future  - Comprehensive metabolic panel; Future  - LD,Blood; Future  - Magnesium; Future    2  Brain metastases [facility])    He status 2nd round of low-dose whole-brain radiation after further progression on recent brain MRI from the middle of April  3  Acute deep vein thrombosis (DVT) of other specified vein of right lower extremity ([facility] [address] )    The patient stated that he would like to continue with the Lovenox instead of Xarelto which is appropriate  HPI:   the patient came today for a follow-up visit accompanied by his son  He received so far 2 cycles of Taxotere with the support of GCSF  He completed his 2nd round of whole brain radiation in May  The patient seems to be fatigued  He continues to smoke daily  His most recent blood work  From this morning showed hemoglobin of 14 3 with normal white cells and platelets  The rest of the blood work is still pending    Oncology History   Brain metastases ([facility] [address] )   9/1/2020 Initial Diagnosis    Brain metastases ([facility] [address] )     9/1/2020 Surgery    Left frontal CRANIOTOMY IMAGE-GUIDED FOR TUMOR (Left)  Surgeon: Dr Jammie Warner, left frontal mass:  -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 , 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         9/23/2020 - 10/6/2020 Radiation    Plan ID Energy Fractions Dose per Fraction (cGy) Dose Correction (cGy) Total Dose Delivered (cGy) Elapsed Days   Whole Brain  6X 10 / 10 300 0 3,000 13        10/12/2020 - 4/13/2021 Chemotherapy    pegfilgrastim-cbqv (UDENYCA) subcutaneous injection 6 mg, 6 mg, Subcutaneous, Once, 2 of 2 cycles  Administration: 6 mg (12/4/2020), 6 mg (12/26/2020)  fosaprepitant (EMEND) 150 mg in sodium chloride 0 9 % 250 mL IVPB, 150 mg, Intravenous, Once, 4 of 4 cycles  Administration: 150 mg (10/12/2020), 150 mg (11/4/2020), 150 mg (12/1/2020), 150 mg (12/22/2020)  CARBOplatin (PARAPLATIN) 723 mg in sodium chloride 0 9 % 250 mL IVPB, 723 mg (561 8 % of original dose 128 7 mg), Intravenous, Once, 4 of 4 cycles  Dose modification: 723 mg (original dose 128 7 mg, Cycle 1, Reason: Other (See Comments), Comment: use creat 9/25/2020)  Administration: 723 mg (10/12/2020), 750 mg (11/4/2020), 750 mg (12/1/2020), 736 5 mg (12/22/2020)  Durvalumab 1,500 mg in sodium chloride 0 9 % 100 mL IVPB, 1,500 mg (100 % of original dose 1,500 mg), Intravenous, Once, 7 of 10 cycles  Dose modification: 1,500 mg (original dose 1,500 mg, Cycle 1)  Administration: 1,500 mg (10/12/2020), 1,500 mg (11/4/2020), 1,500 mg (12/1/2020), 1,500 mg (12/22/2020), 1,500 mg (1/14/2021), 1,500 mg (2/11/2021), 1,500 mg (3/17/2021)  etoposide (TOPOSAR) 200 mg in sodium chloride 0 9 % 500 mL chemo infusion, 198 mg, Intravenous, Once, 4 of 4 cycles  Administration: 200 mg (10/12/2020), 200 mg (10/13/2020), 200 mg (11/4/2020), 200 mg (10/14/2020), 200 mg (11/5/2020), 200 mg (12/1/2020), 200 mg (11/6/2020), 200 mg (12/2/2020), 200 mg (12/22/2020), 200 mg (12/3/2020), 200 mg (12/23/2020), 200 mg (12/24/2020)  methotrexate (PF) 12 mg in sodium chloride (PF) 0 9 % 2 52 mL intrathecal injection, 12 mg, Intrathecal, Once, 2 of 5 cycles  Administration: 12 mg (2/16/2021), 12 mg (2/23/2021)     12/30/2020 - 1/13/2021 Radiation    Treatments:  Course: C2    Plan ID Energy Fractions Dose per Fraction (cGy) Dose Correction (cGy) Total Dose Delivered (cGy) Elapsed Days   L1_S1 Spine 10X 10 / 10 300 0 3,000 14            4/22/2021 -  Chemotherapy    pegfilgrastim-jmdb (FULPHILA) subcutaneous injection 6 mg, 6 mg, Subcutaneous, Once, 2 of 9 cycles  Administration: 6 mg (4/23/2021), 6 mg (5/13/2021)  DOCEtaxel (TAXOTERE) 140 mg in sodium chloride 0 9 % 250 mL chemo infusion, 137 2 mg, Intravenous, Once, 2 of 9 cycles  Administration: 140 mg (4/22/2021), 140 mg (5/12/2021)     Small cell lung cancer, left lower lobe ([address] )   9/24/2020 Initial Diagnosis    Small cell lung cancer, left lower lobe ([facility])  Extensive Stage     10/12/2020 - 4/13/2021 Chemotherapy    pegfilgrastim-cbqv (UDENYCA) subcutaneous injection 6 mg, 6 mg, Subcutaneous, Once, 2 of 2 cycles  Administration: 6 mg (12/4/2020), 6 mg (12/26/2020)  fosaprepitant (EMEND) 150 mg in sodium chloride 0 9 % 250 mL IVPB, 150 mg, Intravenous, Once, 4 of 4 cycles  Administration: 150 mg (10/12/2020), 150 mg (11/4/2020), 150 mg (12/1/2020), 150 mg (12/22/2020)  CARBOplatin (PARAPLATIN) 723 mg in sodium chloride 0 9 % 250 mL IVPB, 723 mg (561 8 % of original dose 128 7 mg), Intravenous, Once, 4 of 4 cycles  Dose modification: 723 mg (original dose 128 7 mg, Cycle 1, Reason: Other (See Comments), Comment: use creat 9/25/2020)  Administration: 723 mg (10/12/2020), 750 mg (11/4/2020), 750 mg (12/1/2020), 736 5 mg (12/22/2020)  Durvalumab 1,500 mg in sodium chloride 0 9 % 100 mL IVPB, 1,500 mg (100 % of original dose 1,500 mg), Intravenous, Once, 7 of 10 cycles  Dose modification: 1,500 mg (original dose 1,500 mg, Cycle 1)  Administration: 1,500 mg (10/12/2020), 1,500 mg (11/4/2020), 1,500 mg (12/1/2020), 1,500 mg (12/22/2020), 1,500 mg (1/14/2021), 1,500 mg (2/11/2021), 1,500 mg (3/17/2021)  etoposide (TOPOSAR) 200 mg in sodium chloride 0 9 % 500 mL chemo infusion, 198 mg, Intravenous, Once, 4 of 4 cycles  Administration: 200 mg (10/12/2020), 200 mg (10/13/2020), 200 mg (11/4/2020), 200 mg (10/14/2020), 200 mg (11/5/2020), 200 mg (12/1/2020), 200 mg (11/6/2020), 200 mg (12/2/2020), 200 mg (12/22/2020), 200 mg (12/3/2020), 200 mg (12/23/2020), 200 mg (12/24/2020)  methotrexate (PF) 12 mg in sodium chloride (PF) 0 9 % 2 52 mL intrathecal injection, 12 mg, Intrathecal, Once, 2 of 5 cycles  Administration: 12 mg (2/16/2021), 12 mg (2/23/2021)     4/22/2021 -  Chemotherapy    pegfilgrastim-jmdb (FULPHILA) subcutaneous injection 6 mg, 6 mg, Subcutaneous, Once, 2 of 9 cycles  Administration: 6 mg (4/23/2021), 6 mg (5/13/2021)  DOCEtaxel (TAXOTERE) 140 mg in sodium chloride 0 9 % 250 mL chemo infusion, 137 2 mg, Intravenous, Once, 2 of 9 cycles  Administration: 140 mg (4/22/2021), 140 mg (5/12/2021)         Interval history:    ROS: Review of Systems   Constitutional: Positive for fatigue  Negative for chills and fever  HENT: Negative for ear pain and sore throat  Eyes: Negative for pain and visual disturbance  Respiratory: Negative for cough and shortness of breath  Cardiovascular: Negative for chest pain and palpitations  Gastrointestinal: Negative for abdominal pain and vomiting  Genitourinary: Negative for dysuria and hematuria  Musculoskeletal: Negative for arthralgias and back pain  Skin: Negative for color change and rash  Neurological: Negative for seizures and syncope  All other systems reviewed and are negative        Past Medical History:   Diagnosis Date    Brain cancer ([facility] [address] )     COPD (chronic obstructive pulmonary disease) ([facility] [address] )     Hyperlipidemia     Hypertension     Lung cancer ([facility] [address] )        Past Surgical History:   Procedure Laterality Date    BILATERAL KNEE ARTHROSCOPY Bilateral     CRANIOTOMY Left 9/1/2020    Procedure: Left frontal CRANIOTOMY IMAGE-GUIDED FOR TUMOR;  Surgeon: Julio Cesar Holloway MD;  Location: [facility];  Service: Neurosurgery    HAND SURGERY Left     IR PORT PLACEMENT  10/8/2020    WISDOM TOOTH EXTRACTION         Social History     Socioeconomic History    Marital status: Single     Spouse name: None    Number of children: None    Years of education: None    Highest education level: None   Occupational History    None   Social Needs    Financial resource strain: None    Food insecurity     Worry: None     Inability: None    Transportation needs     Medical: None     Non-medical: None   Tobacco Use    Smoking status: Current Every Day Smoker     Packs/day: 0 25    Smokeless tobacco: Never Used    Tobacco comment: as pf 5/4/21, smoking 3/4 pack    Substance and Sexual Activity    Alcohol use: Not Currently     Frequency: 4 or more times a week     Binge frequency: Daily or almost daily     Comment: Not at present    Drug use: Never    Sexual activity: None   Lifestyle    Physical activity     Days per week: None     Minutes per session: None    Stress: None   Relationships    Social connections     Talks on phone: None     Gets together: None     Attends [employer] service: None     Active member of club or organization: None     Attends meetings of clubs or organizations: None     Relationship status: None    Intimate partner violence     Fear of current or ex partner: None     Emotionally abused: None     Physically abused: None     Forced sexual activity: None   Other Topics Concern    None   Social History Narrative    None       Family History   Problem Relation Age of Onset    Lymphoma Mother        No Known Allergies      Current Outpatient Medications:     albuterol (PROVENTIL HFA,VENTOLIN HFA) 90 mcg/act inhaler, Inhale 2 puffs every 6 (six) hours as needed for wheezing, Disp: 1 Inhaler, Rfl: 3    aspirin (ECOTRIN LOW STRENGTH) 81 mg EC tablet, Take 162 mg by mouth daily, Disp: , Rfl:     atorvastatin (LIPITOR) 40 mg tablet, Take 1 tablet (40 mg total) by mouth daily with dinner, Disp: 30 tablet, Rfl: 0    cyanocobalamin (VITAMIN B-12) 1000 MCG tablet, Take 1 tablet (1,000 mcg total) by mouth daily, Disp: 90 tablet, Rfl: 3    enoxaparin (LOVENOX) 80 mg/0 8 mL, Inject 0 7 mL (70 mg total) under the skin every 12 (twelve) hours, Disp: 60 Syringe, Rfl: 5    dexamethasone (DECADRON) 2 mg tablet, Take 1 tablet (2 mg total) by mouth 2 (two) times a day with meals (Patient not taking: Reported on 6/1/2021), Disp: 28 tablet, Rfl: 0    dexamethasone (DECADRON) 4 mg tablet, Take 2 tablets (8 mg total) by mouth 2 (two) times a day with meals The day before chemo day of chemo and day after chemo every 4 weeks (Patient not taking: Reported on 6/1/2021), Disp: 12 tablet, Rfl: 6    folic acid (FOLVITE) 1 mg tablet, Take 1 tablet (1 mg total) by mouth daily for 29 days (Patient not taking: Reported on 5/11/2021), Disp: 29 tablet, Rfl: 0    levETIRAcetam (KEPPRA) 500 mg tablet, Take 1 tablet (500 mg total) by mouth every 12 (twelve) hours for 14 doses (Patient not taking: Reported on 1/5/2021), Disp: 14 tablet, Rfl: 0    lisinopril (ZESTRIL) 10 mg tablet, Take 1 tablet (10 mg total) by mouth daily (Patient not taking: Reported on 5/4/2021), Disp: 30 tablet, Rfl: 0    methocarbamol (ROBAXIN) 500 mg tablet, Take 1 tablet (500 mg total) by mouth every 6 (six) hours for 14 days (Patient not taking: Reported on 9/18/2020), Disp: 56 tablet, Rfl: 0    Nicotine 21-14-7 MG/24HR KIT, Apply 21 mg patch daily X 6 wks then 14 mg patch daily for 2 wks then 7 mg patch daily for 2 wks (Patient not taking: Reported on 5/4/2021), Disp: 1 each, Rfl: 0    ondansetron (ZOFRAN) 8 mg tablet, Take 1 tablet (8 mg total) by mouth every 8 (eight) hours as needed for nausea or vomiting (Patient not taking: Reported on 1/5/2021), Disp: 20 tablet, Rfl: 3    pantoprazole (PROTONIX) 40 mg tablet, Take 1 tablet (40 mg total) by mouth daily (Patient not taking: Reported on 3/16/2021), Disp: 30 tablet, Rfl: 11    thiamine (VITAMIN B1) 100 mg tablet, Take 100 mg by mouth daily, Disp: , Rfl:     thiamine 100 MG tablet, Take 1 tablet (100 mg total) by mouth daily (Patient not taking: Reported on 6/1/2021), Disp: 30 tablet, Rfl: 0      Physical Exam:  /68 (BP Location: Left arm, Patient Position: Sitting, Cuff Size: Standard)   Pulse 79   Temp (!) 96 5 °F (35 8 °C) (Tympanic)   Resp 16   Ht 5' 7" (1 702 m)   Wt 71 2 kg (157 lb)   SpO2 98%   BMI 24 59 kg/m²     Physical Exam  Constitutional:       Appearance: He is well-developed  He is ill-appearing  HENT:      Head: Normocephalic and atraumatic  Eyes:      General: No scleral icterus  Right eye: No discharge  Left eye: No discharge  Conjunctiva/sclera: Conjunctivae normal       Pupils: Pupils are equal, round, and reactive to light  Neck:      Musculoskeletal: Normal range of motion and neck supple  Thyroid: No thyromegaly  Trachea: No tracheal deviation  Cardiovascular:      Rate and Rhythm: Normal rate and regular rhythm  Heart sounds: Normal heart sounds  No murmur  No friction rub  Pulmonary:      Effort: Pulmonary effort is normal  No respiratory distress  Breath sounds: Wheezing (  Bilaterally) present  No rales  Chest:      Chest wall: No tenderness  Abdominal:      General: There is no distension  Palpations: Abdomen is soft  There is no hepatomegaly or splenomegaly  Tenderness: There is no abdominal tenderness  There is no guarding or rebound  Musculoskeletal: Normal range of motion  General: No tenderness or deformity  Lymphadenopathy:      Cervical: No cervical adenopathy  Skin:     General: Skin is warm and dry  Coloration: Skin is not pale  Findings: No erythema or rash  Neurological:      Mental Status: He is alert and oriented to person, place, and time  Cranial Nerves: No cranial nerve deficit  Coordination: Coordination normal       Deep Tendon Reflexes: Reflexes are normal and symmetric  Psychiatric:         Behavior: Behavior normal          Thought Content:  Thought content normal          Judgment: Judgment normal            Labs:  Lab Results   Component Value Date    WBC 6 21 06/01/2021    HGB 14 3 06/01/2021    HCT 43 7 06/01/2021     (H) 06/01/2021     06/01/2021     Lab Results   Component Value Date    K 4 0 05/11/2021     (H) 05/11/2021    CO2 30 05/11/2021    BUN 12 05/11/2021    CREATININE 0 66 05/11/2021    GLUF 106 (H) 04/20/2021    CALCIUM 8 8 05/11/2021    CORRECTEDCA 10 1 11/30/2020    AST 10 05/11/2021    ALT 24 05/11/2021    ALKPHOS 98 05/11/2021    EGFR 110 05/11/2021     No results found for: TSH    Patient voiced understanding and agreement in the above discussion  Aware to contact our office with questions/symptoms in the interim

## 2021-06-02 NOTE — PROGRESS NOTES
Pt tolerated todays taxotere without adverse reaction  Good blood return before and after treatment  Port flushed and deaccessed per routine  Is aware of upcoming appts and changes made to times   Discharged ambulatory with avs

## 2021-06-02 NOTE — PLAN OF CARE
Problem: Potential for Falls  Goal: Patient will remain free of falls  Description: INTERVENTIONS:  - Assess patient frequently for physical needs  -  Identify cognitive and physical deficits and behaviors that affect risk of falls    -  [place] fall precautions as indicated by assessment   - Educate patient/family on patient safety including physical limitations  - Instruct patient to call for assistance with activity based on assessment  - Modify environment to reduce risk of injury  - Consider OT/PT consult to assist with strengthening/mobility  Outcome: Progressing

## 2021-06-03 NOTE — PROGRESS NOTES
Pt arrived to dept afebrile  Pt is more forgetful today than normal  Pt is also driving self  Encouraged to come partner to facility  Messaged Delilah Yanes RN in regards to confusion

## 2021-06-17 NOTE — PROGRESS NOTES
Called [facility] & spoke to denise calderon sd that this was transfrd to [facility] & she transfrd me there [phone] spoke to Mejia & yumiko sd that they are missing the pts signatures & the income & household size she sd as son as they get that they can process it gets faxed to [phone] faxed it there  Gini Trejo

## 2021-06-22 PROBLEM — H10.32 ACUTE BACTERIAL CONJUNCTIVITIS OF LEFT EYE: Status: ACTIVE | Noted: 2021-01-01

## 2021-06-22 NOTE — PROGRESS NOTES
Hematology/Oncology Outpatient Follow-up  Burke Walsh 48 y o  male [id number]    Date:  6/22/2021        Assessment and Plan:  1  Small cell lung cancer, left lower lobe ([address] )    The patient will be continued on the current palliative chemotherapy with Taxotere cycle 4 to be given  Tomorrow pending the blood work from today  Subsequently, he will need to get a CT scan of the chest abdomen pelvis  for further evaluation of his extensive small cell lung cancer on the current line of palliative chemotherapy  - CT chest abdomen pelvis w contrast; Future  - CBC and differential; Future  - Comprehensive metabolic panel; Future  - Magnesium; Future  - LD,Blood; Future    2  Brain metastases [facility])    He is scheduled for another MRI of the brain on 06/28  He had a 2nd round of low-dose whole-brain radiation just recently after further progression seen on recent MRI from April  3  Acute deep vein thrombosis (DVT) of other specified vein of right lower extremity ([facility] [address] )    He continues to be on the Lovenox injections daily  4  Chemotherapy-induced neutropenia ([address] )    He will be continue on the G-CSF after each chemotherapy  5  Acute bacterial conjunctivitis of left eye    The rotation of the left eye seems to be bacterial in nature with greenish discharge  He will be given  Antibiotic eyedrops to be used as soon as possible  - ofloxacin (OCUFLOX) 0 3 % ophthalmic solution 1 drop        HPI:   the patient came today for a follow-up visit accompanied by his fiancee  He is tolerating the Taxotere relatively well  He did complain today about the significant green discharge from the left eye with irritation and redness of the left eye as well  He had blood work this morning the result is still pending    Oncology History   Brain metastases ([address] )   9/1/2020 Initial Diagnosis    Brain metastases ([facility] [address] )     9/1/2020 Surgery    Left frontal CRANIOTOMY IMAGE-GUIDED FOR TUMOR (Left)  Surgeon: Dr La Nena Trinidad Lou    A & B Brain, left frontal mass:  -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 , 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         9/23/2020 - 10/6/2020 Radiation    Plan ID Energy Fractions Dose per Fraction (cGy) Dose Correction (cGy) Total Dose Delivered (cGy) Elapsed Days   Whole Brain  6X 10 / 10 300 0 3,000 13        10/12/2020 - 4/13/2021 Chemotherapy    pegfilgrastim-cbqv (UDENYCA) subcutaneous injection 6 mg, 6 mg, Subcutaneous, Once, 2 of 2 cycles  Administration: 6 mg (12/4/2020), 6 mg (12/26/2020)  fosaprepitant (EMEND) 150 mg in sodium chloride 0 9 % 250 mL IVPB, 150 mg, Intravenous, Once, 4 of 4 cycles  Administration: 150 mg (10/12/2020), 150 mg (11/4/2020), 150 mg (12/1/2020), 150 mg (12/22/2020)  CARBOplatin (PARAPLATIN) 723 mg in sodium chloride 0 9 % 250 mL IVPB, 723 mg (561 8 % of original dose 128 7 mg), Intravenous, Once, 4 of 4 cycles  Dose modification: 723 mg (original dose 128 7 mg, Cycle 1, Reason: Other (See Comments), Comment: use creat 9/25/2020)  Administration: 723 mg (10/12/2020), 750 mg (11/4/2020), 750 mg (12/1/2020), 736 5 mg (12/22/2020)  Durvalumab 1,500 mg in sodium chloride 0 9 % 100 mL IVPB, 1,500 mg (100 % of original dose 1,500 mg), Intravenous, Once, 7 of 10 cycles  Dose modification: 1,500 mg (original dose 1,500 mg, Cycle 1)  Administration: 1,500 mg (10/12/2020), 1,500 mg (11/4/2020), 1,500 mg (12/1/2020), 1,500 mg (12/22/2020), 1,500 mg (1/14/2021), 1,500 mg (2/11/2021), 1,500 mg (3/17/2021)  etoposide (TOPOSAR) 200 mg in sodium chloride 0 9 % 500 mL chemo infusion, 198 mg, Intravenous, Once, 4 of 4 cycles  Administration: 200 mg (10/12/2020), 200 mg (10/13/2020), 200 mg (11/4/2020), 200 mg (10/14/2020), 200 mg (11/5/2020), 200 mg (12/1/2020), 200 mg (11/6/2020), 200 mg (12/2/2020), 200 mg (12/22/2020), 200 mg (12/3/2020), 200 mg (12/23/2020), 200 mg (12/24/2020)  methotrexate (PF) 12 mg in sodium chloride (PF) 0 9 % 2 52 mL intrathecal injection, 12 mg, Intrathecal, Once, 2 of 5 cycles  Administration: 12 mg (2/16/2021), 12 mg (2/23/2021)     12/30/2020 - 1/13/2021 Radiation    Treatments:  Course: C2    Plan ID Energy Fractions Dose per Fraction (cGy) Dose Correction (cGy) Total Dose Delivered (cGy) Elapsed Days   L1_S1 Spine 10X 10 / 10 300 0 3,000 14            4/22/2021 -  Chemotherapy    pegfilgrastim-jmdb (FULPHILA), 6 mg, Subcutaneous, Once, 3 of 9 cycles  Administration: 6 mg (4/23/2021), 6 mg (5/13/2021), 6 mg (6/3/2021)  DOCEtaxel (TAXOTERE) chemo infusion, 137 2 mg, Intravenous, Once, 3 of 9 cycles  Administration: 140 mg (4/22/2021), 140 mg (5/12/2021), 140 mg (6/2/2021)     Small cell lung cancer, left lower lobe ([facility] [address] )   9/24/2020 Initial Diagnosis    Small cell lung cancer, left lower lobe (H[facility])  Extensive Stage     10/12/2020 - 4/13/2021 Chemotherapy    pegfilgrastim-cbqv (UDENYCA) subcutaneous injection 6 mg, 6 mg, Subcutaneous, Once, 2 of 2 cycles  Administration: 6 mg (12/4/2020), 6 mg (12/26/2020)  fosaprepitant (EMEND) 150 mg in sodium chloride 0 9 % 250 mL IVPB, 150 mg, Intravenous, Once, 4 of 4 cycles  Administration: 150 mg (10/12/2020), 150 mg (11/4/2020), 150 mg (12/1/2020), 150 mg (12/22/2020)  CARBOplatin (PARAPLATIN) 723 mg in sodium chloride 0 9 % 250 mL IVPB, 723 mg (561 8 % of original dose 128 7 mg), Intravenous, Once, 4 of 4 cycles  Dose modification: 723 mg (original dose 128 7 mg, Cycle 1, Reason: Other (See Comments), Comment: use creat 9/25/2020)  Administration: 723 mg (10/12/2020), 750 mg (11/4/2020), 750 mg (12/1/2020), 736 5 mg (12/22/2020)  Durvalumab 1,500 mg in sodium chloride 0 9 % 100 mL IVPB, 1,500 mg (100 % of original dose 1,500 mg), Intravenous, Once, 7 of 10 cycles  Dose modification: 1,500 mg (original dose 1,500 mg, Cycle 1)  Administration: 1,500 mg (10/12/2020), 1,500 mg (11/4/2020), 1,500 mg (12/1/2020), 1,500 mg (12/22/2020), 1,500 mg (1/14/2021), 1,500 mg (2/11/2021), 1,500 mg (3/17/2021)  etoposide (TOPOSAR) 200 mg in sodium chloride 0 9 % 500 mL chemo infusion, 198 mg, Intravenous, Once, 4 of 4 cycles  Administration: 200 mg (10/12/2020), 200 mg (10/13/2020), 200 mg (11/4/2020), 200 mg (10/14/2020), 200 mg (11/5/2020), 200 mg (12/1/2020), 200 mg (11/6/2020), 200 mg (12/2/2020), 200 mg (12/22/2020), 200 mg (12/3/2020), 200 mg (12/23/2020), 200 mg (12/24/2020)  methotrexate (PF) 12 mg in sodium chloride (PF) 0 9 % 2 52 mL intrathecal injection, 12 mg, Intrathecal, Once, 2 of 5 cycles  Administration: 12 mg (2/16/2021), 12 mg (2/23/2021)     4/22/2021 -  Chemotherapy    pegfilgrastim-jmdb (FULPHILA), 6 mg, Subcutaneous, Once, 3 of 9 cycles  Administration: 6 mg (4/23/2021), 6 mg (5/13/2021), 6 mg (6/3/2021)  DOCEtaxel (TAXOTERE) chemo infusion, 137 2 mg, Intravenous, Once, 3 of 9 cycles  Administration: 140 mg (4/22/2021), 140 mg (5/12/2021), 140 mg (6/2/2021)         Interval history:    ROS: Review of Systems   Constitutional: Positive for fatigue  Negative for chills and fever  HENT: Negative for ear pain and sore throat  Eyes: Positive for discharge (  Left eye discharge), redness ( left eye) and visual disturbance ( left eye)  Negative for pain  Respiratory: Positive for cough and shortness of breath  Cardiovascular: Negative for chest pain and palpitations  Gastrointestinal: Negative for abdominal pain and vomiting  Genitourinary: Negative for dysuria and hematuria  Musculoskeletal: Negative for arthralgias and back pain  Skin: Negative for color change and rash  Neurological: Negative for seizures and syncope  All other systems reviewed and are negative        Past Medical History:   Diagnosis Date    [facility]     COPD (chronic obstructive pulmonary disease) ([facility] )     Hyperlipidemia     Hypertension     Lung cancer ([address] )        Past Surgical History:   Procedure Laterality Date    BILATERAL KNEE ARTHROSCOPY Bilateral     CRANIOTOMY Left 9/1/2020    Procedure: Left frontal CRANIOTOMY IMAGE-GUIDED FOR TUMOR;  Surgeon: Wolf Knapp MD;  Location: [facility];  Service: Neurosurgery    HAND SURGERY Left     IR PORT PLACEMENT  10/8/2020    WISDOM TOOTH EXTRACTION         Social History     Socioeconomic History    Marital status: Single     Spouse name: None    Number of children: None    Years of education: None    Highest education level: None   Occupational History    None   Tobacco Use    Smoking status: Current Every Day Smoker     Packs/day: 0 25    Smokeless tobacco: Never Used    Tobacco comment: as pf 5/4/21, smoking 3/4 pack    Vaping Use    Vaping Use: Never used   Substance and Sexual Activity    Alcohol use: Not Currently     Comment: Not at present    Drug use: Never    Sexual activity: None   Other Topics Concern    None   Social History Narrative    None     Social Determinants of Health     Financial Resource Strain:     Difficulty of Paying Living Expenses:    Food Insecurity:     Worried About Running Out of Food in the Last Year:     Ran Out of Food in the Last Year:    Transportation Needs:     Lack of Transportation (Medical):      Lack of Transportation (Non-Medical):    Physical Activity:     Days of Exercise per Week:     Minutes of Exercise per Session:    Stress:     Feeling of Stress :    Social Connections:     Frequency of Communication with Friends and Family:     Frequency of Social Gatherings with Friends and Family:     Attends Shinto Services:     Active Member of Clubs or Organizations:     Attends Club or Organization Meetings:     Marital Status:    Intimate Partner Violence:     Fear of Current or Ex-Partner:     Emotionally Abused:     Physically Abused:     Sexually Abused:        Family History   Problem Relation Age of Onset    Lymphoma Mother        No Known Allergies      Current Outpatient Medications:     cyanocobalamin (VITAMIN B-12) 1000 MCG tablet, Take 1 tablet (1,000 mcg total) by mouth daily, Disp: 90 tablet, Rfl: 3    dexamethasone (DECADRON) 4 mg tablet, Take 2 tablets (8 mg total) by mouth 2 (two) times a day with meals The day before chemo day of chemo and day after chemo every 4 weeks, Disp: 12 tablet, Rfl: 6    enoxaparin (LOVENOX) 80 mg/0 8 mL, Inject 0 7 mL (70 mg total) under the skin every 12 (twelve) hours, Disp: 60 Syringe, Rfl: 5    thiamine (VITAMIN B1) 100 mg tablet, Take 100 mg by mouth daily, Disp: , Rfl:     albuterol (PROVENTIL HFA,VENTOLIN HFA) 90 mcg/act inhaler, Inhale 2 puffs every 6 (six) hours as needed for wheezing (Patient not taking: Reported on 6/22/2021), Disp: 1 Inhaler, Rfl: 3    aspirin (ECOTRIN LOW STRENGTH) 81 mg EC tablet, Take 162 mg by mouth daily (Patient not taking: Reported on 6/22/2021), Disp: , Rfl:     atorvastatin (LIPITOR) 40 mg tablet, Take 1 tablet (40 mg total) by mouth daily with dinner, Disp: 30 tablet, Rfl: 0    dexamethasone (DECADRON) 2 mg tablet, Take 1 tablet (2 mg total) by mouth 2 (two) times a day with meals (Patient not taking: Reported on 6/22/2021), Disp: 28 tablet, Rfl: 0    folic acid (FOLVITE) 1 mg tablet, Take 1 tablet (1 mg total) by mouth daily for 29 days (Patient not taking: Reported on 5/11/2021), Disp: 29 tablet, Rfl: 0    levETIRAcetam (KEPPRA) 500 mg tablet, Take 1 tablet (500 mg total) by mouth every 12 (twelve) hours for 14 doses (Patient not taking: Reported on 1/5/2021), Disp: 14 tablet, Rfl: 0    lisinopril (ZESTRIL) 10 mg tablet, Take 1 tablet (10 mg total) by mouth daily (Patient not taking: Reported on 5/4/2021), Disp: 30 tablet, Rfl: 0    methocarbamol (ROBAXIN) 500 mg tablet, Take 1 tablet (500 mg total) by mouth every 6 (six) hours for 14 days (Patient not taking: Reported on 9/18/2020), Disp: 56 tablet, Rfl: 0    Nicotine 21-14-7 MG/24HR KIT, Apply 21 mg patch daily X 6 wks then 14 mg patch daily for 2 wks then 7 mg patch daily for 2 wks (Patient not taking: Reported on 5/4/2021), Disp: 1 each, Rfl: 0    ondansetron (ZOFRAN) 8 mg tablet, Take 1 tablet (8 mg total) by mouth every 8 (eight) hours as needed for nausea or vomiting (Patient not taking: Reported on 1/5/2021), Disp: 20 tablet, Rfl: 3    pantoprazole (PROTONIX) 40 mg tablet, Take 1 tablet (40 mg total) by mouth daily (Patient not taking: Reported on 3/16/2021), Disp: 30 tablet, Rfl: 11    thiamine 100 MG tablet, Take 1 tablet (100 mg total) by mouth daily (Patient not taking: Reported on 6/1/2021), Disp: 30 tablet, Rfl: 0    Current Facility-Administered Medications:     ofloxacin (OCUFLOX) 0 3 % ophthalmic solution 1 drop, 1 drop, Left Eye, 4x Daily, Tara Bridges MD      Physical Exam:  /88 (BP Location: Left arm, Patient Position: Sitting, Cuff Size: Adult)   Pulse 59   Temp (!) 96 6 °F (35 9 °C) (Tympanic)   Resp 16   Ht 5' 7" (1 702 m)   Wt 71 1 kg (156 lb 12 8 oz)   SpO2 100%   BMI 24 56 kg/m²     Physical Exam  Constitutional:       Appearance: He is well-developed  HENT:      Head: Normocephalic and atraumatic  Eyes:      General: No scleral icterus  Right eye: No discharge  Left eye: Discharge ( with redness) present  Neck:      Thyroid: No thyromegaly  Trachea: No tracheal deviation  Cardiovascular:      Rate and Rhythm: Normal rate and regular rhythm  Heart sounds: Normal heart sounds  No murmur heard  No friction rub  Pulmonary:      Effort: Pulmonary effort is normal  No respiratory distress  Breath sounds: Wheezing ( bilaterally) and rhonchi present  No rales  Chest:      Chest wall: No tenderness  Abdominal:      General: There is no distension  Palpations: Abdomen is soft  There is no hepatomegaly or splenomegaly  Tenderness: There is no abdominal tenderness  There is no guarding or rebound  Musculoskeletal:         General: No tenderness or deformity  Normal range of motion  Cervical back: Normal range of motion and neck supple  Lymphadenopathy:      Cervical: No cervical adenopathy  Skin:     General: Skin is warm and dry  Coloration: Skin is not pale  Findings: No erythema or rash  Neurological:      Mental Status: He is alert and oriented to person, place, and time  Cranial Nerves: No cranial nerve deficit  Coordination: Coordination normal       Deep Tendon Reflexes: Reflexes are normal and symmetric  Psychiatric:         Behavior: Behavior normal          Thought Content: Thought content normal          Judgment: Judgment normal            Labs:  Lab Results   Component Value Date    WBC 6 21 06/01/2021    HGB 14 3 06/01/2021    HCT 43 7 06/01/2021     (H) 06/01/2021     06/01/2021     Lab Results   Component Value Date    K 4 4 06/01/2021     06/01/2021    CO2 30 06/01/2021    BUN 13 06/01/2021    CREATININE 0 66 06/01/2021    GLUF 95 06/01/2021    CALCIUM 9 5 06/01/2021    CORRECTEDCA 10 1 11/30/2020    AST 13 06/01/2021    ALT 29 06/01/2021    ALKPHOS 95 06/01/2021    EGFR 110 06/01/2021     No results found for: TSH    Patient voiced understanding and agreement in the above discussion  Aware to contact our office with questions/symptoms in the interim

## 2021-06-23 NOTE — PROGRESS NOTES
Pt tolerated todays taxotere infusion well  Pt is much more confused as to recent events today  Brain metastasis eval to be done in June (pt with known diagnosis)  Left eye droop noted  Port flushed and deaccessed per routine   Discharged ambulatory with avs

## 2021-06-24 NOTE — PROGRESS NOTES
Patient tolerated fulphila injection to left arm without issue   Discharged in stable condition, declined AVS

## 2021-06-28 NOTE — PROGRESS NOTES
Called en to ck the application status & spoke to Ravinder Lindsey she sd that they don't have the compleetd application the one they have doesn't ave a signature on all the pages  once they get it they will process the application can email it to [email or web address]  Umair Muñiz & yumiko sd that they took the application to dr johnson office & that [facility] sent it to me    found the application & I emailed it to Ravinder Lindsey & she emailed me back that she recvd the application

## 2021-06-28 NOTE — PROGRESS NOTES
recvd email from Ravinder Lindsey at [employer] that pt was approved for free [facility]  Adkins Jennifer effective 06/28/21 she didn't give an expiration date or an id# emailed er back for that info     Ayanna Wagoner & got her voicemail   Left her a message to call me back

## 2021-06-30 NOTE — PROGRESS NOTES
Manjit Galeana from [facility] called to set up the shipment of keytruda  She sd they can ship 07/12/21 for delivery 07/13/21 foe the treatment on 07/14/21 verfd the add in [place] for shipment  Belkys Cunningham emailed the team

## 2021-07-07 NOTE — PROGRESS NOTES
Follow-up - Radiation Oncology   Kavitha Frost 1965 48 y o  male [id number]      History of Present Illness   Cancer Staging  No matching staging information was found for the patient  Kavitha Frost 1965 is a 48 y o  male with a h/o extensive stage small cell lung carcinoma  He completed whole brain radiation 10/6/20, radiation to L1-S1 spine 1/13/21, and most recently completed repeat whole brain radiation 5/26/21  He presents today for follow-up      6/1/21 Dr Yves Montalvo- The patient will be continued on the current palliative chemotherapy with Taxotere cycle 3 to be given tomorrow  Doy Jenkins were also planning to add Keytruda as immunotherapy, however, that was not approved by the insurance  We will aim for imaging after 4 cycles worth of treatment on the current line of palliative chemotherapy       6/22/21 Dr Yves Montalvo- The patient will be continued on the current palliative chemotherapy with Taxotere cycle 4 to be given tomorrow pending the blood work from today   Subsequently, he will need to get a CT scan of the chest abdomen pelvis for further evaluation of his extensive small cell lung cancer on the current line of palliative chemotherapy      6/29/21 MRI brain- Stable postoperative appearance of a resection cavity within the left frontal lobe  -Previously seen enhancing lesions either immediately adjacent to or along the ependymal surface of the lateral ventricles, 3rd ventricle and 4th ventricle are similar in size to the most recent examination   -Progression of confluently white matter hyperintensity on T2 and FLAIR imaging suggesting sequela of whole brain radiation therapy   -No new enhancing parenchymal lesions identified      7/9/21 CT C/A/P  7/13/21 Alf Welch NP      Interval History:   Waiting for insurance to approve smoking cessation therapeutics          Historical Information   Oncology History   Brain metastases ([facility] [address] )   9/1/2020 Initial Diagnosis    Brain metastases ([facility] [address] ) 9/1/2020 Surgery    Left frontal CRANIOTOMY IMAGE-GUIDED FOR TUMOR (Left)  Surgeon: Dr Kat Robert, left frontal mass:  -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 , 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         9/23/2020 - 10/6/2020 Radiation    Plan ID Energy Fractions Dose per Fraction (cGy) Dose Correction (cGy) Total Dose Delivered (cGy) Elapsed Days   Whole Brain  6X 10 / 10 300 0 3,000 13        10/12/2020 - 4/13/2021 Chemotherapy    pegfilgrastim-cbqv (UDENYCA) subcutaneous injection 6 mg, 6 mg, Subcutaneous, Once, 2 of 2 cycles  Administration: 6 mg (12/4/2020), 6 mg (12/26/2020)  fosaprepitant (EMEND) 150 mg in sodium chloride 0 9 % 250 mL IVPB, 150 mg, Intravenous, Once, 4 of 4 cycles  Administration: 150 mg (10/12/2020), 150 mg (11/4/2020), 150 mg (12/1/2020), 150 mg (12/22/2020)  CARBOplatin (PARAPLATIN) 723 mg in sodium chloride 0 9 % 250 mL IVPB, 723 mg (561 8 % of original dose 128 7 mg), Intravenous, Once, 4 of 4 cycles  Dose modification: 723 mg (original dose 128 7 mg, Cycle 1, Reason: Other (See Comments), Comment: use creat 9/25/2020)  Administration: 723 mg (10/12/2020), 750 mg (11/4/2020), 750 mg (12/1/2020), 736 5 mg (12/22/2020)  Durvalumab 1,500 mg in sodium chloride 0 9 % 100 mL IVPB, 1,500 mg (100 % of original dose 1,500 mg), Intravenous, Once, 7 of 10 cycles  Dose modification: 1,500 mg (original dose 1,500 mg, Cycle 1)  Administration: 1,500 mg (10/12/2020), 1,500 mg (11/4/2020), 1,500 mg (12/1/2020), 1,500 mg (12/22/2020), 1,500 mg (1/14/2021), 1,500 mg (2/11/2021), 1,500 mg (3/17/2021)  etoposide (TOPOSAR) 200 mg in sodium chloride 0 9 % 500 mL chemo infusion, 198 mg, Intravenous, Once, 4 of 4 cycles  Administration: 200 mg (10/12/2020), 200 mg (10/13/2020), 200 mg (11/4/2020), 200 mg (10/14/2020), 200 mg (11/5/2020), 200 mg (12/1/2020), 200 mg (11/6/2020), 200 mg (12/2/2020), 200 mg (12/22/2020), 200 mg (12/3/2020), 200 mg (12/23/2020), 200 mg (12/24/2020)  methotrexate (PF) 12 mg in sodium chloride (PF) 0 9 % 2 52 mL intrathecal injection, 12 mg, Intrathecal, Once, 2 of 5 cycles  Administration: 12 mg (2/16/2021), 12 mg (2/23/2021)     12/30/2020 - 1/13/2021 Radiation    Treatments:  Course: C2    Plan ID Energy Fractions Dose per Fraction (cGy) Dose Correction (cGy) Total Dose Delivered (cGy) Elapsed Days   L1_S1 Spine 10X 10 / 10 300 0 3,000 14            4/22/2021 -  Chemotherapy    pegfilgrastim-jmdb (FULPHILA), 6 mg, Subcutaneous, Once, 4 of 9 cycles  Administration: 6 mg (4/23/2021), 6 mg (5/13/2021), 6 mg (6/3/2021), 6 mg (6/24/2021)  DOCEtaxel (TAXOTERE) chemo infusion, 137 2 mg, Intravenous, Once, 4 of 9 cycles  Administration: 140 mg (4/22/2021), 140 mg (5/12/2021), 140 mg (6/2/2021), 140 mg (6/23/2021)     5/12/2021 - 5/26/2021 Radiation    Plan ID Energy Fractions Dose per Fraction (cGy) Dose Correction (cGy) Total Dose Delivered (cGy) Elapsed Days   WB ReTx EZ #:1 6X 9 / 9 180 0 1,620 14        Small cell lung cancer, left lower lobe ([address] )   9/24/2020 Initial Diagnosis    Small cell lung cancer, left lower lobe ([facility])  Extensive Stage     10/12/2020 - 4/13/2021 Chemotherapy    pegfilgrastim-cbqv (UDENYCA) subcutaneous injection 6 mg, 6 mg, Subcutaneous, Once, 2 of 2 cycles  Administration: 6 mg (12/4/2020), 6 mg (12/26/2020)  fosaprepitant (EMEND) 150 mg in sodium chloride 0 9 % 250 mL IVPB, 150 mg, Intravenous, Once, 4 of 4 cycles  Administration: 150 mg (10/12/2020), 150 mg (11/4/2020), 150 mg (12/1/2020), 150 mg (12/22/2020)  CARBOplatin (PARAPLATIN) 723 mg in sodium chloride 0 9 % 250 mL IVPB, 723 mg (561 8 % of original dose 128 7 mg), Intravenous, Once, 4 of 4 cycles  Dose modification: 723 mg (original dose 128 7 mg, Cycle 1, Reason: Other (See Comments), Comment: use creat 9/25/2020)  Administration: 723 mg (10/12/2020), 750 mg (11/4/2020), 750 mg (12/1/2020), 736 5 mg (12/22/2020)  Durvalumab 1,500 mg in sodium chloride 0 9 % 100 mL IVPB, 1,500 mg (100 % of original dose 1,500 mg), Intravenous, Once, 7 of 10 cycles  Dose modification: 1,500 mg (original dose 1,500 mg, Cycle 1)  Administration: 1,500 mg (10/12/2020), 1,500 mg (11/4/2020), 1,500 mg (12/1/2020), 1,500 mg (12/22/2020), 1,500 mg (1/14/2021), 1,500 mg (2/11/2021), 1,500 mg (3/17/2021)  etoposide (TOPOSAR) 200 mg in sodium chloride 0 9 % 500 mL chemo infusion, 198 mg, Intravenous, Once, 4 of 4 cycles  Administration: 200 mg (10/12/2020), 200 mg (10/13/2020), 200 mg (11/4/2020), 200 mg (10/14/2020), 200 mg (11/5/2020), 200 mg (12/1/2020), 200 mg (11/6/2020), 200 mg (12/2/2020), 200 mg (12/22/2020), 200 mg (12/3/2020), 200 mg (12/23/2020), 200 mg (12/24/2020)  methotrexate (PF) 12 mg in sodium chloride (PF) 0 9 % 2 52 mL intrathecal injection, 12 mg, Intrathecal, Once, 2 of 5 cycles  Administration: 12 mg (2/16/2021), 12 mg (2/23/2021)     4/22/2021 -  Chemotherapy    pegfilgrastim-jose cdb (FULPHILA), 6 mg, Subcutaneous, Once, 4 of 9 cycles  Administration: 6 mg (4/23/2021), 6 mg (5/13/2021), 6 mg (6/3/2021), 6 mg (6/24/2021)  DOCEtaxel (TAXOTERE) chemo infusion, 137 2 mg, Intravenous, Once, 4 of 9 cycles  Administration: 140 mg (4/22/2021), 140 mg (5/12/2021), 140 mg (6/2/2021), 140 mg (6/23/2021)         Past Medical History:   Diagnosis Date    Brain cancer ([address] )     [facility]     Hyperlipidemia     Hypertension     Lung [facility]      Past Surgical History:   Procedure Laterality Date    BILATERAL KNEE ARTHROSCOPY Bilateral     CRANIOTOMY Left 9/1/2020    Procedure: Left frontal CRANIOTOMY IMAGE-GUIDED FOR TUMOR;  Surgeon: Mariel Catherine MD;  Location: [facility];  Service: Neurosurgery    HAND SURGERY Left     IR PORT PLACEMENT  10/8/2020    WISDOM TOOTH EXTRACTION         Social History   Social History     Substance and Sexual Activity   Alcohol Use Not Currently    Comment: Not at present     Social History     Substance and Sexual Activity   Drug Use Never     Social History     Tobacco Use   Smoking Status Current Every Day Smoker    Packs/day: 0 75   Smokeless Tobacco Never Used   Tobacco Comment    as pf 5/4/21, smoking 3/4 pack          Meds/Allergies     Current Outpatient Medications:     albuterol (PROVENTIL HFA,VENTOLIN HFA) 90 mcg/act inhaler, Inhale 2 puffs every 6 (six) hours as needed for wheezing, Disp: 1 Inhaler, Rfl: 3    atorvastatin (LIPITOR) 40 mg tablet, Take 1 tablet (40 mg total) by mouth daily with dinner, Disp: 30 tablet, Rfl: 0    cyanocobalamin (VITAMIN B-12) 1000 MCG tablet, Take 1 tablet (1,000 mcg total) by mouth daily, Disp: 90 tablet, Rfl: 3    dexamethasone (DECADRON) 4 mg tablet, Take 2 tablets (8 mg total) by mouth 2 (two) times a day with meals The day before chemo day of chemo and day after chemo every 4 weeks, Disp: 12 tablet, Rfl: 6    enoxaparin (LOVENOX) 80 mg/0 8 mL, Inject 0 7 mL (70 mg total) under the skin every 12 (twelve) hours, Disp: 60 Syringe, Rfl: 5    folic acid (FOLVITE) 1 mg tablet, Take 1 tablet (1 mg total) by mouth daily for 29 days, Disp: 29 tablet, Rfl: 0    ofloxacin (OCUFLOX) 0 3 % ophthalmic solution, Administer 1 drop into the left eye 4 (four) times a day, Disp: 5 mL, Rfl: 1    thiamine (VITAMIN B1) 100 mg tablet, Take 100 mg by mouth daily, Disp: , Rfl:     aspirin (ECOTRIN LOW STRENGTH) 81 mg EC tablet, Take 162 mg by mouth daily (Patient not taking: Reported on 6/22/2021), Disp: , Rfl:     dexamethasone (DECADRON) 2 mg tablet, Take 1 tablet (2 mg total) by mouth 2 (two) times a day with meals (Patient not taking: Reported on 6/22/2021), Disp: 28 tablet, Rfl: 0    levETIRAcetam (KEPPRA) 500 mg tablet, Take 1 tablet (500 mg total) by mouth every 12 (twelve) hours for 14 doses (Patient not taking: Reported on 1/5/2021), Disp: 14 tablet, Rfl: 0    lisinopril (ZESTRIL) 10 mg tablet, Take 1 tablet (10 mg total) by mouth daily (Patient not taking: Reported on 5/4/2021), Disp: 30 tablet, Rfl: 0    methocarbamol (ROBAXIN) 500 mg tablet, Take 1 tablet (500 mg total) by mouth every 6 (six) hours for 14 days (Patient not taking: Reported on 9/18/2020), Disp: 56 tablet, Rfl: 0    Nicotine 21-14-7 MG/24HR KIT, Apply 21 mg patch daily X 6 wks then 14 mg patch daily for 2 wks then 7 mg patch daily for 2 wks (Patient not taking: Reported on 5/4/2021), Disp: 1 each, Rfl: 0    ondansetron (ZOFRAN) 8 mg tablet, Take 1 tablet (8 mg total) by mouth every 8 (eight) hours as needed for nausea or vomiting (Patient not taking: Reported on 1/5/2021), Disp: 20 tablet, Rfl: 3    pantoprazole (PROTONIX) 40 mg tablet, Take 1 tablet (40 mg total) by mouth daily (Patient not taking: Reported on 3/16/2021), Disp: 30 tablet, Rfl: 11    thiamine 100 MG tablet, Take 1 tablet (100 mg total) by mouth daily (Patient not taking: Reported on 6/1/2021), Disp: 30 tablet, Rfl: 0  No Known Allergies      Review of Systems   Constitutional: Negative for activity change, appetite change, fever and unexpected weight change  HENT: Negative for congestion, sneezing, sore throat, trouble swallowing and voice change  Eyes: Negative for photophobia and visual disturbance  Respiratory: Negative for cough and shortness of breath  Cardiovascular: Negative for chest pain and leg swelling  Gastrointestinal: Negative for abdominal pain, nausea and vomiting  Endocrine: Negative for polyphagia and polyuria  Genitourinary: Negative for dysuria, flank pain and hematuria  Musculoskeletal: Negative for back pain, gait problem and neck pain  Skin: Negative  Allergic/Immunologic: Negative  Neurological: Negative for dizziness, speech difficulty, weakness, numbness and headaches  Hematological: Negative for adenopathy  Psychiatric/Behavioral: Negative              OBJECTIVE:   /77   Pulse 76   Temp 97 6 °F (36 4 °C)   Ht 5' 7" (1 702 m)   Wt 70 5 kg (155 lb 6 8 oz) SpO2 96%   BMI 24 34 kg/m²   Pain Assessment:  0  Karnofsky: 80 - Normal activity with effort; some signs or symptoms of disease    Physical Exam  Constitutional:       Appearance: Normal appearance  He is normal weight  HENT:      Nose: No congestion  Mouth/Throat:      Pharynx: Oropharynx is clear  Eyes:      Extraocular Movements: Extraocular movements intact  Conjunctiva/sclera: Conjunctivae normal       Pupils: Pupils are equal, round, and reactive to light  Cardiovascular:      Rate and Rhythm: Normal rate and regular rhythm  Heart sounds: Normal heart sounds  Pulmonary:      Effort: Pulmonary effort is normal       Breath sounds: Normal breath sounds  Abdominal:      Palpations: Abdomen is soft  There is no mass  Tenderness: There is no abdominal tenderness  Musculoskeletal:         General: No swelling or tenderness  Normal range of motion  Cervical back: Normal range of motion and neck supple  Lymphadenopathy:      Cervical: No cervical adenopathy  Skin:     Coloration: Skin is not jaundiced  Findings: No lesion or rash  Neurological:      General: No focal deficit present  Mental Status: He is alert and oriented to person, place, and time  Mental status is at baseline  Cranial Nerves: No cranial nerve deficit  Sensory: No sensory deficit  Motor: No weakness        Coordination: Coordination normal       Gait: Gait normal    Psychiatric:         Mood and Affect: Mood normal          Behavior: Behavior normal               RESULTS    Lab Results:   Recent Results (from the past 672 hour(s))   CBC and differential    Collection Time: 06/22/21  9:50 AM   Result Value Ref Range    WBC 6 22 4 31 - 10 16 Thousand/uL    RBC 3 89 3 88 - 5 62 Million/uL    Hemoglobin 13 2 12 0 - 17 0 g/dL    Hematocrit 39 3 36 5 - 49 3 %     (H) 82 - 98 fL    MCH 33 9 26 8 - 34 3 pg    MCHC 33 6 31 4 - 37 4 g/dL    RDW 15 1 11 6 - 15 1 %    MPV 10 8 8 9 - 12 7 fL    Platelets 462 869 - 861 Thousands/uL    nRBC 0 /100 WBCs    Neutrophils Relative 73 43 - 75 %    Immat GRANS % 1 0 - 2 %    Lymphocytes Relative 11 (L) 14 - 44 %    Monocytes Relative 14 (H) 4 - 12 %    Eosinophils Relative 0 0 - 6 %    Basophils Relative 1 0 - 1 %    Neutrophils Absolute 4 59 1 85 - 7 62 Thousands/µL    Immature Grans Absolute 0 05 0 00 - 0 20 Thousand/uL    Lymphocytes Absolute 0 69 0 60 - 4 47 Thousands/µL    Monocytes Absolute 0 84 0 17 - 1 22 Thousand/µL    Eosinophils Absolute 0 01 0 00 - 0 61 Thousand/µL    Basophils Absolute 0 04 0 00 - 0 10 Thousands/µL   Comprehensive metabolic panel    Collection Time: 06/22/21  9:50 AM   Result Value Ref Range    Sodium 141 136 - 145 mmol/L    Potassium 4 3 3 5 - 5 3 mmol/L    Chloride 109 (H) 100 - 108 mmol/L    CO2 30 21 - 32 mmol/L    ANION GAP 2 (L) 4 - 13 mmol/L    BUN 9 5 - 25 mg/dL    Creatinine 0 72 0 60 - 1 30 mg/dL    Glucose, Fasting 92 65 - 99 mg/dL    Calcium 9 2 8 3 - 10 1 mg/dL    AST 10 5 - 45 U/L    ALT 24 12 - 78 U/L    Alkaline Phosphatase 96 46 - 116 U/L    Total Protein 6 4 6 4 - 8 2 g/dL    Albumin 3 5 3 5 - 5 0 g/dL    Total Bilirubin 0 40 0 20 - 1 00 mg/dL    eGFR 106 ml/min/1 73sq m   LD,Blood    Collection Time: 06/22/21  9:50 AM   Result Value Ref Range     81 - 234 U/L   Magnesium    Collection Time: 06/22/21  9:50 AM   Result Value Ref Range    Magnesium 2 2 1 6 - 2 6 mg/dL       Imaging Studies:MRI brain w wo contrast    Result Date: 7/6/2021  Narrative: MRI BRAIN WITH AND WITHOUT CONTRAST INDICATION: C34 32: Malignant neoplasm of lower lobe, left bronchus or lung  COMPARISON:  4/14/2021 is the most recent of numerous prior examinations  TECHNIQUE: Sagittal T1, axial T2, axial FLAIR, axial T1, axial Gradient, axial diffusion  Sagittal, axial T1 postcontrast   Axial 3D T1 gradient post contrast with coronal recons     IV Contrast:  8 mL of Gadobutrol injection (SINGLE-DOSE)  IMAGE QUALITY:   Diagnostic  FINDINGS: BRAIN PARENCHYMA:  Once again identified is a previous left frontotemporal craniotomy with an underlying resection cavity  Resection cavity is filled with heterogeneous signal and demonstrates a peripheral rim of hemosiderin deposition  Once again identified is peripheral nodular hyperintense signal on T1-weighted imaging prior to administration of contrast within the resection cavity  Postcontrast imaging demonstrates minimal faint peripheral enhancement  There is a focus of hemosiderin deposition and faint increased T2 signal, identified within the right paramedian rostrum of the corpus callosum  There is minimal linear enhancement identified along the anterior margin of the right lateral ventricle on image 16 at this site, corresponding to a previously treated abnormality  Continued enhancement either immediately adjacent to the surface of the lateral ventricles or along the ependymal surface of the lateral ventricles, in particular the frontal horns, right greater than left, occipital horns and temporal horns, left greater than right  Similar findings along the anterior aspect of the ependymal surface of the 4th ventricle and within the inferior aspect of the 3rd ventricle  Extensive confluent white matter hyperintensity on T2 and FLAIR imaging throughout the cerebral hemispheres, presumably sequela of prior whole brain radiation therapy  This has progressed since the prior study without significant cerebral volume loss  Similar findings noted within the brainstem  Unremarkable cerebellar hemispheres  No new enhancing lesions are identified within the parenchyma or within the ventricular system  VENTRICLES:  Stable ventricular system with no obstructive hydrocephalus  Ex vacuo dilatation of the left lateral ventricle  See above description of the ependymal lesions enhancing within the ventricular system   SELLA AND PITUITARY GLAND:  Normal  ORBITS:  Normal  PARANASAL SINUSES:  Normal  VASCULATURE:  Evaluation of the major intracranial vasculature demonstrates appropriate flow voids  CALVARIUM AND SKULL BASE:  Normal  EXTRACRANIAL SOFT TISSUES:  Normal      Impression: Stable postoperative appearance of a resection cavity within the left frontal lobe  Previously seen enhancing lesions either immediately adjacent to or along the ependymal surface of the lateral ventricles, 3rd ventricle and 4th ventricle are similar in size to the most recent examination  Progression of confluently white matter hyperintensity on T2 and FLAIR imaging suggesting sequela of whole brain radiation therapy  No new enhancing parenchymal lesions identified  Workstation performed: [id number]           Assessment/Plan:  No orders of the defined types were placed in this encounter  Kenyatta Nunez is a 48y o  year old male with extensive stage small cell carcinoma lung with brain and spinal canal metastasis latter causing cauda equina syndrome  He had whole-brain radiation and spinal irradiation with good response  Additional whole-brain radiation therapy with much lower dose to treat new disease the ventricular system was completed a month ago  Repeat MRI of the brain is reviewed  today and there is no change in the size and number of lesions before and after treatment  Again no parenchymal metastases are visible  Overall he is doing well and continues with palliative chemotherapy  We discuss repeating his MRI of the brain in 3 months as well as the spine  He has a CT of the chest, abdomen and pelvis coming up soon  Will see him as needed or in 3 months after the MRI study        Vera Thorpe MD  7/7/2021,9:37 AM    Portions of the record may have been created with voice recognition software   Occasional wrong word or "sound a like" substitutions may have occurred due to the inherent limitations of voice recognition software   Read the chart carefully and recognize, using context, where substitutions have occurred

## 2021-07-07 NOTE — PROGRESS NOTES
Rene Mantilla 1965 is a 48 y o  male with a h/o extensive stage small cell lung carcinoma  He completed whole brain radiation 10/6/20, radiation to L1-S1 spine 1/13/21, and most recently completed repeat whole brain radiation 5/26/21  He presents today for follow-up  6/1/21 Dr Gene Pinto- The patient will be continued on the current palliative chemotherapy with Taxotere cycle 3 to be given tomorrow  We were also planning to add Keytruda as immunotherapy, however, that was not approved by the insurance  We will aim for imaging after 4 cycles worth of treatment on the current line of palliative chemotherapy  6/22/21 Dr Gene Pinto- The patient will be continued on the current palliative chemotherapy with Taxotere cycle 4 to be given tomorrow pending the blood work from today  Subsequently, he will need to get a CT scan of the chest abdomen pelvis for further evaluation of his extensive small cell lung cancer on the current line of palliative chemotherapy  6/29/21 MRI brain- Stable postoperative appearance of a resection cavity within the left frontal lobe  -Previously seen enhancing lesions either immediately adjacent to or along the ependymal surface of the lateral ventricles, 3rd ventricle and 4th ventricle are similar in size to the most recent examination   -Progression of confluently white matter hyperintensity on T2 and FLAIR imaging suggesting sequela of whole brain radiation therapy   -No new enhancing parenchymal lesions identified      7/9/21 CT C/A/P  7/13/21 Alejandro Coto NP        Follow up visit       Oncology History   Brain metastases ([address] )   9/1/2020 Initial Diagnosis    Brain metastases ([address] )     9/1/2020 Surgery    Left frontal CRANIOTOMY IMAGE-GUIDED FOR TUMOR (Left)  Surgeon: Dr Caryle Patience, left frontal mass:  -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 , 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         9/23/2020 - 10/6/2020 Radiation    Plan ID Energy Fractions Dose per Fraction (cGy) Dose Correction (cGy) Total Dose Delivered (cGy) Elapsed Days   Whole Brain  6X 10 / 10 300 0 3,000 13        10/12/2020 - 4/13/2021 Chemotherapy    pegfilgrastim-cbqv (UDENYCA) subcutaneous injection 6 mg, 6 mg, Subcutaneous, Once, 2 of 2 cycles  Administration: 6 mg (12/4/2020), 6 mg (12/26/2020)  fosaprepitant (EMEND) 150 mg in sodium chloride 0 9 % 250 mL IVPB, 150 mg, Intravenous, Once, 4 of 4 cycles  Administration: 150 mg (10/12/2020), 150 mg (11/4/2020), 150 mg (12/1/2020), 150 mg (12/22/2020)  CARBOplatin (PARAPLATIN) 723 mg in sodium chloride 0 9 % 250 mL IVPB, 723 mg (561 8 % of original dose 128 7 mg), Intravenous, Once, 4 of 4 cycles  Dose modification: 723 mg (original dose 128 7 mg, Cycle 1, Reason: Other (See Comments), Comment: use creat 9/25/2020)  Administration: 723 mg (10/12/2020), 750 mg (11/4/2020), 750 mg (12/1/2020), 736 5 mg (12/22/2020)  Durvalumab 1,500 mg in sodium chloride 0 9 % 100 mL IVPB, 1,500 mg (100 % of original dose 1,500 mg), Intravenous, Once, 7 of 10 cycles  Dose modification: 1,500 mg (original dose 1,500 mg, Cycle 1)  Administration: 1,500 mg (10/12/2020), 1,500 mg (11/4/2020), 1,500 mg (12/1/2020), 1,500 mg (12/22/2020), 1,500 mg (1/14/2021), 1,500 mg (2/11/2021), 1,500 mg (3/17/2021)  etoposide (TOPOSAR) 200 mg in sodium chloride 0 9 % 500 mL chemo infusion, 198 mg, Intravenous, Once, 4 of 4 cycles  Administration: 200 mg (10/12/2020), 200 mg (10/13/2020), 200 mg (11/4/2020), 200 mg (10/14/2020), 200 mg (11/5/2020), 200 mg (12/1/2020), 200 mg (11/6/2020), 200 mg (12/2/2020), 200 mg (12/22/2020), 200 mg (12/3/2020), 200 mg (12/23/2020), 200 mg (12/24/2020)  methotrexate (PF) 12 mg in sodium chloride (PF) 0 9 % 2 52 mL intrathecal injection, 12 mg, Intrathecal, Once, 2 of 5 cycles  Administration: 12 mg (2/16/2021), 12 mg (2/23/2021)     12/30/2020 - 1/13/2021 Radiation    Treatments:  Course: C2    Plan ID Energy Fractions Dose per Fraction (cGy) Dose Correction (cGy) Total Dose Delivered (cGy) Elapsed Days   L1_S1 Spine 10X 10 / 10 300 0 3,000 14            4/22/2021 -  Chemotherapy    pegfilgrastim-jmdb (FULPHILA), 6 mg, Subcutaneous, Once, 4 of 9 cycles  Administration: 6 mg (4/23/2021), 6 mg (5/13/2021), 6 mg (6/3/2021), 6 mg (6/24/2021)  DOCEtaxel (TAXOTERE) chemo infusion, 137 2 mg, Intravenous, Once, 4 of 9 cycles  Administration: 140 mg (4/22/2021), 140 mg (5/12/2021), 140 mg (6/2/2021), 140 mg (6/23/2021)     5/12/2021 - 5/26/2021 Radiation    Plan ID Energy Fractions Dose per Fraction (cGy) Dose Correction (cGy) Total Dose Delivered (cGy) Elapsed Days   WB ReTx EZ #:1 6X 9 / 9 180 0 1,620 14        Small cell lung cancer, left lower lobe ([address] )   9/24/2020 Initial Diagnosis    Small cell lung cancer, left lower lobe ([facility])  Extensive Stage     10/12/2020 - 4/13/2021 Chemotherapy    pegfilgrastim-cbqv (UDENYCA) subcutaneous injection 6 mg, 6 mg, Subcutaneous, Once, 2 of 2 cycles  Administration: 6 mg (12/4/2020), 6 mg (12/26/2020)  fosaprepitant (EMEND) 150 mg in sodium chloride 0 9 % 250 mL IVPB, 150 mg, Intravenous, Once, 4 of 4 cycles  Administration: 150 mg (10/12/2020), 150 mg (11/4/2020), 150 mg (12/1/2020), 150 mg (12/22/2020)  CARBOplatin (PARAPLATIN) 723 mg in sodium chloride 0 9 % 250 mL IVPB, 723 mg (561 8 % of original dose 128 7 mg), Intravenous, Once, 4 of 4 cycles  Dose modification: 723 mg (original dose 128 7 mg, Cycle 1, Reason: Other (See Comments), Comment: use creat 9/25/2020)  Administration: 723 mg (10/12/2020), 750 mg (11/4/2020), 750 mg (12/1/2020), 736 5 mg (12/22/2020)  Durvalumab 1,500 mg in sodium chloride 0 9 % 100 mL IVPB, 1,500 mg (100 % of original dose 1,500 mg), Intravenous, Once, 7 of 10 cycles  Dose modification: 1,500 mg (original dose 1,500 mg, Cycle 1)  Administration: 1,500 mg (10/12/2020), 1,500 mg (11/4/2020), 1,500 mg (12/1/2020), 1,500 mg (12/22/2020), 1,500 mg (1/14/2021), 1,500 mg (2/11/2021), 1,500 mg (3/17/2021)  etoposide (TOPOSAR) 200 mg in sodium chloride 0 9 % 500 mL chemo infusion, 198 mg, Intravenous, Once, 4 of 4 cycles  Administration: 200 mg (10/12/2020), 200 mg (10/13/2020), 200 mg (11/4/2020), 200 mg (10/14/2020), 200 mg (11/5/2020), 200 mg (12/1/2020), 200 mg (11/6/2020), 200 mg (12/2/2020), 200 mg (12/22/2020), 200 mg (12/3/2020), 200 mg (12/23/2020), 200 mg (12/24/2020)  methotrexate (PF) 12 mg in sodium chloride (PF) 0 9 % 2 52 mL intrathecal injection, 12 mg, Intrathecal, Once, 2 of 5 cycles  Administration: 12 mg (2/16/2021), 12 mg (2/23/2021)     4/22/2021 -  Chemotherapy    pegfilgrastim-jmdb (FULPHILA), 6 mg, Subcutaneous, Once, 4 of 9 cycles  Administration: 6 mg (4/23/2021), 6 mg (5/13/2021), 6 mg (6/3/2021), 6 mg (6/24/2021)  DOCEtaxel (TAXOTERE) chemo infusion, 137 2 mg, Intravenous, Once, 4 of 9 cycles  Administration: 140 mg (4/22/2021), 140 mg (5/12/2021), 140 mg (6/2/2021), 140 mg (6/23/2021)         Clinical Trial: no      Health Maintenance   Topic Date Due    Hepatitis C Screening  Never done    Pneumococcal Vaccine: Pediatrics (0 to 5 Years) and At-Risk Patients (6 to 59 Years) (1 of 4 - PCV13) Never done    COVID-19 Vaccine (1) Never done    HIV Screening  Never done    Annual Physical  Never done    DTaP,Tdap,and Td Vaccines (1 - Tdap) Never done    Colorectal Cancer Screening  Never done    OT PLAN OF CARE  01/06/2021    Influenza Vaccine (1) 09/01/2021    BMI: Adult  06/23/2022    Depression Screening PHQ  07/07/2022    HIB Vaccine  Aged Out    Hepatitis B Vaccine  Aged Out    IPV Vaccine  Aged Out    Hepatitis A Vaccine  Aged Out    Meningococcal ACWY Vaccine  Aged Out    HPV Vaccine  Aged Out       Patient Active Problem List   Diagnosis    Brain mass    Hypertension    Tobacco abuse    [facility]com[facility] dependence [facility]    Hyperlipidemia    Emphysema lung [facility]    Cerebral edema (HCC)    Lung mass    Brain metastases ([facility] )    Small cell lung cancer, left lower lobe ([facility] )    Encounter for central line care    Counseling regarding advanced care planning and goals of care    Acute bacterial conjunctivitis of left eye     Past Medical History:   Diagnosis Date    Brain cancer ([facility] )     COPD (chronic obstructive pulmonary disease) ([facility] )     Hyperlipidemia     Hypertension     Lung cancer ([place] [address] )      Past Surgical History:   Procedure Laterality Date    BILATERAL KNEE ARTHROSCOPY Bilateral     CRANIOTOMY Left 9/1/2020    Procedure: Left frontal CRANIOTOMY IMAGE-GUIDED FOR TUMOR;  Surgeon: Neela Mena MD;  Location: [facility];  Service: Neurosurgery    HAND SURGERY Left     IR PORT PLACEMENT  10/8/2020    WISDOM TOOTH EXTRACTION       Family History   Problem Relation Age of Onset    Lymphoma Mother      Social History     Socioeconomic History    Marital status: Single     Spouse name: Not on file    Number of children: Not on file    Years of education: Not on file    Highest education level: Not on file   Occupational History    Not on file   Tobacco Use    Smoking status: Current Every Day Smoker     Packs/day: 0 75    Smokeless tobacco: Never Used    Tobacco comment: as pf 5/4/21, smoking 3/4 pack    Vaping Use    Vaping Use: Never used   Substance and Sexual Activity    Alcohol use: Not Currently     Comment: Not at present    Drug use: Never    Sexual activity: Not on file   Other Topics Concern    Not on file   Social History Narrative    Not on file     Social Determinants of Health     Financial Resource Strain:     Difficulty of Paying Living Expenses:    Food Insecurity:     Worried About Running Out of Food in the Last Year:     Cat of Food in the Last Year:    Transportation Needs:     Lack of Transportation (Medical):      Lack of Transportation (Non-Medical):    Physical Activity:     Days of Exercise per Week:     Minutes of Exercise per Session:    Stress:  Feeling of Stress :    Social Connections:     Frequency of Communication with Friends and Family:     Frequency of Social Gatherings with Friends and Family:     Attends [employer] Services:     Active Member of Clubs or Organizations:     Attends Club or Organization Meetings:     Marital Status:    Intimate Partner Violence:     Fear of Current or Ex-Partner:     Emotionally Abused:     Physically Abused:     Sexually Abused:        Current Outpatient Medications:     albuterol (PROVENTIL HFA,VENTOLIN HFA) 90 mcg/act inhaler, Inhale 2 puffs every 6 (six) hours as needed for wheezing, Disp: 1 Inhaler, Rfl: 3    atorvastatin (LIPITOR) 40 mg tablet, Take 1 tablet (40 mg total) by mouth daily with dinner, Disp: 30 tablet, Rfl: 0    cyanocobalamin (VITAMIN B-12) 1000 MCG tablet, Take 1 tablet (1,000 mcg total) by mouth daily, Disp: 90 tablet, Rfl: 3    dexamethasone (DECADRON) 4 mg tablet, Take 2 tablets (8 mg total) by mouth 2 (two) times a day with meals The day before chemo day of chemo and day after chemo every 4 weeks, Disp: 12 tablet, Rfl: 6    enoxaparin (LOVENOX) 80 mg/0 8 mL, Inject 0 7 mL (70 mg total) under the skin every 12 (twelve) hours, Disp: 60 Syringe, Rfl: 5    folic acid (FOLVITE) 1 mg tablet, Take 1 tablet (1 mg total) by mouth daily for 29 days, Disp: 29 tablet, Rfl: 0    ofloxacin (OCUFLOX) 0 3 % ophthalmic solution, Administer 1 drop into the left eye 4 (four) times a day, Disp: 5 mL, Rfl: 1    thiamine (VITAMIN B1) 100 mg tablet, Take 100 mg by mouth daily, Disp: , Rfl:     aspirin (ECOTRIN LOW STRENGTH) 81 mg EC tablet, Take 162 mg by mouth daily (Patient not taking: Reported on 6/22/2021), Disp: , Rfl:     dexamethasone (DECADRON) 2 mg tablet, Take 1 tablet (2 mg total) by mouth 2 (two) times a day with meals (Patient not taking: Reported on 6/22/2021), Disp: 28 tablet, Rfl: 0    levETIRAcetam (KEPPRA) 500 mg tablet, Take 1 tablet (500 mg total) by mouth every 12 (twelve) hours for 14 doses (Patient not taking: Reported on 1/5/2021), Disp: 14 tablet, Rfl: 0    lisinopril (ZESTRIL) 10 mg tablet, Take 1 tablet (10 mg total) by mouth daily (Patient not taking: Reported on 5/4/2021), Disp: 30 tablet, Rfl: 0    methocarbamol (ROBAXIN) 500 mg tablet, Take 1 tablet (500 mg total) by mouth every 6 (six) hours for 14 days (Patient not taking: Reported on 9/18/2020), Disp: 56 tablet, Rfl: 0    Nicotine 21-14-7 MG/24HR KIT, Apply 21 mg patch daily X 6 wks then 14 mg patch daily for 2 wks then 7 mg patch daily for 2 wks (Patient not taking: Reported on 5/4/2021), Disp: 1 each, Rfl: 0    ondansetron (ZOFRAN) 8 mg tablet, Take 1 tablet (8 mg total) by mouth every 8 (eight) hours as needed for nausea or vomiting (Patient not taking: Reported on 1/5/2021), Disp: 20 tablet, Rfl: 3    pantoprazole (PROTONIX) 40 mg tablet, Take 1 tablet (40 mg total) by mouth daily (Patient not taking: Reported on 3/16/2021), Disp: 30 tablet, Rfl: 11    thiamine 100 MG tablet, Take 1 tablet (100 mg total) by mouth daily (Patient not taking: Reported on 6/1/2021), Disp: 30 tablet, Rfl: 0  No Known Allergies    Review of Systems:  Review of Systems   Constitutional: Negative  HENT: Negative  Eyes:        Can not see out of left eye  Respiratory: Positive for cough  Cardiovascular: Negative  Gastrointestinal: Negative  Endocrine: Negative  Genitourinary: Negative  Musculoskeletal: Negative  Skin: Negative  Allergic/Immunologic: Negative  Neurological: Negative  Hematological: Negative  Psychiatric/Behavioral: Negative  Vitals:    07/07/21 0901   BP: 111/77   Pulse: 76   Temp: 97 6 °F (36 4 °C)   SpO2: 96%   Weight: 70 5 kg (155 lb 6 8 oz)   Height: 5' 7" (1 702 m)                   Imaging:MRI brain w wo contrast    Result Date: 7/6/2021  Narrative: MRI BRAIN WITH AND WITHOUT CONTRAST INDICATION: C34 32: Malignant neoplasm of lower lobe, left bronchus or lung  COMPARISON:  4/14/2021 is the most recent of numerous prior examinations  TECHNIQUE: Sagittal T1, axial T2, axial FLAIR, axial T1, axial Gradient, axial diffusion  Sagittal, axial T1 postcontrast   Axial 3D T1 gradient post contrast with coronal recons     IV Contrast:  8 mL of Gadobutrol injection (SINGLE-DOSE)  IMAGE QUALITY:   Diagnostic  FINDINGS: BRAIN PARENCHYMA:  Once again identified is a previous left frontotemporal craniotomy with an underlying resection cavity  Resection cavity is filled with heterogeneous signal and demonstrates a peripheral rim of hemosiderin deposition  Once again identified is peripheral nodular hyperintense signal on T1-weighted imaging prior to administration of contrast within the resection cavity  Postcontrast imaging demonstrates minimal faint peripheral enhancement  There is a focus of hemosiderin deposition and faint increased T2 signal, identified within the right paramedian rostrum of the corpus callosum  There is minimal linear enhancement identified along the anterior margin of the right lateral ventricle on image 16 at this site, corresponding to a previously treated abnormality  Continued enhancement either immediately adjacent to the surface of the lateral ventricles or along the ependymal surface of the lateral ventricles, in particular the frontal horns, right greater than left, occipital horns and temporal horns, left greater than right  Similar findings along the anterior aspect of the ependymal surface of the 4th ventricle and within the inferior aspect of the 3rd ventricle  Extensive confluent white matter hyperintensity on T2 and FLAIR imaging throughout the cerebral hemispheres, presumably sequela of prior whole brain radiation therapy  This has progressed since the prior study without significant cerebral volume loss  Similar findings noted within the brainstem  Unremarkable cerebellar hemispheres   No new enhancing lesions are identified within the parenchyma or within the ventricular system  VENTRICLES:  Stable ventricular system with no obstructive hydrocephalus  Ex vacuo dilatation of the left lateral ventricle  See above description of the ependymal lesions enhancing within the ventricular system  SELLA AND PITUITARY GLAND:  Normal  ORBITS:  Normal  PARANASAL SINUSES:  Normal  VASCULATURE:  Evaluation of the major intracranial vasculature demonstrates appropriate flow voids  CALVARIUM AND SKULL BASE:  Normal  EXTRACRANIAL SOFT TISSUES:  Normal      Impression: Stable postoperative appearance of a resection cavity within the left frontal lobe  Previously seen enhancing lesions either immediately adjacent to or along the ependymal surface of the lateral ventricles, 3rd ventricle and 4th ventricle are similar in size to the most recent examination  Progression of confluently white matter hyperintensity on T2 and FLAIR imaging suggesting sequela of whole brain radiation therapy  No new enhancing parenchymal lesions identified   Workstation performed: [id number]

## 2021-07-13 PROBLEM — D70.1 CHEMOTHERAPY-INDUCED NEUTROPENIA (HCC): Status: ACTIVE | Noted: 2021-01-01

## 2021-07-13 PROBLEM — T45.1X5A CHEMOTHERAPY-INDUCED NEUTROPENIA (HCC): Status: ACTIVE | Noted: 2021-01-01

## 2021-07-13 NOTE — PROGRESS NOTES
Hematology/Oncology Outpatient Follow-up  Kiya Adams 48 y o  male 1965 [id number]    Date:  7/13/2021      Assessment and Plan:  1  Small cell lung cancer, left lower lobe ([address] )  Patient has completed 4 cycles of palliative chemotherapy with single agent Taxotere  His most recent CT scan seems to show mild progression  Patient case and imaging studies were reviewed with Dr Kim Ulloa extensively this morning  Plan is to change treatment to 3rd line with Lurbinectedin (Zepzelca) 3 2mg/m2 every 3 weeks  Patient was due for treatment tomorrow we will defer his treatment by an additional week to allow for insurance authorization of the new medication  He has also been approved for immunotherapy with [facility] through compassionate use;  which he will receive along with the Lurbinectedin on an every three-week basis  He will be back for follow-up again in about 3-4 weeks with repeat labs before cycle 2  The patient and his significant other were educated about the new medication Lurbinectedin ([place]) including mechanism of action, administration and possible side effects including but not limited to: cytopenias especially neutropenia, arthralgias/myalgias, elevated liver functions, electrolyte abnormalities, diarrhea, nausea and vomiting, taste changes, decreased appetite, fatigue and kidney dysfunction  He agrees to proceed with treatment and consent obtained  Patient was provided with educational brochure regarding medication for his records  - CBC and differential; Future  - Comprehensive metabolic panel; Future  - Magnesium; Future  - TSH, 3rd generation with Free T4 reflex; Future  - LD,Blood; Future  - Infusion Calculated Appointment Request; Future  - Infusion Calculated Appointment Request; Future  - Infusion Appointment Request; Future  - Infusion Appointment Request; Future    2   Chemotherapy-induced neutropenia (HCC)  We will continue to support patient with pegylated G-CSF biosimilar day 2 to prevent febrile neutropenia  - Infusion Appointment Request; Future  - Infusion Appointment Request; Future    3  Brain metastases [facility])  Patient completed a 2nd round of whole brain radiation around the end of May 2021  Is being monitored by his radiation oncology team     - Infusion Calculated Appointment Request; Future  - Infusion Calculated Appointment Request; Future  - Infusion Appointment Request; Future  - Infusion Appointment Request; Future    4  Macrocytosis  - TSH, 3rd generation with Free T4 reflex; Future  - Folate; Future  - Vitamin B12; Future    5  Other pulmonary embolism without acute cor pulmonale, unspecified chronicity ([facility] )  Patient will continue his Lovenox injections as he is taking them for his hypercoagulable state/history of thrombosis with active malignancy  6  Deep vein thrombosis (DVT) of other vein of right lower extremity, unspecified chronicity ([facility] )  As above  HPI:  Patient presents today for a follow-up visit accompanied by his significant other  They are asking for another refill/prior authorization for his nicotine patch as he is interested in smoking cessation  Has tried to do nicotine patch in the past however his insurance would not approve it  He does continue to have fatigue which his significant other feels is worse especially since he is not getting out of the house as much as he used to  Otherwise has no new complaints  Has been tolerating his chemotherapy fairly well  His most recent laboratory studies from yesterday 07/12/2021 showed normal white cells and platelets, he is not anemic H&H 14 1/42 7 his MCV is elevated 102  CMP and magnesium normal   LDH is normal 223      He had a repeat CT scan of the chest abdomen and pelvis 07/09/2021 which showed:  IMPRESSION:  Interval worsening of the left lower lung nodular metastatic focus extending centrally into the hilum and involving the pulmonary artery as described      No other findings of metastasis  No other interval change  Oncology History Overview Note   Patient has a  history of COPD, hypertension, tobacco abuse, etc   Presented initially to the emergency room with loss of vision in the left eye with numbness on the left face  He was then evaluated with a CT scan of the head on 08/30/2020 which showed large cystic mass in the left cerebral hemisphere with surrounding edema  CT scan of the chest abdomen pelvis with contrast was then done on 08/30/2020 which showed:  IMPRESSION:  There is an approximately 4 x 4 x 3 cm mass in the mid to lower left hilum which extends into the left lower lobe of the lung and the left lingula  Nearby satellite masses and nodules are present  The appearance is highly suggestive of malignant neoplasm  There appears to be some hypodensity within some of the central left pulmonary artery branches  Pulmonary embolism and/or tumor thrombus should be excluded  Dedicated CT angiogram/PE protocol CTA of the chest is recommended for further evaluation  Bilateral adrenal masses, each measuring approximately 2 cm  MRI is recommended for further characterization, if there are no contraindications  The urinary bladder wall appears thickened  This is most pronounced anteriorly  Clinical correlation, laboratory correlation and follow-up is recommended  The prostate is mildly prominent  Clinical and laboratory correlation is recommended  Mild emphysema  MRI of the brain was done on 08/31/2020 which showed multiple intracranial enhancing lesions without dominant cystic lesion in the deep left inferior frontal lobe with the multiple lesions with suspicious primary pulmonary neoplasm  He also had an MRI of the abdomen for further evaluation of the adrenal glands this showed bilateral adenomas       The patient then had craniotomy and resection of the left frontal mass on 09/01/2020 which came back compatible with high-grade neuroendocrine tumor compatible with metastatic small cell lung cancer primary  The patient started radiation treatment to the brain on the 23rd of September 2020  He has very difficult time expressing himself  He only can answer questions with yes and no      10/30/2020 MRI of the brain showed improvement, with no new lesions  11/23/2020 he had a f/u with [facility] ophthalmology because of  vision loss  OS  Exam revealed Optic nerve atrophy, OS  He was referred to  neurology, MRI orbits ordered, and MD  contacted Dr Alfredo Alcazar, to explain findings and concern for Meningeal/csf involvement  11/27/2020 pt had a Neurology consult @ [facility]  Dr Ewa Davison  12/1/2020 MRI lumbar spine: Drop seed metastases within the distal thecal sac  This is relatively diffuse, with small nodular foci at the upper L2 and, L5 levels  Chronic bilateral L4 spondylolysis, grade 1 spondylolisthesis  Asymmetric right foraminal stenosis, correlate for right L4 radiculitis  12/9/2020 MRI cervical spine: Cervical degenerative disc disease with disc herniations and endplate/uncinate joint hypertrophic change present  Moderate canal stenosis at C3-4 without cord compression or abnormal cord signal    At C4-5 there is severe right and moderate left foraminal narrowing  There is a left-sided disc extrusion at C5-6 with moderate left lateral canal stenosis  Severe right foraminal narrowing at C7-T1 secondary to right foraminal disc osteophyte complex  12/9/2020 MRI thoracic spine:    Mild thoracic degenerative change with small disc herniations and adjacent endplate osteophytes at T6-7, T7-8 and T9-10  No cord compression  12/10/2020 MRI orbits/brain  MRA neck @ [facility]  Impression:  1  Stable size of left frontal resection cavity, with persistent T1 hyperintense hemorrhage/blood products filling the resection cavity  Previously noted prominent irregular enhancement along the superior aspect of the resection cavity has essentially resolved   Nodular enhancement along the medial/posterior aspect of the resection cavity is slightly decreased since the prior study  There remains mild peripheral enhancement anteriorly  2  Decreased size of T1 hyperintense lesions along the ependymal surface of the frontal horn of the right lateral ventricle  Previously noted lesions along the left ventricular ependymal surface appear to have resolved  No new lesions are identified  3  No definite abnormal signal or enhancement in the optic nerve/optic chiasm  No definite intraorbital mass identified  4  No evidence of cervical carotid stenosis  5  Stenosis at the right greater than left vertebral artery origi     He was found to have PE and DVT December 2020  Was started on Xarelto which was later transitioned to Lovenox twice a day in preparation for his intrathecal chemotherapy  Started intrathecal chemotherapy with methotrexate weekly (02/09/2020) x4 followed by monthly intrathecal methotrexate starting 03/30/2021 was discontinued around the end of April 2021 after he was found to have multiple new brain lesions on repeat MRI       He then went on to have whole brain radiation which he completed 5/26/21     Brain metastases ([facility] )   9/1/2020 Initial Diagnosis    Brain metastases ([facility] [address] )     9/1/2020 Surgery    Left frontal CRANIOTOMY IMAGE-GUIDED FOR TUMOR (Left)  Surgeon: Dr Tika Almaraz, left frontal mass:  -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 , 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         9/23/2020 - 10/6/2020 Radiation    Plan ID Energy Fractions Dose per Fraction (cGy) Dose Correction (cGy) Total Dose Delivered (cGy) Elapsed Days   Whole Brain  6X 10 / 10 300 0 3,000 13        10/12/2020 - 4/13/2021 Chemotherapy    pegfilgrastim-cbqv (UDENYCA) subcutaneous injection 6 mg, 6 mg, Subcutaneous, Once, 2 of 2 cycles  Administration: 6 mg (12/4/2020), 6 mg (12/26/2020)  fosaprepitant (EMEND) 150 mg in sodium chloride 0 9 % 250 mL IVPB, 150 mg, Intravenous, Once, 4 of 4 cycles  Administration: 150 mg (10/12/2020), 150 mg (11/4/2020), 150 mg (12/1/2020), 150 mg (12/22/2020)  CARBOplatin (PARAPLATIN) 723 mg in sodium chloride 0 9 % 250 mL IVPB, 723 mg (561 8 % of original dose 128 7 mg), Intravenous, Once, 4 of 4 cycles  Dose modification: 723 mg (original dose 128 7 mg, Cycle 1, Reason: Other (See Comments), Comment: use creat 9/25/2020)  Administration: 723 mg (10/12/2020), 750 mg (11/4/2020), 750 mg (12/1/2020), 736 5 mg (12/22/2020)  Durvalumab 1,500 mg in sodium chloride 0 9 % 100 mL IVPB, 1,500 mg (100 % of original dose 1,500 mg), Intravenous, Once, 7 of 10 cycles  Dose modification: 1,500 mg (original dose 1,500 mg, Cycle 1)  Administration: 1,500 mg (10/12/2020), 1,500 mg (11/4/2020), 1,500 mg (12/1/2020), 1,500 mg (12/22/2020), 1,500 mg (1/14/2021), 1,500 mg (2/11/2021), 1,500 mg (3/17/2021)  etoposide (TOPOSAR) 200 mg in sodium chloride 0 9 % 500 mL chemo infusion, 198 mg, Intravenous, Once, 4 of 4 cycles  Administration: 200 mg (10/12/2020), 200 mg (10/13/2020), 200 mg (11/4/2020), 200 mg (10/14/2020), 200 mg (11/5/2020), 200 mg (12/1/2020), 200 mg (11/6/2020), 200 mg (12/2/2020), 200 mg (12/22/2020), 200 mg (12/3/2020), 200 mg (12/23/2020), 200 mg (12/24/2020)  methotrexate (PF) 12 mg in sodium chloride (PF) 0 9 % 2 52 mL intrathecal injection, 12 mg, Intrathecal, Once, 2 of 5 cycles  Administration: 12 mg (2/16/2021), 12 mg (2/23/2021)     12/30/2020 - 1/13/2021 Radiation    Treatments:  Course: C2    Plan ID Energy Fractions Dose per Fraction (cGy) Dose Correction (cGy) Total Dose Delivered (cGy) Elapsed Days   L1_S1 Spine 10X 10 / 10 300 0 3,000 14            4/22/2021 - 7/13/2021 Chemotherapy    pegfilgrastim-kyleigh (FULPHILA), 6 mg, Subcutaneous, Once, 4 of 9 cycles  Administration: 6 mg (4/23/2021), 6 mg (5/13/2021), 6 mg (6/3/2021), 6 mg (6/24/2021)  DOCEtaxel (TAXOTERE) chemo infusion, 137 2 mg, Intravenous, Once, 4 of 9 cycles  Administration: 140 mg (4/22/2021), 140 mg (5/12/2021), 140 mg (6/2/2021), 140 mg (6/23/2021)  pembrolizumab (KEYTRUDA) IVPB, 200 mg (50 % of original dose 400 mg), Intravenous, Once, 0 of 5 cycles  Dose modification: 200 mg (original dose 400 mg, Cycle 5, Reason: Other (Must fill in a comment), Comment: hospitals obtained for Compassionate use )     5/12/2021 - 5/26/2021 Radiation    Plan ID Energy Fractions Dose per Fraction (cGy) Dose Correction (cGy) Total Dose Delivered (cGy) Elapsed Days   WB ReTx EZ #:1 6X 9 / 9 180 0 1,620 14        7/22/2021 -  Chemotherapy    pegfilgrastim-jmdb (FULPHILA), 6 mg, Subcutaneous, Once, 0 of 6 cycles  Lurbinectedin (ZEPZELCA) IVPB, 3 2 mg/m2, Intravenous, Once, 0 of 6 cycles     Small cell lung cancer, left lower lobe ([facility] [address] )   9/24/2020 Initial Diagnosis    Small cell lung cancer, left lower lobe (HCC)  Extensive Stage     10/12/2020 - 4/13/2021 Chemotherapy    pegfilgrastim-cbqv (UDENYCA) subcutaneous injection 6 mg, 6 mg, Subcutaneous, Once, 2 of 2 cycles  Administration: 6 mg (12/4/2020), 6 mg (12/26/2020)  fosaprepitant (EMEND) 150 mg in sodium chloride 0 9 % 250 mL IVPB, 150 mg, Intravenous, Once, 4 of 4 cycles  Administration: 150 mg (10/12/2020), 150 mg (11/4/2020), 150 mg (12/1/2020), 150 mg (12/22/2020)  CARBOplatin (PARAPLATIN) 723 mg in sodium chloride 0 9 % 250 mL IVPB, 723 mg (561 8 % of original dose 128 7 mg), Intravenous, Once, 4 of 4 cycles  Dose modification: 723 mg (original dose 128 7 mg, Cycle 1, Reason: Other (See Comments), Comment: use creat 9/25/2020)  Administration: 723 mg (10/12/2020), 750 mg (11/4/2020), 750 mg (12/1/2020), 736 5 mg (12/22/2020)  Durvalumab 1,500 mg in sodium chloride 0 9 % 100 mL IVPB, 1,500 mg (100 % of original dose 1,500 mg), Intravenous, Once, 7 of 10 cycles  Dose modification: 1,500 mg (original dose 1,500 mg, Cycle 1)  Administration: 1,500 mg (10/12/2020), 1,500 mg (11/4/2020), 1,500 mg (12/1/2020), 1,500 mg (12/22/2020), 1,500 mg (1/14/2021), 1,500 mg (2/11/2021), 1,500 mg (3/17/2021)  etoposide (TOPOSAR) 200 mg in sodium chloride 0 9 % 500 mL chemo infusion, 198 mg, Intravenous, Once, 4 of 4 cycles  Administration: 200 mg (10/12/2020), 200 mg (10/13/2020), 200 mg (11/4/2020), 200 mg (10/14/2020), 200 mg (11/5/2020), 200 mg (12/1/2020), 200 mg (11/6/2020), 200 mg (12/2/2020), 200 mg (12/22/2020), 200 mg (12/3/2020), 200 mg (12/23/2020), 200 mg (12/24/2020)  methotrexate (PF) 12 mg in sodium chloride (PF) 0 9 % 2 52 mL intrathecal injection, 12 mg, Intrathecal, Once, 2 of 5 cycles  Administration: 12 mg (2/16/2021), 12 mg (2/23/2021)     4/22/2021 - 7/13/2021 Chemotherapy    pegfilgrastim-jmdb (FULPHILA), 6 mg, Subcutaneous, Once, 4 of 9 cycles  Administration: 6 mg (4/23/2021), 6 mg (5/13/2021), 6 mg (6/3/2021), 6 mg (6/24/2021)  DOCEtaxel (TAXOTERE) chemo infusion, 137 2 mg, Intravenous, Once, 4 of 9 cycles  Administration: 140 mg (4/22/2021), 140 mg (5/12/2021), 140 mg (6/2/2021), 140 mg (6/23/2021)  pembrolizumab (KEYTRUDA) IVPB, 200 mg (50 % of original dose 400 mg), Intravenous, Once, 0 of 5 cycles  Dose modification: 200 mg (original dose 400 mg, Cycle 5, Reason: Other (Must fill in a comment), Comment: Allison Arora obtained for Compassionate use )     7/22/2021 -  Chemotherapy    pegfilgrastim-jmdb (FULPHILA), 6 mg, Subcutaneous, Once, 0 of 6 cycles  Lurbinectedin (ZEPZELCA) IVPB, 3 2 mg/m2, Intravenous, Once, 0 of 6 cycles         Interval history:    ROS: Review of Systems   Constitutional: Positive for fatigue  Negative for activity change, appetite change, chills, fever and unexpected weight change  HENT: Negative for congestion, mouth sores, nosebleeds, sore throat and trouble swallowing  Eyes: Negative  Respiratory: Positive for cough and shortness of breath  Negative for chest tightness  Cardiovascular: Negative for chest pain, palpitations and leg swelling  Gastrointestinal: Negative for abdominal distention, abdominal pain, blood in stool, constipation, diarrhea, nausea and vomiting  Genitourinary: Negative for difficulty urinating, dysuria, frequency, hematuria and urgency  Musculoskeletal: Negative for arthralgias, back pain, gait problem, joint swelling and myalgias  Skin: Negative for color change, pallor and rash  Neurological: Negative for dizziness, weakness, light-headedness, numbness and headaches  Hematological: Negative for adenopathy  Does not bruise/bleed easily  Psychiatric/Behavioral: Positive for sleep disturbance  Negative for dysphoric mood  The patient is not nervous/anxious          Past Medical History:   Diagnosis Date    [facility] ([facility] [address] )     [facility]chronic obstructive [facility] disease) [facility]     Hyperlipidemia     Hypertension     Lung cancer ([facility] )        Past Surgical History:   Procedure Laterality Date    BILATERAL KNEE ARTHROSCOPY Bilateral     CRANIOTOMY Left 9/1/2020    Procedure: Left frontal CRANIOTOMY IMAGE-GUIDED FOR TUMOR;  Surgeon: Fracisco Galvan MD;  Location: [facility];  Service: Neurosurgery    HAND SURGERY Left     IR PORT PLACEMENT  10/8/2020    WISDOM TOOTH EXTRACTION         Social History     Socioeconomic History    Marital status: Single     Spouse name: None    Number of children: None    Years of education: None    Highest education level: None   Occupational History    None   Tobacco Use    Smoking status: Current Every Day Smoker     Packs/day: 0 75    Smokeless tobacco: Never Used    Tobacco comment: as pf 5/4/21, smoking 3/4 pack    Vaping Use    Vaping Use: Never used   Substance and Sexual Activity    Alcohol use: Not Currently     Comment: Not at present    Drug use: Never    Sexual activity: None   Other Topics Concern    None   Social History Narrative    None     Social Determinants of Health     Financial Resource Strain:     Difficulty of Paying Living Expenses:    Food Insecurity:     Worried About Running Out of Food in the Last Year:     [address] in the Last Year:    Transportation Needs:     Lack of Transportation (Medical):      Lack of Transportation (Non-Medical):    Physical Activity:     Days of Exercise per Week:     Minutes of Exercise per Session:    Stress:     Feeling of Stress :    Social Connections:     Frequency of Communication with Friends and Family:     Frequency of Social Gatherings with Friends and Family:     Attends Buddhist Services:     Active Member of Clubs or Organizations:     Attends Club or Organization Meetings:     Marital Status:    Intimate Partner Violence:     Fear of Current or Ex-Partner:     Emotionally Abused:     Physically Abused:     Sexually Abused:        Family History   Problem Relation Age of Onset    Lymphoma Mother        No Known Allergies      Current Outpatient Medications:     albuterol (PROVENTIL HFA,VENTOLIN HFA) 90 mcg/act inhaler, Inhale 2 puffs every 6 (six) hours as needed for wheezing, Disp: 1 Inhaler, Rfl: 3    cyanocobalamin (VITAMIN B-12) 1000 MCG tablet, Take 1 tablet (1,000 mcg total) by mouth daily, Disp: 90 tablet, Rfl: 3    dexamethasone (DECADRON) 4 mg tablet, Take 2 tablets (8 mg total) by mouth 2 (two) times a day with meals The day before chemo day of chemo and day after chemo every 4 weeks, Disp: 12 tablet, Rfl: 6    enoxaparin (LOVENOX) 80 mg/0 8 mL, Inject 0 7 mL (70 mg total) under the skin every 12 (twelve) hours, Disp: 60 Syringe, Rfl: 5    folic acid (FOLVITE) 1 mg tablet, Take 1 tablet (1 mg total) by mouth daily for 29 days, Disp: 29 tablet, Rfl: 0    thiamine (VITAMIN B1) 100 mg tablet, Take 100 mg by mouth daily, Disp: , Rfl:     aspirin (ECOTRIN LOW STRENGTH) 81 mg EC tablet, Take 162 mg by mouth daily (Patient not taking: Reported on 6/22/2021), Disp: , Rfl:     atorvastatin (LIPITOR) 40 mg tablet, Take 1 tablet (40 mg total) by mouth daily with dinner, Disp: 30 tablet, Rfl: 0    dexamethasone (DECADRON) 2 mg tablet, Take 1 tablet (2 mg total) by mouth 2 (two) times a day with meals (Patient not taking: Reported on 6/22/2021), Disp: 28 tablet, Rfl: 0    levETIRAcetam (KEPPRA) 500 mg tablet, Take 1 tablet (500 mg total) by mouth every 12 (twelve) hours for 14 doses (Patient not taking: Reported on 1/5/2021), Disp: 14 tablet, Rfl: 0    lisinopril (ZESTRIL) 10 mg tablet, Take 1 tablet (10 mg total) by mouth daily (Patient not taking: Reported on 5/4/2021), Disp: 30 tablet, Rfl: 0    methocarbamol (ROBAXIN) 500 mg tablet, Take 1 tablet (500 mg total) by mouth every 6 (six) hours for 14 days (Patient not taking: Reported on 9/18/2020), Disp: 56 tablet, Rfl: 0    nicotine (NICODERM CQ) 21 mg/24 hr TD 24 hr patch, Place 1 patch on the skin every 24 hours, Disp: 28 patch, Rfl: 0    ofloxacin (OCUFLOX) 0 3 % ophthalmic solution, Administer 1 drop into the left eye 4 (four) times a day (Patient not taking: Reported on 7/13/2021), Disp: 5 mL, Rfl: 1    ondansetron (ZOFRAN) 8 mg tablet, Take 1 tablet (8 mg total) by mouth every 8 (eight) hours as needed for nausea or vomiting (Patient not taking: Reported on 1/5/2021), Disp: 20 tablet, Rfl: 3    pantoprazole (PROTONIX) 40 mg tablet, Take 1 tablet (40 mg total) by mouth daily (Patient not taking: Reported on 3/16/2021), Disp: 30 tablet, Rfl: 11    thiamine 100 MG tablet, Take 1 tablet (100 mg total) by mouth daily (Patient not taking: Reported on 6/1/2021), Disp: 30 tablet, Rfl: 0      Physical Exam:  /88 (BP Location: Left arm, Patient Position: Sitting, Cuff Size: Adult)   Pulse 80   Temp 97 5 °F (36 4 °C) (Tympanic)   Resp 16   Ht 5' 7" (1 702 m)   Wt 68 6 kg (151 lb 3 2 oz)   SpO2 98%   BMI 23 68 kg/m²     Physical Exam  Vitals reviewed  Constitutional:       General: He is not in acute distress  Appearance: He is well-developed  He is not diaphoretic  HENT:      Head: Normocephalic and atraumatic  Eyes:      General: Lids are normal  No scleral icterus  Conjunctiva/sclera: Conjunctivae normal       Pupils: Pupils are equal, round, and reactive to light  Neck:      Thyroid: No thyromegaly  Cardiovascular:      Rate and Rhythm: Normal rate and regular rhythm  Heart sounds: Normal heart sounds  No murmur heard  Pulmonary:      Effort: Pulmonary effort is normal  No respiratory distress  Breath sounds: Rhonchi present  Abdominal:      General: There is no distension  Palpations: Abdomen is soft  There is no hepatomegaly or splenomegaly  Tenderness: There is no abdominal tenderness  Musculoskeletal:         General: No swelling  Normal range of motion  Cervical back: Normal range of motion and neck supple  Lymphadenopathy:      Cervical: No cervical adenopathy  Upper Body:      Right upper body: No axillary adenopathy  Left upper body: No axillary adenopathy  Skin:     General: Skin is warm and dry  Findings: No erythema or rash  Neurological:      General: No focal deficit present  Mental Status: He is alert and oriented to person, place, and time  Psychiatric:         Mood and Affect: Mood normal  Affect is blunt  Behavior: Behavior normal  Behavior is cooperative  Thought Content: Thought content normal          Judgment: Judgment normal            Labs:  Lab Results   Component Value Date    WBC 5 79 07/12/2021    HGB 14 1 07/12/2021    HCT 42 7 07/12/2021     (H) 07/12/2021     07/12/2021     Lab Results   Component Value Date    K 3 8 07/12/2021     07/12/2021    CO2 30 07/12/2021    BUN 12 07/12/2021    CREATININE 0 76 07/12/2021    GLUF 92 06/22/2021    CALCIUM 9 8 07/12/2021    CORRECTEDCA 10 1 11/30/2020    AST 11 07/12/2021    ALT 22 07/12/2021    ALKPHOS 110 07/12/2021    EGFR 103 07/12/2021       Patient voiced understanding and agreement in the above discussion   Aware to contact our office with questions/symptoms in the interim  This note has been generated by voice recognition software system  Therefore, there may be spelling, grammar, and or syntax errors  Please contact if questions arise

## 2021-07-14 NOTE — TELEPHONE ENCOUNTER
I spoke with Rupali Machado patients significant other in regards to blood work question  Informed Rupali Machado that the patient does not need anymore blood work for his treatment that has been rescheduled for 7/22/21  Informed Althea that Flora Heredia states the new drug has already been approved  Informed Rupali Machado if she has any other questions or concerns to give the office a call back at [phone]

## 2021-07-21 NOTE — PROGRESS NOTES
pts last infusion appt for 07/14/21 was cancelled   called the  pharmacy & was told that there is [facility] ([place] for the 07/22/21 infusion  Sebas Mcguire

## 2021-07-22 NOTE — PLAN OF CARE
Problem: Potential for Falls  Goal: Patient will remain free of falls  Description: INTERVENTIONS:  - Educate patient/family on patient safety including physical limitations  - Instruct patient to call for assistance with activity   - Consult OT/PT to assist with strengthening/mobility   - Keep Call bell within reach  - Keep bed low and locked with side rails adjusted as appropriate  - Keep care items and personal belongings within reach  - Initiate and maintain comfort rounds  - Make Fall Risk Sign visible to staff  -  - Apply yellow socks and bracelet for high fall risk patients  - Consider moving patient to room near nurses station  7/22/2021 1434 by Nina Markham, RN  Outcome: Progressing  7/22/2021 1125 by Nina Markham, RN  Outcome: Progressing

## 2021-07-22 NOTE — PROGRESS NOTES
Pt tolerated todays doses of keytruda and zepzelca well  No adverse reactions noted  Port flushed and deaccessed per routine  Pt is aware of his fulphila appt tomorrow at 330  avs provided

## 2021-07-27 NOTE — TELEPHONE ENCOUNTER
Phoned Servando's significant other Jorge Guidry  She said Vicky Graff ate scrambled eggs and toast this AM  She is working 7-3:30 but was off today to take Vicky Cola to his Dr's visit at [facility] with Dr Carroll Lawrence  Orthostatic Bps in office today were not that bad  Jorge Guidry said Vicky Khourychristopher ate this AM Scrambled eggs and toast but most days he tells her  he is not hungry and he sleeps most of the day  Servando's mom will be coming in during the day to be with him while Jorge Guidry at work and she will encourage him to eat  I did offer Servando IV Hydration but told Jorge Guidry to please call me back at office should he not be able to drink so we can make arrangements for hydration in infusion  Jorge Giudry verbalized understanding

## 2021-07-27 NOTE — TELEPHONE ENCOUNTER
7/22/21 last treatment  7/23/21 [facility] is calling to report that patient has not had solid foods since 7/20/21  Patient has only been taking small amounts of PO fluids(1 can of soda and 1 Boost) daily  Dr Sukhdev Larkin would like for Oncology to follow up with patient  Patient will be in her office for another 20 minutes  She will be doing orthostatic Bps on patient  Her note will be in care everywhere shortly  Will forward to dr Annette Roberts RN to follow up with patient for further evaluation and possible IV hydration

## 2021-08-03 NOTE — PROGRESS NOTES
Hematology/Oncology Outpatient Follow-up  John So 48 y o  male 1965 [id number]    Date:  8/3/2021        Assessment and Plan:  1  Small cell lung cancer, left lower lobe ([facility] [address] )    I had a lengthy discussion with the patient and his fiancee regarding his poor prognosis  The patient stated that the he still has a energy to pursue treatment  We will aim to continue with cycle 2 of  Lurbinectedin at the usual dose of 3 2 mg/M2 as planned on 08/12/2021 pending his blood work prior to his treatment  He will be also continued on the [facility] as immunotherapy which is being used in conjunction with the lurbinectedin as compassionate use  The patient will be started on low-dose prednisone 20 mg once a day to see if this will improve his appetite and his energy  I was mitali with the patient and his fiancee regarding the patient's prognosis  The decision was made to continue with the treatment as long as he can come to the office with acceptable performance status  Otherwise, hospice will need to be implemented with any further deterioration of his energy or performance status  - predniSONE 20 mg tablet; Take 1 tablet (20 mg total) by mouth daily  Dispense: 30 tablet; Refill: 2  - CBC and differential; Future  - Comprehensive metabolic panel; Future  - Magnesium; Future    2  Brain metastases [facility])    He had a 2nd round of whole-brain radiation completed May of this year  He seems to worsening of his vision or blindness of the left eye with the a worsening of his neurological symptoms including eye drooping  3  Chemotherapy-induced neutropenia ([facility] [address] )    He will be continued on the pegylated G-CSF by similar is after each chemotherapy to prevent neutropenic complications  4  Other pulmonary embolism without acute cor pulmonale, unspecified chronicity ([facility] [address] )    He continues to be on Lovenox daily without bleeding          HPI:   the patient came today for a follow-up visit accompanied by his significant other  He was treated with  lurbinectedin with cycle 1 on 07/22/2021 which he tolerated with significant fatigue  His fiancee stated that his overall condition is deteriorating gradually  She continues to lose weight estimated to be about at least 12 lb since his last visit on 07/13/2021  His ECOG performance status is estimated to be somewhere between 3 and 4  He stated that he is entirely blind on the left eye but he can see on the right eye  He seems to have some difficulties with his gait  He did not have blood work prior to this office visit  Oncology History Overview Note   Patient has a  history of COPD, hypertension, tobacco abuse, etc   Presented initially to the emergency room with loss of vision in the left eye with numbness on the left face  He was then evaluated with a CT scan of the head on 08/30/2020 which showed large cystic mass in the left cerebral hemisphere with surrounding edema  CT scan of the chest abdomen pelvis with contrast was then done on 08/30/2020 which showed:  IMPRESSION:  There is an approximately 4 x 4 x 3 cm mass in the mid to lower left hilum which extends into the left lower lobe of the lung and the left lingula  Nearby satellite masses and nodules are present  The appearance is highly suggestive of malignant neoplasm  There appears to be some hypodensity within some of the central left pulmonary artery branches  Pulmonary embolism and/or tumor thrombus should be excluded  Dedicated CT angiogram/PE protocol CTA of the chest is recommended for further evaluation  Bilateral adrenal masses, each measuring approximately 2 cm  MRI is recommended for further characterization, if there are no contraindications  The urinary bladder wall appears thickened  This is most pronounced anteriorly  Clinical correlation, laboratory correlation and follow-up is recommended  The prostate is mildly prominent    Clinical and laboratory correlation is recommended  Mild emphysema  MRI of the brain was done on 08/31/2020 which showed multiple intracranial enhancing lesions without dominant cystic lesion in the deep left inferior frontal lobe with the multiple lesions with suspicious primary pulmonary neoplasm  He also had an MRI of the abdomen for further evaluation of the adrenal glands this showed bilateral adenomas  The patient then had craniotomy and resection of the left frontal mass on 09/01/2020 which came back compatible with high-grade neuroendocrine tumor compatible with metastatic small cell lung cancer primary  The patient started radiation treatment to the brain on the 23rd of September 2020  He has very difficult time expressing himself  He only can answer questions with yes and no      10/30/2020 MRI of the brain showed improvement, with no new lesions  11/23/2020 he had a f/u with [facility] ophthalmology because of  vision loss  OS  Exam revealed Optic nerve atrophy, OS  He was referred to  neurology, MRI orbits ordered, and MD  contacted Dr Comfort Gaytan, to explain findings and concern for Meningeal/csf involvement  11/27/2020 pt had a Neurology consult @ [facility]  Dr Levis Eisenmenger  12/1/2020 MRI lumbar spine: Drop seed metastases within the distal thecal sac  This is relatively diffuse, with small nodular foci at the upper L2 and, L5 levels  Chronic bilateral L4 spondylolysis, grade 1 spondylolisthesis  Asymmetric right foraminal stenosis, correlate for right L4 radiculitis  12/9/2020 MRI cervical spine: Cervical degenerative disc disease with disc herniations and endplate/uncinate joint hypertrophic change present  Moderate canal stenosis at C3-4 without cord compression or abnormal cord signal    At C4-5 there is severe right and moderate left foraminal narrowing  There is a left-sided disc extrusion at C5-6 with moderate left lateral canal stenosis     Severe right foraminal narrowing at C7-T1 secondary to right foraminal disc osteophyte complex  12/9/2020 MRI thoracic spine:    Mild thoracic degenerative change with small disc herniations and adjacent endplate osteophytes at T6-7, T7-8 and T9-10  No cord compression  12/10/2020 MRI orbits/brain  MRA neck @ [facility]  Impression:  1  Stable size of left frontal resection cavity, with persistent T1 hyperintense hemorrhage/blood products filling the resection cavity  Previously noted prominent irregular enhancement along the superior aspect of the resection cavity has essentially resolved  Nodular enhancement along the medial/posterior aspect of the resection cavity is slightly decreased since the prior study  There remains mild peripheral enhancement anteriorly  2  Decreased size of T1 hyperintense lesions along the ependymal surface of the frontal horn of the right lateral ventricle  Previously noted lesions along the left ventricular ependymal surface appear to have resolved  No new lesions are identified  3  No definite abnormal signal or enhancement in the optic nerve/optic chiasm  No definite intraorbital mass identified  4  No evidence of cervical carotid stenosis  5  Stenosis at the right greater than left vertebral artery origi     He was found to have PE and DVT December 2020  Was started on Xarelto which was later transitioned to Lovenox twice a day in preparation for his intrathecal chemotherapy  Started intrathecal chemotherapy with methotrexate weekly (02/09/2020) x4 followed by monthly intrathecal methotrexate starting 03/30/2021 was discontinued around the end of April 2021 after he was found to have multiple new brain lesions on repeat MRI       He then went on to have whole brain radiation which he completed 5/26/21     Brain metastases ([facility] [address] )   9/1/2020 Initial Diagnosis    Brain metastases ([facility] [address] )     9/1/2020 Surgery    Left frontal CRANIOTOMY IMAGE-GUIDED FOR TUMOR (Left)  Surgeon: Dr Keyla Peace, left frontal mass:  -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 , 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         9/23/2020 - 10/6/2020 Radiation    Plan ID Energy Fractions Dose per Fraction (cGy) Dose Correction (cGy) Total Dose Delivered (cGy) Elapsed Days   Whole Brain  6X 10 / 10 300 0 3,000 13        10/12/2020 - 4/13/2021 Chemotherapy    pegfilgrastim-cbqv (UDENYCA) subcutaneous injection 6 mg, 6 mg, Subcutaneous, Once, 2 of 2 cycles  Administration: 6 mg (12/4/2020), 6 mg (12/26/2020)  fosaprepitant (EMEND) 150 mg in sodium chloride 0 9 % 250 mL IVPB, 150 mg, Intravenous, Once, 4 of 4 cycles  Administration: 150 mg (10/12/2020), 150 mg (11/4/2020), 150 mg (12/1/2020), 150 mg (12/22/2020)  CARBOplatin (PARAPLATIN) 723 mg in sodium chloride 0 9 % 250 mL IVPB, 723 mg (561 8 % of original dose 128 7 mg), Intravenous, Once, 4 of 4 cycles  Dose modification: 723 mg (original dose 128 7 mg, Cycle 1, Reason: Other (See Comments), Comment: use creat 9/25/2020)  Administration: 723 mg (10/12/2020), 750 mg (11/4/2020), 750 mg (12/1/2020), 736 5 mg (12/22/2020)  Durvalumab 1,500 mg in sodium chloride 0 9 % 100 mL IVPB, 1,500 mg (100 % of original dose 1,500 mg), Intravenous, Once, 7 of 10 cycles  Dose modification: 1,500 mg (original dose 1,500 mg, Cycle 1)  Administration: 1,500 mg (10/12/2020), 1,500 mg (11/4/2020), 1,500 mg (12/1/2020), 1,500 mg (12/22/2020), 1,500 mg (1/14/2021), 1,500 mg (2/11/2021), 1,500 mg (3/17/2021)  etoposide (TOPOSAR) 200 mg in sodium chloride 0 9 % 500 mL chemo infusion, 198 mg, Intravenous, Once, 4 of 4 cycles  Administration: 200 mg (10/12/2020), 200 mg (10/13/2020), 200 mg (11/4/2020), 200 mg (10/14/2020), 200 mg (11/5/2020), 200 mg (12/1/2020), 200 mg (11/6/2020), 200 mg (12/2/2020), 200 mg (12/22/2020), 200 mg (12/3/2020), 200 mg (12/23/2020), 200 mg (12/24/2020)  methotrexate (PF) 12 mg in sodium chloride (PF) 0 9 % 2 52 mL intrathecal injection, 12 mg, Intrathecal, Once, 2 of 5 cycles  Administration: 12 mg (2/16/2021), 12 mg (2/23/2021)     12/30/2020 - 1/13/2021 Radiation    Treatments:  Course: C2    Plan ID Energy Fractions Dose per Fraction (cGy) Dose Correction (cGy) Total Dose Delivered (cGy) Elapsed Days   L1_S1 Spine 10X 10 / 10 300 0 3,000 14            4/22/2021 - 7/13/2021 Chemotherapy    pegfilgrastim-jmdb (FULPHILA), 6 mg, Subcutaneous, Once, 4 of 9 cycles  Administration: 6 mg (4/23/2021), 6 mg (5/13/2021), 6 mg (6/3/2021), 6 mg (6/24/2021)  DOCEtaxel (TAXOTERE) chemo infusion, 137 2 mg, Intravenous, Once, 4 of 9 cycles  Administration: 140 mg (4/22/2021), 140 mg (5/12/2021), 140 mg (6/2/2021), 140 mg (6/23/2021)  pembrolizumab (KEYTRUDA) IVPB, 200 mg (50 % of original dose 400 mg), Intravenous, Once, 0 of 5 cycles  Dose modification: 200 mg (original dose 400 mg, Cycle 5, Reason: Other (Must fill in a comment), Comment: [facility] obtained for Compassionate use )     5/12/2021 - 5/26/2021 Radiation    Plan ID Energy Fractions Dose per Fraction (cGy) Dose Correction (cGy) Total Dose Delivered (cGy) Elapsed Days   WB ReTx EZ #:1 6X 9 / 9 180 0 1,620 14        7/22/2021 -  Chemotherapy    pegfilgrastim-jmdb (FULPHILA), 6 mg, Subcutaneous, Once, 1 of 9 cycles  Administration: 6 mg (7/23/2021)  Lurbinectedin (ZEPZELCA) IVPB, 3 2 mg/m2 = 5 8 mg, Intravenous, Once, 1 of 9 cycles  Administration: 5 8 mg (7/22/2021)  pembrolizumab (KEYTRUDA) IVPB, 200 mg, Intravenous, Once, 1 of 3 cycles  Administration: 200 mg (7/22/2021)     Small cell lung cancer, left lower lobe ([address] )   9/24/2020 Initial Diagnosis    Small cell lung cancer, left lower lobe ([facility])  Extensive Stage     10/12/2020 - 4/13/2021 Chemotherapy    pegfilgrastim-cbqv (UDENYCA) subcutaneous injection 6 mg, 6 mg, Subcutaneous, Once, 2 of 2 cycles  Administration: 6 mg (12/4/2020), 6 mg (12/26/2020)  fosaprepitant (EMEND) 150 mg in sodium chloride 0 9 % 250 mL IVPB, 150 mg, Intravenous, Once, 4 of 4 cycles  Administration: 150 mg (10/12/2020), 150 mg (11/4/2020), 150 mg (12/1/2020), 150 mg (12/22/2020)  CARBOplatin (PARAPLATIN) 723 mg in sodium chloride 0 9 % 250 mL IVPB, 723 mg (561 8 % of original dose 128 7 mg), Intravenous, Once, 4 of 4 cycles  Dose modification: 723 mg (original dose 128 7 mg, Cycle 1, Reason: Other (See Comments), Comment: use creat 9/25/2020)  Administration: 723 mg (10/12/2020), 750 mg (11/4/2020), 750 mg (12/1/2020), 736 5 mg (12/22/2020)  Durvalumab 1,500 mg in sodium chloride 0 9 % 100 mL IVPB, 1,500 mg (100 % of original dose 1,500 mg), Intravenous, Once, 7 of 10 cycles  Dose modification: 1,500 mg (original dose 1,500 mg, Cycle 1)  Administration: 1,500 mg (10/12/2020), 1,500 mg (11/4/2020), 1,500 mg (12/1/2020), 1,500 mg (12/22/2020), 1,500 mg (1/14/2021), 1,500 mg (2/11/2021), 1,500 mg (3/17/2021)  etoposide (TOPOSAR) 200 mg in sodium chloride 0 9 % 500 mL chemo infusion, 198 mg, Intravenous, Once, 4 of 4 cycles  Administration: 200 mg (10/12/2020), 200 mg (10/13/2020), 200 mg (11/4/2020), 200 mg (10/14/2020), 200 mg (11/5/2020), 200 mg (12/1/2020), 200 mg (11/6/2020), 200 mg (12/2/2020), 200 mg (12/22/2020), 200 mg (12/3/2020), 200 mg (12/23/2020), 200 mg (12/24/2020)  methotrexate (PF) 12 mg in sodium chloride (PF) 0 9 % 2 52 mL intrathecal injection, 12 mg, Intrathecal, Once, 2 of 5 cycles  Administration: 12 mg (2/16/2021), 12 mg (2/23/2021)     4/22/2021 - 7/13/2021 Chemotherapy    pegfilgrastim-jmdb (FULPHILA), 6 mg, Subcutaneous, Once, 4 of 9 cycles  Administration: 6 mg (4/23/2021), 6 mg (5/13/2021), 6 mg (6/3/2021), 6 mg (6/24/2021)  DOCEtaxel (TAXOTERE) chemo infusion, 137 2 mg, Intravenous, Once, 4 of 9 cycles  Administration: 140 mg (4/22/2021), 140 mg (5/12/2021), 140 mg (6/2/2021), 140 mg (6/23/2021)  pembrolizumab (KEYTRUDA) IVPB, 200 mg (50 % of original dose 400 mg), Intravenous, Once, 0 of 5 cycles  Dose modification: 200 mg (original dose 400 mg, Cycle 5, Reason: Other (Must fill in a comment), Comment: Keytruda obtained for Compassionate use )     7/22/2021 -  Chemotherapy    pegfilgrastim-jmdb (FULPHILA), 6 mg, Subcutaneous, Once, 1 of 9 cycles  Administration: 6 mg (7/23/2021)  Lurbinectedin (ZEPZELCA) IVPB, 3 2 mg/m2 = 5 8 mg, Intravenous, Once, 1 of 9 cycles  Administration: 5 8 mg (7/22/2021)  pembrolizumab (KEYTRUDA) IVPB, 200 mg, Intravenous, Once, 1 of 3 cycles  Administration: 200 mg (7/22/2021)         Interval history:    ROS: Review of Systems   Constitutional: Positive for activity change, appetite change, fatigue and unexpected weight change  Negative for chills and fever  HENT: Negative for ear pain and sore throat  Eyes: Positive for visual disturbance ( he statedThat he is blind on the left eye)  Negative for pain  Respiratory: Positive for cough and shortness of breath  Cardiovascular: Negative for chest pain and palpitations  Gastrointestinal: Negative for abdominal pain and vomiting  Genitourinary: Negative for dysuria and hematuria  Musculoskeletal: Negative for arthralgias and back pain  Skin: Negative for color change and rash  Neurological: Negative for seizures and syncope  All other systems reviewed and are negative        Past Medical History:   Diagnosis Date    Brain cancer ([facility] [address] )     COPD (chronic obstructive pulmonary disease) ([facility] [address] )     Hyperlipidemia     Hypertension     [facility]        Past Surgical History:   Procedure Laterality Date    BILATERAL KNEE ARTHROSCOPY Bilateral     CRANIOTOMY Left 9/1/2020    Procedure: Left frontal CRANIOTOMY IMAGE-GUIDED FOR TUMOR;  Surgeon: Jacek Lilly MD;  Location: [facility];  Service: Neurosurgery    HAND SURGERY Left     IR PORT PLACEMENT  10/8/2020    WISDOM TOOTH EXTRACTION         Social History     Socioeconomic History    Marital status: Single     Spouse name: None    Number of children: None    Years of education: None    Highest education level: None   Occupational History    None   Tobacco Use    Smoking status: Current Every Day Smoker     Packs/day: 0 75    Smokeless tobacco: Never Used    Tobacco comment: as pf 5/4/21, smoking 3/4 pack    Vaping Use    Vaping Use: Never used   Substance and Sexual Activity    Alcohol use: Not Currently     Comment: Not at present    Drug use: Never    Sexual activity: None   Other Topics Concern    None   Social History Narrative    None     Social Determinants of Health     Financial Resource Strain:     Difficulty of Paying Living Expenses:    Food Insecurity:     Worried About Running Out of Food in the Last Year:     Ran Out of Food in the Last Year:    Transportation Needs:     Lack of Transportation (Medical):      Lack of Transportation (Non-Medical):    Physical Activity:     Days of Exercise per Week:     Minutes of Exercise per Session:    Stress:     Feeling of Stress :    Social Connections:     Frequency of Communication with Friends and Family:     Frequency of Social Gatherings with Friends and Family:     Attends Druze Services:     Active Member of Clubs or Organizations:     Attends Club or Organization Meetings:     Marital Status:    Intimate Partner Violence:     Fear of Current or Ex-Partner:     Emotionally Abused:     Physically Abused:     Sexually Abused:        Family History   Problem Relation Age of Onset    Lymphoma Mother        No Known Allergies      Current Outpatient Medications:     albuterol (PROVENTIL HFA,VENTOLIN HFA) 90 mcg/act inhaler, Inhale 2 puffs every 6 (six) hours as needed for wheezing, Disp: 1 Inhaler, Rfl: 3    atorvastatin (LIPITOR) 40 mg tablet, Take 1 tablet (40 mg total) by mouth daily with dinner, Disp: 30 tablet, Rfl: 0    cyanocobalamin (VITAMIN B-12) 1000 MCG tablet, Take 1 tablet (1,000 mcg total) by mouth daily, Disp: 90 tablet, Rfl: 3    enoxaparin (LOVENOX) 80 mg/0 8 mL, Inject 0 7 mL (70 mg total) under the skin every 12 (twelve) hours, Disp: 60 Syringe, Rfl: 5    folic acid (FOLVITE) 1 mg tablet, Take 1 tablet (1 mg total) by mouth daily for 29 days, Disp: 29 tablet, Rfl: 0    thiamine (VITAMIN B1) 100 mg tablet, Take 100 mg by mouth daily, Disp: , Rfl:     aspirin (ECOTRIN LOW STRENGTH) 81 mg EC tablet, Take 162 mg by mouth daily (Patient not taking: Reported on 6/22/2021), Disp: , Rfl:     dexamethasone (DECADRON) 2 mg tablet, Take 1 tablet (2 mg total) by mouth 2 (two) times a day with meals (Patient not taking: Reported on 6/22/2021), Disp: 28 tablet, Rfl: 0    dexamethasone (DECADRON) 4 mg tablet, Take 2 tablets (8 mg total) by mouth 2 (two) times a day with meals The day before chemo day of chemo and day after chemo every 4 weeks, Disp: 12 tablet, Rfl: 6    levETIRAcetam (KEPPRA) 500 mg tablet, Take 1 tablet (500 mg total) by mouth every 12 (twelve) hours for 14 doses (Patient not taking: Reported on 1/5/2021), Disp: 14 tablet, Rfl: 0    lisinopril (ZESTRIL) 10 mg tablet, Take 1 tablet (10 mg total) by mouth daily (Patient not taking: Reported on 5/4/2021), Disp: 30 tablet, Rfl: 0    methocarbamol (ROBAXIN) 500 mg tablet, Take 1 tablet (500 mg total) by mouth every 6 (six) hours for 14 days (Patient not taking: Reported on 9/18/2020), Disp: 56 tablet, Rfl: 0    nicotine (NICODERM CQ) 21 mg/24 hr TD 24 hr patch, Place 1 patch on the skin every 24 hours (Patient not taking: Reported on 8/3/2021), Disp: 28 patch, Rfl: 0    ofloxacin (OCUFLOX) 0 3 % ophthalmic solution, Administer 1 drop into the left eye 4 (four) times a day (Patient not taking: Reported on 7/13/2021), Disp: 5 mL, Rfl: 1    ondansetron (ZOFRAN) 8 mg tablet, Take 1 tablet (8 mg total) by mouth every 8 (eight) hours as needed for nausea or vomiting (Patient not taking: Reported on 1/5/2021), Disp: 20 tablet, Rfl: 3    pantoprazole (PROTONIX) 40 mg tablet, Take 1 tablet (40 mg total) by mouth daily (Patient not taking: Reported on 3/16/2021), Disp: 30 tablet, Rfl: 11    predniSONE 20 mg tablet, Take 1 tablet (20 mg total) by mouth daily, Disp: 30 tablet, Rfl: 2    thiamine 100 MG tablet, Take 1 tablet (100 mg total) by mouth daily (Patient not taking: Reported on 6/1/2021), Disp: 30 tablet, Rfl: 0      Physical Exam:  /86 (BP Location: Left arm, Patient Position: Sitting, Cuff Size: Adult)   Pulse 80   Temp (!) 97 °F (36 1 °C) (Tympanic)   Resp 18   Ht 5' 7" (1 702 m)   Wt 62 9 kg (138 lb 11 2 oz)   BMI 21 72 kg/m²     Physical Exam  Constitutional:       Appearance: He is well-developed  He is ill-appearing  HENT:      Head: Normocephalic and atraumatic  Eyes:      General: No scleral icterus  Right eye: No discharge  Left eye: No discharge  Neck:      Thyroid: No thyromegaly  Trachea: No tracheal deviation  Cardiovascular:      Rate and Rhythm: Normal rate and regular rhythm  Heart sounds: Normal heart sounds  No murmur heard  No friction rub  Pulmonary:      Effort: Pulmonary effort is normal  No respiratory distress  Breath sounds: Wheezing ( bilaterally) present  No rales  Chest:      Chest wall: No tenderness  Abdominal:      General: There is no distension  Palpations: Abdomen is soft  There is no hepatomegaly or splenomegaly  Tenderness: There is no abdominal tenderness  There is no guarding or rebound  Musculoskeletal:         General: No tenderness or deformity  Normal range of motion  Cervical back: Normal range of motion and neck supple  Lymphadenopathy:      Cervical: No cervical adenopathy  Skin:     General: Skin is warm and dry  Coloration: Skin is not pale  Findings: No erythema or rash  Neurological:      Mental Status: He is alert and oriented to person, place, and time  Deep Tendon Reflexes: Reflexes are normal and symmetric  Psychiatric:         Behavior: Behavior normal          Thought Content:  Thought content normal  Judgment: Judgment normal            Labs:  Lab Results   Component Value Date    WBC 5 79 07/12/2021    HGB 14 1 07/12/2021    HCT 42 7 07/12/2021     (H) 07/12/2021     07/12/2021     Lab Results   Component Value Date    K 3 8 07/12/2021     07/12/2021    CO2 30 07/12/2021    BUN 12 07/12/2021    CREATININE 0 76 07/12/2021    GLUF 92 06/22/2021    CALCIUM 9 8 07/12/2021    CORRECTEDCA 10 1 11/30/2020    AST 11 07/12/2021    ALT 22 07/12/2021    ALKPHOS 110 07/12/2021    EGFR 103 07/12/2021     No results found for: TSH    Patient voiced understanding and agreement in the above discussion  Aware to contact our office with questions/symptoms in the interim

## 2021-08-09 NOTE — PROGRESS NOTES
Called [facility] & spoke to humera & she sd that [facility] can ship the [facility] [place] today for delivery 08/10/21 for the next treatment dos 08/12/21  Emailed the team & mik & the  pharmacists

## 2021-08-12 NOTE — PROGRESS NOTES
Patient tolerated keytruda and zepzelca infusions without issue  Discharged in stable condition with AVS, aware of appointment at [address] [place] [address] tomorrow

## 2021-08-13 NOTE — PROGRESS NOTES
Patient tolerated fulphila injection to left arm without issue  Discharged in stable condition with AVS, aware of next appointment
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
## 2021-08-25 NOTE — DISCHARGE INSTRUCTIONS
Tylenol as needed for pain    Follow up with the usual clinicians regarding her cancer and her primary care doctor

## 2021-08-27 NOTE — PROGRESS NOTES
Called jermaine to set up the next treatment  Spoke to colin gifford that they can ship the [facility] to the [address] [place] [address] on 08/30/21 for delivery 08/31/21  For the treatment date of 09/02/21  Emailed the team
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
## 2021-09-02 NOTE — PROGRESS NOTES
Pt tolerated chemotherapy infusion well without incident  Aware to return tomorrow at 12:30PM for fulphila injection  AVS provided and pt stable upon discharge

## 2021-09-03 NOTE — PROGRESS NOTES
Patient tolerated fulphila injection to right arm without issue   Discharged in stable condition with AVS

## 2021-09-09 NOTE — TELEPHONE ENCOUNTER
4  Other acute pulmonary embolism without acute cor pulmonale ([facility] [address] )    He continues to be on Lovenox on a daily basis without bleeding      Left voice mail message to discuss request for renewal of xarelto as patient is documented to be on lovenox

## 2021-09-09 NOTE — TELEPHONE ENCOUNTER
Medication Refill     Who is Calling  Patient   Medication Xarelto    How many pills left     Preferred Pharmacy / Address  [facility] [address]    Call back number  [phone]   Relevant Information

## 2021-09-17 NOTE — PROGRESS NOTES
Called [facility] & spoke to abi she sd that they can ship out the [facility] ([place] 09/20/21 for delivery 09/21/21 for next treatment dos 09/23/21 after this gets shipped out yaima are 14 refills left   Emailed the team & the infusion dept

## 2021-09-20 PROBLEM — R63.0 ANOREXIA: Status: ACTIVE | Noted: 2021-01-01

## 2021-09-20 PROBLEM — R53.1 GENERALIZED WEAKNESS: Status: ACTIVE | Noted: 2021-01-01

## 2021-09-20 PROBLEM — R63.4 WEIGHT LOSS: Status: ACTIVE | Noted: 2021-01-01

## 2021-09-20 PROBLEM — R54 FRAILTY: Status: ACTIVE | Noted: 2021-01-01

## 2021-09-20 PROBLEM — R26.9 GAIT ABNORMALITY: Status: ACTIVE | Noted: 2021-01-01
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
## 2021-09-21 NOTE — PROGRESS NOTES
Hematology/Oncology Outpatient Follow-up  Siri Gutierrez 48 y o  male 1965 [id number]    Date:  9/21/2021        Assessment and Plan:  1  Small cell lung cancer, left lower lobe ([address] )    I had a lengthy discussion with Larry Kenny and his brother as well as his family over the phone during this office visit  Larry Kenny was not able to communicate very well during this office visit  He seems to have significant deterioration of his overall condition and energy level even though the recent CT scan showed stable disease in the lung  The patient was offered to get only palliative treatment under the hospice care since he has very poor performance status which is the most appropriate approach at this point in time  The patient and his family agreed with the plan  We will see him on as-needed basis  - Ambulatory referral to hospice; Future        HPI:   the patient came today accompanied by his brother  The rest of his family was in the waiting room  He is in a wheelchair and can barely communicate  He seems to have significant deterioration of his overall condition and energy  He spends more than 50% of his day in bed  He had a CT scan of the chest abdomen pelvis on 09/14/2021 which showed stable left upper lobe spiculated nodule and left perihilar mass unchanged since 07/09/2021  The patient was seen by the palliative care team yesterday and hospice was recommended  Oncology History Overview Note   Patient has a  history of COPD, hypertension, tobacco abuse, etc   Presented initially to the emergency room with loss of vision in the left eye with numbness on the left face  He was then evaluated with a CT scan of the head on 08/30/2020 which showed large cystic mass in the left cerebral hemisphere with surrounding edema    CT scan of the chest abdomen pelvis with contrast was then done on 08/30/2020 which showed:  IMPRESSION:  There is an approximately 4 x 4 x 3 cm mass in the mid to lower left hilum which extends into the left lower lobe of the lung and the left lingula  Nearby satellite masses and nodules are present  The appearance is highly suggestive of malignant neoplasm  There appears to be some hypodensity within some of the central left pulmonary artery branches  Pulmonary embolism and/or tumor thrombus should be excluded  Dedicated CT angiogram/PE protocol CTA of the chest is recommended for further evaluation  Bilateral adrenal masses, each measuring approximately 2 cm  MRI is recommended for further characterization, if there are no contraindications  The urinary bladder wall appears thickened  This is most pronounced anteriorly  Clinical correlation, laboratory correlation and follow-up is recommended  The prostate is mildly prominent  Clinical and laboratory correlation is recommended  Mild emphysema  MRI of the brain was done on 08/31/2020 which showed multiple intracranial enhancing lesions without dominant cystic lesion in the deep left inferior frontal lobe with the multiple lesions with suspicious primary pulmonary neoplasm  He also had an MRI of the abdomen for further evaluation of the adrenal glands this showed bilateral adenomas  The patient then had craniotomy and resection of the left frontal mass on 09/01/2020 which came back compatible with high-grade neuroendocrine tumor compatible with metastatic small cell lung cancer primary  The patient started radiation treatment to the brain on the 23rd of September 2020  He has very difficult time expressing himself  He only can answer questions with yes and no      10/30/2020 MRI of the brain showed improvement, with no new lesions  11/23/2020 he had a f/u with [facility] ophthalmology because of  vision loss  OS  Exam revealed Optic nerve atrophy, OS  He was referred to  neurology, MRI orbits ordered, and MD  contacted Dr Ruiz Gill, to explain findings and concern for Meningeal/csf involvement        11/27/2020 pt had a Neurology consult @ [facility]  Dr Espinoza Felix  12/1/2020 MRI lumbar spine: Drop seed metastases within the distal thecal sac  This is relatively diffuse, with small nodular foci at the upper L2 and, L5 levels  Chronic bilateral L4 spondylolysis, grade 1 spondylolisthesis  Asymmetric right foraminal stenosis, correlate for right L4 radiculitis  12/9/2020 MRI cervical spine: Cervical degenerative disc disease with disc herniations and endplate/uncinate joint hypertrophic change present  Moderate canal stenosis at C3-4 without cord compression or abnormal cord signal    At C4-5 there is severe right and moderate left foraminal narrowing  There is a left-sided disc extrusion at C5-6 with moderate left lateral canal stenosis  Severe right foraminal narrowing at C7-T1 secondary to right foraminal disc osteophyte complex  12/9/2020 MRI thoracic spine:    Mild thoracic degenerative change with small disc herniations and adjacent endplate osteophytes at T6-7, T7-8 and T9-10  No cord compression  12/10/2020 MRI orbits/brain  MRA neck @ [facility]  Impression:  1  Stable size of left frontal resection cavity, with persistent T1 hyperintense hemorrhage/blood products filling the resection cavity  Previously noted prominent irregular enhancement along the superior aspect of the resection cavity has essentially resolved  Nodular enhancement along the medial/posterior aspect of the resection cavity is slightly decreased since the prior study  There remains mild peripheral enhancement anteriorly  2  Decreased size of T1 hyperintense lesions along the ependymal surface of the frontal horn of the right lateral ventricle  Previously noted lesions along the left ventricular ependymal surface appear to have resolved  No new lesions are identified  3  No definite abnormal signal or enhancement in the optic nerve/optic chiasm  No definite intraorbital mass identified  4  No evidence of cervical carotid stenosis    5  Stenosis at the right greater than left vertebral artery origi     He was found to have PE and DVT December 2020  Was started on Xarelto which was later transitioned to Lovenox twice a day in preparation for his intrathecal chemotherapy  Started intrathecal chemotherapy with methotrexate weekly (02/09/2020) x4 followed by monthly intrathecal methotrexate starting 03/30/2021 was discontinued around the end of April 2021 after he was found to have multiple new brain lesions on repeat MRI       He then went on to have whole brain radiation which he completed 5/26/21     Brain metastases ([facility] [address] )   9/1/2020 Initial Diagnosis    Brain metastases ([facility] [address] )     9/1/2020 Surgery    Left frontal CRANIOTOMY IMAGE-GUIDED FOR TUMOR (Left)  Surgeon: Dr Todd Orellana, left frontal mass:  -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 , 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         9/23/2020 - 10/6/2020 Radiation    Plan ID Energy Fractions Dose per Fraction (cGy) Dose Correction (cGy) Total Dose Delivered (cGy) Elapsed Days   Whole Brain  6X 10 / 10 300 0 3,000 13        10/12/2020 - 4/13/2021 Chemotherapy    pegfilgrastim-cbqv (UDENYCA) subcutaneous injection 6 mg, 6 mg, Subcutaneous, Once, 2 of 2 cycles  Administration: 6 mg (12/4/2020), 6 mg (12/26/2020)  fosaprepitant (EMEND) 150 mg in sodium chloride 0 9 % 250 mL IVPB, 150 mg, Intravenous, Once, 4 of 4 cycles  Administration: 150 mg (10/12/2020), 150 mg (11/4/2020), 150 mg (12/1/2020), 150 mg (12/22/2020)  CARBOplatin (PARAPLATIN) 723 mg in sodium chloride 0 9 % 250 mL IVPB, 723 mg (561 8 % of original dose 128 7 mg), Intravenous, Once, 4 of 4 cycles  Dose modification: 723 mg (original dose 128 7 mg, Cycle 1, Reason: Other (See Comments), Comment: use creat 9/25/2020)  Administration: 723 mg (10/12/2020), 750 mg (11/4/2020), 750 mg (12/1/2020), 736 5 mg (12/22/2020)  Durvalumab 1,500 mg in sodium chloride 0 9 % 100 mL IVPB, 1,500 mg (100 % of original dose 1,500 mg), Intravenous, Once, 7 of 10 cycles  Dose modification: 1,500 mg (original dose 1,500 mg, Cycle 1)  Administration: 1,500 mg (10/12/2020), 1,500 mg (11/4/2020), 1,500 mg (12/1/2020), 1,500 mg (12/22/2020), 1,500 mg (1/14/2021), 1,500 mg (2/11/2021), 1,500 mg (3/17/2021)  etoposide (TOPOSAR) 200 mg in sodium chloride 0 9 % 500 mL chemo infusion, 198 mg, Intravenous, Once, 4 of 4 cycles  Administration: 200 mg (10/12/2020), 200 mg (10/13/2020), 200 mg (11/4/2020), 200 mg (10/14/2020), 200 mg (11/5/2020), 200 mg (12/1/2020), 200 mg (11/6/2020), 200 mg (12/2/2020), 200 mg (12/22/2020), 200 mg (12/3/2020), 200 mg (12/23/2020), 200 mg (12/24/2020)  methotrexate (PF) 12 mg in sodium chloride (PF) 0 9 % 2 52 mL intrathecal injection, 12 mg, Intrathecal, Once, 2 of 5 cycles  Administration: 12 mg (2/16/2021), 12 mg (2/23/2021)     12/30/2020 - 1/13/2021 Radiation    Treatments:  Course: C2    Plan ID Energy Fractions Dose per Fraction (cGy) Dose Correction (cGy) Total Dose Delivered (cGy) Elapsed Days   L1_S1 Spine 10X 10 / 10 300 0 3,000 14            4/22/2021 - 7/13/2021 Chemotherapy    pegfilgrastim-jmdb (FULPHILA), 6 mg, Subcutaneous, Once, 4 of 9 cycles  Administration: 6 mg (4/23/2021), 6 mg (5/13/2021), 6 mg (6/3/2021), 6 mg (6/24/2021)  DOCEtaxel (TAXOTERE) chemo infusion, 137 2 mg, Intravenous, Once, 4 of 9 cycles  Administration: 140 mg (4/22/2021), 140 mg (5/12/2021), 140 mg (6/2/2021), 140 mg (6/23/2021)  pembrolizumab (KEYTRUDA) IVPB, 200 mg (50 % of original dose 400 mg), Intravenous, Once, 0 of 5 cycles  Dose modification: 200 mg (original dose 400 mg, Cycle 5, Reason: Other (Must fill in a comment), Comment: Jacoby Peterson obtained for Compassionate use )     5/12/2021 - 5/26/2021 Radiation    Plan ID Energy Fractions Dose per Fraction (cGy) Dose Correction (cGy) Total Dose Delivered (cGy) Elapsed Days   WB ReTx EZ #:1 6X 9 / 9 180 0 1,620 14        7/22/2021 - 9/22/2021 Chemotherapy    pegfilgrastim-jmdb (FULPHILA), 6 mg, Subcutaneous, Once, 3 of 9 cycles  Administration: 6 mg (7/23/2021), 6 mg (8/13/2021), 6 mg (9/3/2021)  Lurbinectedin (ZEPZELCA) IVPB, 3 2 mg/m2 = 5 8 mg, Intravenous, Once, 3 of 9 cycles  Administration: 5 8 mg (7/22/2021), 5 8 mg (8/12/2021), 5 8 mg (9/2/2021)  pembrolizumab (Danyelle Doherty) IVPB, 200 mg, Intravenous, Once, 3 of 4 cycles  Administration: 200 mg (7/22/2021), 200 mg (9/2/2021), 200 mg (8/12/2021)     Small cell lung cancer, left lower lobe ([facility] [address] )   9/24/2020 Initial Diagnosis    Small cell lung cancer, left lower lobe ([facility] [address] )  Extensive Stage     10/12/2020 - 4/13/2021 Chemotherapy    pegfilgrastim-cbqv (UDENYCA) subcutaneous injection 6 mg, 6 mg, Subcutaneous, Once, 2 of 2 cycles  Administration: 6 mg (12/4/2020), 6 mg (12/26/2020)  fosaprepitant (EMEND) 150 mg in sodium chloride 0 9 % 250 mL IVPB, 150 mg, Intravenous, Once, 4 of 4 cycles  Administration: 150 mg (10/12/2020), 150 mg (11/4/2020), 150 mg (12/1/2020), 150 mg (12/22/2020)  CARBOplatin (PARAPLATIN) 723 mg in sodium chloride 0 9 % 250 mL IVPB, 723 mg (561 8 % of original dose 128 7 mg), Intravenous, Once, 4 of 4 cycles  Dose modification: 723 mg (original dose 128 7 mg, Cycle 1, Reason: Other (See Comments), Comment: use creat 9/25/2020)  Administration: 723 mg (10/12/2020), 750 mg (11/4/2020), 750 mg (12/1/2020), 736 5 mg (12/22/2020)  Durvalumab 1,500 mg in sodium chloride 0 9 % 100 mL IVPB, 1,500 mg (100 % of original dose 1,500 mg), Intravenous, Once, 7 of 10 cycles  Dose modification: 1,500 mg (original dose 1,500 mg, Cycle 1)  Administration: 1,500 mg (10/12/2020), 1,500 mg (11/4/2020), 1,500 mg (12/1/2020), 1,500 mg (12/22/2020), 1,500 mg (1/14/2021), 1,500 mg (2/11/2021), 1,500 mg (3/17/2021)  etoposide (TOPOSAR) 200 mg in sodium chloride 0 9 % 500 mL chemo infusion, 198 mg, Intravenous, Once, 4 of 4 cycles  Administration: 200 mg (10/12/2020), 200 mg (10/13/2020), 200 mg (11/4/2020), 200 mg (10/14/2020), 200 mg (11/5/2020), 200 mg (12/1/2020), 200 mg (11/6/2020), 200 mg (12/2/2020), 200 mg (12/22/2020), 200 mg (12/3/2020), 200 mg (12/23/2020), 200 mg (12/24/2020)  methotrexate (PF) 12 mg in sodium chloride (PF) 0 9 % 2 52 mL intrathecal injection, 12 mg, Intrathecal, Once, 2 of 5 cycles  Administration: 12 mg (2/16/2021), 12 mg (2/23/2021)     4/22/2021 - 7/13/2021 Chemotherapy    pegfilgrastim-jmdb (FULPHILA), 6 mg, Subcutaneous, Once, 4 of 9 cycles  Administration: 6 mg (4/23/2021), 6 mg (5/13/2021), 6 mg (6/3/2021), 6 mg (6/24/2021)  DOCEtaxel (TAXOTERE) chemo infusion, 137 2 mg, Intravenous, Once, 4 of 9 cycles  Administration: 140 mg (4/22/2021), 140 mg (5/12/2021), 140 mg (6/2/2021), 140 mg (6/23/2021)  pembrolizumab (KEYTRUDA) IVPB, 200 mg (50 % of original dose 400 mg), Intravenous, Once, 0 of 5 cycles  Dose modification: 200 mg (original dose 400 mg, Cycle 5, Reason: Other (Must fill in a comment), Comment: Meghan obtained for Compassionate use )     7/22/2021 - 9/22/2021 Chemotherapy    pegfilgrastim-jmdb (FULPHILA), 6 mg, Subcutaneous, Once, 3 of 9 cycles  Administration: 6 mg (7/23/2021), 6 mg (8/13/2021), 6 mg (9/3/2021)  Lurbinectedin (ZEPZELCA) IVPB, 3 2 mg/m2 = 5 8 mg, Intravenous, Once, 3 of 9 cycles  Administration: 5 8 mg (7/22/2021), 5 8 mg (8/12/2021), 5 8 mg (9/2/2021)  pembrolizumab (KEYTRUDA) IVPB, 200 mg, Intravenous, Once, 3 of 4 cycles  Administration: 200 mg (7/22/2021), 200 mg (9/2/2021), 200 mg (8/12/2021)         Interval history:    ROS: Review of Systems   Constitutional: Positive for activity change, appetite change, fatigue and unexpected weight change  Negative for chills and fever  HENT: Negative for ear pain and sore throat  Eyes: Negative for pain and visual disturbance  Respiratory: Positive for cough and shortness of breath ( at rest)  Cardiovascular: Negative for chest pain and palpitations     Gastrointestinal: Positive for constipation  Negative for abdominal pain and vomiting  Genitourinary: Negative for dysuria and hematuria  Musculoskeletal: Negative for arthralgias and back pain  Skin: Negative for color change and rash  Neurological: Positive for weakness  Negative for seizures and syncope  All other systems reviewed and are negative  Past Medical History:   Diagnosis Date    Brain cancer ([facility] )     COPD (chronic obstructive pulmonary disease) ([facility] )     Hyperlipidemia     Hypertension     Lung cancer ([place] [address] )        Past Surgical History:   Procedure Laterality Date    BILATERAL KNEE ARTHROSCOPY Bilateral     CRANIOTOMY Left 9/1/2020    Procedure: Left frontal CRANIOTOMY IMAGE-GUIDED FOR TUMOR;  Surgeon: Anette Horvath MD;  Location: [facility];  Service: Neurosurgery    HAND SURGERY Left     IR PORT PLACEMENT  10/8/2020    WISDOM TOOTH EXTRACTION         Social History     Socioeconomic History    Marital status: Single     Spouse name: None    Number of children: None    Years of education: None    Highest education level: None   Occupational History    None   Tobacco Use    Smoking status: Current Every Day Smoker     Packs/day: 0 75    Smokeless tobacco: Never Used    Tobacco comment: as pf 5/4/21, smoking 3/4 pack    Vaping Use    Vaping Use: Never used   Substance and Sexual Activity    Alcohol use: Not Currently     Comment: Not at present    Drug use: Never    Sexual activity: None   Other Topics Concern    None   Social History Narrative    None     Social Determinants of Health     Financial Resource Strain:     Difficulty of Paying Living Expenses:    Food Insecurity:     Worried About Running Out of Food in the Last Year:     Ran Out of Food in the Last Year:    Transportation Needs:     Lack of Transportation (Medical):      Lack of Transportation (Non-Medical):    Physical Activity:     Days of Exercise per Week:     Minutes of Exercise per Session:    Stress:     Feeling of Stress :    Social Connections:     Frequency of Communication with Friends and Family:     Frequency of Social Gatherings with Friends and Family:     Attends L[employer] day Services:     Active Member of Clubs or Organizations:     Attends Club or Organization Meetings:     Marital Status:    Intimate Partner Violence:     Fear of Current or Ex-Partner:     Emotionally Abused:     Physically Abused:     Sexually Abused:        Family History   Problem Relation Age of Onset    Lymphoma Mother        No Known Allergies      Current Outpatient Medications:     albuterol (PROVENTIL HFA,VENTOLIN HFA) 90 mcg/act inhaler, Inhale 2 puffs every 6 (six) hours as needed for wheezing, Disp: 1 Inhaler, Rfl: 3    cyanocobalamin (VITAMIN B-12) 1000 MCG tablet, Take 1 tablet (1,000 mcg total) by mouth daily, Disp: 90 tablet, Rfl: 3    enoxaparin (LOVENOX) 80 mg/0 8 mL, Inject 0 7 mL (70 mg total) under the skin every 12 (twelve) hours, Disp: 60 Syringe, Rfl: 5    nicotine (NICODERM CQ) 21 mg/24 hr TD 24 hr patch, Place 1 patch on the skin every 24 hours, Disp: 28 patch, Rfl: 0    predniSONE 20 mg tablet, Take 1 tablet (20 mg total) by mouth daily, Disp: 30 tablet, Rfl: 2    thiamine (VITAMIN B1) 100 mg tablet, Take 100 mg by mouth daily, Disp: , Rfl:     aspirin (ECOTRIN LOW STRENGTH) 81 mg EC tablet, Take 162 mg by mouth daily (Patient not taking: Reported on 6/22/2021), Disp: , Rfl:     atorvastatin (LIPITOR) 40 mg tablet, Take 1 tablet (40 mg total) by mouth daily with dinner, Disp: 30 tablet, Rfl: 0    dexamethasone (DECADRON) 2 mg tablet, Take 1 tablet (2 mg total) by mouth 2 (two) times a day with meals (Patient not taking: Reported on 6/22/2021), Disp: 28 tablet, Rfl: 0    dexamethasone (DECADRON) 4 mg tablet, Take 2 tablets (8 mg total) by mouth 2 (two) times a day with meals The day before chemo day of chemo and day after chemo every 4 weeks, Disp: 12 tablet, Rfl: 6    folic acid (FOLVITE) 1 mg tablet, Take 1 tablet (1 mg total) by mouth daily for 29 days, Disp: 29 tablet, Rfl: 0    levETIRAcetam (KEPPRA) 500 mg tablet, Take 1 tablet (500 mg total) by mouth every 12 (twelve) hours for 14 doses (Patient not taking: Reported on 1/5/2021), Disp: 14 tablet, Rfl: 0    lisinopril (ZESTRIL) 10 mg tablet, Take 1 tablet (10 mg total) by mouth daily (Patient not taking: Reported on 5/4/2021), Disp: 30 tablet, Rfl: 0    methocarbamol (ROBAXIN) 500 mg tablet, Take 1 tablet (500 mg total) by mouth every 6 (six) hours for 14 days (Patient not taking: Reported on 9/18/2020), Disp: 56 tablet, Rfl: 0    ofloxacin (OCUFLOX) 0 3 % ophthalmic solution, Administer 1 drop into the left eye 4 (four) times a day (Patient not taking: Reported on 7/13/2021), Disp: 5 mL, Rfl: 1    ondansetron (ZOFRAN) 8 mg tablet, Take 1 tablet (8 mg total) by mouth every 8 (eight) hours as needed for nausea or vomiting (Patient not taking: Reported on 1/5/2021), Disp: 20 tablet, Rfl: 3    pantoprazole (PROTONIX) 40 mg tablet, Take 1 tablet (40 mg total) by mouth daily (Patient not taking: Reported on 3/16/2021), Disp: 30 tablet, Rfl: 11    thiamine 100 MG tablet, Take 1 tablet (100 mg total) by mouth daily (Patient not taking: Reported on 6/1/2021), Disp: 30 tablet, Rfl: 0      Physical Exam:  /78 (BP Location: Left arm, Patient Position: Sitting, Cuff Size: Adult)   Pulse 72   Temp 97 5 °F (36 4 °C) (Temporal)   Resp 16   SpO2 97%     Physical Exam  Constitutional:       General: He is in acute distress  Appearance: He is well-developed  He is ill-appearing  Comments: The patient looks very malnourished with temporal muscle wasting   HENT:      Head: Normocephalic and atraumatic  Eyes:      Comments: 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 bilaterally  Neck:      Thyroid: No thyromegaly  Trachea: No tracheal deviation  Cardiovascular:      Rate and Rhythm: Normal rate and regular rhythm        Heart sounds: Normal heart sounds  No murmur heard  No friction rub  Pulmonary:      Effort: Pulmonary effort is normal  No respiratory distress  Breath sounds: Normal breath sounds  No wheezing or rales  Chest:      Chest wall: No tenderness  Abdominal:      General: Bowel sounds are normal  There is no distension  Palpations: Abdomen is soft  There is no hepatomegaly, splenomegaly or mass  Tenderness: There is no abdominal tenderness  There is no guarding or rebound  Musculoskeletal:         General: No tenderness or deformity  Cervical back: Normal range of motion and neck supple  Comments: Ambulatory dysfunction sitting in a wheelchair   Lymphadenopathy:      Cervical: No cervical adenopathy  Skin:     General: Skin is warm and dry  Coloration: Skin is not pale  Findings: No erythema or rash  Neurological:      Mental Status: He is alert and oriented to person, place, and time  Cranial Nerves: No cranial nerve deficit  Coordination: Coordination normal       Deep Tendon Reflexes: Reflexes are normal and symmetric  Reflexes normal    Psychiatric:         Behavior: Behavior normal          Thought Content: Thought content normal          Judgment: Judgment normal            Labs:  Lab Results   Component Value Date    WBC 7 50 08/31/2021    HGB 14 9 08/31/2021    HCT 44 9 08/31/2021     (H) 08/31/2021     08/31/2021     Lab Results   Component Value Date    K 3 7 08/31/2021     (H) 08/31/2021    CO2 29 08/31/2021    BUN 14 08/31/2021    CREATININE 0 76 08/31/2021    GLUF 80 08/31/2021    CALCIUM 9 4 08/31/2021    CORRECTEDCA 10 1 11/30/2020    AST 14 08/31/2021    ALT 28 08/31/2021    ALKPHOS 146 (H) 08/31/2021    EGFR 103 08/31/2021     No results found for: TSH    Patient voiced understanding and agreement in the above discussion  Aware to contact our office with questions/symptoms in the interim

## 2021-09-21 NOTE — TELEPHONE ENCOUNTER
Phoned Shantel Brown at [facility] infusion to have her cancel patient's infusion appts as he has opted to go with Hospice

## 2021-09-23 NOTE — TELEPHONE ENCOUNTER
Vaibhav Preciado from [address] [place] [address] called stating patient has been signed on to services as of today

## 2023-04-08 NOTE — PROGRESS NOTES
Pt arrived to dept afebrile  States he had no adverse reactions from the taxotere last night  Tolerated fulphila to left arm without issue   Discharged ambulatory deferring avs as he received same yesterday No

## (undated) DEVICE — SUT SILK 2-0 SH CR/8 18 IN C012D

## (undated) DEVICE — KERLIX BANDAGE ROLL: Brand: KERLIX

## (undated) DEVICE — PETRI DISH STERILE

## (undated) DEVICE — COTTON BALLS-STRUNG 1": Brand: COTTON BALLS

## (undated) DEVICE — SUT NUROLON 4-0 TF CR/8 18 IN C584D

## (undated) DEVICE — TOOL 9MH30 LEGEND 9CM 3MM MH: Brand: MIDAS REX

## (undated) DEVICE — SPONGE SCRUB 4 PCT CHLORHEXIDINE

## (undated) DEVICE — INTENDED FOR TISSUE SEPARATION, AND OTHER PROCEDURES THAT REQUIRE A SHARP SURGICAL BLADE TO PUNCTURE OR CUT.: Brand: BARD-PARKER SAFETY BLADES SIZE 10, STERILE

## (undated) DEVICE — UTILITY MARKER,BLACK WITH LABELS: Brand: DEVON

## (undated) DEVICE — ELECTRODE BLADE MOD E-Z CLEAN 2.5IN 6.4CM -0012M

## (undated) DEVICE — GLOVE INDICATOR PI UNDERGLOVE SZ 8 BLUE

## (undated) DEVICE — MARKER REFLECTIVE RADIOPAQUE SPHERE

## (undated) DEVICE — TELFA NON-ADHERENT ABSORBENT DRESSING: Brand: TELFA

## (undated) DEVICE — JACKSON-PRATT 100CC BULB RESERVOIR: Brand: CARDINAL HEALTH

## (undated) DEVICE — 3M™ IOBAN™ 2 ANTIMICROBIAL INCISE DRAPE 6650EZ: Brand: IOBAN™ 2

## (undated) DEVICE — SURGICEL 4 X 8

## (undated) DEVICE — BETADINE OINTMENT FOIL PACK

## (undated) DEVICE — INTENDED FOR TISSUE SEPARATION, AND OTHER PROCEDURES THAT REQUIRE A SHARP SURGICAL BLADE TO PUNCTURE OR CUT.: Brand: BARD-PARKER ® CARBON RIB-BACK BLADES

## (undated) DEVICE — DRAPE MICROSCOPE OPMI PENTERO

## (undated) DEVICE — TUBING SUCTION 5MM X 12 FT

## (undated) DEVICE — COVER PROBE INTRAOPERATIVE 6 X 96 IN

## (undated) DEVICE — DISPOSABLE EQUIPMENT COVER: Brand: SMALL TOWEL DRAPE

## (undated) DEVICE — SURGIFOAM 8.5 X 12.5

## (undated) DEVICE — TOOL F2/8TA23 LEGEND 8CM 2.3MM TAPER: Brand: MIDAS REX™

## (undated) DEVICE — SPECIMEN CONTAINER STERILE PEEL PACK

## (undated) DEVICE — DRAPE INTESTINAL ISOLATION BAG

## (undated) DEVICE — ANTIBACTERIAL VIOLET BRAIDED (POLYGLACTIN 910), SYNTHETIC ABSORBABLE SUTURE: Brand: COATED VICRYL

## (undated) DEVICE — MAYFIELD® DISPOSABLE ADULT SKULL PIN (PLASTIC BASE): Brand: MAYFIELD®

## (undated) DEVICE — PROXIMATE PLUS MD MULTI-DIRECTIONAL RELEASE SKIN STAPLERS CONTAINS 35 STAINLESS STEEL STAPLES APPROXIMATE CLOSED DIMENSIONS: 6.9MM X 3.9MM WIDE: Brand: PROXIMATE

## (undated) DEVICE — DRAIN JACKSON PRATT 10FR 1/8END: Brand: CARDINAL HEALTH

## (undated) DEVICE — OCCLUSIVE GAUZE STRIP,3% BISMUTH TRIBROMOPHENATE IN PETROLATUM BLEND: Brand: XEROFORM

## (undated) DEVICE — TRAY FOLEY 16FR URIMETER SURESTEP

## (undated) DEVICE — Device: Brand: IQ SYSTEM

## (undated) DEVICE — RANEY SCALP CLIP STERILE: Brand: AESCULAP

## (undated) DEVICE — PAD GROUNDING ADULT

## (undated) DEVICE — DISPOSABLE SLIM BIPOLAR FORCEPS, NON-STICK,: Brand: SPETZLER-MALIS

## (undated) DEVICE — GLOVE SRG BIOGEL 7.5

## (undated) DEVICE — PACK CRANIOTOMY PBDS RF

## (undated) DEVICE — CHLORAPREP HI-LITE 26ML ORANGE

## (undated) DEVICE — SUT PROLENE 2-0 CT-2 30 IN 8411H

## (undated) DEVICE — DRAPE SHEET THREE QUARTER